# Patient Record
Sex: FEMALE | Race: WHITE | HISPANIC OR LATINO | Employment: OTHER | URBAN - METROPOLITAN AREA
[De-identification: names, ages, dates, MRNs, and addresses within clinical notes are randomized per-mention and may not be internally consistent; named-entity substitution may affect disease eponyms.]

---

## 2017-01-01 ENCOUNTER — HOME CARE VISIT (OUTPATIENT)
Dept: HOME HEALTH SERVICES | Facility: HOME HEALTHCARE | Age: 47
End: 2017-01-01
Payer: MEDICARE

## 2017-01-01 ENCOUNTER — RESOLUTE PROFESSIONAL BILLING HOSPITAL PROF FEE (OUTPATIENT)
Dept: HOSPITALIST | Facility: MEDICAL CENTER | Age: 47
End: 2017-01-01
Payer: MEDICARE

## 2017-01-01 ENCOUNTER — HOSPITAL ENCOUNTER (INPATIENT)
Facility: MEDICAL CENTER | Age: 47
LOS: 6 days | DRG: 974 | End: 2017-05-14
Attending: EMERGENCY MEDICINE | Admitting: HOSPITALIST
Payer: MEDICARE

## 2017-01-01 ENCOUNTER — PATIENT OUTREACH (OUTPATIENT)
Dept: HEALTH INFORMATION MANAGEMENT | Facility: OTHER | Age: 47
End: 2017-01-01

## 2017-01-01 ENCOUNTER — RESOLUTE PROFESSIONAL BILLING HOSPITAL PROF FEE (OUTPATIENT)
Dept: HOSPITALIST | Facility: MEDICAL CENTER | Age: 47
End: 2017-01-01
Payer: MEDICAID

## 2017-01-01 ENCOUNTER — APPOINTMENT (OUTPATIENT)
Dept: RADIOLOGY | Facility: MEDICAL CENTER | Age: 47
DRG: 974 | End: 2017-01-01
Attending: INTERNAL MEDICINE
Payer: MEDICARE

## 2017-01-01 ENCOUNTER — HOSPITAL ENCOUNTER (INPATIENT)
Facility: MEDICAL CENTER | Age: 47
LOS: 8 days | DRG: 977 | End: 2017-12-19
Attending: EMERGENCY MEDICINE | Admitting: INTERNAL MEDICINE
Payer: MEDICARE

## 2017-01-01 ENCOUNTER — HOME HEALTH ADMISSION (OUTPATIENT)
Dept: HOME HEALTH SERVICES | Facility: HOME HEALTHCARE | Age: 47
End: 2017-01-01
Payer: MEDICARE

## 2017-01-01 ENCOUNTER — APPOINTMENT (OUTPATIENT)
Dept: RADIOLOGY | Facility: MEDICAL CENTER | Age: 47
DRG: 917 | End: 2017-01-01
Attending: INTERNAL MEDICINE
Payer: MEDICARE

## 2017-01-01 ENCOUNTER — APPOINTMENT (OUTPATIENT)
Dept: RADIOLOGY | Facility: MEDICAL CENTER | Age: 47
DRG: 314 | End: 2017-01-01
Attending: EMERGENCY MEDICINE
Payer: MEDICARE

## 2017-01-01 ENCOUNTER — TELEPHONE (OUTPATIENT)
Dept: NEPHROLOGY | Facility: MEDICAL CENTER | Age: 47
End: 2017-01-01

## 2017-01-01 ENCOUNTER — APPOINTMENT (OUTPATIENT)
Dept: RADIOLOGY | Facility: MEDICAL CENTER | Age: 47
End: 2017-01-01
Attending: INTERNAL MEDICINE
Payer: MEDICAID

## 2017-01-01 ENCOUNTER — HOSPITAL ENCOUNTER (OUTPATIENT)
Facility: MEDICAL CENTER | Age: 47
End: 2017-05-06
Attending: EMERGENCY MEDICINE | Admitting: INTERNAL MEDICINE
Payer: MEDICARE

## 2017-01-01 ENCOUNTER — HOSPITAL ENCOUNTER (INPATIENT)
Facility: MEDICAL CENTER | Age: 47
LOS: 4 days | DRG: 974 | End: 2017-12-01
Attending: EMERGENCY MEDICINE | Admitting: INTERNAL MEDICINE
Payer: MEDICARE

## 2017-01-01 ENCOUNTER — HOME CARE VISIT (OUTPATIENT)
Dept: HOME HEALTH SERVICES | Facility: HOME HEALTHCARE | Age: 47
End: 2017-01-01

## 2017-01-01 ENCOUNTER — HOSPITAL ENCOUNTER (OUTPATIENT)
Facility: MEDICAL CENTER | Age: 47
End: 2017-01-24
Attending: INTERNAL MEDICINE
Payer: MEDICARE

## 2017-01-01 ENCOUNTER — APPOINTMENT (OUTPATIENT)
Dept: RADIOLOGY | Facility: MEDICAL CENTER | Age: 47
DRG: 917 | End: 2017-01-01
Attending: EMERGENCY MEDICINE
Payer: MEDICARE

## 2017-01-01 ENCOUNTER — HOSPITAL ENCOUNTER (OUTPATIENT)
Dept: LAB | Facility: MEDICAL CENTER | Age: 47
End: 2017-09-25
Attending: NURSE PRACTITIONER
Payer: MEDICAID

## 2017-01-01 ENCOUNTER — HOME HEALTH ADMISSION (OUTPATIENT)
Dept: HOME HEALTH SERVICES | Facility: HOME HEALTHCARE | Age: 47
End: 2017-01-01
Payer: MEDICAID

## 2017-01-01 ENCOUNTER — APPOINTMENT (OUTPATIENT)
Dept: RADIOLOGY | Facility: MEDICAL CENTER | Age: 47
DRG: 977 | End: 2017-01-01
Attending: EMERGENCY MEDICINE
Payer: MEDICARE

## 2017-01-01 ENCOUNTER — HOSPITAL ENCOUNTER (OUTPATIENT)
Facility: MEDICAL CENTER | Age: 47
End: 2017-09-26
Attending: INTERNAL MEDICINE | Admitting: INTERNAL MEDICINE
Payer: MEDICAID

## 2017-01-01 ENCOUNTER — APPOINTMENT (OUTPATIENT)
Dept: RADIOLOGY | Facility: MEDICAL CENTER | Age: 47
DRG: 974 | End: 2017-01-01
Attending: HOSPITALIST
Payer: MEDICARE

## 2017-01-01 ENCOUNTER — HOSPITAL ENCOUNTER (OUTPATIENT)
Facility: MEDICAL CENTER | Age: 47
End: 2017-04-30
Attending: EMERGENCY MEDICINE | Admitting: INTERNAL MEDICINE
Payer: MEDICARE

## 2017-01-01 ENCOUNTER — HOSPITAL ENCOUNTER (INPATIENT)
Facility: MEDICAL CENTER | Age: 47
LOS: 4 days | DRG: 917 | End: 2017-06-07
Attending: EMERGENCY MEDICINE | Admitting: INTERNAL MEDICINE
Payer: MEDICARE

## 2017-01-01 ENCOUNTER — HOSPITAL ENCOUNTER (INPATIENT)
Facility: MEDICAL CENTER | Age: 47
LOS: 1 days | DRG: 974 | End: 2017-06-11
Attending: EMERGENCY MEDICINE | Admitting: HOSPITALIST
Payer: MEDICARE

## 2017-01-01 ENCOUNTER — APPOINTMENT (OUTPATIENT)
Dept: RADIOLOGY | Facility: MEDICAL CENTER | Age: 47
End: 2017-01-01
Attending: EMERGENCY MEDICINE
Payer: MEDICARE

## 2017-01-01 ENCOUNTER — HOSPITAL ENCOUNTER (INPATIENT)
Facility: MEDICAL CENTER | Age: 47
LOS: 7 days | DRG: 974 | End: 2017-06-27
Attending: EMERGENCY MEDICINE | Admitting: HOSPITALIST
Payer: MEDICARE

## 2017-01-01 ENCOUNTER — APPOINTMENT (OUTPATIENT)
Dept: RADIOLOGY | Facility: MEDICAL CENTER | Age: 47
DRG: 974 | End: 2017-01-01
Attending: EMERGENCY MEDICINE
Payer: MEDICARE

## 2017-01-01 ENCOUNTER — APPOINTMENT (OUTPATIENT)
Dept: RADIOLOGY | Facility: MEDICAL CENTER | Age: 47
DRG: 314 | End: 2017-01-01
Attending: INTERNAL MEDICINE
Payer: MEDICARE

## 2017-01-01 ENCOUNTER — HOSPITAL ENCOUNTER (INPATIENT)
Facility: MEDICAL CENTER | Age: 47
LOS: 8 days | DRG: 314 | End: 2017-08-07
Attending: EMERGENCY MEDICINE | Admitting: HOSPITALIST
Payer: MEDICARE

## 2017-01-01 VITALS
BODY MASS INDEX: 19.38 KG/M2 | TEMPERATURE: 99.1 F | WEIGHT: 120 LBS | SYSTOLIC BLOOD PRESSURE: 130 MMHG | HEART RATE: 72 BPM | RESPIRATION RATE: 18 BRPM | DIASTOLIC BLOOD PRESSURE: 70 MMHG

## 2017-01-01 VITALS
SYSTOLIC BLOOD PRESSURE: 132 MMHG | DIASTOLIC BLOOD PRESSURE: 68 MMHG | TEMPERATURE: 99.3 F | RESPIRATION RATE: 16 BRPM | HEART RATE: 84 BPM

## 2017-01-01 VITALS
RESPIRATION RATE: 20 BRPM | TEMPERATURE: 97.2 F | SYSTOLIC BLOOD PRESSURE: 152 MMHG | DIASTOLIC BLOOD PRESSURE: 86 MMHG | HEART RATE: 97 BPM

## 2017-01-01 VITALS
WEIGHT: 120.15 LBS | HEIGHT: 66 IN | DIASTOLIC BLOOD PRESSURE: 72 MMHG | RESPIRATION RATE: 22 BRPM | TEMPERATURE: 97.8 F | BODY MASS INDEX: 19.31 KG/M2 | SYSTOLIC BLOOD PRESSURE: 140 MMHG | HEART RATE: 78 BPM | OXYGEN SATURATION: 100 %

## 2017-01-01 VITALS
BODY MASS INDEX: 17.67 KG/M2 | SYSTOLIC BLOOD PRESSURE: 118 MMHG | DIASTOLIC BLOOD PRESSURE: 68 MMHG | RESPIRATION RATE: 16 BRPM | WEIGHT: 109.4 LBS | HEART RATE: 94 BPM | TEMPERATURE: 99.4 F

## 2017-01-01 VITALS
BODY MASS INDEX: 17.5 KG/M2 | WEIGHT: 108.91 LBS | RESPIRATION RATE: 18 BRPM | SYSTOLIC BLOOD PRESSURE: 178 MMHG | OXYGEN SATURATION: 98 % | TEMPERATURE: 96.7 F | HEART RATE: 87 BPM | DIASTOLIC BLOOD PRESSURE: 88 MMHG | HEIGHT: 66 IN

## 2017-01-01 VITALS
DIASTOLIC BLOOD PRESSURE: 58 MMHG | RESPIRATION RATE: 18 BRPM | HEART RATE: 88 BPM | TEMPERATURE: 99.3 F | SYSTOLIC BLOOD PRESSURE: 124 MMHG

## 2017-01-01 VITALS
TEMPERATURE: 98.5 F | RESPIRATION RATE: 18 BRPM | DIASTOLIC BLOOD PRESSURE: 70 MMHG | SYSTOLIC BLOOD PRESSURE: 150 MMHG | HEART RATE: 87 BPM

## 2017-01-01 VITALS
SYSTOLIC BLOOD PRESSURE: 126 MMHG | HEART RATE: 82 BPM | RESPIRATION RATE: 16 BRPM | TEMPERATURE: 98.7 F | DIASTOLIC BLOOD PRESSURE: 74 MMHG

## 2017-01-01 VITALS
TEMPERATURE: 97.7 F | SYSTOLIC BLOOD PRESSURE: 140 MMHG | HEART RATE: 85 BPM | RESPIRATION RATE: 16 BRPM | DIASTOLIC BLOOD PRESSURE: 78 MMHG

## 2017-01-01 VITALS
RESPIRATION RATE: 19 BRPM | TEMPERATURE: 98 F | HEIGHT: 66 IN | DIASTOLIC BLOOD PRESSURE: 74 MMHG | SYSTOLIC BLOOD PRESSURE: 128 MMHG

## 2017-01-01 VITALS
TEMPERATURE: 208.2 F | SYSTOLIC BLOOD PRESSURE: 130 MMHG | HEART RATE: 87 BPM | HEART RATE: 87 BPM | RESPIRATION RATE: 16 BRPM | SYSTOLIC BLOOD PRESSURE: 130 MMHG | DIASTOLIC BLOOD PRESSURE: 68 MMHG | DIASTOLIC BLOOD PRESSURE: 68 MMHG | TEMPERATURE: 208.2 F | RESPIRATION RATE: 16 BRPM

## 2017-01-01 VITALS
HEART RATE: 94 BPM | TEMPERATURE: 99.6 F | DIASTOLIC BLOOD PRESSURE: 76 MMHG | SYSTOLIC BLOOD PRESSURE: 142 MMHG | RESPIRATION RATE: 18 BRPM

## 2017-01-01 VITALS
DIASTOLIC BLOOD PRESSURE: 71 MMHG | WEIGHT: 109 LBS | RESPIRATION RATE: 20 BRPM | SYSTOLIC BLOOD PRESSURE: 121 MMHG | OXYGEN SATURATION: 94 % | TEMPERATURE: 97.2 F | HEART RATE: 90 BPM | HEIGHT: 66 IN | BODY MASS INDEX: 17.52 KG/M2

## 2017-01-01 VITALS
RESPIRATION RATE: 18 BRPM | HEART RATE: 88 BPM | TEMPERATURE: 98.6 F | DIASTOLIC BLOOD PRESSURE: 72 MMHG | SYSTOLIC BLOOD PRESSURE: 118 MMHG

## 2017-01-01 VITALS
HEART RATE: 90 BPM | DIASTOLIC BLOOD PRESSURE: 74 MMHG | TEMPERATURE: 98.7 F | RESPIRATION RATE: 18 BRPM | SYSTOLIC BLOOD PRESSURE: 132 MMHG

## 2017-01-01 VITALS
HEART RATE: 99 BPM | SYSTOLIC BLOOD PRESSURE: 140 MMHG | HEART RATE: 99 BPM | RESPIRATION RATE: 18 BRPM | SYSTOLIC BLOOD PRESSURE: 132 MMHG | DIASTOLIC BLOOD PRESSURE: 68 MMHG | RESPIRATION RATE: 18 BRPM | DIASTOLIC BLOOD PRESSURE: 80 MMHG | TEMPERATURE: 99.4 F | TEMPERATURE: 99.2 F

## 2017-01-01 VITALS
TEMPERATURE: 98.5 F | HEART RATE: 81 BPM | HEIGHT: 66 IN | DIASTOLIC BLOOD PRESSURE: 72 MMHG | RESPIRATION RATE: 16 BRPM | SYSTOLIC BLOOD PRESSURE: 132 MMHG

## 2017-01-01 VITALS
OXYGEN SATURATION: 100 % | WEIGHT: 106.92 LBS | RESPIRATION RATE: 18 BRPM | SYSTOLIC BLOOD PRESSURE: 118 MMHG | DIASTOLIC BLOOD PRESSURE: 70 MMHG | HEIGHT: 66 IN | TEMPERATURE: 98.1 F | HEART RATE: 82 BPM | BODY MASS INDEX: 17.18 KG/M2

## 2017-01-01 VITALS
TEMPERATURE: 99 F | HEIGHT: 66 IN | RESPIRATION RATE: 16 BRPM | SYSTOLIC BLOOD PRESSURE: 153 MMHG | BODY MASS INDEX: 17.68 KG/M2 | DIASTOLIC BLOOD PRESSURE: 96 MMHG | WEIGHT: 110 LBS

## 2017-01-01 VITALS
RESPIRATION RATE: 16 BRPM | HEART RATE: 79 BPM | SYSTOLIC BLOOD PRESSURE: 120 MMHG | TEMPERATURE: 99.2 F | DIASTOLIC BLOOD PRESSURE: 78 MMHG

## 2017-01-01 VITALS
TEMPERATURE: 99.2 F | HEART RATE: 98 BPM | RESPIRATION RATE: 18 BRPM | SYSTOLIC BLOOD PRESSURE: 126 MMHG | DIASTOLIC BLOOD PRESSURE: 78 MMHG

## 2017-01-01 VITALS
HEART RATE: 99 BPM | DIASTOLIC BLOOD PRESSURE: 74 MMHG | RESPIRATION RATE: 18 BRPM | SYSTOLIC BLOOD PRESSURE: 130 MMHG | TEMPERATURE: 99.2 F

## 2017-01-01 VITALS
TEMPERATURE: 99.1 F | DIASTOLIC BLOOD PRESSURE: 62 MMHG | SYSTOLIC BLOOD PRESSURE: 128 MMHG | RESPIRATION RATE: 16 BRPM | HEART RATE: 80 BPM

## 2017-01-01 VITALS
OXYGEN SATURATION: 97 % | DIASTOLIC BLOOD PRESSURE: 78 MMHG | HEART RATE: 85 BPM | RESPIRATION RATE: 17 BRPM | SYSTOLIC BLOOD PRESSURE: 126 MMHG | HEIGHT: 66 IN | TEMPERATURE: 97.1 F | BODY MASS INDEX: 17.54 KG/M2 | WEIGHT: 109.13 LBS

## 2017-01-01 VITALS
SYSTOLIC BLOOD PRESSURE: 152 MMHG | HEART RATE: 97 BPM | DIASTOLIC BLOOD PRESSURE: 86 MMHG | RESPIRATION RATE: 20 BRPM | TEMPERATURE: 97.2 F

## 2017-01-01 VITALS
HEART RATE: 88 BPM | DIASTOLIC BLOOD PRESSURE: 72 MMHG | TEMPERATURE: 98.2 F | SYSTOLIC BLOOD PRESSURE: 138 MMHG | RESPIRATION RATE: 16 BRPM | SYSTOLIC BLOOD PRESSURE: 136 MMHG | DIASTOLIC BLOOD PRESSURE: 70 MMHG | TEMPERATURE: 99.6 F | HEART RATE: 80 BPM | RESPIRATION RATE: 18 BRPM

## 2017-01-01 VITALS
OXYGEN SATURATION: 98 % | HEART RATE: 104 BPM | BODY MASS INDEX: 18.64 KG/M2 | HEIGHT: 66 IN | RESPIRATION RATE: 19 BRPM | WEIGHT: 115.96 LBS | TEMPERATURE: 98.6 F | SYSTOLIC BLOOD PRESSURE: 174 MMHG | DIASTOLIC BLOOD PRESSURE: 99 MMHG

## 2017-01-01 VITALS
SYSTOLIC BLOOD PRESSURE: 114 MMHG | RESPIRATION RATE: 16 BRPM | HEART RATE: 89 BPM | TEMPERATURE: 98.8 F | DIASTOLIC BLOOD PRESSURE: 62 MMHG

## 2017-01-01 VITALS
SYSTOLIC BLOOD PRESSURE: 126 MMHG | RESPIRATION RATE: 16 BRPM | DIASTOLIC BLOOD PRESSURE: 74 MMHG | HEART RATE: 82 BPM | TEMPERATURE: 98.7 F

## 2017-01-01 VITALS
HEIGHT: 65 IN | BODY MASS INDEX: 19.32 KG/M2 | RESPIRATION RATE: 16 BRPM | OXYGEN SATURATION: 99 % | WEIGHT: 115.96 LBS | DIASTOLIC BLOOD PRESSURE: 86 MMHG | HEART RATE: 83 BPM | SYSTOLIC BLOOD PRESSURE: 161 MMHG | TEMPERATURE: 98 F

## 2017-01-01 VITALS
RESPIRATION RATE: 16 BRPM | DIASTOLIC BLOOD PRESSURE: 72 MMHG | BODY MASS INDEX: 16.21 KG/M2 | HEART RATE: 88 BPM | WEIGHT: 100.4 LBS | SYSTOLIC BLOOD PRESSURE: 132 MMHG | TEMPERATURE: 97.8 F

## 2017-01-01 VITALS
SYSTOLIC BLOOD PRESSURE: 132 MMHG | BODY MASS INDEX: 17.12 KG/M2 | HEART RATE: 98 BPM | DIASTOLIC BLOOD PRESSURE: 72 MMHG | TEMPERATURE: 99.3 F | WEIGHT: 106 LBS | RESPIRATION RATE: 18 BRPM

## 2017-01-01 VITALS
SYSTOLIC BLOOD PRESSURE: 132 MMHG | DIASTOLIC BLOOD PRESSURE: 62 MMHG | TEMPERATURE: 98.6 F | HEART RATE: 91 BPM | RESPIRATION RATE: 18 BRPM

## 2017-01-01 VITALS
HEIGHT: 66 IN | OXYGEN SATURATION: 100 % | BODY MASS INDEX: 19.27 KG/M2 | WEIGHT: 119.93 LBS | HEART RATE: 94 BPM | TEMPERATURE: 98.2 F | RESPIRATION RATE: 18 BRPM | DIASTOLIC BLOOD PRESSURE: 74 MMHG | SYSTOLIC BLOOD PRESSURE: 156 MMHG

## 2017-01-01 VITALS
SYSTOLIC BLOOD PRESSURE: 116 MMHG | RESPIRATION RATE: 16 BRPM | TEMPERATURE: 97.9 F | DIASTOLIC BLOOD PRESSURE: 70 MMHG | HEART RATE: 64 BPM

## 2017-01-01 VITALS
HEART RATE: 93 BPM | TEMPERATURE: 98.8 F | SYSTOLIC BLOOD PRESSURE: 136 MMHG | DIASTOLIC BLOOD PRESSURE: 70 MMHG | RESPIRATION RATE: 20 BRPM

## 2017-01-01 VITALS
RESPIRATION RATE: 18 BRPM | DIASTOLIC BLOOD PRESSURE: 60 MMHG | SYSTOLIC BLOOD PRESSURE: 140 MMHG | TEMPERATURE: 98.5 F | HEART RATE: 92 BPM

## 2017-01-01 VITALS
RESPIRATION RATE: 16 BRPM | SYSTOLIC BLOOD PRESSURE: 138 MMHG | DIASTOLIC BLOOD PRESSURE: 60 MMHG | HEART RATE: 105 BPM | TEMPERATURE: 100.3 F

## 2017-01-01 VITALS
RESPIRATION RATE: 16 BRPM | DIASTOLIC BLOOD PRESSURE: 68 MMHG | SYSTOLIC BLOOD PRESSURE: 122 MMHG | TEMPERATURE: 99.1 F | HEART RATE: 87 BPM

## 2017-01-01 VITALS
HEART RATE: 91 BPM | WEIGHT: 119.05 LBS | SYSTOLIC BLOOD PRESSURE: 140 MMHG | BODY MASS INDEX: 19.22 KG/M2 | TEMPERATURE: 98.6 F | RESPIRATION RATE: 16 BRPM | DIASTOLIC BLOOD PRESSURE: 80 MMHG

## 2017-01-01 VITALS
HEART RATE: 95 BPM | WEIGHT: 106.04 LBS | BODY MASS INDEX: 17.04 KG/M2 | DIASTOLIC BLOOD PRESSURE: 62 MMHG | TEMPERATURE: 97.5 F | OXYGEN SATURATION: 100 % | SYSTOLIC BLOOD PRESSURE: 130 MMHG | HEIGHT: 66 IN | RESPIRATION RATE: 18 BRPM

## 2017-01-01 VITALS
SYSTOLIC BLOOD PRESSURE: 130 MMHG | TEMPERATURE: 99.2 F | HEART RATE: 99 BPM | RESPIRATION RATE: 18 BRPM | DIASTOLIC BLOOD PRESSURE: 74 MMHG

## 2017-01-01 VITALS
DIASTOLIC BLOOD PRESSURE: 60 MMHG | SYSTOLIC BLOOD PRESSURE: 100 MMHG | TEMPERATURE: 98.3 F | RESPIRATION RATE: 18 BRPM | HEART RATE: 97 BPM

## 2017-01-01 VITALS
RESPIRATION RATE: 18 BRPM | SYSTOLIC BLOOD PRESSURE: 118 MMHG | DIASTOLIC BLOOD PRESSURE: 72 MMHG | HEART RATE: 88 BPM | TEMPERATURE: 209.5 F

## 2017-01-01 VITALS
TEMPERATURE: 99.7 F | BODY MASS INDEX: 19.22 KG/M2 | RESPIRATION RATE: 18 BRPM | DIASTOLIC BLOOD PRESSURE: 70 MMHG | WEIGHT: 119.05 LBS | SYSTOLIC BLOOD PRESSURE: 150 MMHG | HEART RATE: 101 BPM

## 2017-01-01 VITALS
DIASTOLIC BLOOD PRESSURE: 72 MMHG | HEART RATE: 96 BPM | SYSTOLIC BLOOD PRESSURE: 130 MMHG | RESPIRATION RATE: 16 BRPM | TEMPERATURE: 208.6 F

## 2017-01-01 VITALS
DIASTOLIC BLOOD PRESSURE: 70 MMHG | SYSTOLIC BLOOD PRESSURE: 136 MMHG | TEMPERATURE: 98.2 F | HEART RATE: 80 BPM | RESPIRATION RATE: 16 BRPM

## 2017-01-01 VITALS
DIASTOLIC BLOOD PRESSURE: 84 MMHG | HEART RATE: 96 BPM | RESPIRATION RATE: 24 BRPM | TEMPERATURE: 98.4 F | SYSTOLIC BLOOD PRESSURE: 162 MMHG

## 2017-01-01 VITALS
TEMPERATURE: 97.3 F | HEART RATE: 86 BPM | SYSTOLIC BLOOD PRESSURE: 120 MMHG | RESPIRATION RATE: 18 BRPM | DIASTOLIC BLOOD PRESSURE: 70 MMHG

## 2017-01-01 VITALS
SYSTOLIC BLOOD PRESSURE: 132 MMHG | DIASTOLIC BLOOD PRESSURE: 78 MMHG | TEMPERATURE: 99.1 F | HEART RATE: 91 BPM | RESPIRATION RATE: 18 BRPM

## 2017-01-01 VITALS
HEART RATE: 91 BPM | TEMPERATURE: 99 F | SYSTOLIC BLOOD PRESSURE: 150 MMHG | RESPIRATION RATE: 20 BRPM | DIASTOLIC BLOOD PRESSURE: 82 MMHG

## 2017-01-01 VITALS
RESPIRATION RATE: 17 BRPM | SYSTOLIC BLOOD PRESSURE: 128 MMHG | HEIGHT: 66 IN | DIASTOLIC BLOOD PRESSURE: 64 MMHG | TEMPERATURE: 99.1 F

## 2017-01-01 VITALS — BODY MASS INDEX: 19.29 KG/M2 | HEIGHT: 66 IN | WEIGHT: 120 LBS

## 2017-01-01 VITALS
SYSTOLIC BLOOD PRESSURE: 120 MMHG | RESPIRATION RATE: 16 BRPM | DIASTOLIC BLOOD PRESSURE: 78 MMHG | HEART RATE: 79 BPM | TEMPERATURE: 99.2 F

## 2017-01-01 VITALS
RESPIRATION RATE: 18 BRPM | SYSTOLIC BLOOD PRESSURE: 140 MMHG | DIASTOLIC BLOOD PRESSURE: 80 MMHG | TEMPERATURE: 99.1 F | HEART RATE: 100 BPM

## 2017-01-01 VITALS
SYSTOLIC BLOOD PRESSURE: 140 MMHG | DIASTOLIC BLOOD PRESSURE: 76 MMHG | TEMPERATURE: 97.7 F | HEART RATE: 96 BPM | RESPIRATION RATE: 16 BRPM

## 2017-01-01 VITALS
SYSTOLIC BLOOD PRESSURE: 130 MMHG | DIASTOLIC BLOOD PRESSURE: 78 MMHG | HEART RATE: 82 BPM | RESPIRATION RATE: 18 BRPM | TEMPERATURE: 99.1 F

## 2017-01-01 VITALS
DIASTOLIC BLOOD PRESSURE: 72 MMHG | TEMPERATURE: 98.1 F | SYSTOLIC BLOOD PRESSURE: 120 MMHG | HEART RATE: 90 BPM | RESPIRATION RATE: 16 BRPM

## 2017-01-01 VITALS
DIASTOLIC BLOOD PRESSURE: 78 MMHG | SYSTOLIC BLOOD PRESSURE: 145 MMHG | RESPIRATION RATE: 18 BRPM | TEMPERATURE: 98.6 F | HEART RATE: 79 BPM

## 2017-01-01 DIAGNOSIS — R53.1 WEAKNESS: ICD-10-CM

## 2017-01-01 DIAGNOSIS — E11.9 DIABETES MELLITUS WITHOUT COMPLICATION (HCC): ICD-10-CM

## 2017-01-01 DIAGNOSIS — I10 HYPERTENSION, UNSPECIFIED TYPE: ICD-10-CM

## 2017-01-01 DIAGNOSIS — N30.90 CYSTITIS: ICD-10-CM

## 2017-01-01 DIAGNOSIS — R62.7 FTT (FAILURE TO THRIVE) IN ADULT: ICD-10-CM

## 2017-01-01 DIAGNOSIS — E86.0 DEHYDRATION: ICD-10-CM

## 2017-01-01 DIAGNOSIS — R50.81 NEUTROPENIC FEVER (HCC): ICD-10-CM

## 2017-01-01 DIAGNOSIS — D50.8 OTHER IRON DEFICIENCY ANEMIA: ICD-10-CM

## 2017-01-01 DIAGNOSIS — E43 SEVERE PROTEIN-CALORIE MALNUTRITION (HCC): ICD-10-CM

## 2017-01-01 DIAGNOSIS — N18.6 ESRD (END STAGE RENAL DISEASE) (HCC): ICD-10-CM

## 2017-01-01 DIAGNOSIS — A41.9 SEPSIS, DUE TO UNSPECIFIED ORGANISM: ICD-10-CM

## 2017-01-01 DIAGNOSIS — N18.6 ESRD ON DIALYSIS (HCC): ICD-10-CM

## 2017-01-01 DIAGNOSIS — E88.A HIV WASTING SYNDROME (HCC): ICD-10-CM

## 2017-01-01 DIAGNOSIS — B20 AIDS (ACQUIRED IMMUNE DEFICIENCY SYNDROME) (HCC): ICD-10-CM

## 2017-01-01 DIAGNOSIS — E87.1 HYPONATREMIA: ICD-10-CM

## 2017-01-01 DIAGNOSIS — R56.9 SEIZURE (HCC): ICD-10-CM

## 2017-01-01 DIAGNOSIS — B20 HIV WASTING SYNDROME (HCC): Chronic | ICD-10-CM

## 2017-01-01 DIAGNOSIS — A60.04 HERPES SIMPLEX VULVOVAGINITIS: Chronic | ICD-10-CM

## 2017-01-01 DIAGNOSIS — N18.6 END STAGE RENAL DISEASE (HCC): ICD-10-CM

## 2017-01-01 DIAGNOSIS — E46 MALNUTRITION (HCC): ICD-10-CM

## 2017-01-01 DIAGNOSIS — B20 HIV WASTING SYNDROME (HCC): ICD-10-CM

## 2017-01-01 DIAGNOSIS — M54.9 PAIN, UPPER BACK: ICD-10-CM

## 2017-01-01 DIAGNOSIS — J18.9 COMMUNITY ACQUIRED PNEUMONIA, UNSPECIFIED LATERALITY: ICD-10-CM

## 2017-01-01 DIAGNOSIS — J18.9 HCAP (HEALTHCARE-ASSOCIATED PNEUMONIA): ICD-10-CM

## 2017-01-01 DIAGNOSIS — R11.2 NAUSEA AND VOMITING, INTRACTABILITY OF VOMITING NOT SPECIFIED, UNSPECIFIED VOMITING TYPE: ICD-10-CM

## 2017-01-01 DIAGNOSIS — D64.9 ANEMIA, UNSPECIFIED TYPE: ICD-10-CM

## 2017-01-01 DIAGNOSIS — I10 HTN (HYPERTENSION), BENIGN: ICD-10-CM

## 2017-01-01 DIAGNOSIS — R11.2 INTRACTABLE VOMITING WITH NAUSEA, UNSPECIFIED VOMITING TYPE: ICD-10-CM

## 2017-01-01 DIAGNOSIS — B37.0 THRUSH: ICD-10-CM

## 2017-01-01 DIAGNOSIS — B20 HIV (HUMAN IMMUNODEFICIENCY VIRUS INFECTION) (HCC): ICD-10-CM

## 2017-01-01 DIAGNOSIS — D70.9 NEUTROPENIC FEVER (HCC): ICD-10-CM

## 2017-01-01 DIAGNOSIS — N18.6 ESRD NEEDING DIALYSIS (HCC): ICD-10-CM

## 2017-01-01 DIAGNOSIS — D70.9 NEUTROPENIA, UNSPECIFIED TYPE (HCC): ICD-10-CM

## 2017-01-01 DIAGNOSIS — Z99.2 ESRD ON DIALYSIS (HCC): ICD-10-CM

## 2017-01-01 DIAGNOSIS — N18.6 ESRD (END STAGE RENAL DISEASE) (HCC): Chronic | ICD-10-CM

## 2017-01-01 DIAGNOSIS — R10.84 GENERALIZED ABDOMINAL PAIN: ICD-10-CM

## 2017-01-01 DIAGNOSIS — E11.29 TYPE 2 DIABETES MELLITUS WITH OTHER KIDNEY COMPLICATION: ICD-10-CM

## 2017-01-01 DIAGNOSIS — L02.419 ABSCESS OF THIGH: ICD-10-CM

## 2017-01-01 DIAGNOSIS — R13.10 ODYNOPHAGIA: ICD-10-CM

## 2017-01-01 DIAGNOSIS — Z99.2 ESRD NEEDING DIALYSIS (HCC): ICD-10-CM

## 2017-01-01 DIAGNOSIS — E88.A HIV WASTING SYNDROME (HCC): Chronic | ICD-10-CM

## 2017-01-01 DIAGNOSIS — B37.81 ESOPHAGEAL CANDIDIASIS (HCC): ICD-10-CM

## 2017-01-01 DIAGNOSIS — R56.9 NEW ONSET SEIZURE (HCC): ICD-10-CM

## 2017-01-01 LAB
ABO GROUP BLD: NORMAL
ALBUMIN SERPL BCP-MCNC: 1.4 G/DL (ref 3.2–4.9)
ALBUMIN SERPL BCP-MCNC: 1.5 G/DL (ref 3.2–4.9)
ALBUMIN SERPL BCP-MCNC: 1.6 G/DL (ref 3.2–4.9)
ALBUMIN SERPL BCP-MCNC: 1.8 G/DL (ref 3.2–4.9)
ALBUMIN SERPL BCP-MCNC: 1.9 G/DL (ref 3.2–4.9)
ALBUMIN SERPL BCP-MCNC: 2 G/DL (ref 3.2–4.9)
ALBUMIN SERPL BCP-MCNC: 2.1 G/DL (ref 3.2–4.9)
ALBUMIN SERPL BCP-MCNC: 2.1 G/DL (ref 3.2–4.9)
ALBUMIN SERPL BCP-MCNC: 2.2 G/DL (ref 3.2–4.9)
ALBUMIN SERPL BCP-MCNC: 2.6 G/DL (ref 3.2–4.9)
ALBUMIN SERPL BCP-MCNC: 2.8 G/DL (ref 3.2–4.9)
ALBUMIN/GLOB SERPL: 0.3 G/DL
ALBUMIN/GLOB SERPL: 0.4 G/DL
ALBUMIN/GLOB SERPL: 0.5 G/DL
ALBUMIN/GLOB SERPL: 0.6 G/DL
ALBUMIN/GLOB SERPL: 0.7 G/DL
ALBUMIN/GLOB SERPL: 0.7 G/DL
ALP SERPL-CCNC: 101 U/L (ref 30–99)
ALP SERPL-CCNC: 104 U/L (ref 30–99)
ALP SERPL-CCNC: 104 U/L (ref 30–99)
ALP SERPL-CCNC: 116 U/L (ref 30–99)
ALP SERPL-CCNC: 126 U/L (ref 30–99)
ALP SERPL-CCNC: 140 U/L (ref 30–99)
ALP SERPL-CCNC: 140 U/L (ref 30–99)
ALP SERPL-CCNC: 169 U/L (ref 30–99)
ALP SERPL-CCNC: 184 U/L (ref 30–99)
ALP SERPL-CCNC: 213 U/L (ref 30–99)
ALP SERPL-CCNC: 216 U/L (ref 30–99)
ALP SERPL-CCNC: 223 U/L (ref 30–99)
ALP SERPL-CCNC: 231 U/L (ref 30–99)
ALP SERPL-CCNC: 248 U/L (ref 30–99)
ALP SERPL-CCNC: 285 U/L (ref 30–99)
ALP SERPL-CCNC: 79 U/L (ref 30–99)
ALP SERPL-CCNC: 93 U/L (ref 30–99)
ALP SERPL-CCNC: 97 U/L (ref 30–99)
ALT SERPL-CCNC: 10 U/L (ref 2–50)
ALT SERPL-CCNC: 11 U/L (ref 2–50)
ALT SERPL-CCNC: 11 U/L (ref 2–50)
ALT SERPL-CCNC: 12 U/L (ref 2–50)
ALT SERPL-CCNC: 12 U/L (ref 2–50)
ALT SERPL-CCNC: 13 U/L (ref 2–50)
ALT SERPL-CCNC: 13 U/L (ref 2–50)
ALT SERPL-CCNC: 14 U/L (ref 2–50)
ALT SERPL-CCNC: 17 U/L (ref 2–50)
ALT SERPL-CCNC: 31 U/L (ref 2–50)
ALT SERPL-CCNC: 40 U/L (ref 2–50)
ALT SERPL-CCNC: 5 U/L (ref 2–50)
ALT SERPL-CCNC: 6 U/L (ref 2–50)
ALT SERPL-CCNC: 8 U/L (ref 2–50)
ALT SERPL-CCNC: 8 U/L (ref 2–50)
ALT SERPL-CCNC: 9 U/L (ref 2–50)
ALT SERPL-CCNC: 9 U/L (ref 2–50)
ALT SERPL-CCNC: <5 U/L (ref 2–50)
AMMONIA PLAS-SCNC: 26 UMOL/L (ref 11–45)
AMORPH CRY #/AREA URNS HPF: PRESENT /HPF
ANION GAP SERPL CALC-SCNC: 10 MMOL/L (ref 0–11.9)
ANION GAP SERPL CALC-SCNC: 10 MMOL/L (ref 0–11.9)
ANION GAP SERPL CALC-SCNC: 11 MMOL/L (ref 0–11.9)
ANION GAP SERPL CALC-SCNC: 12 MMOL/L (ref 0–11.9)
ANION GAP SERPL CALC-SCNC: 3 MMOL/L (ref 0–11.9)
ANION GAP SERPL CALC-SCNC: 3 MMOL/L (ref 0–11.9)
ANION GAP SERPL CALC-SCNC: 4 MMOL/L (ref 0–11.9)
ANION GAP SERPL CALC-SCNC: 5 MMOL/L (ref 0–11.9)
ANION GAP SERPL CALC-SCNC: 6 MMOL/L (ref 0–11.9)
ANION GAP SERPL CALC-SCNC: 7 MMOL/L (ref 0–11.9)
ANION GAP SERPL CALC-SCNC: 8 MMOL/L (ref 0–11.9)
ANION GAP SERPL CALC-SCNC: 9 MMOL/L (ref 0–11.9)
ANISOCYTOSIS BLD QL SMEAR: ABNORMAL
ANNOTATION COMMENT IMP: ABNORMAL
APPEARANCE UR: ABNORMAL
APPEARANCE UR: ABNORMAL
APPEARANCE UR: CLEAR
APPEARANCE UR: CLEAR
APTT PPP: 32 SEC (ref 24.7–36)
APTT PPP: 32.6 SEC (ref 24.7–36)
APTT PPP: 43.4 SEC (ref 24.7–36)
AST SERPL-CCNC: 11 U/L (ref 12–45)
AST SERPL-CCNC: 15 U/L (ref 12–45)
AST SERPL-CCNC: 15 U/L (ref 12–45)
AST SERPL-CCNC: 16 U/L (ref 12–45)
AST SERPL-CCNC: 17 U/L (ref 12–45)
AST SERPL-CCNC: 18 U/L (ref 12–45)
AST SERPL-CCNC: 18 U/L (ref 12–45)
AST SERPL-CCNC: 19 U/L (ref 12–45)
AST SERPL-CCNC: 20 U/L (ref 12–45)
AST SERPL-CCNC: 20 U/L (ref 12–45)
AST SERPL-CCNC: 21 U/L (ref 12–45)
AST SERPL-CCNC: 22 U/L (ref 12–45)
AST SERPL-CCNC: 24 U/L (ref 12–45)
AST SERPL-CCNC: 25 U/L (ref 12–45)
AST SERPL-CCNC: 33 U/L (ref 12–45)
AST SERPL-CCNC: 41 U/L (ref 12–45)
AST SERPL-CCNC: 43 U/L (ref 12–45)
AST SERPL-CCNC: 66 U/L (ref 12–45)
BACTERIA #/AREA URNS HPF: ABNORMAL /HPF
BACTERIA #/AREA URNS HPF: NEGATIVE /HPF
BACTERIA BLD CULT: ABNORMAL
BACTERIA BLD CULT: NORMAL
BACTERIA UR CULT: ABNORMAL
BACTERIA UR CULT: NORMAL
BACTERIA UR CULT: NORMAL
BARCODED ABORH UBTYP: 5100
BARCODED ABORH UBTYP: 5100
BARCODED PRD CODE UBPRD: NORMAL
BARCODED PRD CODE UBPRD: NORMAL
BARCODED UNIT NUM UBUNT: NORMAL
BARCODED UNIT NUM UBUNT: NORMAL
BASO STIPL BLD QL SMEAR: NORMAL
BASOPHILS # BLD AUTO: 0 % (ref 0–1.8)
BASOPHILS # BLD AUTO: 0.2 % (ref 0–1.8)
BASOPHILS # BLD AUTO: 0.3 % (ref 0–1.8)
BASOPHILS # BLD AUTO: 0.3 % (ref 0–1.8)
BASOPHILS # BLD AUTO: 0.4 % (ref 0–1.8)
BASOPHILS # BLD AUTO: 0.5 % (ref 0–1.8)
BASOPHILS # BLD AUTO: 0.8 % (ref 0–1.8)
BASOPHILS # BLD AUTO: 0.9 % (ref 0–1.8)
BASOPHILS # BLD AUTO: 1.3 % (ref 0–1.8)
BASOPHILS # BLD AUTO: 1.7 % (ref 0–1.8)
BASOPHILS # BLD AUTO: 1.8 % (ref 0–1.8)
BASOPHILS # BLD AUTO: 11.7 % (ref 0–1.8)
BASOPHILS # BLD AUTO: 13 % (ref 0–1.8)
BASOPHILS # BLD AUTO: 2.7 % (ref 0–1.8)
BASOPHILS # BLD AUTO: 3 % (ref 0–1.8)
BASOPHILS # BLD AUTO: 3.5 % (ref 0–1.8)
BASOPHILS # BLD AUTO: 4.4 % (ref 0–1.8)
BASOPHILS # BLD AUTO: 4.9 % (ref 0–1.8)
BASOPHILS # BLD AUTO: 5.3 % (ref 0–1.8)
BASOPHILS # BLD AUTO: 5.4 % (ref 0–1.8)
BASOPHILS # BLD AUTO: 6.1 % (ref 0–1.8)
BASOPHILS # BLD AUTO: 8.7 % (ref 0–1.8)
BASOPHILS # BLD: 0 K/UL (ref 0–0.12)
BASOPHILS # BLD: 0.01 K/UL (ref 0–0.12)
BASOPHILS # BLD: 0.02 K/UL (ref 0–0.12)
BASOPHILS # BLD: 0.03 K/UL (ref 0–0.12)
BASOPHILS # BLD: 0.04 K/UL (ref 0–0.12)
BASOPHILS # BLD: 0.04 K/UL (ref 0–0.12)
BASOPHILS # BLD: 0.05 K/UL (ref 0–0.12)
BASOPHILS # BLD: 0.07 K/UL (ref 0–0.12)
BASOPHILS # BLD: 0.08 K/UL (ref 0–0.12)
BASOPHILS # BLD: 0.09 K/UL (ref 0–0.12)
BASOPHILS # BLD: 0.09 K/UL (ref 0–0.12)
BASOPHILS # BLD: 0.11 K/UL (ref 0–0.12)
BASOPHILS # BLD: 0.12 K/UL (ref 0–0.12)
BASOPHILS # BLD: 0.12 K/UL (ref 0–0.12)
BASOPHILS # BLD: 0.16 K/UL (ref 0–0.12)
BASOPHILS # BLD: 0.18 K/UL (ref 0–0.12)
BASOPHILS # BLD: 0.21 K/UL (ref 0–0.12)
BILIRUB SERPL-MCNC: 0.3 MG/DL (ref 0.1–1.5)
BILIRUB SERPL-MCNC: 0.4 MG/DL (ref 0.1–1.5)
BILIRUB SERPL-MCNC: 0.5 MG/DL (ref 0.1–1.5)
BILIRUB SERPL-MCNC: 0.6 MG/DL (ref 0.1–1.5)
BILIRUB SERPL-MCNC: 0.6 MG/DL (ref 0.1–1.5)
BILIRUB SERPL-MCNC: 0.7 MG/DL (ref 0.1–1.5)
BILIRUB SERPL-MCNC: 0.8 MG/DL (ref 0.1–1.5)
BILIRUB UR QL STRIP.AUTO: NEGATIVE
BLD GP AB SCN SERPL QL: NORMAL
BNP SERPL-MCNC: 536 PG/ML (ref 0–100)
BNP SERPL-MCNC: 899 PG/ML (ref 0–100)
BUN SERPL-MCNC: 10 MG/DL (ref 8–22)
BUN SERPL-MCNC: 11 MG/DL (ref 8–22)
BUN SERPL-MCNC: 13 MG/DL (ref 8–22)
BUN SERPL-MCNC: 14 MG/DL (ref 8–22)
BUN SERPL-MCNC: 14 MG/DL (ref 8–22)
BUN SERPL-MCNC: 15 MG/DL (ref 8–22)
BUN SERPL-MCNC: 16 MG/DL (ref 8–22)
BUN SERPL-MCNC: 16 MG/DL (ref 8–22)
BUN SERPL-MCNC: 17 MG/DL (ref 8–22)
BUN SERPL-MCNC: 18 MG/DL (ref 8–22)
BUN SERPL-MCNC: 19 MG/DL (ref 8–22)
BUN SERPL-MCNC: 20 MG/DL (ref 8–22)
BUN SERPL-MCNC: 23 MG/DL (ref 8–22)
BUN SERPL-MCNC: 24 MG/DL (ref 8–22)
BUN SERPL-MCNC: 25 MG/DL (ref 8–22)
BUN SERPL-MCNC: 26 MG/DL (ref 8–22)
BUN SERPL-MCNC: 28 MG/DL (ref 8–22)
BUN SERPL-MCNC: 28 MG/DL (ref 8–22)
BUN SERPL-MCNC: 29 MG/DL (ref 8–22)
BUN SERPL-MCNC: 31 MG/DL (ref 8–22)
BUN SERPL-MCNC: 32 MG/DL (ref 8–22)
BUN SERPL-MCNC: 34 MG/DL (ref 8–22)
BUN SERPL-MCNC: 36 MG/DL (ref 8–22)
BUN SERPL-MCNC: 37 MG/DL (ref 8–22)
BUN SERPL-MCNC: 38 MG/DL (ref 8–22)
BUN SERPL-MCNC: 38 MG/DL (ref 8–22)
BUN SERPL-MCNC: 39 MG/DL (ref 8–22)
BUN SERPL-MCNC: 40 MG/DL (ref 8–22)
BUN SERPL-MCNC: 40 MG/DL (ref 8–22)
BUN SERPL-MCNC: 44 MG/DL (ref 8–22)
BUN SERPL-MCNC: 45 MG/DL (ref 8–22)
BUN SERPL-MCNC: 45 MG/DL (ref 8–22)
BUN SERPL-MCNC: 46 MG/DL (ref 8–22)
BUN SERPL-MCNC: 53 MG/DL (ref 8–22)
C DIFF DNA SPEC QL NAA+PROBE: NEGATIVE
C DIFF TOX A+B STL QL IA: NEGATIVE
C DIFF TOX GENS STL QL NAA+PROBE: NEGATIVE
CALCIUM SERPL-MCNC: 10 MG/DL (ref 8.5–10.5)
CALCIUM SERPL-MCNC: 10.1 MG/DL (ref 8.5–10.5)
CALCIUM SERPL-MCNC: 10.3 MG/DL (ref 8.5–10.5)
CALCIUM SERPL-MCNC: 10.3 MG/DL (ref 8.5–10.5)
CALCIUM SERPL-MCNC: 6 MG/DL (ref 8.5–10.5)
CALCIUM SERPL-MCNC: 7.1 MG/DL (ref 8.5–10.5)
CALCIUM SERPL-MCNC: 7.2 MG/DL (ref 8.5–10.5)
CALCIUM SERPL-MCNC: 7.3 MG/DL (ref 8.4–10.2)
CALCIUM SERPL-MCNC: 7.3 MG/DL (ref 8.5–10.5)
CALCIUM SERPL-MCNC: 7.4 MG/DL (ref 8.4–10.2)
CALCIUM SERPL-MCNC: 7.4 MG/DL (ref 8.5–10.5)
CALCIUM SERPL-MCNC: 7.5 MG/DL (ref 8.4–10.2)
CALCIUM SERPL-MCNC: 7.5 MG/DL (ref 8.4–10.2)
CALCIUM SERPL-MCNC: 7.5 MG/DL (ref 8.5–10.5)
CALCIUM SERPL-MCNC: 7.6 MG/DL (ref 8.4–10.2)
CALCIUM SERPL-MCNC: 7.6 MG/DL (ref 8.5–10.5)
CALCIUM SERPL-MCNC: 7.7 MG/DL (ref 8.4–10.2)
CALCIUM SERPL-MCNC: 7.7 MG/DL (ref 8.5–10.5)
CALCIUM SERPL-MCNC: 7.7 MG/DL (ref 8.5–10.5)
CALCIUM SERPL-MCNC: 7.8 MG/DL (ref 8.4–10.2)
CALCIUM SERPL-MCNC: 7.8 MG/DL (ref 8.4–10.2)
CALCIUM SERPL-MCNC: 7.8 MG/DL (ref 8.5–10.5)
CALCIUM SERPL-MCNC: 7.9 MG/DL (ref 8.4–10.2)
CALCIUM SERPL-MCNC: 8 MG/DL (ref 8.4–10.2)
CALCIUM SERPL-MCNC: 8 MG/DL (ref 8.5–10.5)
CALCIUM SERPL-MCNC: 8.2 MG/DL (ref 8.5–10.5)
CALCIUM SERPL-MCNC: 8.6 MG/DL (ref 8.5–10.5)
CALCIUM SERPL-MCNC: 9.3 MG/DL (ref 8.5–10.5)
CALCIUM SERPL-MCNC: 9.4 MG/DL (ref 8.5–10.5)
CALCIUM SERPL-MCNC: 9.5 MG/DL (ref 8.5–10.5)
CALCIUM SERPL-MCNC: 9.7 MG/DL (ref 8.5–10.5)
CALCIUM SERPL-MCNC: 9.8 MG/DL (ref 8.5–10.5)
CD3 CELLS # BLD: 104 CELLS/UL (ref 570–2400)
CD3 CELLS # BLD: 212 CELLS/UL (ref 570–2400)
CD3 CELLS # BLD: 71 CELLS/UL (ref 570–2400)
CD3+CD4+ CELLS # BLD: 23 CELLS/UL (ref 430–1800)
CD3+CD4+ CELLS # BLD: 6 CELLS/UL (ref 430–1800)
CD3+CD4+ CELLS # BLD: 63 CELLS/UL (ref 430–1800)
CD3+CD4+ CELLS/CD3+CD8+ CLL BLD: 0.09 RATIO (ref 0.8–3.9)
CD3+CD4+ CELLS/CD3+CD8+ CLL BLD: 0.29 RATIO (ref 0.8–3.9)
CD3+CD4+ CELLS/CD3+CD8+ CLL BLD: 0.42 RATIO (ref 0.8–3.9)
CD3+CD8+ CELLS # BLD: 149 CELLS/UL (ref 210–1200)
CD3+CD8+ CELLS # BLD: 66 CELLS/UL (ref 210–1200)
CD3+CD8+ CELLS # BLD: 80 CELLS/UL (ref 210–1200)
CHLORIDE SERPL-SCNC: 100 MMOL/L (ref 96–112)
CHLORIDE SERPL-SCNC: 101 MMOL/L (ref 96–112)
CHLORIDE SERPL-SCNC: 102 MMOL/L (ref 96–112)
CHLORIDE SERPL-SCNC: 102 MMOL/L (ref 96–112)
CHLORIDE SERPL-SCNC: 110 MMOL/L (ref 96–112)
CHLORIDE SERPL-SCNC: 90 MMOL/L (ref 96–112)
CHLORIDE SERPL-SCNC: 91 MMOL/L (ref 96–112)
CHLORIDE SERPL-SCNC: 92 MMOL/L (ref 96–112)
CHLORIDE SERPL-SCNC: 93 MMOL/L (ref 96–112)
CHLORIDE SERPL-SCNC: 93 MMOL/L (ref 96–112)
CHLORIDE SERPL-SCNC: 94 MMOL/L (ref 96–112)
CHLORIDE SERPL-SCNC: 95 MMOL/L (ref 96–112)
CHLORIDE SERPL-SCNC: 97 MMOL/L (ref 96–112)
CHLORIDE SERPL-SCNC: 98 MMOL/L (ref 96–112)
CHLORIDE SERPL-SCNC: 99 MMOL/L (ref 96–112)
CK SERPL-CCNC: 25 U/L (ref 0–154)
CMV IGG SERPL IA-ACNC: 5.9 U/ML
CMV IGM SERPL IA-ACNC: <8 AU/ML
CO2 SERPL-SCNC: 21 MMOL/L (ref 20–33)
CO2 SERPL-SCNC: 22 MMOL/L (ref 20–33)
CO2 SERPL-SCNC: 23 MMOL/L (ref 20–33)
CO2 SERPL-SCNC: 24 MMOL/L (ref 20–33)
CO2 SERPL-SCNC: 25 MMOL/L (ref 20–33)
CO2 SERPL-SCNC: 26 MMOL/L (ref 20–33)
CO2 SERPL-SCNC: 27 MMOL/L (ref 20–33)
CO2 SERPL-SCNC: 28 MMOL/L (ref 20–33)
CO2 SERPL-SCNC: 29 MMOL/L (ref 20–33)
CO2 SERPL-SCNC: 30 MMOL/L (ref 20–33)
CO2 SERPL-SCNC: 31 MMOL/L (ref 20–33)
COLOR UR: YELLOW
COMMENT 1642: NORMAL
COMMENT 1642: NORMAL
COMPONENT R 8504R: NORMAL
COMPONENT R 8504R: NORMAL
CREAT SERPL-MCNC: 1.5 MG/DL (ref 0.5–1.4)
CREAT SERPL-MCNC: 1.66 MG/DL (ref 0.5–1.4)
CREAT SERPL-MCNC: 1.7 MG/DL (ref 0.5–1.4)
CREAT SERPL-MCNC: 1.74 MG/DL (ref 0.5–1.4)
CREAT SERPL-MCNC: 1.75 MG/DL (ref 0.5–1.4)
CREAT SERPL-MCNC: 1.83 MG/DL (ref 0.5–1.4)
CREAT SERPL-MCNC: 1.85 MG/DL (ref 0.5–1.4)
CREAT SERPL-MCNC: 1.89 MG/DL (ref 0.5–1.4)
CREAT SERPL-MCNC: 1.91 MG/DL (ref 0.5–1.4)
CREAT SERPL-MCNC: 1.95 MG/DL (ref 0.5–1.4)
CREAT SERPL-MCNC: 1.99 MG/DL (ref 0.5–1.4)
CREAT SERPL-MCNC: 2.02 MG/DL (ref 0.5–1.4)
CREAT SERPL-MCNC: 2.04 MG/DL (ref 0.5–1.4)
CREAT SERPL-MCNC: 2.07 MG/DL (ref 0.5–1.4)
CREAT SERPL-MCNC: 2.15 MG/DL (ref 0.5–1.4)
CREAT SERPL-MCNC: 2.18 MG/DL (ref 0.5–1.4)
CREAT SERPL-MCNC: 2.22 MG/DL (ref 0.5–1.4)
CREAT SERPL-MCNC: 2.32 MG/DL (ref 0.5–1.4)
CREAT SERPL-MCNC: 2.4 MG/DL (ref 0.5–1.4)
CREAT SERPL-MCNC: 2.4 MG/DL (ref 0.5–1.4)
CREAT SERPL-MCNC: 2.43 MG/DL (ref 0.5–1.4)
CREAT SERPL-MCNC: 2.46 MG/DL (ref 0.5–1.4)
CREAT SERPL-MCNC: 2.46 MG/DL (ref 0.5–1.4)
CREAT SERPL-MCNC: 2.47 MG/DL (ref 0.5–1.4)
CREAT SERPL-MCNC: 2.47 MG/DL (ref 0.5–1.4)
CREAT SERPL-MCNC: 2.53 MG/DL (ref 0.5–1.4)
CREAT SERPL-MCNC: 2.55 MG/DL (ref 0.5–1.4)
CREAT SERPL-MCNC: 2.56 MG/DL (ref 0.5–1.4)
CREAT SERPL-MCNC: 2.59 MG/DL (ref 0.5–1.4)
CREAT SERPL-MCNC: 2.65 MG/DL (ref 0.5–1.4)
CREAT SERPL-MCNC: 2.65 MG/DL (ref 0.5–1.4)
CREAT SERPL-MCNC: 2.72 MG/DL (ref 0.5–1.4)
CREAT SERPL-MCNC: 2.77 MG/DL (ref 0.5–1.4)
CREAT SERPL-MCNC: 2.78 MG/DL (ref 0.5–1.4)
CREAT SERPL-MCNC: 2.79 MG/DL (ref 0.5–1.4)
CREAT SERPL-MCNC: 2.83 MG/DL (ref 0.5–1.4)
CREAT SERPL-MCNC: 2.85 MG/DL (ref 0.5–1.4)
CREAT SERPL-MCNC: 2.93 MG/DL (ref 0.5–1.4)
CREAT SERPL-MCNC: 3.19 MG/DL (ref 0.5–1.4)
CREAT SERPL-MCNC: 3.31 MG/DL (ref 0.5–1.4)
CREAT SERPL-MCNC: 3.62 MG/DL (ref 0.5–1.4)
CREAT SERPL-MCNC: 3.66 MG/DL (ref 0.5–1.4)
CREAT SERPL-MCNC: 3.71 MG/DL (ref 0.5–1.4)
CRP SERPL HS-MCNC: 2.09 MG/DL (ref 0–0.75)
CRP SERPL HS-MCNC: 2.35 MG/DL (ref 0–0.75)
CRP SERPL HS-MCNC: 33.6 MG/L (ref 0–7.5)
CULTURE IF INDICATED INDCX: YES UA CULTURE
CULTURE IF INDICATED INDCX: YES UA CULTURE
DACRYOCYTES BLD QL SMEAR: NORMAL
EKG IMPRESSION: NORMAL
EOSINOPHIL # BLD AUTO: 0 K/UL (ref 0–0.51)
EOSINOPHIL # BLD AUTO: 0.01 K/UL (ref 0–0.51)
EOSINOPHIL # BLD AUTO: 0.02 K/UL (ref 0–0.51)
EOSINOPHIL # BLD AUTO: 0.02 K/UL (ref 0–0.51)
EOSINOPHIL # BLD AUTO: 0.04 K/UL (ref 0–0.51)
EOSINOPHIL # BLD AUTO: 0.05 K/UL (ref 0–0.51)
EOSINOPHIL # BLD AUTO: 0.05 K/UL (ref 0–0.51)
EOSINOPHIL # BLD AUTO: 0.06 K/UL (ref 0–0.51)
EOSINOPHIL # BLD AUTO: 0.06 K/UL (ref 0–0.51)
EOSINOPHIL # BLD AUTO: 0.07 K/UL (ref 0–0.51)
EOSINOPHIL # BLD AUTO: 0.07 K/UL (ref 0–0.51)
EOSINOPHIL # BLD AUTO: 0.09 K/UL (ref 0–0.51)
EOSINOPHIL # BLD AUTO: 0.11 K/UL (ref 0–0.51)
EOSINOPHIL # BLD AUTO: 0.11 K/UL (ref 0–0.51)
EOSINOPHIL # BLD AUTO: 0.12 K/UL (ref 0–0.51)
EOSINOPHIL # BLD AUTO: 0.12 K/UL (ref 0–0.51)
EOSINOPHIL # BLD AUTO: 0.13 K/UL (ref 0–0.51)
EOSINOPHIL # BLD AUTO: 0.14 K/UL (ref 0–0.51)
EOSINOPHIL # BLD AUTO: 0.14 K/UL (ref 0–0.51)
EOSINOPHIL # BLD AUTO: 0.15 K/UL (ref 0–0.51)
EOSINOPHIL # BLD AUTO: 0.16 K/UL (ref 0–0.51)
EOSINOPHIL # BLD AUTO: 0.17 K/UL (ref 0–0.51)
EOSINOPHIL # BLD AUTO: 0.22 K/UL (ref 0–0.51)
EOSINOPHIL # BLD AUTO: 0.26 K/UL (ref 0–0.51)
EOSINOPHIL # BLD AUTO: 0.28 K/UL (ref 0–0.51)
EOSINOPHIL NFR BLD: 0 % (ref 0–6.9)
EOSINOPHIL NFR BLD: 0.2 % (ref 0–6.9)
EOSINOPHIL NFR BLD: 0.3 % (ref 0–6.9)
EOSINOPHIL NFR BLD: 0.3 % (ref 0–6.9)
EOSINOPHIL NFR BLD: 0.4 % (ref 0–6.9)
EOSINOPHIL NFR BLD: 0.4 % (ref 0–6.9)
EOSINOPHIL NFR BLD: 0.9 % (ref 0–6.9)
EOSINOPHIL NFR BLD: 1.6 % (ref 0–6.9)
EOSINOPHIL NFR BLD: 1.6 % (ref 0–6.9)
EOSINOPHIL NFR BLD: 1.7 % (ref 0–6.9)
EOSINOPHIL NFR BLD: 1.8 % (ref 0–6.9)
EOSINOPHIL NFR BLD: 1.8 % (ref 0–6.9)
EOSINOPHIL NFR BLD: 1.9 % (ref 0–6.9)
EOSINOPHIL NFR BLD: 14.3 % (ref 0–6.9)
EOSINOPHIL NFR BLD: 2.4 % (ref 0–6.9)
EOSINOPHIL NFR BLD: 2.6 % (ref 0–6.9)
EOSINOPHIL NFR BLD: 2.7 % (ref 0–6.9)
EOSINOPHIL NFR BLD: 2.9 % (ref 0–6.9)
EOSINOPHIL NFR BLD: 3.2 % (ref 0–6.9)
EOSINOPHIL NFR BLD: 4.4 % (ref 0–6.9)
EOSINOPHIL NFR BLD: 4.6 % (ref 0–6.9)
EOSINOPHIL NFR BLD: 5 % (ref 0–6.9)
EOSINOPHIL NFR BLD: 5 % (ref 0–6.9)
EOSINOPHIL NFR BLD: 5.2 % (ref 0–6.9)
EOSINOPHIL NFR BLD: 5.3 % (ref 0–6.9)
EOSINOPHIL NFR BLD: 5.4 % (ref 0–6.9)
EOSINOPHIL NFR BLD: 6 % (ref 0–6.9)
EOSINOPHIL NFR BLD: 6.1 % (ref 0–6.9)
EOSINOPHIL NFR BLD: 7 % (ref 0–6.9)
EOSINOPHIL NFR BLD: 7.2 % (ref 0–6.9)
EOSINOPHIL NFR BLD: 8.9 % (ref 0–6.9)
EPI CELLS #/AREA URNS HPF: ABNORMAL /HPF
ERYTHROCYTE [DISTWIDTH] IN BLOOD BY AUTOMATED COUNT: 45.4 FL (ref 35.9–50)
ERYTHROCYTE [DISTWIDTH] IN BLOOD BY AUTOMATED COUNT: 46.9 FL (ref 35.9–50)
ERYTHROCYTE [DISTWIDTH] IN BLOOD BY AUTOMATED COUNT: 47 FL (ref 35.9–50)
ERYTHROCYTE [DISTWIDTH] IN BLOOD BY AUTOMATED COUNT: 47.6 FL (ref 35.9–50)
ERYTHROCYTE [DISTWIDTH] IN BLOOD BY AUTOMATED COUNT: 47.6 FL (ref 35.9–50)
ERYTHROCYTE [DISTWIDTH] IN BLOOD BY AUTOMATED COUNT: 47.7 FL (ref 35.9–50)
ERYTHROCYTE [DISTWIDTH] IN BLOOD BY AUTOMATED COUNT: 47.8 FL (ref 35.9–50)
ERYTHROCYTE [DISTWIDTH] IN BLOOD BY AUTOMATED COUNT: 48.2 FL (ref 35.9–50)
ERYTHROCYTE [DISTWIDTH] IN BLOOD BY AUTOMATED COUNT: 48.2 FL (ref 35.9–50)
ERYTHROCYTE [DISTWIDTH] IN BLOOD BY AUTOMATED COUNT: 48.3 FL (ref 35.9–50)
ERYTHROCYTE [DISTWIDTH] IN BLOOD BY AUTOMATED COUNT: 48.5 FL (ref 35.9–50)
ERYTHROCYTE [DISTWIDTH] IN BLOOD BY AUTOMATED COUNT: 48.8 FL (ref 35.9–50)
ERYTHROCYTE [DISTWIDTH] IN BLOOD BY AUTOMATED COUNT: 48.9 FL (ref 35.9–50)
ERYTHROCYTE [DISTWIDTH] IN BLOOD BY AUTOMATED COUNT: 49 FL (ref 35.9–50)
ERYTHROCYTE [DISTWIDTH] IN BLOOD BY AUTOMATED COUNT: 49.2 FL (ref 35.9–50)
ERYTHROCYTE [DISTWIDTH] IN BLOOD BY AUTOMATED COUNT: 49.2 FL (ref 35.9–50)
ERYTHROCYTE [DISTWIDTH] IN BLOOD BY AUTOMATED COUNT: 49.4 FL (ref 35.9–50)
ERYTHROCYTE [DISTWIDTH] IN BLOOD BY AUTOMATED COUNT: 49.7 FL (ref 35.9–50)
ERYTHROCYTE [DISTWIDTH] IN BLOOD BY AUTOMATED COUNT: 49.9 FL (ref 35.9–50)
ERYTHROCYTE [DISTWIDTH] IN BLOOD BY AUTOMATED COUNT: 50.4 FL (ref 35.9–50)
ERYTHROCYTE [DISTWIDTH] IN BLOOD BY AUTOMATED COUNT: 52 FL (ref 35.9–50)
ERYTHROCYTE [DISTWIDTH] IN BLOOD BY AUTOMATED COUNT: 52.4 FL (ref 35.9–50)
ERYTHROCYTE [DISTWIDTH] IN BLOOD BY AUTOMATED COUNT: 53.1 FL (ref 35.9–50)
ERYTHROCYTE [DISTWIDTH] IN BLOOD BY AUTOMATED COUNT: 53.4 FL (ref 35.9–50)
ERYTHROCYTE [DISTWIDTH] IN BLOOD BY AUTOMATED COUNT: 53.5 FL (ref 35.9–50)
ERYTHROCYTE [DISTWIDTH] IN BLOOD BY AUTOMATED COUNT: 55 FL (ref 35.9–50)
ERYTHROCYTE [DISTWIDTH] IN BLOOD BY AUTOMATED COUNT: 55.6 FL (ref 35.9–50)
ERYTHROCYTE [DISTWIDTH] IN BLOOD BY AUTOMATED COUNT: 55.8 FL (ref 35.9–50)
ERYTHROCYTE [DISTWIDTH] IN BLOOD BY AUTOMATED COUNT: 56.4 FL (ref 35.9–50)
ERYTHROCYTE [DISTWIDTH] IN BLOOD BY AUTOMATED COUNT: 56.6 FL (ref 35.9–50)
ERYTHROCYTE [DISTWIDTH] IN BLOOD BY AUTOMATED COUNT: 56.6 FL (ref 35.9–50)
ERYTHROCYTE [DISTWIDTH] IN BLOOD BY AUTOMATED COUNT: 56.8 FL (ref 35.9–50)
ERYTHROCYTE [DISTWIDTH] IN BLOOD BY AUTOMATED COUNT: 56.8 FL (ref 35.9–50)
ERYTHROCYTE [DISTWIDTH] IN BLOOD BY AUTOMATED COUNT: 57.5 FL (ref 35.9–50)
ERYTHROCYTE [DISTWIDTH] IN BLOOD BY AUTOMATED COUNT: 57.7 FL (ref 35.9–50)
ERYTHROCYTE [DISTWIDTH] IN BLOOD BY AUTOMATED COUNT: 58.1 FL (ref 35.9–50)
ERYTHROCYTE [DISTWIDTH] IN BLOOD BY AUTOMATED COUNT: 58.3 FL (ref 35.9–50)
ERYTHROCYTE [DISTWIDTH] IN BLOOD BY AUTOMATED COUNT: 59.9 FL (ref 35.9–50)
ERYTHROCYTE [DISTWIDTH] IN BLOOD BY AUTOMATED COUNT: 63 FL (ref 35.9–50)
ERYTHROCYTE [DISTWIDTH] IN BLOOD BY AUTOMATED COUNT: 64.9 FL (ref 35.9–50)
ERYTHROCYTE [DISTWIDTH] IN BLOOD BY AUTOMATED COUNT: 65.3 FL (ref 35.9–50)
ERYTHROCYTE [DISTWIDTH] IN BLOOD BY AUTOMATED COUNT: 66.7 FL (ref 35.9–50)
ERYTHROCYTE [DISTWIDTH] IN BLOOD BY AUTOMATED COUNT: 67.2 FL (ref 35.9–50)
ERYTHROCYTE [SEDIMENTATION RATE] IN BLOOD BY WESTERGREN METHOD: 57 MM/HOUR (ref 0–20)
EST. AVERAGE GLUCOSE BLD GHB EST-MCNC: 97 MG/DL
ETHANOL BLD-MCNC: 0 G/DL
FLUAV H1 2009 PAND RNA SPEC QL NAA+PROBE: NOT DETECTED
FLUAV H1 RNA SPEC QL NAA+PROBE: NOT DETECTED
FLUAV H3 RNA SPEC QL NAA+PROBE: NOT DETECTED
FLUAV RNA SPEC QL NAA+PROBE: NEGATIVE
FLUAV RNA SPEC QL NAA+PROBE: NEGATIVE
FLUAV RNA SPEC QL NAA+PROBE: NOT DETECTED
FLUBV RNA SPEC QL NAA+PROBE: NEGATIVE
FLUBV RNA SPEC QL NAA+PROBE: NEGATIVE
FLUBV RNA SPEC QL NAA+PROBE: NOT DETECTED
FOLATE SERPL-MCNC: 4.5 NG/ML
FOLATE SERPL-MCNC: 6.7 NG/ML
FUNGUS BLD CULT: ABNORMAL
FUNGUS BLD CULT: ABNORMAL
GFR SERPL CREATININE-BSD FRML MDRD: 13 ML/MIN/1.73 M 2
GFR SERPL CREATININE-BSD FRML MDRD: 15 ML/MIN/1.73 M 2
GFR SERPL CREATININE-BSD FRML MDRD: 16 ML/MIN/1.73 M 2
GFR SERPL CREATININE-BSD FRML MDRD: 17 ML/MIN/1.73 M 2
GFR SERPL CREATININE-BSD FRML MDRD: 18 ML/MIN/1.73 M 2
GFR SERPL CREATININE-BSD FRML MDRD: 19 ML/MIN/1.73 M 2
GFR SERPL CREATININE-BSD FRML MDRD: 20 ML/MIN/1.73 M 2
GFR SERPL CREATININE-BSD FRML MDRD: 21 ML/MIN/1.73 M 2
GFR SERPL CREATININE-BSD FRML MDRD: 22 ML/MIN/1.73 M 2
GFR SERPL CREATININE-BSD FRML MDRD: 24 ML/MIN/1.73 M 2
GFR SERPL CREATININE-BSD FRML MDRD: 26 ML/MIN/1.73 M 2
GFR SERPL CREATININE-BSD FRML MDRD: 27 ML/MIN/1.73 M 2
GFR SERPL CREATININE-BSD FRML MDRD: 27 ML/MIN/1.73 M 2
GFR SERPL CREATININE-BSD FRML MDRD: 28 ML/MIN/1.73 M 2
GFR SERPL CREATININE-BSD FRML MDRD: 28 ML/MIN/1.73 M 2
GFR SERPL CREATININE-BSD FRML MDRD: 29 ML/MIN/1.73 M 2
GFR SERPL CREATININE-BSD FRML MDRD: 29 ML/MIN/1.73 M 2
GFR SERPL CREATININE-BSD FRML MDRD: 31 ML/MIN/1.73 M 2
GFR SERPL CREATININE-BSD FRML MDRD: 31 ML/MIN/1.73 M 2
GFR SERPL CREATININE-BSD FRML MDRD: 32 ML/MIN/1.73 M 2
GFR SERPL CREATININE-BSD FRML MDRD: 33 ML/MIN/1.73 M 2
GFR SERPL CREATININE-BSD FRML MDRD: 37 ML/MIN/1.73 M 2
GIANT PLATELETS BLD QL SMEAR: NORMAL
GLOBULIN SER CALC-MCNC: 2.7 G/DL (ref 1.9–3.5)
GLOBULIN SER CALC-MCNC: 3.2 G/DL (ref 1.9–3.5)
GLOBULIN SER CALC-MCNC: 3.4 G/DL (ref 1.9–3.5)
GLOBULIN SER CALC-MCNC: 3.5 G/DL (ref 1.9–3.5)
GLOBULIN SER CALC-MCNC: 3.6 G/DL (ref 1.9–3.5)
GLOBULIN SER CALC-MCNC: 3.6 G/DL (ref 1.9–3.5)
GLOBULIN SER CALC-MCNC: 3.8 G/DL (ref 1.9–3.5)
GLOBULIN SER CALC-MCNC: 3.9 G/DL (ref 1.9–3.5)
GLOBULIN SER CALC-MCNC: 4 G/DL (ref 1.9–3.5)
GLOBULIN SER CALC-MCNC: 4 G/DL (ref 1.9–3.5)
GLOBULIN SER CALC-MCNC: 4.1 G/DL (ref 1.9–3.5)
GLOBULIN SER CALC-MCNC: 4.3 G/DL (ref 1.9–3.5)
GLOBULIN SER CALC-MCNC: 4.3 G/DL (ref 1.9–3.5)
GLOBULIN SER CALC-MCNC: 4.7 G/DL (ref 1.9–3.5)
GLOBULIN SER CALC-MCNC: 4.9 G/DL (ref 1.9–3.5)
GLUCOSE BLD-MCNC: 100 MG/DL (ref 65–99)
GLUCOSE BLD-MCNC: 102 MG/DL (ref 65–99)
GLUCOSE BLD-MCNC: 103 MG/DL (ref 65–99)
GLUCOSE BLD-MCNC: 103 MG/DL (ref 65–99)
GLUCOSE BLD-MCNC: 104 MG/DL (ref 65–99)
GLUCOSE BLD-MCNC: 104 MG/DL (ref 65–99)
GLUCOSE BLD-MCNC: 108 MG/DL (ref 65–99)
GLUCOSE BLD-MCNC: 109 MG/DL (ref 65–99)
GLUCOSE BLD-MCNC: 112 MG/DL (ref 65–99)
GLUCOSE BLD-MCNC: 119 MG/DL (ref 65–99)
GLUCOSE BLD-MCNC: 122 MG/DL (ref 65–99)
GLUCOSE BLD-MCNC: 126 MG/DL (ref 65–99)
GLUCOSE BLD-MCNC: 132 MG/DL (ref 65–99)
GLUCOSE BLD-MCNC: 146 MG/DL (ref 65–99)
GLUCOSE BLD-MCNC: 148 MG/DL (ref 65–99)
GLUCOSE BLD-MCNC: 148 MG/DL (ref 65–99)
GLUCOSE BLD-MCNC: 149 MG/DL (ref 65–99)
GLUCOSE BLD-MCNC: 57 MG/DL (ref 65–99)
GLUCOSE BLD-MCNC: 60 MG/DL (ref 65–99)
GLUCOSE BLD-MCNC: 64 MG/DL (ref 65–99)
GLUCOSE BLD-MCNC: 67 MG/DL (ref 65–99)
GLUCOSE BLD-MCNC: 68 MG/DL (ref 65–99)
GLUCOSE BLD-MCNC: 68 MG/DL (ref 65–99)
GLUCOSE BLD-MCNC: 71 MG/DL (ref 65–99)
GLUCOSE BLD-MCNC: 72 MG/DL (ref 65–99)
GLUCOSE BLD-MCNC: 72 MG/DL (ref 65–99)
GLUCOSE BLD-MCNC: 74 MG/DL (ref 65–99)
GLUCOSE BLD-MCNC: 74 MG/DL (ref 65–99)
GLUCOSE BLD-MCNC: 75 MG/DL (ref 65–99)
GLUCOSE BLD-MCNC: 75 MG/DL (ref 65–99)
GLUCOSE BLD-MCNC: 78 MG/DL (ref 65–99)
GLUCOSE BLD-MCNC: 79 MG/DL (ref 65–99)
GLUCOSE BLD-MCNC: 83 MG/DL (ref 65–99)
GLUCOSE BLD-MCNC: 86 MG/DL (ref 65–99)
GLUCOSE BLD-MCNC: 86 MG/DL (ref 65–99)
GLUCOSE BLD-MCNC: 87 MG/DL (ref 65–99)
GLUCOSE BLD-MCNC: 88 MG/DL (ref 65–99)
GLUCOSE BLD-MCNC: 88 MG/DL (ref 65–99)
GLUCOSE BLD-MCNC: 90 MG/DL (ref 65–99)
GLUCOSE BLD-MCNC: 91 MG/DL (ref 65–99)
GLUCOSE BLD-MCNC: 93 MG/DL (ref 65–99)
GLUCOSE BLD-MCNC: 95 MG/DL (ref 65–99)
GLUCOSE BLD-MCNC: 95 MG/DL (ref 65–99)
GLUCOSE SERPL-MCNC: 100 MG/DL (ref 65–99)
GLUCOSE SERPL-MCNC: 103 MG/DL (ref 65–99)
GLUCOSE SERPL-MCNC: 105 MG/DL (ref 65–99)
GLUCOSE SERPL-MCNC: 120 MG/DL (ref 65–99)
GLUCOSE SERPL-MCNC: 126 MG/DL (ref 65–99)
GLUCOSE SERPL-MCNC: 47 MG/DL (ref 65–99)
GLUCOSE SERPL-MCNC: 48 MG/DL (ref 65–99)
GLUCOSE SERPL-MCNC: 51 MG/DL (ref 65–99)
GLUCOSE SERPL-MCNC: 51 MG/DL (ref 65–99)
GLUCOSE SERPL-MCNC: 55 MG/DL (ref 65–99)
GLUCOSE SERPL-MCNC: 56 MG/DL (ref 65–99)
GLUCOSE SERPL-MCNC: 59 MG/DL (ref 65–99)
GLUCOSE SERPL-MCNC: 59 MG/DL (ref 65–99)
GLUCOSE SERPL-MCNC: 60 MG/DL (ref 65–99)
GLUCOSE SERPL-MCNC: 61 MG/DL (ref 65–99)
GLUCOSE SERPL-MCNC: 61 MG/DL (ref 65–99)
GLUCOSE SERPL-MCNC: 62 MG/DL (ref 65–99)
GLUCOSE SERPL-MCNC: 62 MG/DL (ref 65–99)
GLUCOSE SERPL-MCNC: 64 MG/DL (ref 65–99)
GLUCOSE SERPL-MCNC: 65 MG/DL (ref 65–99)
GLUCOSE SERPL-MCNC: 65 MG/DL (ref 65–99)
GLUCOSE SERPL-MCNC: 66 MG/DL (ref 65–99)
GLUCOSE SERPL-MCNC: 69 MG/DL (ref 65–99)
GLUCOSE SERPL-MCNC: 69 MG/DL (ref 65–99)
GLUCOSE SERPL-MCNC: 72 MG/DL (ref 65–99)
GLUCOSE SERPL-MCNC: 73 MG/DL (ref 65–99)
GLUCOSE SERPL-MCNC: 74 MG/DL (ref 65–99)
GLUCOSE SERPL-MCNC: 74 MG/DL (ref 65–99)
GLUCOSE SERPL-MCNC: 75 MG/DL (ref 65–99)
GLUCOSE SERPL-MCNC: 77 MG/DL (ref 65–99)
GLUCOSE SERPL-MCNC: 78 MG/DL (ref 65–99)
GLUCOSE SERPL-MCNC: 80 MG/DL (ref 65–99)
GLUCOSE SERPL-MCNC: 82 MG/DL (ref 65–99)
GLUCOSE SERPL-MCNC: 84 MG/DL (ref 65–99)
GLUCOSE SERPL-MCNC: 87 MG/DL (ref 65–99)
GLUCOSE SERPL-MCNC: 87 MG/DL (ref 65–99)
GLUCOSE SERPL-MCNC: 96 MG/DL (ref 65–99)
GLUCOSE SERPL-MCNC: 99 MG/DL (ref 65–99)
GLUCOSE UR STRIP.AUTO-MCNC: 100 MG/DL
GLUCOSE UR STRIP.AUTO-MCNC: NEGATIVE MG/DL
HADV DNA SPEC QL NAA+PROBE: NOT DETECTED
HADV DNA SPEC QL NAA+PROBE: NOT DETECTED
HAV IGM SERPL QL IA: NEGATIVE
HBA1C MFR BLD: 5 % (ref 0–5.6)
HBV CORE IGM SER QL: NEGATIVE
HBV SURFACE AB SERPL IA-ACNC: <3.1 MIU/ML (ref 0–10)
HBV SURFACE AG SER QL: NEGATIVE
HCT VFR BLD AUTO: 21.2 % (ref 37–47)
HCT VFR BLD AUTO: 23 % (ref 37–47)
HCT VFR BLD AUTO: 23 % (ref 37–47)
HCT VFR BLD AUTO: 23.2 % (ref 37–47)
HCT VFR BLD AUTO: 23.4 % (ref 37–47)
HCT VFR BLD AUTO: 23.5 % (ref 37–47)
HCT VFR BLD AUTO: 23.9 % (ref 37–47)
HCT VFR BLD AUTO: 24 % (ref 37–47)
HCT VFR BLD AUTO: 24 % (ref 37–47)
HCT VFR BLD AUTO: 24.1 % (ref 37–47)
HCT VFR BLD AUTO: 24.3 % (ref 37–47)
HCT VFR BLD AUTO: 24.4 % (ref 37–47)
HCT VFR BLD AUTO: 25 % (ref 37–47)
HCT VFR BLD AUTO: 25.2 % (ref 37–47)
HCT VFR BLD AUTO: 25.4 % (ref 37–47)
HCT VFR BLD AUTO: 25.4 % (ref 37–47)
HCT VFR BLD AUTO: 25.6 % (ref 37–47)
HCT VFR BLD AUTO: 26.2 % (ref 37–47)
HCT VFR BLD AUTO: 26.3 % (ref 37–47)
HCT VFR BLD AUTO: 26.5 % (ref 37–47)
HCT VFR BLD AUTO: 26.8 % (ref 37–47)
HCT VFR BLD AUTO: 26.8 % (ref 37–47)
HCT VFR BLD AUTO: 26.9 % (ref 37–47)
HCT VFR BLD AUTO: 27 % (ref 37–47)
HCT VFR BLD AUTO: 27.2 % (ref 37–47)
HCT VFR BLD AUTO: 27.2 % (ref 37–47)
HCT VFR BLD AUTO: 27.5 % (ref 37–47)
HCT VFR BLD AUTO: 27.7 % (ref 37–47)
HCT VFR BLD AUTO: 27.7 % (ref 37–47)
HCT VFR BLD AUTO: 28.3 % (ref 37–47)
HCT VFR BLD AUTO: 28.4 % (ref 37–47)
HCT VFR BLD AUTO: 28.6 % (ref 37–47)
HCT VFR BLD AUTO: 28.7 % (ref 37–47)
HCT VFR BLD AUTO: 29 % (ref 37–47)
HCT VFR BLD AUTO: 29.1 % (ref 37–47)
HCT VFR BLD AUTO: 29.1 % (ref 37–47)
HCT VFR BLD AUTO: 29.7 % (ref 37–47)
HCT VFR BLD AUTO: 29.7 % (ref 37–47)
HCT VFR BLD AUTO: 29.8 % (ref 37–47)
HCT VFR BLD AUTO: 30.3 % (ref 37–47)
HCT VFR BLD AUTO: 30.3 % (ref 37–47)
HCT VFR BLD AUTO: 31.8 % (ref 37–47)
HCT VFR BLD AUTO: 33.5 % (ref 37–47)
HCV AB SER QL: NEGATIVE
HGB BLD-MCNC: 10.1 G/DL (ref 12–16)
HGB BLD-MCNC: 10.4 G/DL (ref 12–16)
HGB BLD-MCNC: 6.7 G/DL (ref 12–16)
HGB BLD-MCNC: 6.7 G/DL (ref 12–16)
HGB BLD-MCNC: 7 G/DL (ref 12–16)
HGB BLD-MCNC: 7.1 G/DL (ref 12–16)
HGB BLD-MCNC: 7.2 G/DL (ref 12–16)
HGB BLD-MCNC: 7.3 G/DL (ref 12–16)
HGB BLD-MCNC: 7.4 G/DL (ref 12–16)
HGB BLD-MCNC: 7.4 G/DL (ref 12–16)
HGB BLD-MCNC: 7.5 G/DL (ref 12–16)
HGB BLD-MCNC: 7.5 G/DL (ref 12–16)
HGB BLD-MCNC: 7.7 G/DL (ref 12–16)
HGB BLD-MCNC: 7.9 G/DL (ref 12–16)
HGB BLD-MCNC: 7.9 G/DL (ref 12–16)
HGB BLD-MCNC: 8 G/DL (ref 12–16)
HGB BLD-MCNC: 8.1 G/DL (ref 12–16)
HGB BLD-MCNC: 8.3 G/DL (ref 12–16)
HGB BLD-MCNC: 8.4 G/DL (ref 12–16)
HGB BLD-MCNC: 8.5 G/DL (ref 12–16)
HGB BLD-MCNC: 8.5 G/DL (ref 12–16)
HGB BLD-MCNC: 8.6 G/DL (ref 12–16)
HGB BLD-MCNC: 8.7 G/DL (ref 12–16)
HGB BLD-MCNC: 8.7 G/DL (ref 12–16)
HGB BLD-MCNC: 8.8 G/DL (ref 12–16)
HGB BLD-MCNC: 8.9 G/DL (ref 12–16)
HGB BLD-MCNC: 8.9 G/DL (ref 12–16)
HGB BLD-MCNC: 9 G/DL (ref 12–16)
HGB BLD-MCNC: 9.1 G/DL (ref 12–16)
HGB BLD-MCNC: 9.2 G/DL (ref 12–16)
HGB BLD-MCNC: 9.3 G/DL (ref 12–16)
HGB BLD-MCNC: 9.4 G/DL (ref 12–16)
HGB BLD-MCNC: 9.5 G/DL (ref 12–16)
HGB RETIC QN AUTO: 27.6 PG/CELL (ref 29–35)
HIV 1 & 2 AB SER-IMP: ABNORMAL
HIV 1 & 2 AB SER-IMP: ABNORMAL
HIV 1 & 2 AB SERPL IA.RAPID: ABNORMAL
HIV 1 & 2 AB SERPL IA.RAPID: ABNORMAL
HIV 1+2 AB+HIV1 P24 AG SERPL QL IA: REACTIVE
HIV 1+2 AB+HIV1 P24 AG SERPL QL IA: REACTIVE
HIV 2 AB SERPL QL IA: NEGATIVE
HIV 2 AB SERPL QL IA: NEGATIVE
HIV1 AB SERPL QL IA: POSITIVE
HIV1 AB SERPL QL IA: POSITIVE
HLA-B*57:01 QL: NEGATIVE
HMPV RNA SPEC QL NAA+PROBE: NOT DETECTED
HPIV1 RNA SPEC QL NAA+PROBE: NOT DETECTED
HPIV2 RNA SPEC QL NAA+PROBE: NOT DETECTED
HPIV3 RNA SPEC QL NAA+PROBE: NOT DETECTED
HYALINE CASTS #/AREA URNS LPF: ABNORMAL /LPF
HYALINE CASTS #/AREA URNS LPF: ABNORMAL /LPF
HYPOCHROMIA BLD QL SMEAR: ABNORMAL
IMM GRANULOCYTES # BLD AUTO: 0.02 K/UL (ref 0–0.11)
IMM GRANULOCYTES # BLD AUTO: 0.03 K/UL (ref 0–0.11)
IMM GRANULOCYTES # BLD AUTO: 0.04 K/UL (ref 0–0.11)
IMM GRANULOCYTES # BLD AUTO: 0.04 K/UL (ref 0–0.11)
IMM GRANULOCYTES # BLD AUTO: 0.1 K/UL (ref 0–0.11)
IMM GRANULOCYTES # BLD AUTO: 0.14 K/UL (ref 0–0.11)
IMM GRANULOCYTES # BLD AUTO: 0.43 K/UL (ref 0–0.11)
IMM GRANULOCYTES NFR BLD AUTO: 0.4 % (ref 0–0.9)
IMM GRANULOCYTES NFR BLD AUTO: 0.5 % (ref 0–0.9)
IMM GRANULOCYTES NFR BLD AUTO: 0.5 % (ref 0–0.9)
IMM GRANULOCYTES NFR BLD AUTO: 0.8 % (ref 0–0.9)
IMM GRANULOCYTES NFR BLD AUTO: 0.9 % (ref 0–0.9)
IMM GRANULOCYTES NFR BLD AUTO: 1 % (ref 0–0.9)
IMM GRANULOCYTES NFR BLD AUTO: 1.1 % (ref 0–0.9)
IMM GRANULOCYTES NFR BLD AUTO: 1.1 % (ref 0–0.9)
IMM GRANULOCYTES NFR BLD AUTO: 1.2 % (ref 0–0.9)
IMM GRANULOCYTES NFR BLD AUTO: 2.2 % (ref 0–0.9)
IMM GRANULOCYTES NFR BLD AUTO: 4.6 % (ref 0–0.9)
IMM GRANULOCYTES NFR BLD AUTO: 8.3 % (ref 0–0.9)
IMM RETICS NFR: 5.5 % (ref 9.3–17.4)
INR PPP: 0.95 (ref 0.87–1.13)
INR PPP: 0.97 (ref 0.87–1.13)
INR PPP: 1.06 (ref 0.87–1.13)
INR PPP: 1.18 (ref 0.87–1.13)
IRON SATN MFR SERPL: 25 % (ref 15–55)
IRON SATN MFR SERPL: 6 % (ref 15–55)
IRON SERPL-MCNC: 11 UG/DL (ref 40–170)
IRON SERPL-MCNC: 56 UG/DL (ref 40–170)
KETONES UR STRIP.AUTO-MCNC: NEGATIVE MG/DL
LACTATE BLD-SCNC: 0.82 MMOL/L (ref 0.5–2)
LACTATE BLD-SCNC: 1.02 MMOL/L (ref 0.5–2)
LACTATE BLD-SCNC: 1.3 MMOL/L (ref 0.5–2)
LACTATE BLD-SCNC: 1.3 MMOL/L (ref 0.5–2)
LACTATE BLD-SCNC: 1.33 MMOL/L (ref 0.5–2)
LACTATE BLD-SCNC: 1.6 MMOL/L (ref 0.5–2)
LACTATE BLD-SCNC: 1.89 MMOL/L (ref 0.5–2)
LACTATE BLD-SCNC: 2 MMOL/L (ref 0.5–2)
LACTATE BLD-SCNC: 2.09 MMOL/L (ref 0.5–2)
LACTATE BLD-SCNC: 2.37 MMOL/L (ref 0.5–2)
LEUKOCYTE ESTERASE UR QL STRIP.AUTO: ABNORMAL
LEUKOCYTE ESTERASE UR QL STRIP.AUTO: NEGATIVE
LG PLATELETS BLD QL SMEAR: NORMAL
LIPASE SERPL-CCNC: 18 U/L (ref 7–58)
LV EJECT FRACT  99904: 30
LV EJECT FRACT MOD 2C 99903: 13.97
LV EJECT FRACT MOD 4C 99902: 35.47
LV EJECT FRACT MOD BP 99901: 25.85
LYMPHOCYTES # BLD AUTO: 0 K/UL (ref 1–4.8)
LYMPHOCYTES # BLD AUTO: 0.01 K/UL (ref 1–4.8)
LYMPHOCYTES # BLD AUTO: 0.05 K/UL (ref 1–4.8)
LYMPHOCYTES # BLD AUTO: 0.08 K/UL (ref 1–4.8)
LYMPHOCYTES # BLD AUTO: 0.11 K/UL (ref 1–4.8)
LYMPHOCYTES # BLD AUTO: 0.16 K/UL (ref 1–4.8)
LYMPHOCYTES # BLD AUTO: 0.16 K/UL (ref 1–4.8)
LYMPHOCYTES # BLD AUTO: 0.17 K/UL (ref 1–4.8)
LYMPHOCYTES # BLD AUTO: 0.17 K/UL (ref 1–4.8)
LYMPHOCYTES # BLD AUTO: 0.18 K/UL (ref 1–4.8)
LYMPHOCYTES # BLD AUTO: 0.18 K/UL (ref 1–4.8)
LYMPHOCYTES # BLD AUTO: 0.19 K/UL (ref 1–4.8)
LYMPHOCYTES # BLD AUTO: 0.2 K/UL (ref 1–4.8)
LYMPHOCYTES # BLD AUTO: 0.2 K/UL (ref 1–4.8)
LYMPHOCYTES # BLD AUTO: 0.21 K/UL (ref 1–4.8)
LYMPHOCYTES # BLD AUTO: 0.22 K/UL (ref 1–4.8)
LYMPHOCYTES # BLD AUTO: 0.22 K/UL (ref 1–4.8)
LYMPHOCYTES # BLD AUTO: 0.23 K/UL (ref 1–4.8)
LYMPHOCYTES # BLD AUTO: 0.24 K/UL (ref 1–4.8)
LYMPHOCYTES # BLD AUTO: 0.24 K/UL (ref 1–4.8)
LYMPHOCYTES # BLD AUTO: 0.25 K/UL (ref 1–4.8)
LYMPHOCYTES # BLD AUTO: 0.26 K/UL (ref 1–4.8)
LYMPHOCYTES # BLD AUTO: 0.26 K/UL (ref 1–4.8)
LYMPHOCYTES # BLD AUTO: 0.29 K/UL (ref 1–4.8)
LYMPHOCYTES # BLD AUTO: 0.3 K/UL (ref 1–4.8)
LYMPHOCYTES # BLD AUTO: 0.31 K/UL (ref 1–4.8)
LYMPHOCYTES # BLD AUTO: 0.39 K/UL (ref 1–4.8)
LYMPHOCYTES # BLD AUTO: 0.4 K/UL (ref 1–4.8)
LYMPHOCYTES # BLD AUTO: 0.4 K/UL (ref 1–4.8)
LYMPHOCYTES # BLD AUTO: 0.41 K/UL (ref 1–4.8)
LYMPHOCYTES # BLD AUTO: 0.41 K/UL (ref 1–4.8)
LYMPHOCYTES # BLD AUTO: 0.44 K/UL (ref 1–4.8)
LYMPHOCYTES # BLD AUTO: 0.49 K/UL (ref 1–4.8)
LYMPHOCYTES # BLD AUTO: 0.54 K/UL (ref 1–4.8)
LYMPHOCYTES # BLD AUTO: 0.55 K/UL (ref 1–4.8)
LYMPHOCYTES # BLD AUTO: 0.59 K/UL (ref 1–4.8)
LYMPHOCYTES # BLD AUTO: 0.68 K/UL (ref 1–4.8)
LYMPHOCYTES # BLD AUTO: 0.69 K/UL (ref 1–4.8)
LYMPHOCYTES NFR BLD: 0 % (ref 22–41)
LYMPHOCYTES NFR BLD: 0.9 % (ref 22–41)
LYMPHOCYTES NFR BLD: 10.9 % (ref 22–41)
LYMPHOCYTES NFR BLD: 11 % (ref 22–41)
LYMPHOCYTES NFR BLD: 11.7 % (ref 22–41)
LYMPHOCYTES NFR BLD: 12.5 % (ref 22–41)
LYMPHOCYTES NFR BLD: 13.5 % (ref 22–41)
LYMPHOCYTES NFR BLD: 15.6 % (ref 22–41)
LYMPHOCYTES NFR BLD: 16.1 % (ref 22–41)
LYMPHOCYTES NFR BLD: 16.5 % (ref 22–41)
LYMPHOCYTES NFR BLD: 17.3 % (ref 22–41)
LYMPHOCYTES NFR BLD: 17.4 % (ref 22–41)
LYMPHOCYTES NFR BLD: 17.9 % (ref 22–41)
LYMPHOCYTES NFR BLD: 22.7 % (ref 22–41)
LYMPHOCYTES NFR BLD: 24.6 % (ref 22–41)
LYMPHOCYTES NFR BLD: 27.2 % (ref 22–41)
LYMPHOCYTES NFR BLD: 27.3 % (ref 22–41)
LYMPHOCYTES NFR BLD: 3.5 % (ref 22–41)
LYMPHOCYTES NFR BLD: 30.6 % (ref 22–41)
LYMPHOCYTES NFR BLD: 4.2 % (ref 22–41)
LYMPHOCYTES NFR BLD: 4.4 % (ref 22–41)
LYMPHOCYTES NFR BLD: 5.4 % (ref 22–41)
LYMPHOCYTES NFR BLD: 5.5 % (ref 22–41)
LYMPHOCYTES NFR BLD: 6 % (ref 22–41)
LYMPHOCYTES NFR BLD: 6 % (ref 22–41)
LYMPHOCYTES NFR BLD: 6.1 % (ref 22–41)
LYMPHOCYTES NFR BLD: 6.1 % (ref 22–41)
LYMPHOCYTES NFR BLD: 7 % (ref 22–41)
LYMPHOCYTES NFR BLD: 7 % (ref 22–41)
LYMPHOCYTES NFR BLD: 7.4 % (ref 22–41)
LYMPHOCYTES NFR BLD: 7.9 % (ref 22–41)
LYMPHOCYTES NFR BLD: 8.4 % (ref 22–41)
LYMPHOCYTES NFR BLD: 8.4 % (ref 22–41)
LYMPHOCYTES NFR BLD: 8.9 % (ref 22–41)
LYMPHOCYTES NFR BLD: 9 % (ref 22–41)
LYMPHOCYTES NFR BLD: 9.4 % (ref 22–41)
M TB TUBERC IFN-G BLD QL: NEGATIVE
M TB TUBERC IFN-G/MITOGEN IGNF BLD: 0.08
M TB TUBERC IGNF/MITOGEN IGNF CONTROL: 1.31 [IU]/ML
MACROCYTES BLD QL SMEAR: ABNORMAL
MAGNESIUM SERPL-MCNC: 1.9 MG/DL (ref 1.5–2.5)
MAGNESIUM SERPL-MCNC: 2 MG/DL (ref 1.5–2.5)
MANUAL DIFF BLD: ABNORMAL
MANUAL DIFF BLD: NORMAL
MCH RBC QN AUTO: 26.6 PG (ref 27–33)
MCH RBC QN AUTO: 26.7 PG (ref 27–33)
MCH RBC QN AUTO: 27 PG (ref 27–33)
MCH RBC QN AUTO: 27 PG (ref 27–33)
MCH RBC QN AUTO: 27.2 PG (ref 27–33)
MCH RBC QN AUTO: 27.3 PG (ref 27–33)
MCH RBC QN AUTO: 27.5 PG (ref 27–33)
MCH RBC QN AUTO: 27.7 PG (ref 27–33)
MCH RBC QN AUTO: 27.8 PG (ref 27–33)
MCH RBC QN AUTO: 27.9 PG (ref 27–33)
MCH RBC QN AUTO: 28 PG (ref 27–33)
MCH RBC QN AUTO: 28 PG (ref 27–33)
MCH RBC QN AUTO: 28.4 PG (ref 27–33)
MCH RBC QN AUTO: 28.7 PG (ref 27–33)
MCH RBC QN AUTO: 28.8 PG (ref 27–33)
MCH RBC QN AUTO: 29 PG (ref 27–33)
MCH RBC QN AUTO: 29.1 PG (ref 27–33)
MCH RBC QN AUTO: 29.3 PG (ref 27–33)
MCH RBC QN AUTO: 29.4 PG (ref 27–33)
MCH RBC QN AUTO: 29.5 PG (ref 27–33)
MCH RBC QN AUTO: 29.6 PG (ref 27–33)
MCH RBC QN AUTO: 29.6 PG (ref 27–33)
MCH RBC QN AUTO: 29.8 PG (ref 27–33)
MCH RBC QN AUTO: 29.9 PG (ref 27–33)
MCH RBC QN AUTO: 30 PG (ref 27–33)
MCH RBC QN AUTO: 30.2 PG (ref 27–33)
MCH RBC QN AUTO: 30.2 PG (ref 27–33)
MCH RBC QN AUTO: 30.3 PG (ref 27–33)
MCH RBC QN AUTO: 30.3 PG (ref 27–33)
MCH RBC QN AUTO: 30.4 PG (ref 27–33)
MCH RBC QN AUTO: 30.6 PG (ref 27–33)
MCHC RBC AUTO-ENTMCNC: 27.6 G/DL (ref 33.6–35)
MCHC RBC AUTO-ENTMCNC: 29.5 G/DL (ref 33.6–35)
MCHC RBC AUTO-ENTMCNC: 29.6 G/DL (ref 33.6–35)
MCHC RBC AUTO-ENTMCNC: 30.3 G/DL (ref 33.6–35)
MCHC RBC AUTO-ENTMCNC: 30.3 G/DL (ref 33.6–35)
MCHC RBC AUTO-ENTMCNC: 30.4 G/DL (ref 33.6–35)
MCHC RBC AUTO-ENTMCNC: 30.4 G/DL (ref 33.6–35)
MCHC RBC AUTO-ENTMCNC: 30.5 G/DL (ref 33.6–35)
MCHC RBC AUTO-ENTMCNC: 30.6 G/DL (ref 33.6–35)
MCHC RBC AUTO-ENTMCNC: 30.8 G/DL (ref 33.6–35)
MCHC RBC AUTO-ENTMCNC: 30.9 G/DL (ref 33.6–35)
MCHC RBC AUTO-ENTMCNC: 30.9 G/DL (ref 33.6–35)
MCHC RBC AUTO-ENTMCNC: 31 G/DL (ref 33.6–35)
MCHC RBC AUTO-ENTMCNC: 31.2 G/DL (ref 33.6–35)
MCHC RBC AUTO-ENTMCNC: 31.3 G/DL (ref 33.6–35)
MCHC RBC AUTO-ENTMCNC: 31.3 G/DL (ref 33.6–35)
MCHC RBC AUTO-ENTMCNC: 31.4 G/DL (ref 33.6–35)
MCHC RBC AUTO-ENTMCNC: 31.5 G/DL (ref 33.6–35)
MCHC RBC AUTO-ENTMCNC: 31.6 G/DL (ref 33.6–35)
MCHC RBC AUTO-ENTMCNC: 31.7 G/DL (ref 33.6–35)
MCHC RBC AUTO-ENTMCNC: 31.8 G/DL (ref 33.6–35)
MCHC RBC AUTO-ENTMCNC: 31.9 G/DL (ref 33.6–35)
MCHC RBC AUTO-ENTMCNC: 32 G/DL (ref 33.6–35)
MCHC RBC AUTO-ENTMCNC: 32.1 G/DL (ref 33.6–35)
MCHC RBC AUTO-ENTMCNC: 32.1 G/DL (ref 33.6–35)
MCHC RBC AUTO-ENTMCNC: 32.2 G/DL (ref 33.6–35)
MCHC RBC AUTO-ENTMCNC: 32.6 G/DL (ref 33.6–35)
MCV RBC AUTO: 83.9 FL (ref 81.4–97.8)
MCV RBC AUTO: 85.3 FL (ref 81.4–97.8)
MCV RBC AUTO: 86.9 FL (ref 81.4–97.8)
MCV RBC AUTO: 87.1 FL (ref 81.4–97.8)
MCV RBC AUTO: 87.1 FL (ref 81.4–97.8)
MCV RBC AUTO: 87.3 FL (ref 81.4–97.8)
MCV RBC AUTO: 87.8 FL (ref 81.4–97.8)
MCV RBC AUTO: 88.4 FL (ref 81.4–97.8)
MCV RBC AUTO: 88.6 FL (ref 81.4–97.8)
MCV RBC AUTO: 88.6 FL (ref 81.4–97.8)
MCV RBC AUTO: 89.9 FL (ref 81.4–97.8)
MCV RBC AUTO: 89.9 FL (ref 81.4–97.8)
MCV RBC AUTO: 90.2 FL (ref 81.4–97.8)
MCV RBC AUTO: 90.4 FL (ref 81.4–97.8)
MCV RBC AUTO: 90.7 FL (ref 81.4–97.8)
MCV RBC AUTO: 91.5 FL (ref 81.4–97.8)
MCV RBC AUTO: 91.8 FL (ref 81.4–97.8)
MCV RBC AUTO: 91.9 FL (ref 81.4–97.8)
MCV RBC AUTO: 91.9 FL (ref 81.4–97.8)
MCV RBC AUTO: 92 FL (ref 81.4–97.8)
MCV RBC AUTO: 92 FL (ref 81.4–97.8)
MCV RBC AUTO: 92.1 FL (ref 81.4–97.8)
MCV RBC AUTO: 92.5 FL (ref 81.4–97.8)
MCV RBC AUTO: 92.8 FL (ref 81.4–97.8)
MCV RBC AUTO: 92.8 FL (ref 81.4–97.8)
MCV RBC AUTO: 93 FL (ref 81.4–97.8)
MCV RBC AUTO: 93.1 FL (ref 81.4–97.8)
MCV RBC AUTO: 93.3 FL (ref 81.4–97.8)
MCV RBC AUTO: 93.3 FL (ref 81.4–97.8)
MCV RBC AUTO: 93.8 FL (ref 81.4–97.8)
MCV RBC AUTO: 94.1 FL (ref 81.4–97.8)
MCV RBC AUTO: 94.3 FL (ref 81.4–97.8)
MCV RBC AUTO: 94.6 FL (ref 81.4–97.8)
MCV RBC AUTO: 95 FL (ref 81.4–97.8)
MCV RBC AUTO: 95.1 FL (ref 81.4–97.8)
MCV RBC AUTO: 95.1 FL (ref 81.4–97.8)
MCV RBC AUTO: 95.2 FL (ref 81.4–97.8)
MCV RBC AUTO: 95.4 FL (ref 81.4–97.8)
MCV RBC AUTO: 96.2 FL (ref 81.4–97.8)
MCV RBC AUTO: 97 FL (ref 81.4–97.8)
MCV RBC AUTO: 97.1 FL (ref 81.4–97.8)
MCV RBC AUTO: 97.9 FL (ref 81.4–97.8)
MCV RBC AUTO: 98 FL (ref 81.4–97.8)
METAMYELOCYTES NFR BLD MANUAL: 0.9 %
METAMYELOCYTES NFR BLD MANUAL: 1.3 %
METAMYELOCYTES NFR BLD MANUAL: 1.5 %
METAMYELOCYTES NFR BLD MANUAL: 1.6 %
METAMYELOCYTES NFR BLD MANUAL: 1.7 %
METAMYELOCYTES NFR BLD MANUAL: 1.7 %
METAMYELOCYTES NFR BLD MANUAL: 1.8 %
METAMYELOCYTES NFR BLD MANUAL: 1.8 %
METAMYELOCYTES NFR BLD MANUAL: 2.6 %
METAMYELOCYTES NFR BLD MANUAL: 2.6 %
METAMYELOCYTES NFR BLD MANUAL: 2.8 %
MICRO URNS: ABNORMAL
MICROCYTES BLD QL SMEAR: ABNORMAL
MITOGEN IGNF BCKGRD COR BLD-ACNC: 0.95 [IU]/ML
MONOCYTES # BLD AUTO: 0 K/UL (ref 0–0.85)
MONOCYTES # BLD AUTO: 0 K/UL (ref 0–0.85)
MONOCYTES # BLD AUTO: 0.03 K/UL (ref 0–0.85)
MONOCYTES # BLD AUTO: 0.04 K/UL (ref 0–0.85)
MONOCYTES # BLD AUTO: 0.05 K/UL (ref 0–0.85)
MONOCYTES # BLD AUTO: 0.05 K/UL (ref 0–0.85)
MONOCYTES # BLD AUTO: 0.06 K/UL (ref 0–0.85)
MONOCYTES # BLD AUTO: 0.09 K/UL (ref 0–0.85)
MONOCYTES # BLD AUTO: 0.1 K/UL (ref 0–0.85)
MONOCYTES # BLD AUTO: 0.1 K/UL (ref 0–0.85)
MONOCYTES # BLD AUTO: 0.12 K/UL (ref 0–0.85)
MONOCYTES # BLD AUTO: 0.14 K/UL (ref 0–0.85)
MONOCYTES # BLD AUTO: 0.15 K/UL (ref 0–0.85)
MONOCYTES # BLD AUTO: 0.19 K/UL (ref 0–0.85)
MONOCYTES # BLD AUTO: 0.2 K/UL (ref 0–0.85)
MONOCYTES # BLD AUTO: 0.22 K/UL (ref 0–0.85)
MONOCYTES # BLD AUTO: 0.23 K/UL (ref 0–0.85)
MONOCYTES # BLD AUTO: 0.25 K/UL (ref 0–0.85)
MONOCYTES # BLD AUTO: 0.25 K/UL (ref 0–0.85)
MONOCYTES # BLD AUTO: 0.26 K/UL (ref 0–0.85)
MONOCYTES # BLD AUTO: 0.27 K/UL (ref 0–0.85)
MONOCYTES # BLD AUTO: 0.29 K/UL (ref 0–0.85)
MONOCYTES # BLD AUTO: 0.3 K/UL (ref 0–0.85)
MONOCYTES # BLD AUTO: 0.31 K/UL (ref 0–0.85)
MONOCYTES # BLD AUTO: 0.32 K/UL (ref 0–0.85)
MONOCYTES # BLD AUTO: 0.34 K/UL (ref 0–0.85)
MONOCYTES # BLD AUTO: 0.36 K/UL (ref 0–0.85)
MONOCYTES # BLD AUTO: 0.39 K/UL (ref 0–0.85)
MONOCYTES # BLD AUTO: 0.4 K/UL (ref 0–0.85)
MONOCYTES # BLD AUTO: 0.55 K/UL (ref 0–0.85)
MONOCYTES # BLD AUTO: 0.57 K/UL (ref 0–0.85)
MONOCYTES NFR BLD AUTO: 0 % (ref 0–13.4)
MONOCYTES NFR BLD AUTO: 0 % (ref 0–13.4)
MONOCYTES NFR BLD AUTO: 10 % (ref 0–13.4)
MONOCYTES NFR BLD AUTO: 10.5 % (ref 0–13.4)
MONOCYTES NFR BLD AUTO: 10.6 % (ref 0–13.4)
MONOCYTES NFR BLD AUTO: 11 % (ref 0–13.4)
MONOCYTES NFR BLD AUTO: 11.1 % (ref 0–13.4)
MONOCYTES NFR BLD AUTO: 11.2 % (ref 0–13.4)
MONOCYTES NFR BLD AUTO: 13.1 % (ref 0–13.4)
MONOCYTES NFR BLD AUTO: 13.8 % (ref 0–13.4)
MONOCYTES NFR BLD AUTO: 13.9 % (ref 0–13.4)
MONOCYTES NFR BLD AUTO: 18.8 % (ref 0–13.4)
MONOCYTES NFR BLD AUTO: 2 % (ref 0–13.4)
MONOCYTES NFR BLD AUTO: 2 % (ref 0–13.4)
MONOCYTES NFR BLD AUTO: 2.6 % (ref 0–13.4)
MONOCYTES NFR BLD AUTO: 3.5 % (ref 0–13.4)
MONOCYTES NFR BLD AUTO: 3.6 % (ref 0–13.4)
MONOCYTES NFR BLD AUTO: 4 % (ref 0–13.4)
MONOCYTES NFR BLD AUTO: 4.4 % (ref 0–13.4)
MONOCYTES NFR BLD AUTO: 4.8 % (ref 0–13.4)
MONOCYTES NFR BLD AUTO: 4.9 % (ref 0–13.4)
MONOCYTES NFR BLD AUTO: 5 % (ref 0–13.4)
MONOCYTES NFR BLD AUTO: 5 % (ref 0–13.4)
MONOCYTES NFR BLD AUTO: 5.7 % (ref 0–13.4)
MONOCYTES NFR BLD AUTO: 5.8 % (ref 0–13.4)
MONOCYTES NFR BLD AUTO: 6.3 % (ref 0–13.4)
MONOCYTES NFR BLD AUTO: 6.3 % (ref 0–13.4)
MONOCYTES NFR BLD AUTO: 6.5 % (ref 0–13.4)
MONOCYTES NFR BLD AUTO: 7.9 % (ref 0–13.4)
MONOCYTES NFR BLD AUTO: 8 % (ref 0–13.4)
MONOCYTES NFR BLD AUTO: 8.6 % (ref 0–13.4)
MONOCYTES NFR BLD AUTO: 8.7 % (ref 0–13.4)
MONOCYTES NFR BLD AUTO: 8.8 % (ref 0–13.4)
MONOCYTES NFR BLD AUTO: 8.9 % (ref 0–13.4)
MONOCYTES NFR BLD AUTO: 9 % (ref 0–13.4)
MONOCYTES NFR BLD AUTO: 9.1 % (ref 0–13.4)
MONOCYTES NFR BLD AUTO: 9.6 % (ref 0–13.4)
MONOCYTES NFR BLD AUTO: 9.6 % (ref 0–13.4)
MONOCYTES NFR BLD AUTO: 9.8 % (ref 0–13.4)
MONOCYTES NFR BLD AUTO: 9.8 % (ref 0–13.4)
MORPHOLOGY BLD-IMP: NORMAL
MYELOCYTES NFR BLD MANUAL: 0.9 %
MYELOCYTES NFR BLD MANUAL: 1.3 %
MYELOCYTES NFR BLD MANUAL: 1.8 %
MYELOCYTES NFR BLD MANUAL: 2.6 %
MYELOCYTES NFR BLD MANUAL: 4.3 %
NEUTROPHILS # BLD AUTO: 0.39 K/UL (ref 2–7.15)
NEUTROPHILS # BLD AUTO: 0.45 K/UL (ref 2–7.15)
NEUTROPHILS # BLD AUTO: 0.49 K/UL (ref 2–7.15)
NEUTROPHILS # BLD AUTO: 0.62 K/UL (ref 2–7.15)
NEUTROPHILS # BLD AUTO: 0.79 K/UL (ref 2–7.15)
NEUTROPHILS # BLD AUTO: 0.82 K/UL (ref 2–7.15)
NEUTROPHILS # BLD AUTO: 0.83 K/UL (ref 2–7.15)
NEUTROPHILS # BLD AUTO: 1.33 K/UL (ref 2–7.15)
NEUTROPHILS # BLD AUTO: 1.35 K/UL (ref 2–7.15)
NEUTROPHILS # BLD AUTO: 1.36 K/UL (ref 2–7.15)
NEUTROPHILS # BLD AUTO: 1.39 K/UL (ref 2–7.15)
NEUTROPHILS # BLD AUTO: 1.43 K/UL (ref 2–7.15)
NEUTROPHILS # BLD AUTO: 1.45 K/UL (ref 2–7.15)
NEUTROPHILS # BLD AUTO: 1.48 K/UL (ref 2–7.15)
NEUTROPHILS # BLD AUTO: 1.53 K/UL (ref 2–7.15)
NEUTROPHILS # BLD AUTO: 1.75 K/UL (ref 2–7.15)
NEUTROPHILS # BLD AUTO: 1.82 K/UL (ref 2–7.15)
NEUTROPHILS # BLD AUTO: 1.87 K/UL (ref 2–7.15)
NEUTROPHILS # BLD AUTO: 1.95 K/UL (ref 2–7.15)
NEUTROPHILS # BLD AUTO: 1.99 K/UL (ref 2–7.15)
NEUTROPHILS # BLD AUTO: 2.03 K/UL (ref 2–7.15)
NEUTROPHILS # BLD AUTO: 2.06 K/UL (ref 2–7.15)
NEUTROPHILS # BLD AUTO: 2.15 K/UL (ref 2–7.15)
NEUTROPHILS # BLD AUTO: 2.28 K/UL (ref 2–7.15)
NEUTROPHILS # BLD AUTO: 2.37 K/UL (ref 2–7.15)
NEUTROPHILS # BLD AUTO: 3.04 K/UL (ref 2–7.15)
NEUTROPHILS # BLD AUTO: 3.04 K/UL (ref 2–7.15)
NEUTROPHILS # BLD AUTO: 3.1 K/UL (ref 2–7.15)
NEUTROPHILS # BLD AUTO: 3.17 K/UL (ref 2–7.15)
NEUTROPHILS # BLD AUTO: 3.26 K/UL (ref 2–7.15)
NEUTROPHILS # BLD AUTO: 3.36 K/UL (ref 2–7.15)
NEUTROPHILS # BLD AUTO: 3.41 K/UL (ref 2–7.15)
NEUTROPHILS # BLD AUTO: 3.57 K/UL (ref 2–7.15)
NEUTROPHILS # BLD AUTO: 3.59 K/UL (ref 2–7.15)
NEUTROPHILS # BLD AUTO: 3.98 K/UL (ref 2–7.15)
NEUTROPHILS # BLD AUTO: 4.09 K/UL (ref 2–7.15)
NEUTROPHILS # BLD AUTO: 4.26 K/UL (ref 2–7.15)
NEUTROPHILS # BLD AUTO: 4.45 K/UL (ref 2–7.15)
NEUTROPHILS # BLD AUTO: 4.65 K/UL (ref 2–7.15)
NEUTROPHILS # BLD AUTO: 5.06 K/UL (ref 2–7.15)
NEUTROPHILS NFR BLD: 43.7 % (ref 44–72)
NEUTROPHILS NFR BLD: 46.3 % (ref 44–72)
NEUTROPHILS NFR BLD: 47 % (ref 44–72)
NEUTROPHILS NFR BLD: 47.8 % (ref 44–72)
NEUTROPHILS NFR BLD: 49.1 % (ref 44–72)
NEUTROPHILS NFR BLD: 50 % (ref 44–72)
NEUTROPHILS NFR BLD: 51.7 % (ref 44–72)
NEUTROPHILS NFR BLD: 51.9 % (ref 44–72)
NEUTROPHILS NFR BLD: 53.5 % (ref 44–72)
NEUTROPHILS NFR BLD: 55 % (ref 44–72)
NEUTROPHILS NFR BLD: 56.1 % (ref 44–72)
NEUTROPHILS NFR BLD: 56.5 % (ref 44–72)
NEUTROPHILS NFR BLD: 58.4 % (ref 44–72)
NEUTROPHILS NFR BLD: 59.8 % (ref 44–72)
NEUTROPHILS NFR BLD: 62.9 % (ref 44–72)
NEUTROPHILS NFR BLD: 65.4 % (ref 44–72)
NEUTROPHILS NFR BLD: 66 % (ref 44–72)
NEUTROPHILS NFR BLD: 67.8 % (ref 44–72)
NEUTROPHILS NFR BLD: 68 % (ref 44–72)
NEUTROPHILS NFR BLD: 68.4 % (ref 44–72)
NEUTROPHILS NFR BLD: 73.3 % (ref 44–72)
NEUTROPHILS NFR BLD: 77.8 % (ref 44–72)
NEUTROPHILS NFR BLD: 78 % (ref 44–72)
NEUTROPHILS NFR BLD: 80.4 % (ref 44–72)
NEUTROPHILS NFR BLD: 81.1 % (ref 44–72)
NEUTROPHILS NFR BLD: 82.1 % (ref 44–72)
NEUTROPHILS NFR BLD: 83.2 % (ref 44–72)
NEUTROPHILS NFR BLD: 83.2 % (ref 44–72)
NEUTROPHILS NFR BLD: 83.6 % (ref 44–72)
NEUTROPHILS NFR BLD: 85.3 % (ref 44–72)
NEUTROPHILS NFR BLD: 86.1 % (ref 44–72)
NEUTROPHILS NFR BLD: 86.2 % (ref 44–72)
NEUTROPHILS NFR BLD: 86.7 % (ref 44–72)
NEUTROPHILS NFR BLD: 87 % (ref 44–72)
NEUTROPHILS NFR BLD: 87.3 % (ref 44–72)
NEUTROPHILS NFR BLD: 87.7 % (ref 44–72)
NEUTROPHILS NFR BLD: 88 % (ref 44–72)
NEUTROPHILS NFR BLD: 88 % (ref 44–72)
NEUTROPHILS NFR BLD: 88.5 % (ref 44–72)
NEUTROPHILS NFR BLD: 93.6 % (ref 44–72)
NEUTS BAND NFR BLD MANUAL: 1 % (ref 0–10)
NEUTS BAND NFR BLD MANUAL: 12 % (ref 0–10)
NEUTS BAND NFR BLD MANUAL: 13.9 % (ref 0–10)
NEUTS BAND NFR BLD MANUAL: 14.4 % (ref 0–10)
NEUTS BAND NFR BLD MANUAL: 15.2 % (ref 0–10)
NEUTS BAND NFR BLD MANUAL: 16.7 % (ref 0–10)
NEUTS BAND NFR BLD MANUAL: 19.3 % (ref 0–10)
NEUTS BAND NFR BLD MANUAL: 2 % (ref 0–10)
NEUTS BAND NFR BLD MANUAL: 2.7 % (ref 0–10)
NEUTS BAND NFR BLD MANUAL: 2.8 % (ref 0–10)
NEUTS BAND NFR BLD MANUAL: 3 % (ref 0–10)
NEUTS BAND NFR BLD MANUAL: 4.4 % (ref 0–10)
NEUTS BAND NFR BLD MANUAL: 4.5 % (ref 0–10)
NEUTS BAND NFR BLD MANUAL: 4.6 % (ref 0–10)
NEUTS BAND NFR BLD MANUAL: 5 % (ref 0–10)
NEUTS BAND NFR BLD MANUAL: 5.2 % (ref 0–10)
NEUTS BAND NFR BLD MANUAL: 6.5 % (ref 0–10)
NEUTS BAND NFR BLD MANUAL: 6.5 % (ref 0–10)
NEUTS BAND NFR BLD MANUAL: 7.1 % (ref 0–10)
NEUTS BAND NFR BLD MANUAL: 8.6 % (ref 0–10)
NEUTS BAND NFR BLD MANUAL: 8.7 % (ref 0–10)
NITRITE UR QL STRIP.AUTO: NEGATIVE
NRBC # BLD AUTO: 0 K/UL
NRBC BLD AUTO-RTO: 0 /100 WBC
NRBC BLD-RTO: 0 /100 WBC
OVALOCYTES BLD QL SMEAR: NORMAL
PATHOLOGY CONSULT NOTE: NORMAL
PH UR STRIP.AUTO: 6.5 [PH]
PH UR STRIP.AUTO: 8 [PH]
PH UR STRIP.AUTO: >=9 [PH]
PH UR STRIP.AUTO: >=9 [PH]
PHOSPHATE SERPL-MCNC: 2.9 MG/DL (ref 2.5–4.5)
PHOSPHATE SERPL-MCNC: 3.8 MG/DL (ref 2.5–4.5)
PHOSPHATE SERPL-MCNC: 5 MG/DL (ref 2.5–4.5)
PLATELET # BLD AUTO: 101 K/UL (ref 164–446)
PLATELET # BLD AUTO: 107 K/UL (ref 164–446)
PLATELET # BLD AUTO: 107 K/UL (ref 164–446)
PLATELET # BLD AUTO: 108 K/UL (ref 164–446)
PLATELET # BLD AUTO: 108 K/UL (ref 164–446)
PLATELET # BLD AUTO: 111 K/UL (ref 164–446)
PLATELET # BLD AUTO: 112 K/UL (ref 164–446)
PLATELET # BLD AUTO: 112 K/UL (ref 164–446)
PLATELET # BLD AUTO: 115 K/UL (ref 164–446)
PLATELET # BLD AUTO: 118 K/UL (ref 164–446)
PLATELET # BLD AUTO: 119 K/UL (ref 164–446)
PLATELET # BLD AUTO: 120 K/UL (ref 164–446)
PLATELET # BLD AUTO: 124 K/UL (ref 164–446)
PLATELET # BLD AUTO: 125 K/UL (ref 164–446)
PLATELET # BLD AUTO: 126 K/UL (ref 164–446)
PLATELET # BLD AUTO: 129 K/UL (ref 164–446)
PLATELET # BLD AUTO: 132 K/UL (ref 164–446)
PLATELET # BLD AUTO: 134 K/UL (ref 164–446)
PLATELET # BLD AUTO: 137 K/UL (ref 164–446)
PLATELET # BLD AUTO: 138 K/UL (ref 164–446)
PLATELET # BLD AUTO: 144 K/UL (ref 164–446)
PLATELET # BLD AUTO: 144 K/UL (ref 164–446)
PLATELET # BLD AUTO: 145 K/UL (ref 164–446)
PLATELET # BLD AUTO: 152 K/UL (ref 164–446)
PLATELET # BLD AUTO: 154 K/UL (ref 164–446)
PLATELET # BLD AUTO: 154 K/UL (ref 164–446)
PLATELET # BLD AUTO: 156 K/UL (ref 164–446)
PLATELET # BLD AUTO: 163 K/UL (ref 164–446)
PLATELET # BLD AUTO: 179 K/UL (ref 164–446)
PLATELET # BLD AUTO: 200 K/UL (ref 164–446)
PLATELET # BLD AUTO: 205 K/UL (ref 164–446)
PLATELET # BLD AUTO: 211 K/UL (ref 164–446)
PLATELET # BLD AUTO: 240 K/UL (ref 164–446)
PLATELET # BLD AUTO: 68 K/UL (ref 164–446)
PLATELET # BLD AUTO: 68 K/UL (ref 164–446)
PLATELET # BLD AUTO: 70 K/UL (ref 164–446)
PLATELET # BLD AUTO: 71 K/UL (ref 164–446)
PLATELET # BLD AUTO: 75 K/UL (ref 164–446)
PLATELET # BLD AUTO: 78 K/UL (ref 164–446)
PLATELET # BLD AUTO: 79 K/UL (ref 164–446)
PLATELET # BLD AUTO: 82 K/UL (ref 164–446)
PLATELET # BLD AUTO: 88 K/UL (ref 164–446)
PLATELET BLD QL SMEAR: NORMAL
PMV BLD AUTO: 10 FL (ref 9–12.9)
PMV BLD AUTO: 10.1 FL (ref 9–12.9)
PMV BLD AUTO: 10.2 FL (ref 9–12.9)
PMV BLD AUTO: 10.4 FL (ref 9–12.9)
PMV BLD AUTO: 10.4 FL (ref 9–12.9)
PMV BLD AUTO: 10.5 FL (ref 9–12.9)
PMV BLD AUTO: 10.6 FL (ref 9–12.9)
PMV BLD AUTO: 10.8 FL (ref 9–12.9)
PMV BLD AUTO: 10.9 FL (ref 9–12.9)
PMV BLD AUTO: 11 FL (ref 9–12.9)
PMV BLD AUTO: 11 FL (ref 9–12.9)
PMV BLD AUTO: 11.2 FL (ref 9–12.9)
PMV BLD AUTO: 11.4 FL (ref 9–12.9)
PMV BLD AUTO: 11.8 FL (ref 9–12.9)
PMV BLD AUTO: 8.7 FL (ref 9–12.9)
PMV BLD AUTO: 8.7 FL (ref 9–12.9)
PMV BLD AUTO: 8.8 FL (ref 9–12.9)
PMV BLD AUTO: 8.9 FL (ref 9–12.9)
PMV BLD AUTO: 9.1 FL (ref 9–12.9)
PMV BLD AUTO: 9.2 FL (ref 9–12.9)
PMV BLD AUTO: 9.3 FL (ref 9–12.9)
PMV BLD AUTO: 9.4 FL (ref 9–12.9)
PMV BLD AUTO: 9.5 FL (ref 9–12.9)
PMV BLD AUTO: 9.6 FL (ref 9–12.9)
PMV BLD AUTO: 9.6 FL (ref 9–12.9)
PMV BLD AUTO: 9.7 FL (ref 9–12.9)
PMV BLD AUTO: 9.7 FL (ref 9–12.9)
PMV BLD AUTO: 9.8 FL (ref 9–12.9)
PMV BLD AUTO: 9.8 FL (ref 9–12.9)
PMV BLD AUTO: 9.9 FL (ref 9–12.9)
PMV BLD AUTO: 9.9 FL (ref 9–12.9)
POIKILOCYTOSIS BLD QL SMEAR: NORMAL
POLYCHROMASIA BLD QL SMEAR: NORMAL
POTASSIUM SERPL-SCNC: 3.3 MMOL/L (ref 3.6–5.5)
POTASSIUM SERPL-SCNC: 3.4 MMOL/L (ref 3.6–5.5)
POTASSIUM SERPL-SCNC: 3.4 MMOL/L (ref 3.6–5.5)
POTASSIUM SERPL-SCNC: 3.5 MMOL/L (ref 3.6–5.5)
POTASSIUM SERPL-SCNC: 3.6 MMOL/L (ref 3.6–5.5)
POTASSIUM SERPL-SCNC: 3.7 MMOL/L (ref 3.6–5.5)
POTASSIUM SERPL-SCNC: 3.8 MMOL/L (ref 3.6–5.5)
POTASSIUM SERPL-SCNC: 3.9 MMOL/L (ref 3.6–5.5)
POTASSIUM SERPL-SCNC: 4 MMOL/L (ref 3.6–5.5)
POTASSIUM SERPL-SCNC: 4.1 MMOL/L (ref 3.6–5.5)
POTASSIUM SERPL-SCNC: 4.2 MMOL/L (ref 3.6–5.5)
POTASSIUM SERPL-SCNC: 4.3 MMOL/L (ref 3.6–5.5)
POTASSIUM SERPL-SCNC: 4.3 MMOL/L (ref 3.6–5.5)
POTASSIUM SERPL-SCNC: 4.4 MMOL/L (ref 3.6–5.5)
POTASSIUM SERPL-SCNC: 4.5 MMOL/L (ref 3.6–5.5)
POTASSIUM SERPL-SCNC: 4.7 MMOL/L (ref 3.6–5.5)
POTASSIUM SERPL-SCNC: 4.8 MMOL/L (ref 3.6–5.5)
PROCALCITONIN SERPL-MCNC: 0.28 NG/ML
PROCALCITONIN SERPL-MCNC: 0.28 NG/ML
PROCALCITONIN SERPL-MCNC: 0.31 NG/ML
PROCALCITONIN SERPL-MCNC: 1.35 NG/ML
PROCALCITONIN SERPL-MCNC: 1.38 NG/ML
PRODUCT TYPE UPROD: NORMAL
PRODUCT TYPE UPROD: NORMAL
PROT SERPL-MCNC: 4.2 G/DL (ref 6–8.2)
PROT SERPL-MCNC: 5 G/DL (ref 6–8.2)
PROT SERPL-MCNC: 5.2 G/DL (ref 6–8.2)
PROT SERPL-MCNC: 5.2 G/DL (ref 6–8.2)
PROT SERPL-MCNC: 5.3 G/DL (ref 6–8.2)
PROT SERPL-MCNC: 5.4 G/DL (ref 6–8.2)
PROT SERPL-MCNC: 5.4 G/DL (ref 6–8.2)
PROT SERPL-MCNC: 5.6 G/DL (ref 6–8.2)
PROT SERPL-MCNC: 5.7 G/DL (ref 6–8.2)
PROT SERPL-MCNC: 6 G/DL (ref 6–8.2)
PROT SERPL-MCNC: 6 G/DL (ref 6–8.2)
PROT SERPL-MCNC: 6.1 G/DL (ref 6–8.2)
PROT SERPL-MCNC: 6.2 G/DL (ref 6–8.2)
PROT SERPL-MCNC: 6.3 G/DL (ref 6–8.2)
PROT SERPL-MCNC: 6.4 G/DL (ref 6–8.2)
PROT SERPL-MCNC: 6.5 G/DL (ref 6–8.2)
PROT SERPL-MCNC: 6.8 G/DL (ref 6–8.2)
PROT SERPL-MCNC: 6.9 G/DL (ref 6–8.2)
PROT UR QL STRIP: 100 MG/DL
PROT UR QL STRIP: 100 MG/DL
PROT UR QL STRIP: 200 MG/DL
PROT UR QL STRIP: 300 MG/DL
PROTHROMBIN TIME: 12.5 SEC (ref 12–14.6)
PROTHROMBIN TIME: 12.6 SEC (ref 12–14.6)
PROTHROMBIN TIME: 14.1 SEC (ref 12–14.6)
PROTHROMBIN TIME: 15.4 SEC (ref 12–14.6)
RBC # BLD AUTO: 2.44 M/UL (ref 4.2–5.4)
RBC # BLD AUTO: 2.44 M/UL (ref 4.2–5.4)
RBC # BLD AUTO: 2.48 M/UL (ref 4.2–5.4)
RBC # BLD AUTO: 2.52 M/UL (ref 4.2–5.4)
RBC # BLD AUTO: 2.54 M/UL (ref 4.2–5.4)
RBC # BLD AUTO: 2.55 M/UL (ref 4.2–5.4)
RBC # BLD AUTO: 2.6 M/UL (ref 4.2–5.4)
RBC # BLD AUTO: 2.61 M/UL (ref 4.2–5.4)
RBC # BLD AUTO: 2.62 M/UL (ref 4.2–5.4)
RBC # BLD AUTO: 2.64 M/UL (ref 4.2–5.4)
RBC # BLD AUTO: 2.67 M/UL (ref 4.2–5.4)
RBC # BLD AUTO: 2.68 M/UL (ref 4.2–5.4)
RBC # BLD AUTO: 2.7 M/UL (ref 4.2–5.4)
RBC # BLD AUTO: 2.7 M/UL (ref 4.2–5.4)
RBC # BLD AUTO: 2.72 M/UL (ref 4.2–5.4)
RBC # BLD AUTO: 2.76 M/UL (ref 4.2–5.4)
RBC # BLD AUTO: 2.79 M/UL (ref 4.2–5.4)
RBC # BLD AUTO: 2.8 M/UL (ref 4.2–5.4)
RBC # BLD AUTO: 2.81 M/UL (ref 4.2–5.4)
RBC # BLD AUTO: 2.85 M/UL (ref 4.2–5.4)
RBC # BLD AUTO: 2.86 M/UL (ref 4.2–5.4)
RBC # BLD AUTO: 2.88 M/UL (ref 4.2–5.4)
RBC # BLD AUTO: 2.89 M/UL (ref 4.2–5.4)
RBC # BLD AUTO: 2.89 M/UL (ref 4.2–5.4)
RBC # BLD AUTO: 2.9 M/UL (ref 4.2–5.4)
RBC # BLD AUTO: 2.91 M/UL (ref 4.2–5.4)
RBC # BLD AUTO: 2.93 M/UL (ref 4.2–5.4)
RBC # BLD AUTO: 2.97 M/UL (ref 4.2–5.4)
RBC # BLD AUTO: 2.99 M/UL (ref 4.2–5.4)
RBC # BLD AUTO: 3.01 M/UL (ref 4.2–5.4)
RBC # BLD AUTO: 3.04 M/UL (ref 4.2–5.4)
RBC # BLD AUTO: 3.04 M/UL (ref 4.2–5.4)
RBC # BLD AUTO: 3.12 M/UL (ref 4.2–5.4)
RBC # BLD AUTO: 3.15 M/UL (ref 4.2–5.4)
RBC # BLD AUTO: 3.2 M/UL (ref 4.2–5.4)
RBC # BLD AUTO: 3.21 M/UL (ref 4.2–5.4)
RBC # BLD AUTO: 3.21 M/UL (ref 4.2–5.4)
RBC # BLD AUTO: 3.22 M/UL (ref 4.2–5.4)
RBC # BLD AUTO: 3.23 M/UL (ref 4.2–5.4)
RBC # BLD AUTO: 3.24 M/UL (ref 4.2–5.4)
RBC # BLD AUTO: 3.29 M/UL (ref 4.2–5.4)
RBC # BLD AUTO: 3.33 M/UL (ref 4.2–5.4)
RBC # BLD AUTO: 3.41 M/UL (ref 4.2–5.4)
RBC # BLD AUTO: 3.62 M/UL (ref 4.2–5.4)
RBC # BLD AUTO: 3.66 M/UL (ref 4.2–5.4)
RBC # URNS HPF: ABNORMAL /HPF
RBC BLD AUTO: PRESENT
RBC UR QL AUTO: ABNORMAL
RBC UR QL AUTO: ABNORMAL
RBC UR QL AUTO: NEGATIVE
RBC UR QL AUTO: NEGATIVE
RETICS # AUTO: 0.03 M/UL (ref 0.04–0.06)
RETICS/RBC NFR: 1.1 % (ref 0.8–2.1)
RH BLD: NORMAL
RHINOVIRUS RNA SPEC QL NAA+PROBE: NOT DETECTED
RSV A RNA SPEC QL NAA+PROBE: NOT DETECTED
RSV B RNA SPEC QL NAA+PROBE: NOT DETECTED
SCHISTOCYTES BLD QL SMEAR: NORMAL
SCHISTOCYTES BLD QL SMEAR: NORMAL
SIGNIFICANT IND 70042: ABNORMAL
SIGNIFICANT IND 70042: NORMAL
SITE SITE: ABNORMAL
SITE SITE: NORMAL
SMUDGE CELLS BLD QL SMEAR: NORMAL
SODIUM SERPL-SCNC: 124 MMOL/L (ref 135–145)
SODIUM SERPL-SCNC: 126 MMOL/L (ref 135–145)
SODIUM SERPL-SCNC: 127 MMOL/L (ref 135–145)
SODIUM SERPL-SCNC: 128 MMOL/L (ref 135–145)
SODIUM SERPL-SCNC: 129 MMOL/L (ref 135–145)
SODIUM SERPL-SCNC: 130 MMOL/L (ref 135–145)
SODIUM SERPL-SCNC: 131 MMOL/L (ref 135–145)
SODIUM SERPL-SCNC: 132 MMOL/L (ref 135–145)
SODIUM SERPL-SCNC: 133 MMOL/L (ref 135–145)
SODIUM SERPL-SCNC: 134 MMOL/L (ref 135–145)
SODIUM SERPL-SCNC: 134 MMOL/L (ref 135–145)
SODIUM SERPL-SCNC: 135 MMOL/L (ref 135–145)
SODIUM SERPL-SCNC: 135 MMOL/L (ref 135–145)
SODIUM SERPL-SCNC: 136 MMOL/L (ref 135–145)
SODIUM SERPL-SCNC: 137 MMOL/L (ref 135–145)
SOURCE SOURCE: ABNORMAL
SOURCE SOURCE: NORMAL
SP GR UR STRIP.AUTO: 1.01
SP GR UR STRIP.AUTO: 1.02
SPECIMEN DRAWN FROM PATIENT: ABNORMAL
SPECIMEN DRAWN FROM PATIENT: ABNORMAL
SPECIMEN DRAWN FROM PATIENT: NORMAL
SPECIMEN SOURCE: NORMAL
SPHEROCYTES BLD QL SMEAR: NORMAL
T GONDII IGG SER-ACNC: <3 IU/ML
TARGETS BLD QL SMEAR: NORMAL
TIBC SERPL-MCNC: 174 UG/DL (ref 250–450)
TIBC SERPL-MCNC: 224 UG/DL (ref 250–450)
TOXIC GRANULES BLD QL SMEAR: SLIGHT
TREPONEMA PALLIDUM IGG+IGM AB [PRESENCE] IN SERUM OR PLASMA BY IMMUNOASSAY: NON REACTIVE
TROPONIN I SERPL-MCNC: <0.02 NG/ML (ref 0–0.04)
TROPONIN I SERPL-MCNC: <0.02 NG/ML (ref 0–0.04)
TSH SERPL DL<=0.005 MIU/L-ACNC: 0.93 UIU/ML (ref 0.3–3.7)
UNIDENT CRYS URNS QL MICRO: ABNORMAL /HPF
UNIDENT CRYS URNS QL MICRO: ABNORMAL /HPF
UNIT STATUS USTAT: NORMAL
UNIT STATUS USTAT: NORMAL
UROBILINOGEN UR STRIP.AUTO-MCNC: 0.2 MG/DL
VANCOMYCIN TIMED 2584: 13.2 UG/ML
VANCOMYCIN TIMED 2584: 19.5 UG/ML
VANCOMYCIN TROUGH SERPL-MCNC: 11.1 UG/ML (ref 10–20)
VIT B12 SERPL-MCNC: 1244 PG/ML (ref 211–911)
VIT B12 SERPL-MCNC: 1287 PG/ML (ref 211–911)
WBC # BLD AUTO: 0.7 K/UL (ref 4.8–10.8)
WBC # BLD AUTO: 0.7 K/UL (ref 4.8–10.8)
WBC # BLD AUTO: 0.8 K/UL (ref 4.8–10.8)
WBC # BLD AUTO: 1 K/UL (ref 4.8–10.8)
WBC # BLD AUTO: 1 K/UL (ref 4.8–10.8)
WBC # BLD AUTO: 1.1 K/UL (ref 4.8–10.8)
WBC # BLD AUTO: 1.4 K/UL (ref 4.8–10.8)
WBC # BLD AUTO: 1.6 K/UL (ref 4.8–10.8)
WBC # BLD AUTO: 1.6 K/UL (ref 4.8–10.8)
WBC # BLD AUTO: 2.2 K/UL (ref 4.8–10.8)
WBC # BLD AUTO: 2.3 K/UL (ref 4.8–10.8)
WBC # BLD AUTO: 2.4 K/UL (ref 4.8–10.8)
WBC # BLD AUTO: 2.5 K/UL (ref 4.8–10.8)
WBC # BLD AUTO: 2.6 K/UL (ref 4.8–10.8)
WBC # BLD AUTO: 2.7 K/UL (ref 4.8–10.8)
WBC # BLD AUTO: 2.8 K/UL (ref 4.8–10.8)
WBC # BLD AUTO: 2.9 K/UL (ref 4.8–10.8)
WBC # BLD AUTO: 3 K/UL (ref 4.8–10.8)
WBC # BLD AUTO: 3.1 K/UL (ref 4.8–10.8)
WBC # BLD AUTO: 3.1 K/UL (ref 4.8–10.8)
WBC # BLD AUTO: 3.6 K/UL (ref 4.8–10.8)
WBC # BLD AUTO: 3.7 K/UL (ref 4.8–10.8)
WBC # BLD AUTO: 3.8 K/UL (ref 4.8–10.8)
WBC # BLD AUTO: 4.1 K/UL (ref 4.8–10.8)
WBC # BLD AUTO: 4.3 K/UL (ref 4.8–10.8)
WBC # BLD AUTO: 4.6 K/UL (ref 4.8–10.8)
WBC # BLD AUTO: 4.6 K/UL (ref 4.8–10.8)
WBC # BLD AUTO: 5 K/UL (ref 4.8–10.8)
WBC # BLD AUTO: 5 K/UL (ref 4.8–10.8)
WBC # BLD AUTO: 5.2 K/UL (ref 4.8–10.8)
WBC # BLD AUTO: 5.3 K/UL (ref 4.8–10.8)
WBC # BLD AUTO: 6.1 K/UL (ref 4.8–10.8)
WBC #/AREA URNS HPF: ABNORMAL /HPF
YEAST #/AREA URNS HPF: ABNORMAL /HPF

## 2017-01-01 PROCEDURE — 700111 HCHG RX REV CODE 636 W/ 250 OVERRIDE (IP): Performed by: FAMILY MEDICINE

## 2017-01-01 PROCEDURE — G0378 HOSPITAL OBSERVATION PER HR: HCPCS

## 2017-01-01 PROCEDURE — A9270 NON-COVERED ITEM OR SERVICE: HCPCS | Performed by: HOSPITALIST

## 2017-01-01 PROCEDURE — 665998 HH PPS REVENUE CREDIT

## 2017-01-01 PROCEDURE — 700102 HCHG RX REV CODE 250 W/ 637 OVERRIDE(OP): Performed by: INTERNAL MEDICINE

## 2017-01-01 PROCEDURE — 97161 PT EVAL LOW COMPLEX 20 MIN: CPT

## 2017-01-01 PROCEDURE — 80048 BASIC METABOLIC PNL TOTAL CA: CPT

## 2017-01-01 PROCEDURE — 85610 PROTHROMBIN TIME: CPT

## 2017-01-01 PROCEDURE — 700111 HCHG RX REV CODE 636 W/ 250 OVERRIDE (IP): Performed by: HOSPITALIST

## 2017-01-01 PROCEDURE — 700111 HCHG RX REV CODE 636 W/ 250 OVERRIDE (IP): Performed by: INTERNAL MEDICINE

## 2017-01-01 PROCEDURE — 90935 HEMODIALYSIS ONE EVALUATION: CPT

## 2017-01-01 PROCEDURE — 80053 COMPREHEN METABOLIC PANEL: CPT

## 2017-01-01 PROCEDURE — 82962 GLUCOSE BLOOD TEST: CPT

## 2017-01-01 PROCEDURE — 71020 DX-CHEST-2 VIEWS: CPT

## 2017-01-01 PROCEDURE — 700102 HCHG RX REV CODE 250 W/ 637 OVERRIDE(OP): Performed by: HOSPITALIST

## 2017-01-01 PROCEDURE — 82962 GLUCOSE BLOOD TEST: CPT | Mod: 91

## 2017-01-01 PROCEDURE — 665999 HH PPS REVENUE DEBIT

## 2017-01-01 PROCEDURE — G0158 HHC OT ASSISTANT EA 15: HCPCS

## 2017-01-01 PROCEDURE — 770001 HCHG ROOM/CARE - MED/SURG/GYN PRIV*

## 2017-01-01 PROCEDURE — 87493 C DIFF AMPLIFIED PROBE: CPT

## 2017-01-01 PROCEDURE — 36415 COLL VENOUS BLD VENIPUNCTURE: CPT

## 2017-01-01 PROCEDURE — A9270 NON-COVERED ITEM OR SERVICE: HCPCS | Performed by: INTERNAL MEDICINE

## 2017-01-01 PROCEDURE — 700102 HCHG RX REV CODE 250 W/ 637 OVERRIDE(OP): Performed by: NURSE PRACTITIONER

## 2017-01-01 PROCEDURE — 96365 THER/PROPH/DIAG IV INF INIT: CPT

## 2017-01-01 PROCEDURE — 94669 MECHANICAL CHEST WALL OSCILL: CPT

## 2017-01-01 PROCEDURE — G0162 HHC RN E&M PLAN SVS, 15 MIN: HCPCS

## 2017-01-01 PROCEDURE — 700105 HCHG RX REV CODE 258: Performed by: INTERNAL MEDICINE

## 2017-01-01 PROCEDURE — A9270 NON-COVERED ITEM OR SERVICE: HCPCS | Performed by: NURSE PRACTITIONER

## 2017-01-01 PROCEDURE — 87340 HEPATITIS B SURFACE AG IA: CPT

## 2017-01-01 PROCEDURE — 97162 PT EVAL MOD COMPLEX 30 MIN: CPT

## 2017-01-01 PROCEDURE — 86645 CMV ANTIBODY IGM: CPT

## 2017-01-01 PROCEDURE — 86644 CMV ANTIBODY: CPT

## 2017-01-01 PROCEDURE — 99225 PR SUBSEQUENT OBSERVATION CARE,LEVEL II: CPT | Performed by: INTERNAL MEDICINE

## 2017-01-01 PROCEDURE — 99233 SBSQ HOSP IP/OBS HIGH 50: CPT | Performed by: INTERNAL MEDICINE

## 2017-01-01 PROCEDURE — 700105 HCHG RX REV CODE 258: Performed by: HOSPITALIST

## 2017-01-01 PROCEDURE — 700111 HCHG RX REV CODE 636 W/ 250 OVERRIDE (IP)

## 2017-01-01 PROCEDURE — 81381 HLA I TYPING 1 ALLELE HR: CPT

## 2017-01-01 PROCEDURE — 96375 TX/PRO/DX INJ NEW DRUG ADDON: CPT

## 2017-01-01 PROCEDURE — 160048 HCHG OR STATISTICAL LEVEL 1-5: Performed by: INTERNAL MEDICINE

## 2017-01-01 PROCEDURE — G0151 HHCP-SERV OF PT,EA 15 MIN: HCPCS

## 2017-01-01 PROCEDURE — 85027 COMPLETE CBC AUTOMATED: CPT

## 2017-01-01 PROCEDURE — 99232 SBSQ HOSP IP/OBS MODERATE 35: CPT | Performed by: INTERNAL MEDICINE

## 2017-01-01 PROCEDURE — G0152 HHCP-SERV OF OT,EA 15 MIN: HCPCS

## 2017-01-01 PROCEDURE — 86706 HEP B SURFACE ANTIBODY: CPT

## 2017-01-01 PROCEDURE — 99285 EMERGENCY DEPT VISIT HI MDM: CPT

## 2017-01-01 PROCEDURE — 99220 PR INITIAL OBSERVATION CARE,LEVL III: CPT | Performed by: INTERNAL MEDICINE

## 2017-01-01 PROCEDURE — 87086 URINE CULTURE/COLONY COUNT: CPT

## 2017-01-01 PROCEDURE — 99232 SBSQ HOSP IP/OBS MODERATE 35: CPT | Performed by: FAMILY MEDICINE

## 2017-01-01 PROCEDURE — 770009 HCHG ROOM/CARE - ONCOLOGY SEMI PRI*

## 2017-01-01 PROCEDURE — 83605 ASSAY OF LACTIC ACID: CPT | Mod: 91

## 2017-01-01 PROCEDURE — G0299 HHS/HOSPICE OF RN EA 15 MIN: HCPCS

## 2017-01-01 PROCEDURE — G0153 HHCP-SVS OF S/L PATH,EA 15MN: HCPCS

## 2017-01-01 PROCEDURE — 86901 BLOOD TYPING SEROLOGIC RH(D): CPT

## 2017-01-01 PROCEDURE — 99223 1ST HOSP IP/OBS HIGH 75: CPT | Mod: AI | Performed by: HOSPITALIST

## 2017-01-01 PROCEDURE — 87040 BLOOD CULTURE FOR BACTERIA: CPT

## 2017-01-01 PROCEDURE — 71010 DX-CHEST-PORTABLE (1 VIEW): CPT

## 2017-01-01 PROCEDURE — 87502 INFLUENZA DNA AMP PROBE: CPT

## 2017-01-01 PROCEDURE — 85007 BL SMEAR W/DIFF WBC COUNT: CPT

## 2017-01-01 PROCEDURE — 83540 ASSAY OF IRON: CPT

## 2017-01-01 PROCEDURE — 96376 TX/PRO/DX INJ SAME DRUG ADON: CPT | Mod: XU

## 2017-01-01 PROCEDURE — 71250 CT THORAX DX C-: CPT

## 2017-01-01 PROCEDURE — 700101 HCHG RX REV CODE 250: Performed by: INTERNAL MEDICINE

## 2017-01-01 PROCEDURE — 80202 ASSAY OF VANCOMYCIN: CPT

## 2017-01-01 PROCEDURE — 94760 N-INVAS EAR/PLS OXIMETRY 1: CPT

## 2017-01-01 PROCEDURE — A9270 NON-COVERED ITEM OR SERVICE: HCPCS | Performed by: EMERGENCY MEDICINE

## 2017-01-01 PROCEDURE — 770020 HCHG ROOM/CARE - TELE (206)

## 2017-01-01 PROCEDURE — 86140 C-REACTIVE PROTEIN: CPT

## 2017-01-01 PROCEDURE — 0DB58ZX EXCISION OF ESOPHAGUS, VIA NATURAL OR ARTIFICIAL OPENING ENDOSCOPIC, DIAGNOSTIC: ICD-10-PCS | Performed by: INTERNAL MEDICINE

## 2017-01-01 PROCEDURE — 700101 HCHG RX REV CODE 250: Performed by: HOSPITALIST

## 2017-01-01 PROCEDURE — 81001 URINALYSIS AUTO W/SCOPE: CPT

## 2017-01-01 PROCEDURE — 700105 HCHG RX REV CODE 258: Performed by: FAMILY MEDICINE

## 2017-01-01 PROCEDURE — A6213 FOAM DRG >16<=48 SQ IN W/BDR: HCPCS | Performed by: INTERNAL MEDICINE

## 2017-01-01 PROCEDURE — 70450 CT HEAD/BRAIN W/O DYE: CPT

## 2017-01-01 PROCEDURE — 85025 COMPLETE CBC W/AUTO DIFF WBC: CPT

## 2017-01-01 PROCEDURE — 99233 SBSQ HOSP IP/OBS HIGH 50: CPT | Performed by: FAMILY MEDICINE

## 2017-01-01 PROCEDURE — 72148 MRI LUMBAR SPINE W/O DYE: CPT

## 2017-01-01 PROCEDURE — G0155 HHCP-SVS OF CSW,EA 15 MIN: HCPCS

## 2017-01-01 PROCEDURE — P9016 RBC LEUKOCYTES REDUCED: HCPCS

## 2017-01-01 PROCEDURE — 160035 HCHG PACU - 1ST 60 MINS PHASE I: Performed by: INTERNAL MEDICINE

## 2017-01-01 PROCEDURE — 83880 ASSAY OF NATRIURETIC PEPTIDE: CPT

## 2017-01-01 PROCEDURE — 700101 HCHG RX REV CODE 250: Performed by: NURSE PRACTITIONER

## 2017-01-01 PROCEDURE — 85027 COMPLETE CBC AUTOMATED: CPT | Mod: 91

## 2017-01-01 PROCEDURE — 87389 HIV-1 AG W/HIV-1&-2 AB AG IA: CPT

## 2017-01-01 PROCEDURE — 84145 PROCALCITONIN (PCT): CPT

## 2017-01-01 PROCEDURE — 99226 PR SUBSEQUENT OBSERVATION CARE,LEVEL III: CPT | Performed by: HOSPITALIST

## 2017-01-01 PROCEDURE — 700111 HCHG RX REV CODE 636 W/ 250 OVERRIDE (IP): Performed by: EMERGENCY MEDICINE

## 2017-01-01 PROCEDURE — 97166 OT EVAL MOD COMPLEX 45 MIN: CPT

## 2017-01-01 PROCEDURE — 700117 HCHG RX CONTRAST REV CODE 255: Performed by: FAMILY MEDICINE

## 2017-01-01 PROCEDURE — 302112 WASHCLOTH,PERINEAL CARE: Performed by: INTERNAL MEDICINE

## 2017-01-01 PROCEDURE — 83735 ASSAY OF MAGNESIUM: CPT

## 2017-01-01 PROCEDURE — 665001 SOC-HOME HEALTH

## 2017-01-01 PROCEDURE — 770021 HCHG ROOM/CARE - ISO PRIVATE

## 2017-01-01 PROCEDURE — 96366 THER/PROPH/DIAG IV INF ADDON: CPT

## 2017-01-01 PROCEDURE — G8978 MOBILITY CURRENT STATUS: HCPCS | Mod: CM

## 2017-01-01 PROCEDURE — 500066 HCHG BITE BLOCK, ECT: Performed by: INTERNAL MEDICINE

## 2017-01-01 PROCEDURE — 99217 PR OBSERVATION CARE DISCHARGE: CPT | Performed by: HOSPITALIST

## 2017-01-01 PROCEDURE — 94668 MNPJ CHEST WALL SBSQ: CPT

## 2017-01-01 PROCEDURE — 80307 DRUG TEST PRSMV CHEM ANLYZR: CPT

## 2017-01-01 PROCEDURE — 160036 HCHG PACU - EA ADDL 30 MINS PHASE I: Performed by: INTERNAL MEDICINE

## 2017-01-01 PROCEDURE — 84100 ASSAY OF PHOSPHORUS: CPT

## 2017-01-01 PROCEDURE — G8990 OTHER PT/OT CURRENT STATUS: HCPCS | Mod: CH

## 2017-01-01 PROCEDURE — 83550 IRON BINDING TEST: CPT

## 2017-01-01 PROCEDURE — 86360 T CELL ABSOLUTE COUNT/RATIO: CPT

## 2017-01-01 PROCEDURE — 87077 CULTURE AEROBIC IDENTIFY: CPT | Mod: 91

## 2017-01-01 PROCEDURE — 99223 1ST HOSP IP/OBS HIGH 75: CPT | Performed by: INTERNAL MEDICINE

## 2017-01-01 PROCEDURE — 99239 HOSP IP/OBS DSCHRG MGMT >30: CPT | Performed by: INTERNAL MEDICINE

## 2017-01-01 PROCEDURE — 72146 MRI CHEST SPINE W/O DYE: CPT

## 2017-01-01 PROCEDURE — 96376 TX/PRO/DX INJ SAME DRUG ADON: CPT

## 2017-01-01 PROCEDURE — 36430 TRANSFUSION BLD/BLD COMPNT: CPT

## 2017-01-01 PROCEDURE — G0496 LPN CARE TRAIN/EDU IN HH: HCPCS

## 2017-01-01 PROCEDURE — 97535 SELF CARE MNGMENT TRAINING: CPT

## 2017-01-01 PROCEDURE — 85007 BL SMEAR W/DIFF WBC COUNT: CPT | Mod: 91

## 2017-01-01 PROCEDURE — G8979 MOBILITY GOAL STATUS: HCPCS | Mod: CK

## 2017-01-01 PROCEDURE — 770006 HCHG ROOM/CARE - MED/SURG/GYN SEMI*

## 2017-01-01 PROCEDURE — 99238 HOSP IP/OBS DSCHRG MGMT 30/<: CPT | Performed by: FAMILY MEDICINE

## 2017-01-01 PROCEDURE — 700102 HCHG RX REV CODE 250 W/ 637 OVERRIDE(OP): Performed by: FAMILY MEDICINE

## 2017-01-01 PROCEDURE — 302112 WASHCLOTH,PERINEAL CARE: Performed by: FAMILY MEDICINE

## 2017-01-01 PROCEDURE — 86359 T CELLS TOTAL COUNT: CPT

## 2017-01-01 PROCEDURE — 0DB68ZX EXCISION OF STOMACH, VIA NATURAL OR ARTIFICIAL OPENING ENDOSCOPIC, DIAGNOSTIC: ICD-10-PCS | Performed by: INTERNAL MEDICINE

## 2017-01-01 PROCEDURE — 83605 ASSAY OF LACTIC ACID: CPT

## 2017-01-01 PROCEDURE — 665997 HH PPS REVENUE ADJ

## 2017-01-01 PROCEDURE — 80074 ACUTE HEPATITIS PANEL: CPT

## 2017-01-01 PROCEDURE — 96361 HYDRATE IV INFUSION ADD-ON: CPT

## 2017-01-01 PROCEDURE — 86702 HIV-2 ANTIBODY: CPT

## 2017-01-01 PROCEDURE — 87324 CLOSTRIDIUM AG IA: CPT

## 2017-01-01 PROCEDURE — 99225 PR SUBSEQUENT OBSERVATION CARE,LEVEL II: CPT | Performed by: HOSPITALIST

## 2017-01-01 PROCEDURE — 700102 HCHG RX REV CODE 250 W/ 637 OVERRIDE(OP): Performed by: EMERGENCY MEDICINE

## 2017-01-01 PROCEDURE — 86480 TB TEST CELL IMMUN MEASURE: CPT

## 2017-01-01 PROCEDURE — 85730 THROMBOPLASTIN TIME PARTIAL: CPT

## 2017-01-01 PROCEDURE — 84295 ASSAY OF SERUM SODIUM: CPT

## 2017-01-01 PROCEDURE — 82607 VITAMIN B-12: CPT

## 2017-01-01 PROCEDURE — G8988 SELF CARE GOAL STATUS: HCPCS | Mod: CK

## 2017-01-01 PROCEDURE — 5A1D60Z PERFORMANCE OF URINARY FILTRATION, MULTIPLE: ICD-10-PCS | Performed by: INTERNAL MEDICINE

## 2017-01-01 PROCEDURE — 87103 BLOOD FUNGUS CULTURE: CPT

## 2017-01-01 PROCEDURE — 36589 REMOVAL TUNNELED CV CATH: CPT

## 2017-01-01 PROCEDURE — 99223 1ST HOSP IP/OBS HIGH 75: CPT | Mod: AI | Performed by: INTERNAL MEDICINE

## 2017-01-01 PROCEDURE — 74176 CT ABD & PELVIS W/O CONTRAST: CPT

## 2017-01-01 PROCEDURE — 82550 ASSAY OF CK (CPK): CPT

## 2017-01-01 PROCEDURE — 700105 HCHG RX REV CODE 258

## 2017-01-01 PROCEDURE — 71010 DX-CHEST-LIMITED (1 VIEW): CPT

## 2017-01-01 PROCEDURE — 86900 BLOOD TYPING SEROLOGIC ABO: CPT

## 2017-01-01 PROCEDURE — 71260 CT THORAX DX C+: CPT

## 2017-01-01 PROCEDURE — 82140 ASSAY OF AMMONIA: CPT

## 2017-01-01 PROCEDURE — 99239 HOSP IP/OBS DSCHRG MGMT >30: CPT | Performed by: HOSPITALIST

## 2017-01-01 PROCEDURE — 87077 CULTURE AEROBIC IDENTIFY: CPT

## 2017-01-01 PROCEDURE — 93005 ELECTROCARDIOGRAM TRACING: CPT | Performed by: EMERGENCY MEDICINE

## 2017-01-01 PROCEDURE — 0DB98ZX EXCISION OF DUODENUM, VIA NATURAL OR ARTIFICIAL OPENING ENDOSCOPIC, DIAGNOSTIC: ICD-10-PCS | Performed by: INTERNAL MEDICINE

## 2017-01-01 PROCEDURE — 84484 ASSAY OF TROPONIN QUANT: CPT

## 2017-01-01 PROCEDURE — 96374 THER/PROPH/DIAG INJ IV PUSH: CPT

## 2017-01-01 PROCEDURE — 88305 TISSUE EXAM BY PATHOLOGIST: CPT

## 2017-01-01 PROCEDURE — 99233 SBSQ HOSP IP/OBS HIGH 50: CPT | Performed by: HOSPITALIST

## 2017-01-01 PROCEDURE — 87040 BLOOD CULTURE FOR BACTERIA: CPT | Mod: 91

## 2017-01-01 PROCEDURE — 84443 ASSAY THYROID STIM HORMONE: CPT

## 2017-01-01 PROCEDURE — G0494 LPN CARE EA 15MIN HH/HOSPICE: HCPCS

## 2017-01-01 PROCEDURE — 86701 HIV-1ANTIBODY: CPT

## 2017-01-01 PROCEDURE — 96372 THER/PROPH/DIAG INJ SC/IM: CPT

## 2017-01-01 PROCEDURE — 302255 BARRIER CREAM MOISTURE BAZA PROTECT: Performed by: INTERNAL MEDICINE

## 2017-01-01 PROCEDURE — 96374 THER/PROPH/DIAG INJ IV PUSH: CPT | Mod: XU

## 2017-01-01 PROCEDURE — 97163 PT EVAL HIGH COMPLEX 45 MIN: CPT

## 2017-01-01 PROCEDURE — 93005 ELECTROCARDIOGRAM TRACING: CPT

## 2017-01-01 PROCEDURE — G0495 RN CARE TRAIN/EDU IN HH: HCPCS

## 2017-01-01 PROCEDURE — 5A1D00Z PERFORMANCE OF URINARY FILTRATION, SINGLE: ICD-10-PCS | Performed by: INTERNAL MEDICINE

## 2017-01-01 PROCEDURE — 87186 SC STD MICRODIL/AGAR DIL: CPT

## 2017-01-01 PROCEDURE — A9270 NON-COVERED ITEM OR SERVICE: HCPCS | Performed by: FAMILY MEDICINE

## 2017-01-01 PROCEDURE — 94667 MNPJ CHEST WALL 1ST: CPT

## 2017-01-01 PROCEDURE — 700105 HCHG RX REV CODE 258: Performed by: EMERGENCY MEDICINE

## 2017-01-01 PROCEDURE — 86850 RBC ANTIBODY SCREEN: CPT

## 2017-01-01 PROCEDURE — 84132 ASSAY OF SERUM POTASSIUM: CPT

## 2017-01-01 PROCEDURE — 160002 HCHG RECOVERY MINUTES (STAT): Performed by: INTERNAL MEDICINE

## 2017-01-01 PROCEDURE — G8987 SELF CARE CURRENT STATUS: HCPCS | Mod: CL

## 2017-01-01 PROCEDURE — 70491 CT SOFT TISSUE NECK W/DYE: CPT

## 2017-01-01 PROCEDURE — 304222 HCHG STAT ISOLATION DAILY CHARGE

## 2017-01-01 PROCEDURE — 86780 TREPONEMA PALLIDUM: CPT

## 2017-01-01 PROCEDURE — 304562 HCHG STAT O2 MASK/CANNULA

## 2017-01-01 PROCEDURE — 96367 TX/PROPH/DG ADDL SEQ IV INF: CPT

## 2017-01-01 PROCEDURE — 82746 ASSAY OF FOLIC ACID SERUM: CPT

## 2017-01-01 PROCEDURE — G0156 HHCP-SVS OF AIDE,EA 15 MIN: HCPCS

## 2017-01-01 PROCEDURE — 302146: Performed by: INTERNAL MEDICINE

## 2017-01-01 PROCEDURE — 160009 HCHG ANES TIME/MIN: Performed by: INTERNAL MEDICINE

## 2017-01-01 PROCEDURE — G8991 OTHER PT/OT GOAL STATUS: HCPCS | Mod: CH

## 2017-01-01 PROCEDURE — 88312 SPECIAL STAINS GROUP 1: CPT | Mod: 59

## 2017-01-01 PROCEDURE — 502240 HCHG MISC OR SUPPLY RC 0272: Performed by: INTERNAL MEDICINE

## 2017-01-01 PROCEDURE — 83690 ASSAY OF LIPASE: CPT

## 2017-01-01 PROCEDURE — 700117 HCHG RX CONTRAST REV CODE 255: Performed by: INTERNAL MEDICINE

## 2017-01-01 PROCEDURE — 70551 MRI BRAIN STEM W/O DYE: CPT

## 2017-01-01 PROCEDURE — 95951 EEG: CPT | Mod: 52

## 2017-01-01 PROCEDURE — 86141 C-REACTIVE PROTEIN HS: CPT

## 2017-01-01 PROCEDURE — 302255 BARRIER CREAM MOISTURE BAZA PROTECT: Performed by: HOSPITALIST

## 2017-01-01 PROCEDURE — G8979 MOBILITY GOAL STATUS: HCPCS | Mod: CJ

## 2017-01-01 PROCEDURE — 302106 OSTOMY POWDER: Performed by: HOSPITALIST

## 2017-01-01 PROCEDURE — 5A1D70Z PERFORMANCE OF URINARY FILTRATION, INTERMITTENT, LESS THAN 6 HOURS PER DAY: ICD-10-PCS | Performed by: INTERNAL MEDICINE

## 2017-01-01 PROCEDURE — 86777 TOXOPLASMA ANTIBODY: CPT

## 2017-01-01 PROCEDURE — 86803 HEPATITIS C AB TEST: CPT

## 2017-01-01 PROCEDURE — 85046 RETICYTE/HGB CONCENTRATE: CPT

## 2017-01-01 PROCEDURE — 86923 COMPATIBILITY TEST ELECTRIC: CPT

## 2017-01-01 PROCEDURE — 302092 REMEDY SKIN REPAIR CREAM: Performed by: INTERNAL MEDICINE

## 2017-01-01 PROCEDURE — 93306 TTE W/DOPPLER COMPLETE: CPT | Mod: 26 | Performed by: INTERNAL MEDICINE

## 2017-01-01 PROCEDURE — 83036 HEMOGLOBIN GLYCOSYLATED A1C: CPT

## 2017-01-01 PROCEDURE — 87633 RESP VIRUS 12-25 TARGETS: CPT

## 2017-01-01 PROCEDURE — 51701 INSERT BLADDER CATHETER: CPT

## 2017-01-01 PROCEDURE — 30233N1 TRANSFUSION OF NONAUTOLOGOUS RED BLOOD CELLS INTO PERIPHERAL VEIN, PERCUTANEOUS APPROACH: ICD-10-PCS | Performed by: HOSPITALIST

## 2017-01-01 PROCEDURE — G8978 MOBILITY CURRENT STATUS: HCPCS | Mod: CL

## 2017-01-01 PROCEDURE — 87186 SC STD MICRODIL/AGAR DIL: CPT | Mod: 91

## 2017-01-01 PROCEDURE — 160203 HCHG ENDO MINUTES - 1ST 30 MINS LEVEL 4: Performed by: INTERNAL MEDICINE

## 2017-01-01 PROCEDURE — 93306 TTE W/DOPPLER COMPLETE: CPT

## 2017-01-01 PROCEDURE — 85652 RBC SED RATE AUTOMATED: CPT

## 2017-01-01 RX ORDER — POTASSIUM CHLORIDE 20 MEQ/1
20 TABLET, EXTENDED RELEASE ORAL ONCE
Status: DISCONTINUED | OUTPATIENT
Start: 2017-01-01 | End: 2017-01-01

## 2017-01-01 RX ORDER — ALPRAZOLAM 0.25 MG/1
0.25 TABLET ORAL 3 TIMES DAILY PRN
Status: DISCONTINUED | OUTPATIENT
Start: 2017-01-01 | End: 2017-01-01 | Stop reason: HOSPADM

## 2017-01-01 RX ORDER — METHOCARBAMOL 750 MG/1
750 TABLET, FILM COATED ORAL EVERY 6 HOURS PRN
Status: DISCONTINUED | OUTPATIENT
Start: 2017-01-01 | End: 2017-01-01 | Stop reason: HOSPADM

## 2017-01-01 RX ORDER — HEPARIN SODIUM 1000 [USP'U]/ML
3700 INJECTION, SOLUTION INTRAVENOUS; SUBCUTANEOUS
Status: COMPLETED | OUTPATIENT
Start: 2017-01-01 | End: 2017-01-01

## 2017-01-01 RX ORDER — FLUCONAZOLE 200 MG/1
200 TABLET ORAL DAILY
COMMUNITY
Start: 2017-01-01 | End: 2017-01-01

## 2017-01-01 RX ORDER — SULFAMETHOXAZOLE AND TRIMETHOPRIM 80; 16 MG/ML; MG/ML
INJECTION INTRAVENOUS
Status: DISCONTINUED
Start: 2017-01-01 | End: 2017-01-01

## 2017-01-01 RX ORDER — HEPARIN SODIUM 5000 [USP'U]/ML
5000 INJECTION, SOLUTION INTRAVENOUS; SUBCUTANEOUS EVERY 8 HOURS
Status: DISCONTINUED | OUTPATIENT
Start: 2017-01-01 | End: 2017-01-01 | Stop reason: HOSPADM

## 2017-01-01 RX ORDER — SULFAMETHOXAZOLE AND TRIMETHOPRIM 800; 160 MG/1; MG/1
0.5 TABLET ORAL 2 TIMES DAILY
Status: ON HOLD | COMMUNITY
Start: 2017-01-01 | End: 2017-01-01

## 2017-01-01 RX ORDER — LIDOCAINE HYDROCHLORIDE 10 MG/ML
1 INJECTION, SOLUTION INFILTRATION; PERINEURAL PRN
Status: DISCONTINUED | OUTPATIENT
Start: 2017-01-01 | End: 2017-01-01 | Stop reason: HOSPADM

## 2017-01-01 RX ORDER — POLYETHYLENE GLYCOL 3350 17 G/17G
1 POWDER, FOR SOLUTION ORAL DAILY
Status: DISCONTINUED | OUTPATIENT
Start: 2017-01-01 | End: 2017-01-01 | Stop reason: HOSPADM

## 2017-01-01 RX ORDER — ACETAMINOPHEN 325 MG/1
325 TABLET ORAL EVERY 8 HOURS PRN
COMMUNITY
End: 2017-01-01

## 2017-01-01 RX ORDER — HEPARIN SODIUM 1000 [USP'U]/ML
3700 INJECTION, SOLUTION INTRAVENOUS; SUBCUTANEOUS ONCE
Status: COMPLETED | OUTPATIENT
Start: 2017-01-01 | End: 2017-01-01

## 2017-01-01 RX ORDER — HEPARIN SODIUM 1000 [USP'U]/ML
INJECTION, SOLUTION INTRAVENOUS; SUBCUTANEOUS
Status: COMPLETED
Start: 2017-01-01 | End: 2017-01-01

## 2017-01-01 RX ORDER — DIPHENOXYLATE HYDROCHLORIDE AND ATROPINE SULFATE 2.5; .025 MG/1; MG/1
1 TABLET ORAL 4 TIMES DAILY PRN
Status: DISCONTINUED | OUTPATIENT
Start: 2017-01-01 | End: 2017-01-01 | Stop reason: HOSPADM

## 2017-01-01 RX ORDER — FLUOXETINE 10 MG/1
10 CAPSULE ORAL DAILY
Status: DISCONTINUED | OUTPATIENT
Start: 2017-01-01 | End: 2017-01-01 | Stop reason: HOSPADM

## 2017-01-01 RX ORDER — ONDANSETRON 2 MG/ML
4 INJECTION INTRAMUSCULAR; INTRAVENOUS EVERY 4 HOURS PRN
Status: DISCONTINUED | OUTPATIENT
Start: 2017-01-01 | End: 2017-01-01 | Stop reason: HOSPADM

## 2017-01-01 RX ORDER — PROMETHAZINE HYDROCHLORIDE 25 MG/1
12.5-25 TABLET ORAL EVERY 4 HOURS PRN
Status: DISCONTINUED | OUTPATIENT
Start: 2017-01-01 | End: 2017-01-01 | Stop reason: HOSPADM

## 2017-01-01 RX ORDER — LISINOPRIL 20 MG/1
20 TABLET ORAL DAILY
Status: DISCONTINUED | OUTPATIENT
Start: 2017-01-01 | End: 2017-01-01

## 2017-01-01 RX ORDER — POLYETHYLENE GLYCOL 3350 17 G/17G
17 POWDER, FOR SOLUTION ORAL
Start: 2017-01-01

## 2017-01-01 RX ORDER — LISINOPRIL 20 MG/1
20 TABLET ORAL DAILY
Status: DISCONTINUED | OUTPATIENT
Start: 2017-01-01 | End: 2017-01-01 | Stop reason: HOSPADM

## 2017-01-01 RX ORDER — NYSTATIN 100000 [USP'U]/G
POWDER TOPICAL 2 TIMES DAILY
Status: DISCONTINUED | OUTPATIENT
Start: 2017-01-01 | End: 2017-01-01 | Stop reason: HOSPADM

## 2017-01-01 RX ORDER — SODIUM CHLORIDE 9 MG/ML
INJECTION, SOLUTION INTRAVENOUS CONTINUOUS
Status: DISCONTINUED | OUTPATIENT
Start: 2017-01-01 | End: 2017-01-01

## 2017-01-01 RX ORDER — SODIUM CHLORIDE 9 MG/ML
INJECTION, SOLUTION INTRAVENOUS CONTINUOUS
Status: DISCONTINUED | OUTPATIENT
Start: 2017-01-01 | End: 2017-01-01 | Stop reason: HOSPADM

## 2017-01-01 RX ORDER — DEXTROSE MONOHYDRATE 25 G/50ML
25 INJECTION, SOLUTION INTRAVENOUS
Status: DISCONTINUED | OUTPATIENT
Start: 2017-01-01 | End: 2017-01-01 | Stop reason: HOSPADM

## 2017-01-01 RX ORDER — AZITHROMYCIN 250 MG/1
TABLET, FILM COATED ORAL
Qty: 10 TAB | Refills: 0 | Status: SHIPPED | OUTPATIENT
Start: 2017-01-01 | End: 2017-01-01

## 2017-01-01 RX ORDER — SEVELAMER HYDROCHLORIDE 800 MG/1
1600 TABLET, FILM COATED ORAL 3 TIMES DAILY
Status: DISCONTINUED | OUTPATIENT
Start: 2017-01-01 | End: 2017-01-01 | Stop reason: HOSPADM

## 2017-01-01 RX ORDER — HYDROXYZINE HYDROCHLORIDE 25 MG/1
25 TABLET, FILM COATED ORAL EVERY 8 HOURS PRN
Status: DISCONTINUED | OUTPATIENT
Start: 2017-01-01 | End: 2017-01-01 | Stop reason: HOSPADM

## 2017-01-01 RX ORDER — ACYCLOVIR 800 MG/1
400 TABLET ORAL DAILY
Status: DISCONTINUED | OUTPATIENT
Start: 2017-01-01 | End: 2017-01-01 | Stop reason: HOSPADM

## 2017-01-01 RX ORDER — HEPARIN SODIUM 1000 [USP'U]/ML
3700 INJECTION, SOLUTION INTRAVENOUS; SUBCUTANEOUS
Status: DISCONTINUED | OUTPATIENT
Start: 2017-01-01 | End: 2017-01-01 | Stop reason: HOSPADM

## 2017-01-01 RX ORDER — TRAZODONE HYDROCHLORIDE 50 MG/1
50 TABLET ORAL NIGHTLY PRN
Status: DISCONTINUED | OUTPATIENT
Start: 2017-01-01 | End: 2017-01-01 | Stop reason: HOSPADM

## 2017-01-01 RX ORDER — PREDNISOLONE ACETATE 10 MG/ML
1 SUSPENSION/ DROPS OPHTHALMIC 4 TIMES DAILY
COMMUNITY
End: 2017-01-01

## 2017-01-01 RX ORDER — PROMETHAZINE HYDROCHLORIDE 25 MG/1
12.5-25 SUPPOSITORY RECTAL EVERY 4 HOURS PRN
Status: DISCONTINUED | OUTPATIENT
Start: 2017-01-01 | End: 2017-01-01 | Stop reason: HOSPADM

## 2017-01-01 RX ORDER — ETHAMBUTOL HYDROCHLORIDE 400 MG/1
800 TABLET, FILM COATED ORAL DAILY
Status: DISCONTINUED | OUTPATIENT
Start: 2017-01-01 | End: 2017-01-01 | Stop reason: HOSPADM

## 2017-01-01 RX ORDER — AZITHROMYCIN 250 MG/1
500 TABLET, FILM COATED ORAL DAILY
Status: DISCONTINUED | OUTPATIENT
Start: 2017-01-01 | End: 2017-01-01

## 2017-01-01 RX ORDER — SODIUM CHLORIDE 9 MG/ML
1000 INJECTION, SOLUTION INTRAVENOUS
Status: DISCONTINUED | OUTPATIENT
Start: 2017-01-01 | End: 2017-01-01 | Stop reason: HOSPADM

## 2017-01-01 RX ORDER — ETHAMBUTOL HYDROCHLORIDE 400 MG/1
800 TABLET, FILM COATED ORAL
Status: DISCONTINUED | OUTPATIENT
Start: 2017-01-01 | End: 2017-01-01 | Stop reason: HOSPADM

## 2017-01-01 RX ORDER — HEPARIN SODIUM 1000 [USP'U]/ML
2000 INJECTION, SOLUTION INTRAVENOUS; SUBCUTANEOUS
Status: DISCONTINUED | OUTPATIENT
Start: 2017-01-01 | End: 2017-01-01 | Stop reason: HOSPADM

## 2017-01-01 RX ORDER — VALACYCLOVIR HYDROCHLORIDE 500 MG/1
500 TABLET, FILM COATED ORAL DAILY
Status: DISCONTINUED | OUTPATIENT
Start: 2017-01-01 | End: 2017-01-01 | Stop reason: HOSPADM

## 2017-01-01 RX ORDER — MOXIFLOXACIN 5 MG/ML
SOLUTION/ DROPS OPHTHALMIC
Status: COMPLETED
Start: 2017-01-01 | End: 2017-01-01

## 2017-01-01 RX ORDER — ALBUMIN (HUMAN) 12.5 G/50ML
12.5 SOLUTION INTRAVENOUS
Status: DISCONTINUED | OUTPATIENT
Start: 2017-01-01 | End: 2017-01-01 | Stop reason: HOSPADM

## 2017-01-01 RX ORDER — HYDROCODONE BITARTRATE AND ACETAMINOPHEN 5; 325 MG/1; MG/1
1-2 TABLET ORAL EVERY 4 HOURS PRN
COMMUNITY
End: 2017-01-01

## 2017-01-01 RX ORDER — HEPARIN SODIUM 1000 [USP'U]/ML
1500 INJECTION, SOLUTION INTRAVENOUS; SUBCUTANEOUS PRN
Status: DISCONTINUED | OUTPATIENT
Start: 2017-01-01 | End: 2017-01-01 | Stop reason: HOSPADM

## 2017-01-01 RX ORDER — AMLODIPINE BESYLATE 5 MG/1
5 TABLET ORAL EVERY EVENING
Status: DISCONTINUED | OUTPATIENT
Start: 2017-01-01 | End: 2017-01-01 | Stop reason: HOSPADM

## 2017-01-01 RX ORDER — ONDANSETRON 4 MG/1
4 TABLET, ORALLY DISINTEGRATING ORAL EVERY 4 HOURS PRN
Status: DISCONTINUED | OUTPATIENT
Start: 2017-01-01 | End: 2017-01-01 | Stop reason: HOSPADM

## 2017-01-01 RX ORDER — FLUOXETINE 10 MG/1
10 CAPSULE ORAL DAILY
COMMUNITY

## 2017-01-01 RX ORDER — FLUCONAZOLE 2 MG/ML
INJECTION, SOLUTION INTRAVENOUS
Status: COMPLETED
Start: 2017-01-01 | End: 2017-01-01

## 2017-01-01 RX ORDER — HYDROXYZINE HYDROCHLORIDE 25 MG/1
25 TABLET, FILM COATED ORAL 4 TIMES DAILY
Status: DISCONTINUED | OUTPATIENT
Start: 2017-01-01 | End: 2017-01-01 | Stop reason: HOSPADM

## 2017-01-01 RX ORDER — AMLODIPINE BESYLATE 5 MG/1
10 TABLET ORAL DAILY
Status: ON HOLD | COMMUNITY
End: 2017-01-01

## 2017-01-01 RX ORDER — HEPARIN SODIUM 5000 [USP'U]/ML
5000 INJECTION, SOLUTION INTRAVENOUS; SUBCUTANEOUS EVERY 8 HOURS
Status: DISCONTINUED | OUTPATIENT
Start: 2017-01-01 | End: 2017-01-01

## 2017-01-01 RX ORDER — FLUCONAZOLE 2 MG/ML
200 INJECTION, SOLUTION INTRAVENOUS EVERY 24 HOURS
Status: DISCONTINUED | OUTPATIENT
Start: 2017-01-01 | End: 2017-01-01

## 2017-01-01 RX ORDER — ACETAMINOPHEN 325 MG/1
650 TABLET ORAL EVERY 4 HOURS PRN
COMMUNITY
End: 2018-01-01

## 2017-01-01 RX ORDER — AMOXICILLIN 250 MG
2 CAPSULE ORAL 2 TIMES DAILY
Status: DISCONTINUED | OUTPATIENT
Start: 2017-01-01 | End: 2017-01-01 | Stop reason: HOSPADM

## 2017-01-01 RX ORDER — SULFAMETHOXAZOLE AND TRIMETHOPRIM 800; 160 MG/1; MG/1
1 TABLET ORAL
Status: DISCONTINUED | OUTPATIENT
Start: 2017-01-01 | End: 2017-01-01

## 2017-01-01 RX ORDER — AZITHROMYCIN 250 MG/1
500 TABLET, FILM COATED ORAL DAILY
Status: DISCONTINUED | OUTPATIENT
Start: 2017-01-01 | End: 2017-01-01 | Stop reason: HOSPADM

## 2017-01-01 RX ORDER — AZITHROMYCIN 250 MG/1
TABLET, FILM COATED ORAL
Status: COMPLETED
Start: 2017-01-01 | End: 2017-01-01

## 2017-01-01 RX ORDER — PREDNISOLONE ACETATE 10 MG/ML
1 SUSPENSION/ DROPS OPHTHALMIC 4 TIMES DAILY
Status: DISCONTINUED | OUTPATIENT
Start: 2017-01-01 | End: 2017-01-01 | Stop reason: HOSPADM

## 2017-01-01 RX ORDER — SULFAMETHOXAZOLE AND TRIMETHOPRIM 400; 80 MG/1; MG/1
1 TABLET ORAL DAILY
Status: ON HOLD | COMMUNITY
Start: 2017-01-01 | End: 2017-01-01

## 2017-01-01 RX ORDER — METOCLOPRAMIDE 10 MG/1
10 TABLET ORAL
Status: DISCONTINUED | OUTPATIENT
Start: 2017-01-01 | End: 2017-01-01 | Stop reason: HOSPADM

## 2017-01-01 RX ORDER — HYDROCODONE BITARTRATE AND ACETAMINOPHEN 5; 325 MG/1; MG/1
2 TABLET ORAL EVERY 4 HOURS PRN
COMMUNITY
End: 2018-01-01

## 2017-01-01 RX ORDER — SODIUM CHLORIDE 9 MG/ML
500 INJECTION, SOLUTION INTRAVENOUS
Status: DISCONTINUED | OUTPATIENT
Start: 2017-01-01 | End: 2017-01-01 | Stop reason: HOSPADM

## 2017-01-01 RX ORDER — CARVEDILOL 6.25 MG/1
6.25 TABLET ORAL 2 TIMES DAILY WITH MEALS
Status: DISCONTINUED | OUTPATIENT
Start: 2017-01-01 | End: 2017-01-01 | Stop reason: HOSPADM

## 2017-01-01 RX ORDER — POLYVINYL ALCOHOL 14 MG/ML
1 SOLUTION/ DROPS OPHTHALMIC PRN
Status: DISCONTINUED | OUTPATIENT
Start: 2017-01-01 | End: 2017-01-01 | Stop reason: HOSPADM

## 2017-01-01 RX ORDER — AMOXICILLIN 250 MG
2 CAPSULE ORAL 2 TIMES DAILY
Status: DISCONTINUED | OUTPATIENT
Start: 2017-01-01 | End: 2017-01-01

## 2017-01-01 RX ORDER — AMLODIPINE BESYLATE 10 MG/1
10 TABLET ORAL DAILY
Status: DISCONTINUED | OUTPATIENT
Start: 2017-01-01 | End: 2017-01-01 | Stop reason: HOSPADM

## 2017-01-01 RX ORDER — ACETAMINOPHEN 325 MG/1
650 TABLET ORAL ONCE
Status: COMPLETED | OUTPATIENT
Start: 2017-01-01 | End: 2017-01-01

## 2017-01-01 RX ORDER — DIPHENHYDRAMINE HCL 25 MG
25 TABLET ORAL ONCE
Status: COMPLETED | OUTPATIENT
Start: 2017-01-01 | End: 2017-01-01

## 2017-01-01 RX ORDER — HYDRALAZINE HYDROCHLORIDE 25 MG/1
50 TABLET, FILM COATED ORAL EVERY 8 HOURS PRN
Status: DISCONTINUED | OUTPATIENT
Start: 2017-01-01 | End: 2017-01-01 | Stop reason: HOSPADM

## 2017-01-01 RX ORDER — MORPHINE SULFATE 4 MG/ML
2-5 INJECTION, SOLUTION INTRAMUSCULAR; INTRAVENOUS
Status: DISCONTINUED | OUTPATIENT
Start: 2017-01-01 | End: 2017-01-01 | Stop reason: HOSPADM

## 2017-01-01 RX ORDER — FLUCONAZOLE 200 MG/1
200 TABLET ORAL DAILY
Qty: 10 TAB | Refills: 0 | Status: SHIPPED | OUTPATIENT
Start: 2017-01-01 | End: 2017-01-01

## 2017-01-01 RX ORDER — HYDROCODONE BITARTRATE AND ACETAMINOPHEN 5; 325 MG/1; MG/1
1-2 TABLET ORAL EVERY 4 HOURS PRN
Qty: 20 TAB | Refills: 0
Start: 2017-01-01 | End: 2017-01-01

## 2017-01-01 RX ORDER — MORPHINE SULFATE 4 MG/ML
2.5 INJECTION, SOLUTION INTRAMUSCULAR; INTRAVENOUS ONCE
Status: COMPLETED | OUTPATIENT
Start: 2017-01-01 | End: 2017-01-01

## 2017-01-01 RX ORDER — LORAZEPAM 2 MG/ML
4 INJECTION INTRAMUSCULAR
Status: DISCONTINUED | OUTPATIENT
Start: 2017-01-01 | End: 2017-01-01 | Stop reason: HOSPADM

## 2017-01-01 RX ORDER — FLUCONAZOLE 100 MG/1
200 TABLET ORAL DAILY
Status: DISCONTINUED | OUTPATIENT
Start: 2017-01-01 | End: 2017-01-01

## 2017-01-01 RX ORDER — HEPARIN SODIUM 1000 [USP'U]/ML
2000 INJECTION, SOLUTION INTRAVENOUS; SUBCUTANEOUS ONCE
Status: COMPLETED | OUTPATIENT
Start: 2017-01-01 | End: 2017-01-01

## 2017-01-01 RX ORDER — BISACODYL 10 MG
10 SUPPOSITORY, RECTAL RECTAL
Status: DISCONTINUED | OUTPATIENT
Start: 2017-01-01 | End: 2017-01-01 | Stop reason: HOSPADM

## 2017-01-01 RX ORDER — SULFAMETHOXAZOLE AND TRIMETHOPRIM 800; 160 MG/1; MG/1
0.5 TABLET ORAL EVERY 12 HOURS
Qty: 28 TAB | Refills: 0 | Status: SHIPPED | OUTPATIENT
Start: 2017-01-01 | End: 2017-01-01

## 2017-01-01 RX ORDER — TRAZODONE HYDROCHLORIDE 100 MG/1
50 TABLET ORAL NIGHTLY PRN
Status: DISCONTINUED | OUTPATIENT
Start: 2017-01-01 | End: 2017-01-01 | Stop reason: HOSPADM

## 2017-01-01 RX ORDER — POLYETHYLENE GLYCOL 3350 17 G/17G
1 POWDER, FOR SOLUTION ORAL
Status: DISCONTINUED | OUTPATIENT
Start: 2017-01-01 | End: 2017-01-01

## 2017-01-01 RX ORDER — DEXTROSE MONOHYDRATE 25 G/50ML
25 INJECTION, SOLUTION INTRAVENOUS
Status: DISCONTINUED | OUTPATIENT
Start: 2017-01-01 | End: 2017-01-01

## 2017-01-01 RX ORDER — SULFAMETHOXAZOLE AND TRIMETHOPRIM 400; 80 MG/1; MG/1
1 TABLET ORAL 2 TIMES DAILY
Status: DISCONTINUED | OUTPATIENT
Start: 2017-01-01 | End: 2017-01-01

## 2017-01-01 RX ORDER — ETHAMBUTOL HYDROCHLORIDE 400 MG/1
800 TABLET, FILM COATED ORAL DAILY
Status: DISCONTINUED | OUTPATIENT
Start: 2017-01-01 | End: 2017-01-01

## 2017-01-01 RX ORDER — HYDRALAZINE HYDROCHLORIDE 20 MG/ML
10 INJECTION INTRAMUSCULAR; INTRAVENOUS EVERY 6 HOURS PRN
Status: DISCONTINUED | OUTPATIENT
Start: 2017-01-01 | End: 2017-01-01 | Stop reason: HOSPADM

## 2017-01-01 RX ORDER — LISINOPRIL 20 MG/1
20 TABLET ORAL DAILY
Qty: 30 TAB | Refills: 1 | Status: SHIPPED | OUTPATIENT
Start: 2017-01-01 | End: 2017-01-01

## 2017-01-01 RX ORDER — AZITHROMYCIN 250 MG/1
250 TABLET, FILM COATED ORAL DAILY
COMMUNITY
Start: 2017-01-01 | End: 2017-01-01

## 2017-01-01 RX ORDER — SODIUM BICARBONATE 650 MG/1
650 TABLET ORAL 3 TIMES DAILY
Status: DISCONTINUED | OUTPATIENT
Start: 2017-01-01 | End: 2017-01-01 | Stop reason: HOSPADM

## 2017-01-01 RX ORDER — FLUCONAZOLE 100 MG/1
100 TABLET ORAL DAILY
Status: DISCONTINUED | OUTPATIENT
Start: 2017-01-01 | End: 2017-01-01

## 2017-01-01 RX ORDER — VALACYCLOVIR HYDROCHLORIDE 500 MG/1
500 TABLET, FILM COATED ORAL 2 TIMES DAILY
Qty: 20 TAB | Refills: 0 | Status: SHIPPED | OUTPATIENT
Start: 2017-01-01 | End: 2017-01-01

## 2017-01-01 RX ORDER — ACYCLOVIR 800 MG/1
800 TABLET ORAL DAILY
COMMUNITY
Start: 2017-01-01 | End: 2017-01-01

## 2017-01-01 RX ORDER — AZITHROMYCIN 250 MG/1
1200 TABLET, FILM COATED ORAL
Status: DISCONTINUED | OUTPATIENT
Start: 2017-01-01 | End: 2017-01-01 | Stop reason: HOSPADM

## 2017-01-01 RX ORDER — LISINOPRIL 20 MG/1
40 TABLET ORAL
Status: DISCONTINUED | OUTPATIENT
Start: 2017-01-01 | End: 2017-01-01 | Stop reason: HOSPADM

## 2017-01-01 RX ORDER — LABETALOL HYDROCHLORIDE 5 MG/ML
10 INJECTION, SOLUTION INTRAVENOUS EVERY 4 HOURS PRN
Status: DISCONTINUED | OUTPATIENT
Start: 2017-01-01 | End: 2017-01-01 | Stop reason: HOSPADM

## 2017-01-01 RX ORDER — AMLODIPINE BESYLATE 5 MG/1
5 TABLET ORAL
Status: DISCONTINUED | OUTPATIENT
Start: 2017-01-01 | End: 2017-01-01 | Stop reason: HOSPADM

## 2017-01-01 RX ORDER — MOXIFLOXACIN 5 MG/ML
1 SOLUTION/ DROPS OPHTHALMIC 4 TIMES DAILY
Status: DISCONTINUED | OUTPATIENT
Start: 2017-01-01 | End: 2017-01-01 | Stop reason: HOSPADM

## 2017-01-01 RX ORDER — METHOCARBAMOL 500 MG/1
750 TABLET, FILM COATED ORAL EVERY 6 HOURS PRN
Status: DISCONTINUED | OUTPATIENT
Start: 2017-01-01 | End: 2017-01-01 | Stop reason: HOSPADM

## 2017-01-01 RX ORDER — ONDANSETRON 4 MG/1
4 TABLET, ORALLY DISINTEGRATING ORAL EVERY 4 HOURS PRN
Qty: 20 TAB | Refills: 0 | Status: SHIPPED | OUTPATIENT
Start: 2017-01-01 | End: 2017-01-01

## 2017-01-01 RX ORDER — ATOVAQUONE 750 MG/5ML
750 SUSPENSION ORAL 2 TIMES DAILY WITH MEALS
Status: DISCONTINUED | OUTPATIENT
Start: 2017-01-01 | End: 2017-01-01

## 2017-01-01 RX ORDER — MICONAZOLE NITRATE 20 MG/G
CREAM TOPICAL PRN
Status: DISCONTINUED | OUTPATIENT
Start: 2017-01-01 | End: 2017-01-01 | Stop reason: HOSPADM

## 2017-01-01 RX ORDER — TRAMADOL HYDROCHLORIDE 50 MG/1
100 TABLET ORAL EVERY 6 HOURS PRN
Status: DISCONTINUED | OUTPATIENT
Start: 2017-01-01 | End: 2017-01-01 | Stop reason: HOSPADM

## 2017-01-01 RX ORDER — LISINOPRIL 20 MG/1
20 TABLET ORAL
Status: DISCONTINUED | OUTPATIENT
Start: 2017-01-01 | End: 2017-01-01 | Stop reason: HOSPADM

## 2017-01-01 RX ORDER — AZITHROMYCIN 250 MG/1
250-500 TABLET, FILM COATED ORAL DAILY
Status: ON HOLD | COMMUNITY
End: 2017-01-01

## 2017-01-01 RX ORDER — HEPARIN SODIUM 1000 [USP'U]/ML
INJECTION, SOLUTION INTRAVENOUS; SUBCUTANEOUS
Status: ACTIVE
Start: 2017-01-01 | End: 2017-01-01

## 2017-01-01 RX ORDER — SODIUM CHLORIDE 9 MG/ML
1000 INJECTION, SOLUTION INTRAVENOUS CONTINUOUS
Status: DISCONTINUED | OUTPATIENT
Start: 2017-01-01 | End: 2017-01-01

## 2017-01-01 RX ORDER — AZITHROMYCIN 250 MG/1
250 TABLET, FILM COATED ORAL DAILY
Status: DISCONTINUED | OUTPATIENT
Start: 2017-01-01 | End: 2017-01-01

## 2017-01-01 RX ORDER — ACYCLOVIR 800 MG/1
800 TABLET ORAL DAILY
Status: DISCONTINUED | OUTPATIENT
Start: 2017-01-01 | End: 2017-01-01 | Stop reason: HOSPADM

## 2017-01-01 RX ORDER — ACYCLOVIR 200 MG/1
800 CAPSULE ORAL DAILY
Status: DISCONTINUED | OUTPATIENT
Start: 2017-01-01 | End: 2017-01-01 | Stop reason: HOSPADM

## 2017-01-01 RX ORDER — ETHAMBUTOL HYDROCHLORIDE 400 MG/1
800 TABLET, FILM COATED ORAL
Status: DISCONTINUED | OUTPATIENT
Start: 2017-01-01 | End: 2017-01-01

## 2017-01-01 RX ORDER — ONDANSETRON 2 MG/ML
4 INJECTION INTRAMUSCULAR; INTRAVENOUS ONCE
Status: COMPLETED | OUTPATIENT
Start: 2017-01-01 | End: 2017-01-01

## 2017-01-01 RX ORDER — VALACYCLOVIR HYDROCHLORIDE 500 MG/1
500 TABLET, FILM COATED ORAL 2 TIMES DAILY
COMMUNITY
Start: 2017-01-01 | End: 2017-01-01

## 2017-01-01 RX ORDER — AMLODIPINE BESYLATE 5 MG/1
10 TABLET ORAL DAILY
Qty: 30 TAB
Start: 2017-01-01 | End: 2017-01-01

## 2017-01-01 RX ORDER — SULFAMETHOXAZOLE AND TRIMETHOPRIM 400; 80 MG/1; MG/1
1 TABLET ORAL DAILY
Status: DISCONTINUED | OUTPATIENT
Start: 2017-01-01 | End: 2017-01-01 | Stop reason: HOSPADM

## 2017-01-01 RX ORDER — HEPARIN SODIUM 1000 [USP'U]/ML
2000 INJECTION, SOLUTION INTRAVENOUS; SUBCUTANEOUS ONCE
Status: DISCONTINUED | OUTPATIENT
Start: 2017-01-01 | End: 2017-01-01

## 2017-01-01 RX ORDER — HYDROCODONE BITARTRATE AND ACETAMINOPHEN 10; 325 MG/1; MG/1
1 TABLET ORAL EVERY 6 HOURS PRN
Status: DISCONTINUED | OUTPATIENT
Start: 2017-01-01 | End: 2017-01-01 | Stop reason: HOSPADM

## 2017-01-01 RX ORDER — SODIUM CHLORIDE 9 MG/ML
30 INJECTION, SOLUTION INTRAVENOUS
Status: DISCONTINUED | OUTPATIENT
Start: 2017-01-01 | End: 2017-01-01 | Stop reason: HOSPADM

## 2017-01-01 RX ORDER — HEPARIN SODIUM 5000 [USP'U]/ML
5000 INJECTION, SOLUTION INTRAVENOUS; SUBCUTANEOUS EVERY 12 HOURS
Refills: 0
Start: 2017-01-01 | End: 2017-01-01

## 2017-01-01 RX ORDER — SULFAMETHOXAZOLE AND TRIMETHOPRIM 800; 160 MG/1; MG/1
TABLET ORAL
Status: COMPLETED
Start: 2017-01-01 | End: 2017-01-01

## 2017-01-01 RX ORDER — LISINOPRIL 20 MG/1
20 TABLET ORAL DAILY
Status: ON HOLD | COMMUNITY
End: 2017-01-01

## 2017-01-01 RX ORDER — HEPARIN SODIUM 1000 [USP'U]/ML
2000 INJECTION, SOLUTION INTRAVENOUS; SUBCUTANEOUS
Status: COMPLETED | OUTPATIENT
Start: 2017-01-01 | End: 2017-01-01

## 2017-01-01 RX ORDER — HYDROCODONE BITARTRATE AND ACETAMINOPHEN 5; 325 MG/1; MG/1
2 TABLET ORAL EVERY 4 HOURS PRN
Status: DISCONTINUED | OUTPATIENT
Start: 2017-01-01 | End: 2017-01-01 | Stop reason: HOSPADM

## 2017-01-01 RX ORDER — FLUCONAZOLE 100 MG/1
200 TABLET ORAL DAILY
Status: DISCONTINUED | OUTPATIENT
Start: 2017-01-01 | End: 2017-01-01 | Stop reason: HOSPADM

## 2017-01-01 RX ORDER — HYDROXYZINE HYDROCHLORIDE 25 MG/1
25 TABLET, FILM COATED ORAL 4 TIMES DAILY
COMMUNITY
End: 2017-01-01

## 2017-01-01 RX ORDER — HEPARIN SODIUM 1000 [USP'U]/ML
2500 INJECTION, SOLUTION INTRAVENOUS; SUBCUTANEOUS
Status: DISCONTINUED | OUTPATIENT
Start: 2017-01-01 | End: 2017-01-01 | Stop reason: HOSPADM

## 2017-01-01 RX ORDER — ONDANSETRON 4 MG/1
4 TABLET, ORALLY DISINTEGRATING ORAL EVERY 4 HOURS PRN
Status: DISCONTINUED | OUTPATIENT
Start: 2017-01-01 | End: 2017-01-01

## 2017-01-01 RX ORDER — DIPHENHYDRAMINE HCL 25 MG
25 TABLET ORAL PRN
Status: DISCONTINUED | OUTPATIENT
Start: 2017-01-01 | End: 2017-01-01 | Stop reason: HOSPADM

## 2017-01-01 RX ORDER — HYDROCODONE BITARTRATE AND ACETAMINOPHEN 5; 325 MG/1; MG/1
1-2 TABLET ORAL EVERY 4 HOURS PRN
Status: DISCONTINUED | OUTPATIENT
Start: 2017-01-01 | End: 2017-01-01 | Stop reason: HOSPADM

## 2017-01-01 RX ORDER — SULFAMETHOXAZOLE AND TRIMETHOPRIM 800; 160 MG/1; MG/1
0.5 TABLET ORAL 2 TIMES DAILY
Status: DISCONTINUED | OUTPATIENT
Start: 2017-01-01 | End: 2017-01-01

## 2017-01-01 RX ORDER — HEPARIN SODIUM 1000 [USP'U]/ML
1500 INJECTION, SOLUTION INTRAVENOUS; SUBCUTANEOUS
Status: DISCONTINUED | OUTPATIENT
Start: 2017-01-01 | End: 2017-01-01 | Stop reason: HOSPADM

## 2017-01-01 RX ORDER — NEOMYCIN SULFATE, POLYMYXIN B SULFATE, AND DEXAMETHASONE 3.5; 10000; 1 MG/G; [USP'U]/G; MG/G
OINTMENT OPHTHALMIC
Status: DISCONTINUED | OUTPATIENT
Start: 2017-01-01 | End: 2017-01-01 | Stop reason: HOSPADM

## 2017-01-01 RX ORDER — HEPARIN SODIUM 1000 [USP'U]/ML
1500 INJECTION, SOLUTION INTRAVENOUS; SUBCUTANEOUS
Status: DISCONTINUED | OUTPATIENT
Start: 2017-01-01 | End: 2017-01-01

## 2017-01-01 RX ORDER — MORPHINE SULFATE 10 MG/5ML
2.5 SOLUTION ORAL ONCE
Status: DISCONTINUED | OUTPATIENT
Start: 2017-01-01 | End: 2017-01-01

## 2017-01-01 RX ORDER — DIPHENOXYLATE HYDROCHLORIDE AND ATROPINE SULFATE 2.5; .025 MG/1; MG/1
1 TABLET ORAL 4 TIMES DAILY PRN
COMMUNITY
End: 2017-01-01

## 2017-01-01 RX ORDER — SODIUM BICARBONATE 650 MG/1
650 TABLET ORAL 3 TIMES DAILY
Status: DISCONTINUED | OUTPATIENT
Start: 2017-01-01 | End: 2017-01-01

## 2017-01-01 RX ORDER — ACYCLOVIR 400 MG/1
800 TABLET ORAL DAILY
COMMUNITY
End: 2017-01-01

## 2017-01-01 RX ORDER — BISACODYL 10 MG
10 SUPPOSITORY, RECTAL RECTAL
Status: DISCONTINUED | OUTPATIENT
Start: 2017-01-01 | End: 2017-01-01

## 2017-01-01 RX ORDER — AMPICILLIN AND SULBACTAM 2; 1 G/1; G/1
3 INJECTION, POWDER, FOR SOLUTION INTRAMUSCULAR; INTRAVENOUS ONCE
Status: COMPLETED | OUTPATIENT
Start: 2017-01-01 | End: 2017-01-01

## 2017-01-01 RX ORDER — FLUCONAZOLE 200 MG/1
100 TABLET ORAL DAILY
Status: DISCONTINUED | OUTPATIENT
Start: 2017-01-01 | End: 2017-01-01 | Stop reason: HOSPADM

## 2017-01-01 RX ORDER — ACETAMINOPHEN 325 MG/1
650 TABLET ORAL EVERY 6 HOURS PRN
Status: DISCONTINUED | OUTPATIENT
Start: 2017-01-01 | End: 2017-01-01 | Stop reason: HOSPADM

## 2017-01-01 RX ORDER — PROMETHAZINE HYDROCHLORIDE 25 MG/1
25 TABLET ORAL EVERY 4 HOURS PRN
Status: DISCONTINUED | OUTPATIENT
Start: 2017-01-01 | End: 2017-01-01

## 2017-01-01 RX ORDER — FLUCONAZOLE 100 MG/1
100 TABLET ORAL DAILY
Qty: 10 TAB | Refills: 0 | Status: ON HOLD | OUTPATIENT
Start: 2017-01-01 | End: 2017-01-01

## 2017-01-01 RX ORDER — HEPARIN SODIUM 1000 [USP'U]/ML
2000 INJECTION, SOLUTION INTRAVENOUS; SUBCUTANEOUS PRN
Status: DISCONTINUED | OUTPATIENT
Start: 2017-01-01 | End: 2017-01-01

## 2017-01-01 RX ORDER — HEPARIN SODIUM 1000 [USP'U]/ML
3700 INJECTION, SOLUTION INTRAVENOUS; SUBCUTANEOUS PRN
Status: DISCONTINUED | OUTPATIENT
Start: 2017-01-01 | End: 2017-01-01 | Stop reason: HOSPADM

## 2017-01-01 RX ORDER — SULFAMETHOXAZOLE AND TRIMETHOPRIM 400; 80 MG/1; MG/1
1 TABLET ORAL DAILY
Status: DISCONTINUED | OUTPATIENT
Start: 2017-01-01 | End: 2017-01-01

## 2017-01-01 RX ORDER — BISACODYL 10 MG
10 SUPPOSITORY, RECTAL RECTAL
Refills: 0
Start: 2017-01-01 | End: 2018-01-01

## 2017-01-01 RX ORDER — POLYETHYLENE GLYCOL 3350 17 G/17G
1 POWDER, FOR SOLUTION ORAL
Status: DISCONTINUED | OUTPATIENT
Start: 2017-01-01 | End: 2017-01-01 | Stop reason: HOSPADM

## 2017-01-01 RX ORDER — OXYCODONE HYDROCHLORIDE 10 MG/1
10 TABLET ORAL EVERY 4 HOURS PRN
Status: DISCONTINUED | OUTPATIENT
Start: 2017-01-01 | End: 2017-01-01 | Stop reason: HOSPADM

## 2017-01-01 RX ORDER — SODIUM CHLORIDE 9 MG/ML
250 INJECTION, SOLUTION INTRAVENOUS
Status: DISCONTINUED | OUTPATIENT
Start: 2017-01-01 | End: 2017-01-01 | Stop reason: HOSPADM

## 2017-01-01 RX ORDER — SULFAMETHOXAZOLE AND TRIMETHOPRIM 400; 80 MG/1; MG/1
1 TABLET ORAL DAILY
Qty: 30 TAB | Refills: 0 | Status: SHIPPED | OUTPATIENT
Start: 2017-01-01 | End: 2017-01-01

## 2017-01-01 RX ORDER — SULFAMETHOXAZOLE AND TRIMETHOPRIM 800; 160 MG/1; MG/1
0.5 TABLET ORAL DAILY
Status: DISCONTINUED | OUTPATIENT
Start: 2017-01-01 | End: 2017-01-01 | Stop reason: HOSPADM

## 2017-01-01 RX ORDER — OXYCODONE HYDROCHLORIDE 5 MG/1
5-10 TABLET ORAL EVERY 4 HOURS PRN
Status: DISCONTINUED | OUTPATIENT
Start: 2017-01-01 | End: 2017-01-01

## 2017-01-01 RX ORDER — SODIUM CHLORIDE 9 MG/ML
500 INJECTION, SOLUTION INTRAVENOUS ONCE
Status: COMPLETED | OUTPATIENT
Start: 2017-01-01 | End: 2017-01-01

## 2017-01-01 RX ORDER — VALACYCLOVIR HYDROCHLORIDE 1 G/1
500 TABLET, FILM COATED ORAL 2 TIMES DAILY
Status: DISCONTINUED | OUTPATIENT
Start: 2017-01-01 | End: 2017-01-01 | Stop reason: HOSPADM

## 2017-01-01 RX ORDER — ENALAPRILAT 1.25 MG/ML
1.25 INJECTION INTRAVENOUS EVERY 6 HOURS PRN
Status: DISCONTINUED | OUTPATIENT
Start: 2017-01-01 | End: 2017-01-01 | Stop reason: HOSPADM

## 2017-01-01 RX ORDER — ACYCLOVIR 400 MG/1
400 TABLET ORAL DAILY
COMMUNITY

## 2017-01-01 RX ORDER — FLUCONAZOLE 2 MG/ML
200 INJECTION, SOLUTION INTRAVENOUS EVERY 24 HOURS
Status: DISCONTINUED | OUTPATIENT
Start: 2017-01-01 | End: 2017-01-01 | Stop reason: HOSPADM

## 2017-01-01 RX ORDER — SULFAMETHOXAZOLE AND TRIMETHOPRIM 400; 80 MG/1; MG/1
1 TABLET ORAL EVERY 12 HOURS
Status: DISCONTINUED | OUTPATIENT
Start: 2017-01-01 | End: 2017-01-01

## 2017-01-01 RX ORDER — OXYCODONE HYDROCHLORIDE 5 MG/1
5 TABLET ORAL EVERY 4 HOURS PRN
Status: DISCONTINUED | OUTPATIENT
Start: 2017-01-01 | End: 2017-01-01 | Stop reason: HOSPADM

## 2017-01-01 RX ORDER — HEPARIN SODIUM 5000 [USP'U]/ML
5000 INJECTION, SOLUTION INTRAVENOUS; SUBCUTANEOUS EVERY 8 HOURS
Status: ON HOLD | COMMUNITY
Start: 2017-01-01 | End: 2017-01-01

## 2017-01-01 RX ORDER — TRAMADOL HYDROCHLORIDE 50 MG/1
50 TABLET ORAL EVERY 6 HOURS PRN
Status: DISCONTINUED | OUTPATIENT
Start: 2017-01-01 | End: 2017-01-01 | Stop reason: HOSPADM

## 2017-01-01 RX ORDER — AMOXICILLIN 250 MG
2 CAPSULE ORAL 2 TIMES DAILY
Qty: 30 TAB | Refills: 0 | Status: SHIPPED | OUTPATIENT
Start: 2017-01-01

## 2017-01-01 RX ORDER — SODIUM CHLORIDE 9 MG/ML
500 INJECTION, SOLUTION INTRAVENOUS ONCE
Status: ACTIVE | OUTPATIENT
Start: 2017-01-01 | End: 2017-01-01

## 2017-01-01 RX ORDER — DIPHENHYDRAMINE HYDROCHLORIDE 50 MG/ML
25 INJECTION INTRAMUSCULAR; INTRAVENOUS ONCE
Status: COMPLETED | OUTPATIENT
Start: 2017-01-01 | End: 2017-01-01

## 2017-01-01 RX ORDER — AMLODIPINE BESYLATE 10 MG/1
10 TABLET ORAL DAILY
Qty: 30 TAB
Start: 2017-01-01

## 2017-01-01 RX ORDER — HYDROXYZINE 50 MG/1
25 TABLET, FILM COATED ORAL 4 TIMES DAILY
Status: DISCONTINUED | OUTPATIENT
Start: 2017-01-01 | End: 2017-01-01 | Stop reason: HOSPADM

## 2017-01-01 RX ORDER — ONDANSETRON 2 MG/ML
4 INJECTION INTRAMUSCULAR; INTRAVENOUS EVERY 4 HOURS PRN
Status: DISCONTINUED | OUTPATIENT
Start: 2017-01-01 | End: 2017-01-01

## 2017-01-01 RX ORDER — CLONIDINE HYDROCHLORIDE 0.1 MG/1
0.1 TABLET ORAL EVERY 6 HOURS PRN
Status: DISCONTINUED | OUTPATIENT
Start: 2017-01-01 | End: 2017-01-01 | Stop reason: HOSPADM

## 2017-01-01 RX ORDER — ACETAMINOPHEN 325 MG/1
650 TABLET ORAL PRN
Status: DISCONTINUED | OUTPATIENT
Start: 2017-01-01 | End: 2017-01-01 | Stop reason: HOSPADM

## 2017-01-01 RX ORDER — FLUCONAZOLE 100 MG/1
100 TABLET ORAL DAILY
Status: DISCONTINUED | OUTPATIENT
Start: 2017-01-01 | End: 2017-01-01 | Stop reason: HOSPADM

## 2017-01-01 RX ORDER — HEPARIN SODIUM 5000 [USP'U]/ML
5000 INJECTION, SOLUTION INTRAVENOUS; SUBCUTANEOUS EVERY 12 HOURS
Status: DISCONTINUED | OUTPATIENT
Start: 2017-01-01 | End: 2017-01-01 | Stop reason: HOSPADM

## 2017-01-01 RX ORDER — SULFAMETHOXAZOLE AND TRIMETHOPRIM 800; 160 MG/1; MG/1
0.5 TABLET ORAL EVERY 12 HOURS
Status: DISCONTINUED | OUTPATIENT
Start: 2017-01-01 | End: 2017-01-01

## 2017-01-01 RX ORDER — KETOROLAC TROMETHAMINE 30 MG/ML
15 INJECTION, SOLUTION INTRAMUSCULAR; INTRAVENOUS ONCE
Status: DISCONTINUED | OUTPATIENT
Start: 2017-01-01 | End: 2017-01-01

## 2017-01-01 RX ORDER — SEVELAMER CARBONATE 800 MG/1
1600 TABLET, FILM COATED ORAL 3 TIMES DAILY
COMMUNITY

## 2017-01-01 RX ORDER — MORPHINE SULFATE 4 MG/ML
2 INJECTION, SOLUTION INTRAMUSCULAR; INTRAVENOUS EVERY 4 HOURS PRN
Status: DISCONTINUED | OUTPATIENT
Start: 2017-01-01 | End: 2017-01-01 | Stop reason: HOSPADM

## 2017-01-01 RX ORDER — LORAZEPAM 2 MG/ML
2 INJECTION INTRAMUSCULAR
Status: DISCONTINUED | OUTPATIENT
Start: 2017-01-01 | End: 2017-01-01 | Stop reason: HOSPADM

## 2017-01-01 RX ORDER — ONDANSETRON 2 MG/ML
INJECTION INTRAMUSCULAR; INTRAVENOUS
Status: COMPLETED
Start: 2017-01-01 | End: 2017-01-01

## 2017-01-01 RX ORDER — CEFTRIAXONE 1 G/1
1 INJECTION, POWDER, FOR SOLUTION INTRAMUSCULAR; INTRAVENOUS ONCE
Status: COMPLETED | OUTPATIENT
Start: 2017-01-01 | End: 2017-01-01

## 2017-01-01 RX ORDER — SULFAMETHOXAZOLE AND TRIMETHOPRIM 800; 160 MG/1; MG/1
0.5 TABLET ORAL EVERY 12 HOURS
Status: DISCONTINUED | OUTPATIENT
Start: 2017-01-01 | End: 2017-01-01 | Stop reason: HOSPADM

## 2017-01-01 RX ORDER — ETHAMBUTOL HYDROCHLORIDE 400 MG/1
800 TABLET, FILM COATED ORAL DAILY
Status: ON HOLD | COMMUNITY
Start: 2017-01-01 | End: 2017-01-01

## 2017-01-01 RX ADMIN — NYSTATIN 500000 UNITS: 500000 SUSPENSION ORAL at 17:51

## 2017-01-01 RX ADMIN — CEFEPIME 2 G: 2 INJECTION, POWDER, FOR SOLUTION INTRAMUSCULAR; INTRAVENOUS at 21:18

## 2017-01-01 RX ADMIN — ALPRAZOLAM 0.25 MG: 0.25 TABLET ORAL at 21:15

## 2017-01-01 RX ADMIN — FLUOXETINE 10 MG: 10 CAPSULE ORAL at 10:24

## 2017-01-01 RX ADMIN — NYSTATIN 500000 UNITS: 500000 SUSPENSION ORAL at 05:04

## 2017-01-01 RX ADMIN — HEPARIN SODIUM 5000 UNITS: 5000 INJECTION, SOLUTION INTRAVENOUS; SUBCUTANEOUS at 06:18

## 2017-01-01 RX ADMIN — FLUOXETINE 10 MG: 10 CAPSULE ORAL at 14:04

## 2017-01-01 RX ADMIN — PREDNISOLONE ACETATE 1 DROP: 10 SUSPENSION/ DROPS OPHTHALMIC at 17:16

## 2017-01-01 RX ADMIN — FLUCONAZOLE 200 MG: 2 INJECTION, SOLUTION INTRAVENOUS at 09:00

## 2017-01-01 RX ADMIN — PREDNISOLONE ACETATE 1 DROP: 10 SUSPENSION/ DROPS OPHTHALMIC at 04:19

## 2017-01-01 RX ADMIN — DOLUTEGRAVIR SODIUM 50 MG: 50 TABLET, FILM COATED ORAL at 07:40

## 2017-01-01 RX ADMIN — AZITHROMYCIN 500 MG: 250 TABLET, FILM COATED ORAL at 21:43

## 2017-01-01 RX ADMIN — HEPARIN SODIUM 3700 UNITS: 1000 INJECTION, SOLUTION INTRAVENOUS; SUBCUTANEOUS at 08:08

## 2017-01-01 RX ADMIN — METOCLOPRAMIDE 10 MG: 10 TABLET ORAL at 16:16

## 2017-01-01 RX ADMIN — NYSTATIN 500000 UNITS: 500000 SUSPENSION ORAL at 06:02

## 2017-01-01 RX ADMIN — HEPARIN SODIUM 5000 UNITS: 5000 INJECTION, SOLUTION INTRAVENOUS; SUBCUTANEOUS at 14:16

## 2017-01-01 RX ADMIN — HEPARIN SODIUM 3700 UNITS: 1000 INJECTION, SOLUTION INTRAVENOUS; SUBCUTANEOUS at 13:20

## 2017-01-01 RX ADMIN — PREDNISOLONE ACETATE 1 DROP: 10 SUSPENSION/ DROPS OPHTHALMIC at 08:35

## 2017-01-01 RX ADMIN — HYDROCODONE BITARTRATE AND ACETAMINOPHEN 2 TABLET: 5; 325 TABLET ORAL at 10:36

## 2017-01-01 RX ADMIN — HYDROXYZINE HYDROCHLORIDE 25 MG: 50 TABLET, FILM COATED ORAL at 13:50

## 2017-01-01 RX ADMIN — HYDROCODONE BITARTRATE AND ACETAMINOPHEN 2 TABLET: 5; 325 TABLET ORAL at 20:35

## 2017-01-01 RX ADMIN — TRAMADOL HYDROCHLORIDE 50 MG: 50 TABLET, COATED ORAL at 06:03

## 2017-01-01 RX ADMIN — MOXIFLOXACIN HYDROCHLORIDE 1 DROP: 5 SOLUTION/ DROPS OPHTHALMIC at 05:21

## 2017-01-01 RX ADMIN — HYDROXYZINE HYDROCHLORIDE 25 MG: 25 TABLET, FILM COATED ORAL at 14:53

## 2017-01-01 RX ADMIN — ETHAMBUTOL HYDROCHLORIDE 800 MG: 400 TABLET, FILM COATED ORAL at 08:38

## 2017-01-01 RX ADMIN — HYDROXYZINE HYDROCHLORIDE 25 MG: 25 TABLET, FILM COATED ORAL at 17:46

## 2017-01-01 RX ADMIN — HEPARIN SODIUM 5000 UNITS: 5000 INJECTION, SOLUTION INTRAVENOUS; SUBCUTANEOUS at 13:40

## 2017-01-01 RX ADMIN — ACYCLOVIR 800 MG: 800 TABLET ORAL at 07:59

## 2017-01-01 RX ADMIN — HEPARIN SODIUM 3700 UNITS: 1000 INJECTION, SOLUTION INTRAVENOUS; SUBCUTANEOUS at 11:20

## 2017-01-01 RX ADMIN — NYSTATIN 500000 UNITS: 500000 SUSPENSION ORAL at 23:15

## 2017-01-01 RX ADMIN — HYDROCODONE BITARTRATE AND ACETAMINOPHEN 2 TABLET: 5; 325 TABLET ORAL at 12:35

## 2017-01-01 RX ADMIN — EPOETIN ALFA 3000 UNITS: 3000 SOLUTION INTRAVENOUS; SUBCUTANEOUS at 09:45

## 2017-01-01 RX ADMIN — FLUOXETINE 10 MG: 10 CAPSULE ORAL at 09:14

## 2017-01-01 RX ADMIN — PREDNISOLONE ACETATE 1 DROP: 10 SUSPENSION/ DROPS OPHTHALMIC at 23:00

## 2017-01-01 RX ADMIN — METOCLOPRAMIDE 10 MG: 10 TABLET ORAL at 18:03

## 2017-01-01 RX ADMIN — FLUOXETINE 10 MG: 10 CAPSULE ORAL at 08:22

## 2017-01-01 RX ADMIN — MOXIFLOXACIN HYDROCHLORIDE 1 DROP: 5 SOLUTION/ DROPS OPHTHALMIC at 21:18

## 2017-01-01 RX ADMIN — MOXIFLOXACIN HYDROCHLORIDE 1 DROP: 5 SOLUTION/ DROPS OPHTHALMIC at 14:17

## 2017-01-01 RX ADMIN — ONDANSETRON 4 MG: 4 TABLET, ORALLY DISINTEGRATING ORAL at 02:23

## 2017-01-01 RX ADMIN — PREDNISOLONE ACETATE 1 DROP: 10 SUSPENSION/ DROPS OPHTHALMIC at 11:47

## 2017-01-01 RX ADMIN — NYSTATIN 500000 UNITS: 500000 SUSPENSION ORAL at 11:47

## 2017-01-01 RX ADMIN — NYSTATIN 500000 UNITS: 500000 SUSPENSION ORAL at 04:50

## 2017-01-01 RX ADMIN — LISINOPRIL 20 MG: 20 TABLET ORAL at 08:07

## 2017-01-01 RX ADMIN — HYDROXYZINE HYDROCHLORIDE 25 MG: 25 TABLET, FILM COATED ORAL at 17:07

## 2017-01-01 RX ADMIN — SODIUM BICARBONATE 650 MG: 650 TABLET ORAL at 23:05

## 2017-01-01 RX ADMIN — PIPERACILLIN AND TAZOBACTAM 2.25 G: 2; .25 INJECTION, POWDER, FOR SOLUTION INTRAVENOUS at 06:07

## 2017-01-01 RX ADMIN — ETHAMBUTOL HYDROCHLORIDE 800 MG: 400 TABLET, FILM COATED ORAL at 08:18

## 2017-01-01 RX ADMIN — CEFTRIAXONE 2 G: 2 INJECTION, POWDER, FOR SOLUTION INTRAMUSCULAR; INTRAVENOUS at 12:35

## 2017-01-01 RX ADMIN — HEPARIN SODIUM 1500 UNITS: 1000 INJECTION, SOLUTION INTRAVENOUS; SUBCUTANEOUS at 09:46

## 2017-01-01 RX ADMIN — HYDROXYZINE HYDROCHLORIDE 25 MG: 25 TABLET, FILM COATED ORAL at 08:34

## 2017-01-01 RX ADMIN — AMLODIPINE BESYLATE 10 MG: 10 TABLET ORAL at 09:00

## 2017-01-01 RX ADMIN — HYDROXYZINE HYDROCHLORIDE 25 MG: 50 TABLET, FILM COATED ORAL at 17:45

## 2017-01-01 RX ADMIN — HYDROXYZINE HYDROCHLORIDE 25 MG: 25 TABLET, FILM COATED ORAL at 21:50

## 2017-01-01 RX ADMIN — SODIUM BICARBONATE 650 MG TABLET 650 MG: at 09:57

## 2017-01-01 RX ADMIN — HYDROXYZINE HYDROCHLORIDE 25 MG: 50 TABLET, FILM COATED ORAL at 12:43

## 2017-01-01 RX ADMIN — HYDROMORPHONE HYDROCHLORIDE 0.5 MG: 1 INJECTION, SOLUTION INTRAMUSCULAR; INTRAVENOUS; SUBCUTANEOUS at 12:34

## 2017-01-01 RX ADMIN — FLUOXETINE 10 MG: 10 CAPSULE ORAL at 08:49

## 2017-01-01 RX ADMIN — SODIUM BICARBONATE 650 MG: 650 TABLET ORAL at 22:05

## 2017-01-01 RX ADMIN — HYDROXYZINE HYDROCHLORIDE 25 MG: 25 TABLET, FILM COATED ORAL at 16:39

## 2017-01-01 RX ADMIN — HEPARIN SODIUM 2500 UNITS: 1000 INJECTION, SOLUTION INTRAVENOUS; SUBCUTANEOUS at 17:10

## 2017-01-01 RX ADMIN — HYDROXYZINE HYDROCHLORIDE 25 MG: 25 TABLET, FILM COATED ORAL at 17:15

## 2017-01-01 RX ADMIN — EPOETIN ALFA 3000 UNITS: 3000 SOLUTION INTRAVENOUS; SUBCUTANEOUS at 10:00

## 2017-01-01 RX ADMIN — AZITHROMYCIN 250 MG: 250 TABLET, FILM COATED ORAL at 08:38

## 2017-01-01 RX ADMIN — HYDROXYZINE HYDROCHLORIDE 25 MG: 25 TABLET, FILM COATED ORAL at 21:25

## 2017-01-01 RX ADMIN — MOXIFLOXACIN HYDROCHLORIDE 1 DROP: 5 SOLUTION/ DROPS OPHTHALMIC at 05:07

## 2017-01-01 RX ADMIN — HEPARIN SODIUM 5000 UNITS: 5000 INJECTION, SOLUTION INTRAVENOUS; SUBCUTANEOUS at 20:28

## 2017-01-01 RX ADMIN — HEPARIN SODIUM 5000 UNITS: 5000 INJECTION, SOLUTION INTRAVENOUS; SUBCUTANEOUS at 13:56

## 2017-01-01 RX ADMIN — HYDROCODONE BITARTRATE AND ACETAMINOPHEN 2 TABLET: 5; 325 TABLET ORAL at 20:15

## 2017-01-01 RX ADMIN — PREDNISOLONE ACETATE 1 DROP: 10 SUSPENSION/ DROPS OPHTHALMIC at 17:07

## 2017-01-01 RX ADMIN — MOXIFLOXACIN HYDROCHLORIDE 1 DROP: 5 SOLUTION/ DROPS OPHTHALMIC at 14:53

## 2017-01-01 RX ADMIN — HEPARIN SODIUM 3700 UNITS: 1000 INJECTION, SOLUTION INTRAVENOUS; SUBCUTANEOUS at 13:35

## 2017-01-01 RX ADMIN — DIPHENHYDRAMINE HCL 25 MG: 25 TABLET ORAL at 17:12

## 2017-01-01 RX ADMIN — FLUCONAZOLE 100 MG: 100 TABLET ORAL at 20:18

## 2017-01-01 RX ADMIN — HYDROXYZINE HYDROCHLORIDE 25 MG: 25 TABLET, FILM COATED ORAL at 08:35

## 2017-01-01 RX ADMIN — NYSTATIN 500000 UNITS: 500000 SUSPENSION ORAL at 17:16

## 2017-01-01 RX ADMIN — METOCLOPRAMIDE 10 MG: 10 TABLET ORAL at 17:23

## 2017-01-01 RX ADMIN — CARVEDILOL 6.25 MG: 6.25 TABLET, FILM COATED ORAL at 18:27

## 2017-01-01 RX ADMIN — FLUOXETINE 10 MG: 10 CAPSULE ORAL at 08:35

## 2017-01-01 RX ADMIN — FLUCONAZOLE 100 MG: 100 TABLET ORAL at 12:44

## 2017-01-01 RX ADMIN — SODIUM BICARBONATE 650 MG: 650 TABLET ORAL at 16:43

## 2017-01-01 RX ADMIN — RENAGEL 1600 MG: 800 TABLET ORAL at 17:27

## 2017-01-01 RX ADMIN — DOLUTEGRAVIR SODIUM 50 MG: 50 TABLET, FILM COATED ORAL at 13:28

## 2017-01-01 RX ADMIN — NYSTATIN 500000 UNITS: 500000 SUSPENSION ORAL at 21:47

## 2017-01-01 RX ADMIN — ONDANSETRON 4 MG: 2 INJECTION INTRAMUSCULAR; INTRAVENOUS at 17:34

## 2017-01-01 RX ADMIN — HYDROCODONE BITARTRATE AND ACETAMINOPHEN 2 TABLET: 5; 325 TABLET ORAL at 20:23

## 2017-01-01 RX ADMIN — HYDROCODONE BITARTRATE AND ACETAMINOPHEN 2 TABLET: 5; 325 TABLET ORAL at 06:05

## 2017-01-01 RX ADMIN — LIDOCAINE HYDROCHLORIDE 1 APPLICATION: 20 JELLY TOPICAL at 12:18

## 2017-01-01 RX ADMIN — SODIUM BICARBONATE 650 MG: 650 TABLET ORAL at 20:17

## 2017-01-01 RX ADMIN — METOCLOPRAMIDE 10 MG: 10 TABLET ORAL at 20:23

## 2017-01-01 RX ADMIN — MORPHINE SULFATE 4 MG: 4 INJECTION INTRAVENOUS at 06:11

## 2017-01-01 RX ADMIN — ACETAMINOPHEN 650 MG: 325 TABLET, FILM COATED ORAL at 11:45

## 2017-01-01 RX ADMIN — VALACYCLOVIR 500 MG: 500 TABLET, FILM COATED ORAL at 08:49

## 2017-01-01 RX ADMIN — MOXIFLOXACIN HYDROCHLORIDE 1 DROP: 5 SOLUTION/ DROPS OPHTHALMIC at 21:49

## 2017-01-01 RX ADMIN — AMPICILLIN SODIUM 1 G: 1 INJECTION, POWDER, FOR SOLUTION INTRAMUSCULAR; INTRAVENOUS at 07:59

## 2017-01-01 RX ADMIN — AZITHROMYCIN 500 MG: 250 TABLET, FILM COATED ORAL at 21:17

## 2017-01-01 RX ADMIN — HYDROXYZINE HYDROCHLORIDE 25 MG: 25 TABLET, FILM COATED ORAL at 08:04

## 2017-01-01 RX ADMIN — HEPARIN SODIUM 3700 UNITS: 1000 INJECTION, SOLUTION INTRAVENOUS; SUBCUTANEOUS at 10:35

## 2017-01-01 RX ADMIN — IOHEXOL 80 ML: 350 INJECTION, SOLUTION INTRAVENOUS at 16:20

## 2017-01-01 RX ADMIN — ACYCLOVIR 400 MG: 800 TABLET ORAL at 08:38

## 2017-01-01 RX ADMIN — SODIUM BICARBONATE 650 MG: 650 TABLET ORAL at 20:07

## 2017-01-01 RX ADMIN — AMLODIPINE BESYLATE 5 MG: 5 TABLET ORAL at 15:28

## 2017-01-01 RX ADMIN — SODIUM BICARBONATE 650 MG: 650 TABLET ORAL at 21:46

## 2017-01-01 RX ADMIN — ACYCLOVIR 800 MG: 200 CAPSULE ORAL at 09:56

## 2017-01-01 RX ADMIN — PREDNISOLONE ACETATE 1 DROP: 10 SUSPENSION/ DROPS OPHTHALMIC at 22:00

## 2017-01-01 RX ADMIN — MEROPENEM 500 MG: 500 INJECTION, POWDER, FOR SOLUTION INTRAVENOUS at 16:00

## 2017-01-01 RX ADMIN — NYSTATIN 500000 UNITS: 500000 SUSPENSION ORAL at 20:53

## 2017-01-01 RX ADMIN — NYSTATIN 500000 UNITS: 500000 SUSPENSION ORAL at 08:33

## 2017-01-01 RX ADMIN — DIPHENOXYLATE HYDROCHLORIDE AND ATROPINE SULFATE 1 TABLET: 2.5; .025 TABLET ORAL at 21:56

## 2017-01-01 RX ADMIN — HYDROXYZINE HYDROCHLORIDE 25 MG: 50 TABLET, FILM COATED ORAL at 16:56

## 2017-01-01 RX ADMIN — MOXIFLOXACIN HYDROCHLORIDE 1 DROP: 5 SOLUTION/ DROPS OPHTHALMIC at 08:37

## 2017-01-01 RX ADMIN — FLUCONAZOLE 100 MG: 100 TABLET ORAL at 09:48

## 2017-01-01 RX ADMIN — SODIUM BICARBONATE 650 MG: 650 TABLET ORAL at 20:28

## 2017-01-01 RX ADMIN — DIPHENOXYLATE HYDROCHLORIDE AND ATROPINE SULFATE 1 TABLET: 2.5; .025 TABLET ORAL at 10:12

## 2017-01-01 RX ADMIN — ACYCLOVIR 400 MG: 800 TABLET ORAL at 12:48

## 2017-01-01 RX ADMIN — HYDROXYZINE HYDROCHLORIDE 25 MG: 25 TABLET, FILM COATED ORAL at 12:08

## 2017-01-01 RX ADMIN — PREDNISOLONE ACETATE 1 DROP: 10 SUSPENSION/ DROPS OPHTHALMIC at 17:30

## 2017-01-01 RX ADMIN — HEPARIN SODIUM 3700 UNITS: 1000 INJECTION, SOLUTION INTRAVENOUS; SUBCUTANEOUS at 11:25

## 2017-01-01 RX ADMIN — MOXIFLOXACIN HYDROCHLORIDE 1 DROP: 5 SOLUTION/ DROPS OPHTHALMIC at 16:56

## 2017-01-01 RX ADMIN — AMLODIPINE BESYLATE 5 MG: 5 TABLET ORAL at 21:05

## 2017-01-01 RX ADMIN — HYDROXYZINE HYDROCHLORIDE 25 MG: 25 TABLET, FILM COATED ORAL at 09:20

## 2017-01-01 RX ADMIN — ONDANSETRON 4 MG: 2 INJECTION INTRAMUSCULAR; INTRAVENOUS at 17:38

## 2017-01-01 RX ADMIN — NYSTATIN 500000 UNITS: 100000 SUSPENSION ORAL at 20:39

## 2017-01-01 RX ADMIN — EPOETIN ALFA 3000 UNITS: 3000 SOLUTION INTRAVENOUS; SUBCUTANEOUS at 09:57

## 2017-01-01 RX ADMIN — HYDROCODONE BITARTRATE AND ACETAMINOPHEN 2 TABLET: 5; 325 TABLET ORAL at 16:12

## 2017-01-01 RX ADMIN — RENAGEL 1600 MG: 800 TABLET ORAL at 08:20

## 2017-01-01 RX ADMIN — HEPARIN SODIUM 5000 UNITS: 5000 INJECTION, SOLUTION INTRAVENOUS; SUBCUTANEOUS at 22:05

## 2017-01-01 RX ADMIN — DEXTROSE MONOHYDRATE 25 ML: 25 INJECTION, SOLUTION INTRAVENOUS at 16:00

## 2017-01-01 RX ADMIN — NYSTATIN 500000 UNITS: 500000 SUSPENSION ORAL at 13:45

## 2017-01-01 RX ADMIN — PREDNISOLONE ACETATE 1 DROP: 10 SUSPENSION/ DROPS OPHTHALMIC at 06:07

## 2017-01-01 RX ADMIN — MOXIFLOXACIN HYDROCHLORIDE 1 DROP: 5 SOLUTION/ DROPS OPHTHALMIC at 04:51

## 2017-01-01 RX ADMIN — HYDROXYZINE HYDROCHLORIDE 25 MG: 25 TABLET, FILM COATED ORAL at 18:03

## 2017-01-01 RX ADMIN — SODIUM BICARBONATE 650 MG TABLET 650 MG: at 15:11

## 2017-01-01 RX ADMIN — PREDNISOLONE ACETATE 1 DROP: 10 SUSPENSION/ DROPS OPHTHALMIC at 21:45

## 2017-01-01 RX ADMIN — ACYCLOVIR 400 MG: 800 TABLET ORAL at 08:24

## 2017-01-01 RX ADMIN — HEPARIN SODIUM 5000 UNITS: 5000 INJECTION, SOLUTION INTRAVENOUS; SUBCUTANEOUS at 05:56

## 2017-01-01 RX ADMIN — HEPARIN SODIUM 5000 UNITS: 5000 INJECTION, SOLUTION INTRAVENOUS; SUBCUTANEOUS at 20:27

## 2017-01-01 RX ADMIN — ETHAMBUTOL HYDROCHLORIDE 800 MG: 400 TABLET, FILM COATED ORAL at 08:30

## 2017-01-01 RX ADMIN — MOXIFLOXACIN HYDROCHLORIDE 1 DROP: 5 SOLUTION/ DROPS OPHTHALMIC at 11:13

## 2017-01-01 RX ADMIN — MOXIFLOXACIN HYDROCHLORIDE 1 DROP: 5 SOLUTION/ DROPS OPHTHALMIC at 12:26

## 2017-01-01 RX ADMIN — SENNOSIDES AND DOCUSATE SODIUM 2 TABLET: 8.6; 5 TABLET ORAL at 13:00

## 2017-01-01 RX ADMIN — HYDRALAZINE HYDROCHLORIDE 10 MG: 20 INJECTION INTRAMUSCULAR; INTRAVENOUS at 20:29

## 2017-01-01 RX ADMIN — HEPARIN SODIUM 5000 UNITS: 5000 INJECTION, SOLUTION INTRAVENOUS; SUBCUTANEOUS at 23:15

## 2017-01-01 RX ADMIN — MORPHINE SULFATE 2 MG: 4 INJECTION INTRAVENOUS at 01:57

## 2017-01-01 RX ADMIN — SODIUM BICARBONATE 650 MG: 650 TABLET ORAL at 09:48

## 2017-01-01 RX ADMIN — NYSTATIN 500000 UNITS: 500000 SUSPENSION ORAL at 11:53

## 2017-01-01 RX ADMIN — AZITHROMYCIN 1250 MG: 250 TABLET, FILM COATED ORAL at 03:07

## 2017-01-01 RX ADMIN — HEPARIN SODIUM 1500 UNITS: 1000 INJECTION, SOLUTION INTRAVENOUS; SUBCUTANEOUS at 06:41

## 2017-01-01 RX ADMIN — SODIUM BICARBONATE 650 MG: 650 TABLET ORAL at 13:28

## 2017-01-01 RX ADMIN — HYDRALAZINE HYDROCHLORIDE 10 MG: 20 INJECTION INTRAMUSCULAR; INTRAVENOUS at 07:13

## 2017-01-01 RX ADMIN — SODIUM BICARBONATE 650 MG: 650 TABLET ORAL at 22:11

## 2017-01-01 RX ADMIN — MOXIFLOXACIN HYDROCHLORIDE 1 DROP: 5 SOLUTION/ DROPS OPHTHALMIC at 17:16

## 2017-01-01 RX ADMIN — DOLUTEGRAVIR SODIUM 50 MG: 50 TABLET, FILM COATED ORAL at 08:24

## 2017-01-01 RX ADMIN — MORPHINE SULFATE 2.5 MG: 4 INJECTION INTRAVENOUS at 13:40

## 2017-01-01 RX ADMIN — RENAGEL 1600 MG: 800 TABLET ORAL at 20:15

## 2017-01-01 RX ADMIN — ALPRAZOLAM 0.25 MG: 0.25 TABLET ORAL at 21:56

## 2017-01-01 RX ADMIN — MOXIFLOXACIN HYDROCHLORIDE 1 DROP: 5 SOLUTION/ DROPS OPHTHALMIC at 09:00

## 2017-01-01 RX ADMIN — RENAGEL 1600 MG: 800 TABLET ORAL at 08:37

## 2017-01-01 RX ADMIN — MOXIFLOXACIN HYDROCHLORIDE 1 DROP: 5 SOLUTION/ DROPS OPHTHALMIC at 00:00

## 2017-01-01 RX ADMIN — DIPHENOXYLATE HYDROCHLORIDE AND ATROPINE SULFATE 1 TABLET: 2.5; .025 TABLET ORAL at 20:53

## 2017-01-01 RX ADMIN — HEPARIN SODIUM 1500 UNITS: 1000 INJECTION, SOLUTION INTRAVENOUS; SUBCUTANEOUS at 07:48

## 2017-01-01 RX ADMIN — LABETALOL HYDROCHLORIDE 10 MG: 5 INJECTION, SOLUTION INTRAVENOUS at 05:05

## 2017-01-01 RX ADMIN — SODIUM BICARBONATE 650 MG TABLET 650 MG: at 23:14

## 2017-01-01 RX ADMIN — ACYCLOVIR 800 MG: 200 CAPSULE ORAL at 09:20

## 2017-01-01 RX ADMIN — SODIUM BICARBONATE 650 MG TABLET 650 MG: at 09:07

## 2017-01-01 RX ADMIN — VANCOMYCIN HYDROCHLORIDE 1000 MG: 100 INJECTION, POWDER, LYOPHILIZED, FOR SOLUTION INTRAVENOUS at 21:48

## 2017-01-01 RX ADMIN — HYDROCODONE BITARTRATE AND ACETAMINOPHEN 2 TABLET: 5; 325 TABLET ORAL at 08:06

## 2017-01-01 RX ADMIN — NYSTATIN 500000 UNITS: 500000 SUSPENSION ORAL at 12:03

## 2017-01-01 RX ADMIN — MOXIFLOXACIN HYDROCHLORIDE 1 DROP: 5 SOLUTION/ DROPS OPHTHALMIC at 22:21

## 2017-01-01 RX ADMIN — ETHAMBUTOL HYDROCHLORIDE 800 MG: 400 TABLET, FILM COATED ORAL at 13:28

## 2017-01-01 RX ADMIN — HEPARIN SODIUM 5000 UNITS: 5000 INJECTION, SOLUTION INTRAVENOUS; SUBCUTANEOUS at 06:08

## 2017-01-01 RX ADMIN — HYDROXYZINE HYDROCHLORIDE 25 MG: 50 TABLET, FILM COATED ORAL at 13:37

## 2017-01-01 RX ADMIN — AMLODIPINE BESYLATE 10 MG: 10 TABLET ORAL at 08:07

## 2017-01-01 RX ADMIN — RENAGEL 1600 MG: 800 TABLET ORAL at 20:56

## 2017-01-01 RX ADMIN — HYDROCODONE BITARTRATE AND ACETAMINOPHEN 2 TABLET: 5; 325 TABLET ORAL at 17:27

## 2017-01-01 RX ADMIN — LISINOPRIL 20 MG: 20 TABLET ORAL at 08:03

## 2017-01-01 RX ADMIN — HYDROXYZINE HYDROCHLORIDE 25 MG: 50 TABLET, FILM COATED ORAL at 17:30

## 2017-01-01 RX ADMIN — HYDROXYZINE HYDROCHLORIDE 25 MG: 50 TABLET, FILM COATED ORAL at 23:04

## 2017-01-01 RX ADMIN — LISINOPRIL 20 MG: 20 TABLET ORAL at 09:59

## 2017-01-01 RX ADMIN — ACYCLOVIR 400 MG: 800 TABLET ORAL at 07:24

## 2017-01-01 RX ADMIN — AZITHROMYCIN 500 MG: 250 TABLET, FILM COATED ORAL at 08:49

## 2017-01-01 RX ADMIN — SODIUM BICARBONATE 650 MG TABLET 650 MG: at 17:18

## 2017-01-01 RX ADMIN — PREDNISOLONE ACETATE 1 DROP: 10 SUSPENSION/ DROPS OPHTHALMIC at 13:45

## 2017-01-01 RX ADMIN — LISINOPRIL 20 MG: 20 TABLET ORAL at 14:04

## 2017-01-01 RX ADMIN — ONDANSETRON 4 MG: 2 INJECTION INTRAMUSCULAR; INTRAVENOUS at 18:20

## 2017-01-01 RX ADMIN — POLYETHYLENE GLYCOL 3350 1 PACKET: 17 POWDER, FOR SOLUTION ORAL at 07:48

## 2017-01-01 RX ADMIN — VANCOMYCIN HYDROCHLORIDE 1000 MG: 10 INJECTION, POWDER, LYOPHILIZED, FOR SOLUTION INTRAVENOUS at 16:00

## 2017-01-01 RX ADMIN — CEFTRIAXONE 1 G: 1 INJECTION, SOLUTION INTRAVENOUS at 09:57

## 2017-01-01 RX ADMIN — HEPARIN SODIUM 5000 UNITS: 5000 INJECTION, SOLUTION INTRAVENOUS; SUBCUTANEOUS at 05:53

## 2017-01-01 RX ADMIN — HYDRALAZINE HYDROCHLORIDE 10 MG: 20 INJECTION INTRAMUSCULAR; INTRAVENOUS at 15:13

## 2017-01-01 RX ADMIN — ETHAMBUTOL HYDROCHLORIDE 800 MG: 400 TABLET, FILM COATED ORAL at 10:24

## 2017-01-01 RX ADMIN — FLUOXETINE 10 MG: 10 CAPSULE ORAL at 08:39

## 2017-01-01 RX ADMIN — ONDANSETRON 4 MG: 2 INJECTION INTRAMUSCULAR; INTRAVENOUS at 21:40

## 2017-01-01 RX ADMIN — PREDNISOLONE ACETATE 1 DROP: 10 SUSPENSION/ DROPS OPHTHALMIC at 14:05

## 2017-01-01 RX ADMIN — HYDROXYZINE HYDROCHLORIDE 25 MG: 25 TABLET, FILM COATED ORAL at 17:36

## 2017-01-01 RX ADMIN — HEPARIN SODIUM 5000 UNITS: 5000 INJECTION, SOLUTION INTRAVENOUS; SUBCUTANEOUS at 13:43

## 2017-01-01 RX ADMIN — HEPARIN SODIUM 2000 UNITS: 1000 INJECTION, SOLUTION INTRAVENOUS; SUBCUTANEOUS at 08:55

## 2017-01-01 RX ADMIN — MOXIFLOXACIN HYDROCHLORIDE 1 DROP: 5 SOLUTION/ DROPS OPHTHALMIC at 12:19

## 2017-01-01 RX ADMIN — HEPARIN SODIUM 3700 UNITS: 1000 INJECTION, SOLUTION INTRAVENOUS; SUBCUTANEOUS at 14:09

## 2017-01-01 RX ADMIN — HEPARIN SODIUM 5000 UNITS: 5000 INJECTION, SOLUTION INTRAVENOUS; SUBCUTANEOUS at 20:35

## 2017-01-01 RX ADMIN — MOXIFLOXACIN HYDROCHLORIDE 1 DROP: 5 SOLUTION/ DROPS OPHTHALMIC at 05:33

## 2017-01-01 RX ADMIN — HEPARIN SODIUM 5000 UNITS: 5000 INJECTION, SOLUTION INTRAVENOUS; SUBCUTANEOUS at 05:51

## 2017-01-01 RX ADMIN — HEPARIN SODIUM 3700 UNITS: 1000 INJECTION, SOLUTION INTRAVENOUS; SUBCUTANEOUS at 12:01

## 2017-01-01 RX ADMIN — STANDARDIZED SENNA CONCENTRATE AND DOCUSATE SODIUM 2 TABLET: 8.6; 5 TABLET, FILM COATED ORAL at 20:33

## 2017-01-01 RX ADMIN — NYSTATIN 500000 UNITS: 500000 SUSPENSION ORAL at 08:08

## 2017-01-01 RX ADMIN — LISINOPRIL 20 MG: 20 TABLET ORAL at 10:36

## 2017-01-01 RX ADMIN — METOCLOPRAMIDE 10 MG: 10 TABLET ORAL at 20:29

## 2017-01-01 RX ADMIN — METOCLOPRAMIDE 10 MG: 10 TABLET ORAL at 11:35

## 2017-01-01 RX ADMIN — FLUCONAZOLE 200 MG: 2 INJECTION, SOLUTION INTRAVENOUS at 12:08

## 2017-01-01 RX ADMIN — SODIUM BICARBONATE 650 MG TABLET 650 MG: at 13:01

## 2017-01-01 RX ADMIN — PIPERACILLIN AND TAZOBACTAM 2.25 G: 2; .25 INJECTION, POWDER, FOR SOLUTION INTRAVENOUS at 21:46

## 2017-01-01 RX ADMIN — ACYCLOVIR 800 MG: 800 TABLET ORAL at 13:28

## 2017-01-01 RX ADMIN — SODIUM BICARBONATE 650 MG TABLET 650 MG: at 14:00

## 2017-01-01 RX ADMIN — MOXIFLOXACIN HYDROCHLORIDE 1 DROP: 5 SOLUTION/ DROPS OPHTHALMIC at 13:38

## 2017-01-01 RX ADMIN — FLUOXETINE 10 MG: 10 CAPSULE ORAL at 11:14

## 2017-01-01 RX ADMIN — HEPARIN SODIUM 5000 UNITS: 5000 INJECTION, SOLUTION INTRAVENOUS; SUBCUTANEOUS at 21:20

## 2017-01-01 RX ADMIN — AZITHROMYCIN 500 MG: 250 TABLET, FILM COATED ORAL at 13:27

## 2017-01-01 RX ADMIN — RENAGEL 1600 MG: 800 TABLET ORAL at 21:20

## 2017-01-01 RX ADMIN — HYDROXYZINE HYDROCHLORIDE 25 MG: 25 TABLET, FILM COATED ORAL at 21:15

## 2017-01-01 RX ADMIN — RENAGEL 1600 MG: 800 TABLET ORAL at 08:07

## 2017-01-01 RX ADMIN — NYSTATIN 500000 UNITS: 500000 SUSPENSION ORAL at 18:07

## 2017-01-01 RX ADMIN — SODIUM BICARBONATE 650 MG TABLET 650 MG: at 21:47

## 2017-01-01 RX ADMIN — HYDROXYZINE HYDROCHLORIDE 25 MG: 25 TABLET, FILM COATED ORAL at 09:28

## 2017-01-01 RX ADMIN — HYDROCODONE BITARTRATE AND ACETAMINOPHEN 2 TABLET: 5; 325 TABLET ORAL at 23:20

## 2017-01-01 RX ADMIN — DOLUTEGRAVIR SODIUM 50 MG: 50 TABLET, FILM COATED ORAL at 10:00

## 2017-01-01 RX ADMIN — AZITHROMYCIN 500 MG: 250 TABLET, FILM COATED ORAL at 23:06

## 2017-01-01 RX ADMIN — HYDROCODONE BITARTRATE AND ACETAMINOPHEN 2 TABLET: 5; 325 TABLET ORAL at 23:07

## 2017-01-01 RX ADMIN — ACYCLOVIR 800 MG: 200 CAPSULE ORAL at 08:34

## 2017-01-01 RX ADMIN — HYDROXYZINE HYDROCHLORIDE 25 MG: 25 TABLET, FILM COATED ORAL at 09:56

## 2017-01-01 RX ADMIN — LABETALOL HYDROCHLORIDE 10 MG: 5 INJECTION, SOLUTION INTRAVENOUS at 03:14

## 2017-01-01 RX ADMIN — SODIUM BICARBONATE 650 MG TABLET 650 MG: at 08:34

## 2017-01-01 RX ADMIN — HYDROCODONE BITARTRATE AND ACETAMINOPHEN 2 TABLET: 5; 325 TABLET ORAL at 06:36

## 2017-01-01 RX ADMIN — MOXIFLOXACIN HYDROCHLORIDE 1 DROP: 5 SOLUTION/ DROPS OPHTHALMIC at 17:00

## 2017-01-01 RX ADMIN — AMPICILLIN SODIUM 1 G: 1 INJECTION, POWDER, FOR SOLUTION INTRAMUSCULAR; INTRAVENOUS at 15:00

## 2017-01-01 RX ADMIN — AMPICILLIN AND SULBACTAM 3 G: 2; 1 INJECTION, POWDER, FOR SOLUTION INTRAVENOUS at 20:30

## 2017-01-01 RX ADMIN — MICONAZOLE NITRATE: 20 CREAM TOPICAL at 10:00

## 2017-01-01 RX ADMIN — FLUOXETINE 10 MG: 10 CAPSULE ORAL at 11:46

## 2017-01-01 RX ADMIN — NYSTATIN 500000 UNITS: 100000 SUSPENSION ORAL at 16:16

## 2017-01-01 RX ADMIN — LISINOPRIL 20 MG: 20 TABLET ORAL at 15:18

## 2017-01-01 RX ADMIN — ETHAMBUTOL HYDROCHLORIDE 800 MG: 400 TABLET, FILM COATED ORAL at 08:49

## 2017-01-01 RX ADMIN — HYDROCODONE BITARTRATE AND ACETAMINOPHEN 2 TABLET: 5; 325 TABLET ORAL at 21:00

## 2017-01-01 RX ADMIN — IRON DEXTRAN 25 MG: 50 INJECTION, SOLUTION INTRAVENOUS at 17:28

## 2017-01-01 RX ADMIN — HYDROXYZINE HYDROCHLORIDE 25 MG: 25 TABLET, FILM COATED ORAL at 08:32

## 2017-01-01 RX ADMIN — RENAGEL 1600 MG: 800 TABLET ORAL at 07:40

## 2017-01-01 RX ADMIN — METOCLOPRAMIDE 10 MG: 10 TABLET ORAL at 21:25

## 2017-01-01 RX ADMIN — FLUOXETINE 10 MG: 10 CAPSULE ORAL at 17:40

## 2017-01-01 RX ADMIN — ALPRAZOLAM 0.25 MG: 0.25 TABLET ORAL at 10:08

## 2017-01-01 RX ADMIN — HEPARIN SODIUM 5000 UNITS: 5000 INJECTION, SOLUTION INTRAVENOUS; SUBCUTANEOUS at 22:42

## 2017-01-01 RX ADMIN — NYSTATIN 500000 UNITS: 500000 SUSPENSION ORAL at 08:34

## 2017-01-01 RX ADMIN — ACYCLOVIR 800 MG: 200 CAPSULE ORAL at 08:30

## 2017-01-01 RX ADMIN — HEPARIN SODIUM 5000 UNITS: 5000 INJECTION, SOLUTION INTRAVENOUS; SUBCUTANEOUS at 20:48

## 2017-01-01 RX ADMIN — SODIUM BICARBONATE 650 MG: 650 TABLET ORAL at 14:04

## 2017-01-01 RX ADMIN — MOXIFLOXACIN HYDROCHLORIDE 1 DROP: 5 SOLUTION/ DROPS OPHTHALMIC at 11:14

## 2017-01-01 RX ADMIN — MORPHINE SULFATE 2 MG: 4 INJECTION INTRAVENOUS at 01:01

## 2017-01-01 RX ADMIN — SODIUM BICARBONATE TAB 650 MG 650 MG: 650 TAB at 22:21

## 2017-01-01 RX ADMIN — HEPARIN SODIUM 5000 UNITS: 5000 INJECTION, SOLUTION INTRAVENOUS; SUBCUTANEOUS at 12:55

## 2017-01-01 RX ADMIN — HYDROXYZINE HYDROCHLORIDE 25 MG: 25 TABLET, FILM COATED ORAL at 21:47

## 2017-01-01 RX ADMIN — HEPARIN SODIUM 3700 UNITS: 1000 INJECTION, SOLUTION INTRAVENOUS; SUBCUTANEOUS at 12:56

## 2017-01-01 RX ADMIN — HEPARIN SODIUM 2000 UNITS: 1000 INJECTION, SOLUTION INTRAVENOUS; SUBCUTANEOUS at 09:35

## 2017-01-01 RX ADMIN — RENAGEL 1600 MG: 800 TABLET ORAL at 07:25

## 2017-01-01 RX ADMIN — HYDROXYZINE HYDROCHLORIDE 25 MG: 50 TABLET, FILM COATED ORAL at 08:47

## 2017-01-01 RX ADMIN — LISINOPRIL 20 MG: 20 TABLET ORAL at 08:32

## 2017-01-01 RX ADMIN — HYDROXYZINE HYDROCHLORIDE 25 MG: 25 TABLET, FILM COATED ORAL at 09:33

## 2017-01-01 RX ADMIN — DOLUTEGRAVIR SODIUM 50 MG: 50 TABLET, FILM COATED ORAL at 10:41

## 2017-01-01 RX ADMIN — METOCLOPRAMIDE 10 MG: 10 TABLET ORAL at 21:51

## 2017-01-01 RX ADMIN — STANDARDIZED SENNA CONCENTRATE AND DOCUSATE SODIUM 2 TABLET: 8.6; 5 TABLET, FILM COATED ORAL at 07:39

## 2017-01-01 RX ADMIN — SODIUM BICARBONATE 650 MG TABLET 650 MG: at 21:25

## 2017-01-01 RX ADMIN — SODIUM BICARBONATE 650 MG TABLET 650 MG: at 08:03

## 2017-01-01 RX ADMIN — METOCLOPRAMIDE 10 MG: 10 TABLET ORAL at 05:53

## 2017-01-01 RX ADMIN — OXYCODONE HYDROCHLORIDE 5 MG: 5 TABLET ORAL at 06:07

## 2017-01-01 RX ADMIN — NYSTATIN 500000 UNITS: 500000 SUSPENSION ORAL at 21:43

## 2017-01-01 RX ADMIN — HYDROCODONE BITARTRATE AND ACETAMINOPHEN 2 TABLET: 5; 325 TABLET ORAL at 16:48

## 2017-01-01 RX ADMIN — ACETAMINOPHEN 650 MG: 325 TABLET, FILM COATED ORAL at 17:43

## 2017-01-01 RX ADMIN — NYSTATIN 1500000 UNITS: 100000 POWDER TOPICAL at 08:59

## 2017-01-01 RX ADMIN — RENAGEL 1600 MG: 800 TABLET ORAL at 08:58

## 2017-01-01 RX ADMIN — AMLODIPINE BESYLATE 10 MG: 10 TABLET ORAL at 10:37

## 2017-01-01 RX ADMIN — LISINOPRIL 20 MG: 20 TABLET ORAL at 12:59

## 2017-01-01 RX ADMIN — FLUCONAZOLE 100 MG: 100 TABLET ORAL at 07:59

## 2017-01-01 RX ADMIN — HYDROCODONE BITARTRATE AND ACETAMINOPHEN 2 TABLET: 5; 325 TABLET ORAL at 19:58

## 2017-01-01 RX ADMIN — MOXIFLOXACIN HYDROCHLORIDE 1 DROP: 5 SOLUTION/ DROPS OPHTHALMIC at 22:09

## 2017-01-01 RX ADMIN — MOXIFLOXACIN HYDROCHLORIDE 1 DROP: 5 SOLUTION/ DROPS OPHTHALMIC at 17:30

## 2017-01-01 RX ADMIN — SODIUM BICARBONATE 650 MG: 650 TABLET ORAL at 14:44

## 2017-01-01 RX ADMIN — MOXIFLOXACIN HYDROCHLORIDE 1 DROP: 5 SOLUTION/ DROPS OPHTHALMIC at 08:35

## 2017-01-01 RX ADMIN — HYDROXYZINE HYDROCHLORIDE 25 MG: 50 TABLET, FILM COATED ORAL at 21:17

## 2017-01-01 RX ADMIN — MOXIFLOXACIN HYDROCHLORIDE 1 DROP: 5 SOLUTION/ DROPS OPHTHALMIC at 13:45

## 2017-01-01 RX ADMIN — LABETALOL HYDROCHLORIDE 10 MG: 5 INJECTION, SOLUTION INTRAVENOUS at 06:00

## 2017-01-01 RX ADMIN — MOXIFLOXACIN HYDROCHLORIDE 1 DROP: 5 SOLUTION/ DROPS OPHTHALMIC at 21:47

## 2017-01-01 RX ADMIN — FLUOXETINE 10 MG: 10 CAPSULE ORAL at 11:16

## 2017-01-01 RX ADMIN — CARVEDILOL 6.25 MG: 6.25 TABLET, FILM COATED ORAL at 08:35

## 2017-01-01 RX ADMIN — HYDROXYZINE HYDROCHLORIDE 25 MG: 25 TABLET, FILM COATED ORAL at 21:07

## 2017-01-01 RX ADMIN — HEPARIN SODIUM 5000 UNITS: 5000 INJECTION, SOLUTION INTRAVENOUS; SUBCUTANEOUS at 14:13

## 2017-01-01 RX ADMIN — MOXIFLOXACIN HYDROCHLORIDE 1 DROP: 5 SOLUTION/ DROPS OPHTHALMIC at 05:52

## 2017-01-01 RX ADMIN — HYDROXYZINE HYDROCHLORIDE 25 MG: 25 TABLET, FILM COATED ORAL at 17:10

## 2017-01-01 RX ADMIN — FLUOXETINE 10 MG: 10 CAPSULE ORAL at 10:00

## 2017-01-01 RX ADMIN — NYSTATIN 500000 UNITS: 500000 SUSPENSION ORAL at 12:54

## 2017-01-01 RX ADMIN — PREDNISOLONE ACETATE 1 DROP: 10 SUSPENSION/ DROPS OPHTHALMIC at 05:51

## 2017-01-01 RX ADMIN — FLUOXETINE 10 MG: 10 CAPSULE ORAL at 13:01

## 2017-01-01 RX ADMIN — VALACYCLOVIR 500 MG: 500 TABLET, FILM COATED ORAL at 08:18

## 2017-01-01 RX ADMIN — MOXIFLOXACIN HYDROCHLORIDE 1 DROP: 5 SOLUTION/ DROPS OPHTHALMIC at 11:47

## 2017-01-01 RX ADMIN — MORPHINE SULFATE 5 MG: 4 INJECTION INTRAVENOUS at 20:44

## 2017-01-01 RX ADMIN — HYDROCODONE BITARTRATE AND ACETAMINOPHEN 2 TABLET: 5; 325 TABLET ORAL at 23:45

## 2017-01-01 RX ADMIN — HEPARIN SODIUM 2000 UNITS: 1000 INJECTION, SOLUTION INTRAVENOUS; SUBCUTANEOUS at 08:05

## 2017-01-01 RX ADMIN — PREDNISOLONE ACETATE 1 DROP: 10 SUSPENSION/ DROPS OPHTHALMIC at 23:15

## 2017-01-01 RX ADMIN — HEPARIN SODIUM 3700 UNITS: 1000 INJECTION, SOLUTION INTRAVENOUS; SUBCUTANEOUS at 12:25

## 2017-01-01 RX ADMIN — MOXIFLOXACIN HYDROCHLORIDE 1 DROP: 5 SOLUTION/ DROPS OPHTHALMIC at 17:31

## 2017-01-01 RX ADMIN — SODIUM BICARBONATE 650 MG TABLET 650 MG: at 08:32

## 2017-01-01 RX ADMIN — HYDROXYZINE HYDROCHLORIDE 25 MG: 50 TABLET, FILM COATED ORAL at 21:43

## 2017-01-01 RX ADMIN — PREDNISOLONE ACETATE 1 DROP: 10 SUSPENSION/ DROPS OPHTHALMIC at 13:36

## 2017-01-01 RX ADMIN — PREDNISOLONE ACETATE 1 DROP: 10 SUSPENSION/ DROPS OPHTHALMIC at 05:07

## 2017-01-01 RX ADMIN — HEPARIN SODIUM 2000 UNITS: 1000 INJECTION, SOLUTION INTRAVENOUS; SUBCUTANEOUS at 13:18

## 2017-01-01 RX ADMIN — LISINOPRIL 20 MG: 20 TABLET ORAL at 17:23

## 2017-01-01 RX ADMIN — HYDROCODONE BITARTRATE AND ACETAMINOPHEN 2 TABLET: 5; 325 TABLET ORAL at 20:57

## 2017-01-01 RX ADMIN — SODIUM BICARBONATE 650 MG TABLET 650 MG: at 08:35

## 2017-01-01 RX ADMIN — ONDANSETRON 4 MG: 2 INJECTION INTRAMUSCULAR; INTRAVENOUS at 11:50

## 2017-01-01 RX ADMIN — NYSTATIN 500000 UNITS: 500000 SUSPENSION ORAL at 12:47

## 2017-01-01 RX ADMIN — ACYCLOVIR 800 MG: 800 TABLET ORAL at 08:50

## 2017-01-01 RX ADMIN — NYSTATIN 1500000 UNITS: 100000 POWDER TOPICAL at 21:33

## 2017-01-01 RX ADMIN — HYDROXYZINE HYDROCHLORIDE 25 MG: 50 TABLET, FILM COATED ORAL at 08:39

## 2017-01-01 RX ADMIN — PREDNISOLONE ACETATE 1 DROP: 10 SUSPENSION/ DROPS OPHTHALMIC at 17:46

## 2017-01-01 RX ADMIN — CARVEDILOL 6.25 MG: 6.25 TABLET, FILM COATED ORAL at 07:58

## 2017-01-01 RX ADMIN — AZITHROMYCIN 500 MG: 250 TABLET, FILM COATED ORAL at 09:48

## 2017-01-01 RX ADMIN — VALACYCLOVIR 500 MG: 500 TABLET, FILM COATED ORAL at 08:40

## 2017-01-01 RX ADMIN — SULFAMETHOXAZOLE AND TRIMETHOPRIM 1 TABLET: 400; 80 TABLET ORAL at 08:51

## 2017-01-01 RX ADMIN — STANDARDIZED SENNA CONCENTRATE AND DOCUSATE SODIUM 2 TABLET: 8.6; 5 TABLET, FILM COATED ORAL at 20:38

## 2017-01-01 RX ADMIN — HYDRALAZINE HYDROCHLORIDE 10 MG: 20 INJECTION INTRAMUSCULAR; INTRAVENOUS at 17:26

## 2017-01-01 RX ADMIN — SODIUM BICARBONATE 650 MG TABLET 650 MG: at 09:33

## 2017-01-01 RX ADMIN — HYDROXYZINE HYDROCHLORIDE 25 MG: 50 TABLET, FILM COATED ORAL at 13:07

## 2017-01-01 RX ADMIN — MOXIFLOXACIN HYDROCHLORIDE 1 DROP: 5 SOLUTION/ DROPS OPHTHALMIC at 22:00

## 2017-01-01 RX ADMIN — SODIUM BICARBONATE 650 MG TABLET 650 MG: at 09:29

## 2017-01-01 RX ADMIN — HYDROCODONE BITARTRATE AND ACETAMINOPHEN 2 TABLET: 5; 325 TABLET ORAL at 05:58

## 2017-01-01 RX ADMIN — LISINOPRIL 20 MG: 20 TABLET ORAL at 07:39

## 2017-01-01 RX ADMIN — LISINOPRIL 20 MG: 20 TABLET ORAL at 12:48

## 2017-01-01 RX ADMIN — SODIUM BICARBONATE 650 MG: 650 TABLET ORAL at 21:43

## 2017-01-01 RX ADMIN — HEPARIN SODIUM 5000 UNITS: 5000 INJECTION, SOLUTION INTRAVENOUS; SUBCUTANEOUS at 15:00

## 2017-01-01 RX ADMIN — CEFTRIAXONE 2 G: 2 INJECTION, POWDER, FOR SOLUTION INTRAMUSCULAR; INTRAVENOUS at 08:34

## 2017-01-01 RX ADMIN — ACYCLOVIR 400 MG: 800 TABLET ORAL at 10:36

## 2017-01-01 RX ADMIN — HYDROXYZINE HYDROCHLORIDE 25 MG: 25 TABLET, FILM COATED ORAL at 18:27

## 2017-01-01 RX ADMIN — ACYCLOVIR 800 MG: 800 TABLET ORAL at 08:29

## 2017-01-01 RX ADMIN — SODIUM BICARBONATE 650 MG: 650 TABLET ORAL at 14:42

## 2017-01-01 RX ADMIN — HYDROXYZINE HYDROCHLORIDE 25 MG: 25 TABLET, FILM COATED ORAL at 21:56

## 2017-01-01 RX ADMIN — AMLODIPINE BESYLATE 10 MG: 10 TABLET ORAL at 12:48

## 2017-01-01 RX ADMIN — HYDROXYZINE HYDROCHLORIDE 25 MG: 25 TABLET, FILM COATED ORAL at 18:14

## 2017-01-01 RX ADMIN — NYSTATIN 500000 UNITS: 500000 SUSPENSION ORAL at 11:14

## 2017-01-01 RX ADMIN — METOCLOPRAMIDE 10 MG: 10 TABLET ORAL at 11:44

## 2017-01-01 RX ADMIN — FLUCONAZOLE 200 MG: 100 TABLET ORAL at 21:45

## 2017-01-01 RX ADMIN — HYDRALAZINE HYDROCHLORIDE 10 MG: 20 INJECTION INTRAMUSCULAR; INTRAVENOUS at 03:57

## 2017-01-01 RX ADMIN — ACETAMINOPHEN 650 MG: 325 TABLET, FILM COATED ORAL at 09:59

## 2017-01-01 RX ADMIN — METOCLOPRAMIDE 10 MG: 10 TABLET ORAL at 20:39

## 2017-01-01 RX ADMIN — AMLODIPINE BESYLATE 5 MG: 5 TABLET ORAL at 08:30

## 2017-01-01 RX ADMIN — FAMOTIDINE 20 MG: 10 INJECTION, SOLUTION INTRAVENOUS at 17:14

## 2017-01-01 RX ADMIN — MOXIFLOXACIN HYDROCHLORIDE 1 DROP: 5 SOLUTION/ DROPS OPHTHALMIC at 17:07

## 2017-01-01 RX ADMIN — VALACYCLOVIR 500 MG: 500 TABLET, FILM COATED ORAL at 08:50

## 2017-01-01 RX ADMIN — FLUOXETINE 10 MG: 10 CAPSULE ORAL at 10:37

## 2017-01-01 RX ADMIN — NYSTATIN 500000 UNITS: 500000 SUSPENSION ORAL at 14:00

## 2017-01-01 RX ADMIN — IOHEXOL 50 ML: 350 INJECTION, SOLUTION INTRAVENOUS at 19:45

## 2017-01-01 RX ADMIN — NYSTATIN 500000 UNITS: 500000 SUSPENSION ORAL at 05:51

## 2017-01-01 RX ADMIN — HYDROXYZINE HYDROCHLORIDE 25 MG: 25 TABLET, FILM COATED ORAL at 12:58

## 2017-01-01 RX ADMIN — CARVEDILOL 6.25 MG: 6.25 TABLET, FILM COATED ORAL at 17:26

## 2017-01-01 RX ADMIN — HEPARIN SODIUM 5000 UNITS: 5000 INJECTION, SOLUTION INTRAVENOUS; SUBCUTANEOUS at 05:37

## 2017-01-01 RX ADMIN — HEPARIN SODIUM 3700 UNITS: 1000 INJECTION, SOLUTION INTRAVENOUS; SUBCUTANEOUS at 20:04

## 2017-01-01 RX ADMIN — RENAGEL 1600 MG: 800 TABLET ORAL at 15:49

## 2017-01-01 RX ADMIN — ACETAMINOPHEN 650 MG: 325 TABLET, FILM COATED ORAL at 08:50

## 2017-01-01 RX ADMIN — MOXIFLOXACIN HYDROCHLORIDE 1 DROP: 5 SOLUTION/ DROPS OPHTHALMIC at 04:19

## 2017-01-01 RX ADMIN — SODIUM BICARBONATE 650 MG TABLET 650 MG: at 20:09

## 2017-01-01 RX ADMIN — HEPARIN SODIUM 3700 UNITS: 1000 INJECTION, SOLUTION INTRAVENOUS; SUBCUTANEOUS at 13:30

## 2017-01-01 RX ADMIN — METOCLOPRAMIDE 10 MG: 10 TABLET ORAL at 06:03

## 2017-01-01 RX ADMIN — METOCLOPRAMIDE 10 MG: 10 TABLET ORAL at 06:29

## 2017-01-01 RX ADMIN — LISINOPRIL 20 MG: 20 TABLET ORAL at 12:42

## 2017-01-01 RX ADMIN — ACETAMINOPHEN 650 MG: 325 TABLET, FILM COATED ORAL at 20:18

## 2017-01-01 RX ADMIN — ACYCLOVIR 800 MG: 200 CAPSULE ORAL at 08:02

## 2017-01-01 RX ADMIN — PREDNISOLONE ACETATE 1 DROP: 10 SUSPENSION/ DROPS OPHTHALMIC at 16:56

## 2017-01-01 RX ADMIN — FLUCONAZOLE 100 MG: 200 TABLET ORAL at 09:07

## 2017-01-01 RX ADMIN — NYSTATIN 500000 UNITS: 500000 SUSPENSION ORAL at 17:30

## 2017-01-01 RX ADMIN — HEPARIN SODIUM 5000 UNITS: 5000 INJECTION, SOLUTION INTRAVENOUS; SUBCUTANEOUS at 16:16

## 2017-01-01 RX ADMIN — SENNOSIDES AND DOCUSATE SODIUM 2 TABLET: 8.6; 5 TABLET ORAL at 07:49

## 2017-01-01 RX ADMIN — SODIUM BICARBONATE 650 MG TABLET 650 MG: at 21:07

## 2017-01-01 RX ADMIN — EPOETIN ALFA 3000 UNITS: 3000 SOLUTION INTRAVENOUS; SUBCUTANEOUS at 10:45

## 2017-01-01 RX ADMIN — DOLUTEGRAVIR SODIUM 50 MG: 50 TABLET, FILM COATED ORAL at 08:50

## 2017-01-01 RX ADMIN — HYDROMORPHONE HYDROCHLORIDE 0.5 MG: 1 INJECTION, SOLUTION INTRAMUSCULAR; INTRAVENOUS; SUBCUTANEOUS at 12:13

## 2017-01-01 RX ADMIN — MOXIFLOXACIN HYDROCHLORIDE 1 DROP: 5 SOLUTION/ DROPS OPHTHALMIC at 05:05

## 2017-01-01 RX ADMIN — SODIUM BICARBONATE 650 MG: 650 TABLET ORAL at 08:00

## 2017-01-01 RX ADMIN — HYDROXYZINE HYDROCHLORIDE 25 MG: 50 TABLET, FILM COATED ORAL at 08:19

## 2017-01-01 RX ADMIN — NYSTATIN 500000 UNITS: 500000 SUSPENSION ORAL at 13:48

## 2017-01-01 RX ADMIN — ALPRAZOLAM 0.25 MG: 0.25 TABLET ORAL at 21:48

## 2017-01-01 RX ADMIN — HYDROCODONE BITARTRATE AND ACETAMINOPHEN 2 TABLET: 5; 325 TABLET ORAL at 07:25

## 2017-01-01 RX ADMIN — DOLUTEGRAVIR SODIUM 50 MG: 50 TABLET, FILM COATED ORAL at 13:40

## 2017-01-01 RX ADMIN — SODIUM CHLORIDE: 9 INJECTION, SOLUTION INTRAVENOUS at 04:46

## 2017-01-01 RX ADMIN — MOXIFLOXACIN HYDROCHLORIDE 1 DROP: 5 SOLUTION/ DROPS OPHTHALMIC at 17:46

## 2017-01-01 RX ADMIN — METOCLOPRAMIDE 10 MG: 10 TABLET ORAL at 06:23

## 2017-01-01 RX ADMIN — HEPARIN SODIUM 5000 UNITS: 5000 INJECTION, SOLUTION INTRAVENOUS; SUBCUTANEOUS at 08:23

## 2017-01-01 RX ADMIN — VALACYCLOVIR 500 MG: 1 TABLET, FILM COATED ORAL at 12:57

## 2017-01-01 RX ADMIN — TRAMADOL HYDROCHLORIDE 100 MG: 50 TABLET, COATED ORAL at 21:11

## 2017-01-01 RX ADMIN — RENAGEL 1600 MG: 800 TABLET ORAL at 14:29

## 2017-01-01 RX ADMIN — MOXIFLOXACIN HYDROCHLORIDE 1 DROP: 5 SOLUTION/ DROPS OPHTHALMIC at 08:06

## 2017-01-01 RX ADMIN — HYDROCODONE BITARTRATE AND ACETAMINOPHEN 2 TABLET: 5; 325 TABLET ORAL at 16:35

## 2017-01-01 RX ADMIN — METOCLOPRAMIDE 10 MG: 10 TABLET ORAL at 06:34

## 2017-01-01 RX ADMIN — PREDNISOLONE ACETATE 1 DROP: 10 SUSPENSION/ DROPS OPHTHALMIC at 11:15

## 2017-01-01 RX ADMIN — PREDNISOLONE ACETATE 1 DROP: 10 SUSPENSION/ DROPS OPHTHALMIC at 00:00

## 2017-01-01 RX ADMIN — SODIUM CHLORIDE 1000 ML: 9 INJECTION, SOLUTION INTRAVENOUS at 14:58

## 2017-01-01 RX ADMIN — ACYCLOVIR 800 MG: 800 TABLET ORAL at 10:24

## 2017-01-01 RX ADMIN — AMLODIPINE BESYLATE 10 MG: 10 TABLET ORAL at 07:39

## 2017-01-01 RX ADMIN — SENNOSIDES AND DOCUSATE SODIUM 2 TABLET: 8.6; 5 TABLET ORAL at 09:28

## 2017-01-01 RX ADMIN — NYSTATIN 500000 UNITS: 500000 SUSPENSION ORAL at 23:08

## 2017-01-01 RX ADMIN — PROMETHAZINE HYDROCHLORIDE 12.5 MG: 12.5 SUPPOSITORY RECTAL at 06:24

## 2017-01-01 RX ADMIN — HYDROCODONE BITARTRATE AND ACETAMINOPHEN 2 TABLET: 5; 325 TABLET ORAL at 03:45

## 2017-01-01 RX ADMIN — MOXIFLOXACIN HYDROCHLORIDE 1 DROP: 5 SOLUTION/ DROPS OPHTHALMIC at 18:07

## 2017-01-01 RX ADMIN — ONDANSETRON 4 MG: 2 INJECTION INTRAMUSCULAR; INTRAVENOUS at 08:30

## 2017-01-01 RX ADMIN — ACYCLOVIR 800 MG: 200 CAPSULE ORAL at 09:59

## 2017-01-01 RX ADMIN — HEPARIN SODIUM 1500 UNITS: 1000 INJECTION, SOLUTION INTRAVENOUS; SUBCUTANEOUS at 14:40

## 2017-01-01 RX ADMIN — LISINOPRIL 20 MG: 20 TABLET ORAL at 08:37

## 2017-01-01 RX ADMIN — ALPRAZOLAM 0.25 MG: 0.25 TABLET ORAL at 20:53

## 2017-01-01 RX ADMIN — HYDROXYZINE HYDROCHLORIDE 25 MG: 50 TABLET, FILM COATED ORAL at 18:07

## 2017-01-01 RX ADMIN — AMLODIPINE BESYLATE 10 MG: 10 TABLET ORAL at 08:58

## 2017-01-01 RX ADMIN — FLUOXETINE 10 MG: 10 CAPSULE ORAL at 17:45

## 2017-01-01 RX ADMIN — ACETAMINOPHEN 650 MG: 325 TABLET, FILM COATED ORAL at 23:15

## 2017-01-01 RX ADMIN — MOXIFLOXACIN HYDROCHLORIDE 1 DROP: 5 SOLUTION/ DROPS OPHTHALMIC at 22:14

## 2017-01-01 RX ADMIN — ACETAMINOPHEN 650 MG: 325 TABLET, FILM COATED ORAL at 19:47

## 2017-01-01 RX ADMIN — SULFAMETHOXAZOLE AND TRIMETHOPRIM 0.5 TABLET: 800; 160 TABLET ORAL at 21:46

## 2017-01-01 RX ADMIN — SODIUM BICARBONATE 650 MG: 650 TABLET ORAL at 15:38

## 2017-01-01 RX ADMIN — TRAMADOL HYDROCHLORIDE 50 MG: 50 TABLET, COATED ORAL at 20:07

## 2017-01-01 RX ADMIN — FLUCONAZOLE 200 MG: 2 INJECTION, SOLUTION INTRAVENOUS at 09:33

## 2017-01-01 RX ADMIN — STANDARDIZED SENNA CONCENTRATE AND DOCUSATE SODIUM 2 TABLET: 8.6; 5 TABLET, FILM COATED ORAL at 10:41

## 2017-01-01 RX ADMIN — LISINOPRIL 20 MG: 20 TABLET ORAL at 11:14

## 2017-01-01 RX ADMIN — METOCLOPRAMIDE 10 MG: 10 TABLET ORAL at 18:27

## 2017-01-01 RX ADMIN — VANCOMYCIN HYDROCHLORIDE 1300 MG: 10 INJECTION, POWDER, LYOPHILIZED, FOR SOLUTION INTRAVENOUS at 12:43

## 2017-01-01 RX ADMIN — SODIUM BICARBONATE 650 MG: 650 TABLET ORAL at 13:45

## 2017-01-01 RX ADMIN — AMLODIPINE BESYLATE 5 MG: 5 TABLET ORAL at 20:23

## 2017-01-01 RX ADMIN — FLUOXETINE 10 MG: 10 CAPSULE ORAL at 08:37

## 2017-01-01 RX ADMIN — MOXIFLOXACIN HYDROCHLORIDE 1 DROP: 5 SOLUTION/ DROPS OPHTHALMIC at 23:00

## 2017-01-01 RX ADMIN — DOLUTEGRAVIR SODIUM 50 MG: 50 TABLET, FILM COATED ORAL at 07:24

## 2017-01-01 RX ADMIN — SODIUM BICARBONATE 650 MG TABLET 650 MG: at 13:52

## 2017-01-01 RX ADMIN — SODIUM BICARBONATE 650 MG TABLET 650 MG: at 16:11

## 2017-01-01 RX ADMIN — SODIUM BICARBONATE 650 MG TABLET 650 MG: at 20:39

## 2017-01-01 RX ADMIN — ACYCLOVIR 400 MG: 800 TABLET ORAL at 07:39

## 2017-01-01 RX ADMIN — ERYTHROPOIETIN 2500 UNITS: 10000 INJECTION, SOLUTION INTRAVENOUS; SUBCUTANEOUS at 08:51

## 2017-01-01 RX ADMIN — FLUCONAZOLE 200 MG: 2 INJECTION INTRAVENOUS at 19:43

## 2017-01-01 RX ADMIN — HYDROCODONE BITARTRATE AND ACETAMINOPHEN 1 TABLET: 10; 325 TABLET ORAL at 10:43

## 2017-01-01 RX ADMIN — RENAGEL 1600 MG: 800 TABLET ORAL at 15:16

## 2017-01-01 RX ADMIN — NYSTATIN 500000 UNITS: 500000 SUSPENSION ORAL at 16:39

## 2017-01-01 RX ADMIN — SODIUM BICARBONATE 650 MG: 650 TABLET ORAL at 08:50

## 2017-01-01 RX ADMIN — SODIUM BICARBONATE 650 MG: 650 TABLET ORAL at 15:01

## 2017-01-01 RX ADMIN — PREDNISOLONE ACETATE 1 DROP: 10 SUSPENSION/ DROPS OPHTHALMIC at 18:12

## 2017-01-01 RX ADMIN — NYSTATIN 500000 UNITS: 100000 SUSPENSION ORAL at 07:48

## 2017-01-01 RX ADMIN — AZITHROMYCIN 500 MG: 250 TABLET, FILM COATED ORAL at 22:10

## 2017-01-01 RX ADMIN — HYDROXYZINE HYDROCHLORIDE 25 MG: 25 TABLET ORAL at 13:17

## 2017-01-01 RX ADMIN — METHOCARBAMOL 750 MG: 500 TABLET ORAL at 14:37

## 2017-01-01 RX ADMIN — METOCLOPRAMIDE 10 MG: 10 TABLET ORAL at 06:06

## 2017-01-01 RX ADMIN — PIPERACILLIN AND TAZOBACTAM 4.5 G: 4; .5 INJECTION, POWDER, LYOPHILIZED, FOR SOLUTION INTRAVENOUS; PARENTERAL at 11:13

## 2017-01-01 RX ADMIN — ATOVAQUONE 750 MG: 750 SUSPENSION ORAL at 06:04

## 2017-01-01 RX ADMIN — SODIUM CHLORIDE: 9 INJECTION, SOLUTION INTRAVENOUS at 12:01

## 2017-01-01 RX ADMIN — HYDROXYZINE HYDROCHLORIDE 25 MG: 25 TABLET, FILM COATED ORAL at 09:07

## 2017-01-01 RX ADMIN — FLUOXETINE 10 MG: 10 CAPSULE ORAL at 12:45

## 2017-01-01 RX ADMIN — FLUOXETINE 10 MG: 10 CAPSULE ORAL at 09:48

## 2017-01-01 RX ADMIN — LISINOPRIL 20 MG: 20 TABLET ORAL at 10:41

## 2017-01-01 RX ADMIN — AMLODIPINE BESYLATE 5 MG: 5 TABLET ORAL at 20:39

## 2017-01-01 RX ADMIN — METOCLOPRAMIDE 10 MG: 10 TABLET ORAL at 13:00

## 2017-01-01 RX ADMIN — METOCLOPRAMIDE 10 MG: 10 TABLET ORAL at 11:16

## 2017-01-01 RX ADMIN — FLUOXETINE 10 MG: 10 CAPSULE ORAL at 09:20

## 2017-01-01 RX ADMIN — HYDROCODONE BITARTRATE AND ACETAMINOPHEN 1 TABLET: 10; 325 TABLET ORAL at 22:05

## 2017-01-01 RX ADMIN — MICONAZOLE NITRATE: 20 CREAM TOPICAL at 14:39

## 2017-01-01 RX ADMIN — SODIUM BICARBONATE 650 MG TABLET 650 MG: at 14:37

## 2017-01-01 RX ADMIN — FLUCONAZOLE 200 MG: 100 TABLET ORAL at 17:15

## 2017-01-01 RX ADMIN — EPOETIN ALFA 3000 UNITS: 3000 SOLUTION INTRAVENOUS; SUBCUTANEOUS at 08:02

## 2017-01-01 RX ADMIN — SENNOSIDES AND DOCUSATE SODIUM 2 TABLET: 8.6; 5 TABLET ORAL at 20:39

## 2017-01-01 RX ADMIN — SULFAMETHOXAZOLE AND TRIMETHOPRIM 1 TABLET: 800; 160 TABLET ORAL at 08:50

## 2017-01-01 RX ADMIN — NYSTATIN 500000 UNITS: 500000 SUSPENSION ORAL at 11:11

## 2017-01-01 RX ADMIN — DOLUTEGRAVIR SODIUM 50 MG: 50 TABLET, FILM COATED ORAL at 12:48

## 2017-01-01 RX ADMIN — PREDNISOLONE ACETATE 1 DROP: 10 SUSPENSION/ DROPS OPHTHALMIC at 22:14

## 2017-01-01 RX ADMIN — SODIUM BICARBONATE 650 MG: 650 TABLET ORAL at 08:52

## 2017-01-01 RX ADMIN — LISINOPRIL 20 MG: 20 TABLET ORAL at 11:53

## 2017-01-01 RX ADMIN — DIPHENOXYLATE HYDROCHLORIDE AND ATROPINE SULFATE 1 TABLET: 2.5; .025 TABLET ORAL at 21:14

## 2017-01-01 RX ADMIN — VANCOMYCIN HYDROCHLORIDE 1200 MG: 10 INJECTION, POWDER, LYOPHILIZED, FOR SOLUTION INTRAVENOUS at 17:13

## 2017-01-01 RX ADMIN — DOLUTEGRAVIR SODIUM 50 MG: 50 TABLET, FILM COATED ORAL at 08:38

## 2017-01-01 RX ADMIN — PREDNISOLONE ACETATE 1 DROP: 10 SUSPENSION/ DROPS OPHTHALMIC at 12:21

## 2017-01-01 RX ADMIN — NYSTATIN 500000 UNITS: 500000 SUSPENSION ORAL at 23:38

## 2017-01-01 RX ADMIN — HEPARIN SODIUM 5000 UNITS: 5000 INJECTION, SOLUTION INTRAVENOUS; SUBCUTANEOUS at 20:17

## 2017-01-01 RX ADMIN — MOXIFLOXACIN HYDROCHLORIDE 1 DROP: 5 SOLUTION/ DROPS OPHTHALMIC at 13:00

## 2017-01-01 RX ADMIN — NYSTATIN 500000 UNITS: 500000 SUSPENSION ORAL at 17:36

## 2017-01-01 RX ADMIN — FLUCONAZOLE 200 MG: 2 INJECTION, SOLUTION INTRAVENOUS at 08:35

## 2017-01-01 RX ADMIN — SODIUM CHLORIDE: 9 INJECTION, SOLUTION INTRAVENOUS at 06:21

## 2017-01-01 RX ADMIN — METOCLOPRAMIDE 10 MG: 10 TABLET ORAL at 14:14

## 2017-01-01 RX ADMIN — HYDROXYZINE HYDROCHLORIDE 25 MG: 25 TABLET, FILM COATED ORAL at 18:01

## 2017-01-01 RX ADMIN — NYSTATIN 500000 UNITS: 500000 SUSPENSION ORAL at 10:00

## 2017-01-01 RX ADMIN — MOXIFLOXACIN HYDROCHLORIDE 1 DROP: 5 SOLUTION/ DROPS OPHTHALMIC at 21:07

## 2017-01-01 RX ADMIN — ETHAMBUTOL HYDROCHLORIDE 800 MG: 400 TABLET, FILM COATED ORAL at 13:01

## 2017-01-01 RX ADMIN — HEPARIN SODIUM 5000 UNITS: 5000 INJECTION, SOLUTION INTRAVENOUS; SUBCUTANEOUS at 13:29

## 2017-01-01 RX ADMIN — SODIUM BICARBONATE 650 MG TABLET 650 MG: at 21:15

## 2017-01-01 RX ADMIN — NYSTATIN 500000 UNITS: 100000 SUSPENSION ORAL at 08:59

## 2017-01-01 RX ADMIN — CARVEDILOL 6.25 MG: 6.25 TABLET, FILM COATED ORAL at 08:34

## 2017-01-01 RX ADMIN — SODIUM CHLORIDE: 9 INJECTION, SOLUTION INTRAVENOUS at 21:46

## 2017-01-01 RX ADMIN — ACETAMINOPHEN 650 MG: 325 TABLET, FILM COATED ORAL at 10:03

## 2017-01-01 RX ADMIN — SODIUM BICARBONATE 650 MG: 650 TABLET ORAL at 08:23

## 2017-01-01 RX ADMIN — FLUCONAZOLE 100 MG: 100 TABLET ORAL at 08:18

## 2017-01-01 RX ADMIN — RENAGEL 1600 MG: 800 TABLET ORAL at 19:54

## 2017-01-01 RX ADMIN — NYSTATIN 500000 UNITS: 500000 SUSPENSION ORAL at 21:17

## 2017-01-01 RX ADMIN — MOXIFLOXACIN HYDROCHLORIDE 1 DROP: 5 SOLUTION/ DROPS OPHTHALMIC at 20:53

## 2017-01-01 RX ADMIN — TRAMADOL HYDROCHLORIDE 50 MG: 50 TABLET, COATED ORAL at 12:51

## 2017-01-01 RX ADMIN — STANDARDIZED SENNA CONCENTRATE AND DOCUSATE SODIUM 2 TABLET: 8.6; 5 TABLET, FILM COATED ORAL at 20:56

## 2017-01-01 RX ADMIN — MOXIFLOXACIN HYDROCHLORIDE 1 DROP: 5 SOLUTION/ DROPS OPHTHALMIC at 14:05

## 2017-01-01 RX ADMIN — MOXIFLOXACIN HYDROCHLORIDE 1 DROP: 5 SOLUTION/ DROPS OPHTHALMIC at 06:02

## 2017-01-01 RX ADMIN — HYDRALAZINE HYDROCHLORIDE 50 MG: 25 TABLET ORAL at 20:08

## 2017-01-01 RX ADMIN — HYDROCODONE BITARTRATE AND ACETAMINOPHEN 2 TABLET: 5; 325 TABLET ORAL at 11:52

## 2017-01-01 RX ADMIN — MOXIFLOXACIN HYDROCHLORIDE 1 DROP: 5 SOLUTION/ DROPS OPHTHALMIC at 17:51

## 2017-01-01 RX ADMIN — ACYCLOVIR 800 MG: 200 CAPSULE ORAL at 09:07

## 2017-01-01 RX ADMIN — STANDARDIZED SENNA CONCENTRATE AND DOCUSATE SODIUM 2 TABLET: 8.6; 5 TABLET, FILM COATED ORAL at 12:46

## 2017-01-01 RX ADMIN — HYDROCODONE BITARTRATE AND ACETAMINOPHEN 2 TABLET: 5; 325 TABLET ORAL at 00:39

## 2017-01-01 RX ADMIN — FLUCONAZOLE 200 MG: 100 TABLET ORAL at 18:11

## 2017-01-01 RX ADMIN — FLUOXETINE 10 MG: 10 CAPSULE ORAL at 07:48

## 2017-01-01 RX ADMIN — SENNOSIDES AND DOCUSATE SODIUM 2 TABLET: 8.6; 5 TABLET ORAL at 08:59

## 2017-01-01 RX ADMIN — SODIUM BICARBONATE 650 MG: 650 TABLET ORAL at 08:18

## 2017-01-01 RX ADMIN — NYSTATIN 500000 UNITS: 500000 SUSPENSION ORAL at 21:49

## 2017-01-01 RX ADMIN — NYSTATIN 500000 UNITS: 500000 SUSPENSION ORAL at 22:30

## 2017-01-01 RX ADMIN — ACYCLOVIR 800 MG: 800 TABLET ORAL at 17:39

## 2017-01-01 RX ADMIN — AMPICILLIN SODIUM 1 G: 1 INJECTION, POWDER, FOR SOLUTION INTRAMUSCULAR; INTRAVENOUS at 09:06

## 2017-01-01 RX ADMIN — MORPHINE SULFATE 5 MG: 4 INJECTION INTRAVENOUS at 04:35

## 2017-01-01 RX ADMIN — SENNOSIDES AND DOCUSATE SODIUM 2 TABLET: 8.6; 5 TABLET ORAL at 20:23

## 2017-01-01 RX ADMIN — DOLUTEGRAVIR SODIUM 50 MG: 50 TABLET, FILM COATED ORAL at 10:24

## 2017-01-01 RX ADMIN — FLUCONAZOLE 100 MG: 100 TABLET ORAL at 08:49

## 2017-01-01 RX ADMIN — HYDROXYZINE HYDROCHLORIDE 25 MG: 50 TABLET, FILM COATED ORAL at 20:07

## 2017-01-01 RX ADMIN — SODIUM BICARBONATE 650 MG: 650 TABLET ORAL at 20:33

## 2017-01-01 RX ADMIN — PREDNISOLONE ACETATE 1 DROP: 10 SUSPENSION/ DROPS OPHTHALMIC at 05:05

## 2017-01-01 RX ADMIN — MEROPENEM 500 MG: 500 INJECTION, POWDER, FOR SOLUTION INTRAVENOUS at 17:10

## 2017-01-01 RX ADMIN — OXYCODONE HYDROCHLORIDE 5 MG: 5 TABLET ORAL at 15:16

## 2017-01-01 RX ADMIN — SODIUM BICARBONATE 650 MG TABLET 650 MG: at 14:53

## 2017-01-01 RX ADMIN — HYDROXYZINE HYDROCHLORIDE 25 MG: 50 TABLET, FILM COATED ORAL at 22:11

## 2017-01-01 RX ADMIN — FLUOXETINE 10 MG: 10 CAPSULE ORAL at 12:57

## 2017-01-01 RX ADMIN — ERYTHROPOIETIN 10000 UNITS: 10000 INJECTION, SOLUTION INTRAVENOUS; SUBCUTANEOUS at 11:30

## 2017-01-01 RX ADMIN — HYDROXYZINE HYDROCHLORIDE 25 MG: 50 TABLET, FILM COATED ORAL at 21:12

## 2017-01-01 RX ADMIN — SODIUM BICARBONATE 650 MG TABLET 650 MG: at 09:20

## 2017-01-01 RX ADMIN — PREDNISOLONE ACETATE 1 DROP: 10 SUSPENSION/ DROPS OPHTHALMIC at 22:47

## 2017-01-01 RX ADMIN — MOXIFLOXACIN HYDROCHLORIDE 1 DROP: 5 SOLUTION/ DROPS OPHTHALMIC at 05:51

## 2017-01-01 RX ADMIN — HYDROXYZINE HYDROCHLORIDE 25 MG: 25 TABLET, FILM COATED ORAL at 16:01

## 2017-01-01 RX ADMIN — NYSTATIN 500000 UNITS: 500000 SUSPENSION ORAL at 17:46

## 2017-01-01 RX ADMIN — HYDROXYZINE HYDROCHLORIDE 25 MG: 25 TABLET, FILM COATED ORAL at 10:00

## 2017-01-01 RX ADMIN — LIDOCAINE HYDROCHLORIDE 30 ML: 20 SOLUTION OROPHARYNGEAL at 16:55

## 2017-01-01 RX ADMIN — NYSTATIN 500000 UNITS: 500000 SUSPENSION ORAL at 17:48

## 2017-01-01 RX ADMIN — SODIUM CHLORIDE: 9 INJECTION, SOLUTION INTRAVENOUS at 14:40

## 2017-01-01 RX ADMIN — SULFAMETHOXAZOLE AND TRIMETHOPRIM 1 TABLET: 400; 80 TABLET ORAL at 17:53

## 2017-01-01 RX ADMIN — HEPARIN SODIUM 5000 UNITS: 5000 INJECTION, SOLUTION INTRAVENOUS; SUBCUTANEOUS at 15:55

## 2017-01-01 RX ADMIN — HEPARIN SODIUM 5000 UNITS: 5000 INJECTION, SOLUTION INTRAVENOUS; SUBCUTANEOUS at 20:16

## 2017-01-01 RX ADMIN — NYSTATIN 500000 UNITS: 500000 SUSPENSION ORAL at 05:07

## 2017-01-01 RX ADMIN — SODIUM BICARBONATE 650 MG: 650 TABLET ORAL at 14:38

## 2017-01-01 RX ADMIN — MOXIFLOXACIN HYDROCHLORIDE 1 DROP: 5 SOLUTION/ DROPS OPHTHALMIC at 09:15

## 2017-01-01 RX ADMIN — ONDANSETRON 4 MG: 2 INJECTION INTRAMUSCULAR; INTRAVENOUS at 11:15

## 2017-01-01 RX ADMIN — HYDROCODONE BITARTRATE AND ACETAMINOPHEN 2 TABLET: 5; 325 TABLET ORAL at 10:02

## 2017-01-01 RX ADMIN — HEPARIN SODIUM 5000 UNITS: 5000 INJECTION, SOLUTION INTRAVENOUS; SUBCUTANEOUS at 05:59

## 2017-01-01 RX ADMIN — MOXIFLOXACIN HYDROCHLORIDE 1 DROP: 5 SOLUTION/ DROPS OPHTHALMIC at 23:37

## 2017-01-01 RX ADMIN — HYDROCODONE BITARTRATE AND ACETAMINOPHEN 2 TABLET: 5; 325 TABLET ORAL at 11:38

## 2017-01-01 RX ADMIN — VANCOMYCIN HYDROCHLORIDE 1400 MG: 10 INJECTION, POWDER, LYOPHILIZED, FOR SOLUTION INTRAVENOUS at 06:37

## 2017-01-01 RX ADMIN — PREDNISOLONE ACETATE 1 DROP: 10 SUSPENSION/ DROPS OPHTHALMIC at 13:29

## 2017-01-01 RX ADMIN — METOCLOPRAMIDE 10 MG: 10 TABLET ORAL at 18:14

## 2017-01-01 RX ADMIN — MOXIFLOXACIN HYDROCHLORIDE 1 DROP: 5 SOLUTION/ DROPS OPHTHALMIC at 23:09

## 2017-01-01 RX ADMIN — CARVEDILOL 6.25 MG: 6.25 TABLET, FILM COATED ORAL at 17:41

## 2017-01-01 RX ADMIN — SULFAMETHOXAZOLE AND TRIMETHOPRIM 0.5 TABLET: 800; 160 TABLET ORAL at 12:58

## 2017-01-01 RX ADMIN — NYSTATIN 500000 UNITS: 500000 SUSPENSION ORAL at 21:07

## 2017-01-01 RX ADMIN — FLUOXETINE 10 MG: 10 CAPSULE ORAL at 09:29

## 2017-01-01 RX ADMIN — HEPARIN SODIUM 5000 UNITS: 5000 INJECTION, SOLUTION INTRAVENOUS; SUBCUTANEOUS at 21:46

## 2017-01-01 RX ADMIN — MORPHINE SULFATE 4 MG: 4 INJECTION INTRAVENOUS at 15:44

## 2017-01-01 RX ADMIN — SODIUM BICARBONATE 650 MG: 650 TABLET ORAL at 08:49

## 2017-01-01 RX ADMIN — FLUCONAZOLE 100 MG: 200 TABLET ORAL at 08:04

## 2017-01-01 RX ADMIN — HEPARIN SODIUM 3700 UNITS: 1000 INJECTION, SOLUTION INTRAVENOUS; SUBCUTANEOUS at 12:35

## 2017-01-01 RX ADMIN — HEPARIN SODIUM 5000 UNITS: 5000 INJECTION, SOLUTION INTRAVENOUS; SUBCUTANEOUS at 08:31

## 2017-01-01 RX ADMIN — MOXIFLOXACIN HYDROCHLORIDE 1 DROP: 5 SOLUTION/ DROPS OPHTHALMIC at 11:53

## 2017-01-01 RX ADMIN — PREDNISOLONE ACETATE 1 DROP: 10 SUSPENSION/ DROPS OPHTHALMIC at 06:02

## 2017-01-01 RX ADMIN — VALACYCLOVIR 500 MG: 1 TABLET, FILM COATED ORAL at 21:48

## 2017-01-01 RX ADMIN — FLUCONAZOLE 100 MG: 100 TABLET ORAL at 17:30

## 2017-01-01 RX ADMIN — HEPARIN SODIUM 5000 UNITS: 5000 INJECTION, SOLUTION INTRAVENOUS; SUBCUTANEOUS at 04:35

## 2017-01-01 RX ADMIN — FLUCONAZOLE 100 MG: 100 TABLET ORAL at 08:40

## 2017-01-01 RX ADMIN — FLUOXETINE 10 MG: 10 CAPSULE ORAL at 08:58

## 2017-01-01 RX ADMIN — HYDROCODONE BITARTRATE AND ACETAMINOPHEN 2 TABLET: 5; 325 TABLET ORAL at 00:52

## 2017-01-01 RX ADMIN — MOXIFLOXACIN HYDROCHLORIDE 1 DROP: 5 SOLUTION/ DROPS OPHTHALMIC at 12:56

## 2017-01-01 RX ADMIN — SODIUM BICARBONATE 650 MG: 650 TABLET ORAL at 09:52

## 2017-01-01 RX ADMIN — FLUOXETINE 10 MG: 10 CAPSULE ORAL at 08:04

## 2017-01-01 RX ADMIN — MOXIFLOXACIN HYDROCHLORIDE 1 DROP: 5 SOLUTION/ DROPS OPHTHALMIC at 23:18

## 2017-01-01 RX ADMIN — HEPARIN SODIUM 5000 UNITS: 5000 INJECTION, SOLUTION INTRAVENOUS; SUBCUTANEOUS at 05:19

## 2017-01-01 RX ADMIN — FLUOXETINE 10 MG: 10 CAPSULE ORAL at 07:25

## 2017-01-01 RX ADMIN — IRON DEXTRAN 1375 MG: 50 INJECTION, SOLUTION INTRAVENOUS at 19:22

## 2017-01-01 RX ADMIN — HYDROCODONE BITARTRATE AND ACETAMINOPHEN 2 TABLET: 5; 325 TABLET ORAL at 12:48

## 2017-01-01 RX ADMIN — VALACYCLOVIR 500 MG: 500 TABLET, FILM COATED ORAL at 07:48

## 2017-01-01 RX ADMIN — OXYCODONE HYDROCHLORIDE 5 MG: 5 TABLET ORAL at 13:02

## 2017-01-01 RX ADMIN — MORPHINE SULFATE 2 MG: 4 INJECTION INTRAVENOUS at 19:07

## 2017-01-01 RX ADMIN — ACYCLOVIR 800 MG: 800 TABLET ORAL at 08:23

## 2017-01-01 RX ADMIN — HYDROXYZINE HYDROCHLORIDE 25 MG: 25 TABLET, FILM COATED ORAL at 17:48

## 2017-01-01 RX ADMIN — HYDROXYZINE HYDROCHLORIDE 25 MG: 50 TABLET, FILM COATED ORAL at 17:15

## 2017-01-01 RX ADMIN — POLYETHYLENE GLYCOL 3350 1 PACKET: 17 POWDER, FOR SOLUTION ORAL at 16:16

## 2017-01-01 RX ADMIN — NYSTATIN 500000 UNITS: 500000 SUSPENSION ORAL at 13:52

## 2017-01-01 RX ADMIN — SODIUM BICARBONATE 650 MG TABLET 650 MG: at 21:51

## 2017-01-01 RX ADMIN — HYDROXYZINE HYDROCHLORIDE 25 MG: 50 TABLET, FILM COATED ORAL at 14:38

## 2017-01-01 RX ADMIN — LISINOPRIL 20 MG: 20 TABLET ORAL at 08:04

## 2017-01-01 RX ADMIN — HEPARIN SODIUM 5000 UNITS: 5000 INJECTION, SOLUTION INTRAVENOUS; SUBCUTANEOUS at 06:02

## 2017-01-01 RX ADMIN — SODIUM BICARBONATE 650 MG: 650 TABLET ORAL at 15:14

## 2017-01-01 RX ADMIN — HYDROXYZINE HYDROCHLORIDE 25 MG: 25 TABLET, FILM COATED ORAL at 20:23

## 2017-01-01 RX ADMIN — SODIUM BICARBONATE 650 MG TABLET 650 MG: at 10:00

## 2017-01-01 RX ADMIN — AZITHROMYCIN MONOHYDRATE 500 MG: 500 INJECTION, POWDER, LYOPHILIZED, FOR SOLUTION INTRAVENOUS at 21:16

## 2017-01-01 RX ADMIN — NYSTATIN 500000 UNITS: 500000 SUSPENSION ORAL at 16:55

## 2017-01-01 RX ADMIN — MICONAZOLE NITRATE: 20 CREAM TOPICAL at 07:00

## 2017-01-01 RX ADMIN — MORPHINE SULFATE 2 MG: 4 INJECTION INTRAVENOUS at 17:10

## 2017-01-01 RX ADMIN — DEXTROSE MONOHYDRATE 500 MG: 50 INJECTION, SOLUTION INTRAVENOUS at 21:07

## 2017-01-01 RX ADMIN — SODIUM BICARBONATE 650 MG TABLET 650 MG: at 15:31

## 2017-01-01 RX ADMIN — PREDNISOLONE ACETATE 1 DROP: 10 SUSPENSION/ DROPS OPHTHALMIC at 04:02

## 2017-01-01 RX ADMIN — HEPARIN SODIUM 2000 UNITS: 1000 INJECTION, SOLUTION INTRAVENOUS; SUBCUTANEOUS at 11:20

## 2017-01-01 RX ADMIN — LISINOPRIL 20 MG: 20 TABLET ORAL at 14:38

## 2017-01-01 RX ADMIN — HEPARIN SODIUM 5000 UNITS: 5000 INJECTION, SOLUTION INTRAVENOUS; SUBCUTANEOUS at 21:47

## 2017-01-01 RX ADMIN — NYSTATIN 500000 UNITS: 500000 SUSPENSION ORAL at 09:20

## 2017-01-01 RX ADMIN — METOCLOPRAMIDE 10 MG: 10 TABLET ORAL at 06:13

## 2017-01-01 RX ADMIN — CEFTRIAXONE 1 G: 1 INJECTION, SOLUTION INTRAVENOUS at 11:20

## 2017-01-01 RX ADMIN — CARVEDILOL 6.25 MG: 6.25 TABLET, FILM COATED ORAL at 17:10

## 2017-01-01 RX ADMIN — FLUCONAZOLE 200 MG: 2 INJECTION, SOLUTION INTRAVENOUS at 19:43

## 2017-01-01 RX ADMIN — HEPARIN SODIUM 5000 UNITS: 5000 INJECTION, SOLUTION INTRAVENOUS; SUBCUTANEOUS at 05:31

## 2017-01-01 RX ADMIN — HEPARIN SODIUM 5000 UNITS: 5000 INJECTION, SOLUTION INTRAVENOUS; SUBCUTANEOUS at 05:45

## 2017-01-01 RX ADMIN — SODIUM BICARBONATE 650 MG: 650 TABLET ORAL at 13:41

## 2017-01-01 RX ADMIN — HYDROXYZINE HYDROCHLORIDE 25 MG: 25 TABLET, FILM COATED ORAL at 12:03

## 2017-01-01 RX ADMIN — HYDROCODONE BITARTRATE AND ACETAMINOPHEN 2 TABLET: 5; 325 TABLET ORAL at 02:28

## 2017-01-01 RX ADMIN — LISINOPRIL 20 MG: 20 TABLET ORAL at 08:58

## 2017-01-01 RX ADMIN — HYDROXYZINE HYDROCHLORIDE 25 MG: 50 TABLET, FILM COATED ORAL at 08:50

## 2017-01-01 RX ADMIN — AMLODIPINE BESYLATE 10 MG: 10 TABLET ORAL at 10:41

## 2017-01-01 RX ADMIN — ACETAMINOPHEN 650 MG: 325 TABLET, FILM COATED ORAL at 21:47

## 2017-01-01 RX ADMIN — NYSTATIN 500000 UNITS: 100000 SUSPENSION ORAL at 14:08

## 2017-01-01 RX ADMIN — PREDNISOLONE ACETATE 1 DROP: 10 SUSPENSION/ DROPS OPHTHALMIC at 22:50

## 2017-01-01 RX ADMIN — EPOETIN ALFA 3000 UNITS: 3000 SOLUTION INTRAVENOUS; SUBCUTANEOUS at 16:30

## 2017-01-01 RX ADMIN — HYDROXYZINE HYDROCHLORIDE 25 MG: 25 TABLET, FILM COATED ORAL at 20:53

## 2017-01-01 RX ADMIN — LABETALOL HYDROCHLORIDE 10 MG: 5 INJECTION, SOLUTION INTRAVENOUS at 04:00

## 2017-01-01 RX ADMIN — DOLUTEGRAVIR SODIUM 50 MG: 50 TABLET, FILM COATED ORAL at 07:51

## 2017-01-01 RX ADMIN — HEPARIN SODIUM 1500 UNITS: 1000 INJECTION, SOLUTION INTRAVENOUS; SUBCUTANEOUS at 09:03

## 2017-01-01 RX ADMIN — HYDROCODONE BITARTRATE AND ACETAMINOPHEN 2 TABLET: 5; 325 TABLET ORAL at 01:57

## 2017-01-01 RX ADMIN — HYDROCODONE BITARTRATE AND ACETAMINOPHEN 2 TABLET: 5; 325 TABLET ORAL at 00:18

## 2017-01-01 RX ADMIN — SODIUM BICARBONATE 650 MG: 650 TABLET ORAL at 21:13

## 2017-01-01 RX ADMIN — ACYCLOVIR 800 MG: 800 TABLET ORAL at 11:19

## 2017-01-01 RX ADMIN — ETHAMBUTOL HYDROCHLORIDE 800 MG: 400 TABLET, FILM COATED ORAL at 11:09

## 2017-01-01 RX ADMIN — FLUOXETINE 10 MG: 10 CAPSULE ORAL at 09:07

## 2017-01-01 RX ADMIN — NYSTATIN 500000 UNITS: 500000 SUSPENSION ORAL at 21:16

## 2017-01-01 RX ADMIN — SODIUM BICARBONATE 650 MG TABLET 650 MG: at 21:48

## 2017-01-01 RX ADMIN — DOLUTEGRAVIR SODIUM 50 MG: 50 TABLET, FILM COATED ORAL at 08:37

## 2017-01-01 RX ADMIN — HEPARIN SODIUM 5000 UNITS: 5000 INJECTION, SOLUTION INTRAVENOUS; SUBCUTANEOUS at 15:38

## 2017-01-01 RX ADMIN — HYDROXYZINE HYDROCHLORIDE 25 MG: 25 TABLET, FILM COATED ORAL at 14:00

## 2017-01-01 RX ADMIN — HYDROCODONE BITARTRATE AND ACETAMINOPHEN 2 TABLET: 5; 325 TABLET ORAL at 12:46

## 2017-01-01 RX ADMIN — HEPARIN SODIUM 2000 UNITS: 1000 INJECTION, SOLUTION INTRAVENOUS; SUBCUTANEOUS at 11:42

## 2017-01-01 RX ADMIN — RENAGEL 1600 MG: 800 TABLET ORAL at 20:24

## 2017-01-01 RX ADMIN — CARVEDILOL 6.25 MG: 6.25 TABLET, FILM COATED ORAL at 18:14

## 2017-01-01 RX ADMIN — HEPARIN SODIUM 5000 UNITS: 5000 INJECTION, SOLUTION INTRAVENOUS; SUBCUTANEOUS at 05:57

## 2017-01-01 RX ADMIN — SODIUM BICARBONATE 650 MG: 650 TABLET ORAL at 21:09

## 2017-01-01 RX ADMIN — HYDROXYZINE HYDROCHLORIDE 25 MG: 25 TABLET, FILM COATED ORAL at 16:11

## 2017-01-01 RX ADMIN — ACYCLOVIR 400 MG: 800 TABLET ORAL at 08:58

## 2017-01-01 RX ADMIN — CARVEDILOL 6.25 MG: 6.25 TABLET, FILM COATED ORAL at 09:20

## 2017-01-01 RX ADMIN — POLYETHYLENE GLYCOL 3350 1 PACKET: 17 POWDER, FOR SOLUTION ORAL at 08:59

## 2017-01-01 RX ADMIN — SODIUM BICARBONATE 650 MG: 650 TABLET ORAL at 21:17

## 2017-01-01 RX ADMIN — HYDROCODONE BITARTRATE AND ACETAMINOPHEN 2 TABLET: 5; 325 TABLET ORAL at 21:38

## 2017-01-01 RX ADMIN — HYDROXYZINE HYDROCHLORIDE 25 MG: 25 TABLET, FILM COATED ORAL at 21:46

## 2017-01-01 RX ADMIN — AMLODIPINE BESYLATE 5 MG: 5 TABLET ORAL at 00:29

## 2017-01-01 RX ADMIN — SODIUM CHLORIDE 500 ML: 9 INJECTION, SOLUTION INTRAVENOUS at 11:13

## 2017-01-01 RX ADMIN — HYDROCODONE BITARTRATE AND ACETAMINOPHEN 2 TABLET: 5; 325 TABLET ORAL at 05:05

## 2017-01-01 RX ADMIN — CARVEDILOL 6.25 MG: 6.25 TABLET, FILM COATED ORAL at 09:33

## 2017-01-01 RX ADMIN — ONDANSETRON 4 MG: 2 INJECTION INTRAMUSCULAR; INTRAVENOUS at 18:28

## 2017-01-01 RX ADMIN — VALACYCLOVIR 500 MG: 500 TABLET, FILM COATED ORAL at 08:48

## 2017-01-01 RX ADMIN — FLUOXETINE 10 MG: 10 CAPSULE ORAL at 08:08

## 2017-01-01 RX ADMIN — MOXIFLOXACIN HYDROCHLORIDE 1 DROP: 5 SOLUTION/ DROPS OPHTHALMIC at 21:16

## 2017-01-01 RX ADMIN — LISINOPRIL 20 MG: 20 TABLET ORAL at 12:01

## 2017-01-01 RX ADMIN — DOLUTEGRAVIR SODIUM 50 MG: 50 TABLET, FILM COATED ORAL at 10:37

## 2017-01-01 RX ADMIN — MOXIFLOXACIN HYDROCHLORIDE 1 DROP: 5 SOLUTION/ DROPS OPHTHALMIC at 13:30

## 2017-01-01 RX ADMIN — CARVEDILOL 6.25 MG: 6.25 TABLET, FILM COATED ORAL at 17:23

## 2017-01-01 RX ADMIN — PREDNISOLONE ACETATE 1 DROP: 10 SUSPENSION/ DROPS OPHTHALMIC at 12:26

## 2017-01-01 RX ADMIN — HYDROXYZINE HYDROCHLORIDE 25 MG: 25 TABLET, FILM COATED ORAL at 13:52

## 2017-01-01 RX ADMIN — ALPRAZOLAM 0.25 MG: 0.25 TABLET ORAL at 21:47

## 2017-01-01 RX ADMIN — HEPARIN SODIUM 2000 UNITS: 1000 INJECTION, SOLUTION INTRAVENOUS; SUBCUTANEOUS at 07:25

## 2017-01-01 RX ADMIN — PREDNISOLONE ACETATE 1 DROP: 10 SUSPENSION/ DROPS OPHTHALMIC at 06:08

## 2017-01-01 RX ADMIN — VALACYCLOVIR 500 MG: 500 TABLET, FILM COATED ORAL at 12:44

## 2017-01-01 RX ADMIN — HEPARIN SODIUM 2000 UNITS: 1000 INJECTION, SOLUTION INTRAVENOUS; SUBCUTANEOUS at 11:06

## 2017-01-01 RX ADMIN — NYSTATIN 500000 UNITS: 500000 SUSPENSION ORAL at 20:10

## 2017-01-01 RX ADMIN — HEPARIN SODIUM 2500 UNITS: 1000 INJECTION, SOLUTION INTRAVENOUS; SUBCUTANEOUS at 09:16

## 2017-01-01 RX ADMIN — HYDROXYZINE HYDROCHLORIDE 25 MG: 50 TABLET, FILM COATED ORAL at 14:04

## 2017-01-01 RX ADMIN — HEPARIN SODIUM 5000 UNITS: 5000 INJECTION, SOLUTION INTRAVENOUS; SUBCUTANEOUS at 06:05

## 2017-01-01 RX ADMIN — MOXIFLOXACIN HYDROCHLORIDE 1 DROP: 5 SOLUTION/ DROPS OPHTHALMIC at 21:45

## 2017-01-01 RX ADMIN — TRAZODONE HYDROCHLORIDE 50 MG: 100 TABLET ORAL at 21:15

## 2017-01-01 RX ADMIN — ACETAMINOPHEN 650 MG: 325 TABLET, FILM COATED ORAL at 04:49

## 2017-01-01 RX ADMIN — HYDROXYZINE HYDROCHLORIDE 25 MG: 25 TABLET, FILM COATED ORAL at 12:54

## 2017-01-01 RX ADMIN — METOCLOPRAMIDE 10 MG: 10 TABLET ORAL at 17:42

## 2017-01-01 RX ADMIN — MORPHINE SULFATE 2 MG: 4 INJECTION INTRAVENOUS at 06:28

## 2017-01-01 RX ADMIN — HEPARIN SODIUM 5000 UNITS: 5000 INJECTION, SOLUTION INTRAVENOUS; SUBCUTANEOUS at 04:52

## 2017-01-01 RX ADMIN — FLUCONAZOLE 200 MG: 2 INJECTION, SOLUTION INTRAVENOUS at 23:35

## 2017-01-01 RX ADMIN — FLUOXETINE 10 MG: 10 CAPSULE ORAL at 13:28

## 2017-01-01 RX ADMIN — HYDROCODONE BITARTRATE AND ACETAMINOPHEN 2 TABLET: 5; 325 TABLET ORAL at 14:43

## 2017-01-01 RX ADMIN — SODIUM CHLORIDE: 9 INJECTION, SOLUTION INTRAVENOUS at 13:44

## 2017-01-01 RX ADMIN — HYDROXYZINE HYDROCHLORIDE 25 MG: 25 TABLET, FILM COATED ORAL at 13:48

## 2017-01-01 RX ADMIN — HYDROCODONE BITARTRATE AND ACETAMINOPHEN 2 TABLET: 5; 325 TABLET ORAL at 18:06

## 2017-01-01 RX ADMIN — CARVEDILOL 6.25 MG: 6.25 TABLET, FILM COATED ORAL at 08:58

## 2017-01-01 RX ADMIN — LISINOPRIL 20 MG: 20 TABLET ORAL at 12:47

## 2017-01-01 RX ADMIN — AMLODIPINE BESYLATE 5 MG: 5 TABLET ORAL at 22:21

## 2017-01-01 RX ADMIN — PROCHLORPERAZINE EDISYLATE 10 MG: 5 INJECTION INTRAMUSCULAR; INTRAVENOUS at 07:47

## 2017-01-01 RX ADMIN — HEPARIN SODIUM 3700 UNITS: 1000 INJECTION, SOLUTION INTRAVENOUS; SUBCUTANEOUS at 15:35

## 2017-01-01 RX ADMIN — ERYTHROPOIETIN 10000 UNITS: 10000 INJECTION, SOLUTION INTRAVENOUS; SUBCUTANEOUS at 11:00

## 2017-01-01 RX ADMIN — ACETAMINOPHEN 650 MG: 325 TABLET, FILM COATED ORAL at 06:13

## 2017-01-01 RX ADMIN — FLUOXETINE 10 MG: 10 CAPSULE ORAL at 08:34

## 2017-01-01 RX ADMIN — HEPARIN SODIUM 5000 UNITS: 5000 INJECTION, SOLUTION INTRAVENOUS; SUBCUTANEOUS at 20:38

## 2017-01-01 RX ADMIN — HEPARIN SODIUM 5000 UNITS: 5000 INJECTION, SOLUTION INTRAVENOUS; SUBCUTANEOUS at 14:07

## 2017-01-01 RX ADMIN — MOXIFLOXACIN HYDROCHLORIDE 1 DROP: 5 SOLUTION/ DROPS OPHTHALMIC at 21:06

## 2017-01-01 RX ADMIN — NYSTATIN 500000 UNITS: 500000 SUSPENSION ORAL at 08:04

## 2017-01-01 RX ADMIN — NYSTATIN 500000 UNITS: 100000 SUSPENSION ORAL at 12:13

## 2017-01-01 RX ADMIN — ONDANSETRON 4 MG: 2 INJECTION INTRAMUSCULAR; INTRAVENOUS at 13:40

## 2017-01-01 RX ADMIN — SULFAMETHOXAZOLE AND TRIMETHOPRIM 1 TABLET: 400; 80 TABLET ORAL at 03:10

## 2017-01-01 RX ADMIN — HYDROCODONE BITARTRATE AND ACETAMINOPHEN 2 TABLET: 5; 325 TABLET ORAL at 15:55

## 2017-01-01 RX ADMIN — FLUOXETINE 10 MG: 10 CAPSULE ORAL at 10:41

## 2017-01-01 RX ADMIN — NYSTATIN 500000 UNITS: 500000 SUSPENSION ORAL at 17:45

## 2017-01-01 RX ADMIN — FLUCONAZOLE 100 MG: 200 TABLET ORAL at 08:03

## 2017-01-01 RX ADMIN — ACYCLOVIR 800 MG: 800 TABLET ORAL at 08:39

## 2017-01-01 RX ADMIN — FLUOXETINE 10 MG: 10 CAPSULE ORAL at 12:47

## 2017-01-01 RX ADMIN — NYSTATIN 500000 UNITS: 500000 SUSPENSION ORAL at 16:01

## 2017-01-01 RX ADMIN — LISINOPRIL 20 MG: 20 TABLET ORAL at 09:07

## 2017-01-01 RX ADMIN — HYDROXYZINE HYDROCHLORIDE 25 MG: 25 TABLET, FILM COATED ORAL at 20:10

## 2017-01-01 RX ADMIN — MOXIFLOXACIN HYDROCHLORIDE 1 DROP: 5 SOLUTION/ DROPS OPHTHALMIC at 04:02

## 2017-01-01 RX ADMIN — AZITHROMYCIN MONOHYDRATE: 500 INJECTION, POWDER, LYOPHILIZED, FOR SOLUTION INTRAVENOUS at 21:46

## 2017-01-01 RX ADMIN — MOXIFLOXACIN HYDROCHLORIDE 1 DROP: 5 SOLUTION/ DROPS OPHTHALMIC at 12:48

## 2017-01-01 RX ADMIN — AZITHROMYCIN 500 MG: 250 TABLET, FILM COATED ORAL at 20:42

## 2017-01-01 RX ADMIN — ENALAPRILAT 1.25 MG: 1.25 INJECTION, SOLUTION INTRAVENOUS at 19:28

## 2017-01-01 RX ADMIN — HYDROCODONE BITARTRATE AND ACETAMINOPHEN 1 TABLET: 5; 325 TABLET ORAL at 20:48

## 2017-01-01 RX ADMIN — HYDROCODONE BITARTRATE AND ACETAMINOPHEN 2 TABLET: 5; 325 TABLET ORAL at 03:55

## 2017-01-01 RX ADMIN — FLUOXETINE 10 MG: 10 CAPSULE ORAL at 11:53

## 2017-01-01 RX ADMIN — SODIUM BICARBONATE 650 MG TABLET 650 MG: at 16:01

## 2017-01-01 RX ADMIN — FLUOXETINE 10 MG: 10 CAPSULE ORAL at 08:29

## 2017-01-01 RX ADMIN — RENAGEL 1600 MG: 800 TABLET ORAL at 14:13

## 2017-01-01 RX ADMIN — MORPHINE SULFATE 2 MG: 4 INJECTION INTRAVENOUS at 11:20

## 2017-01-01 RX ADMIN — HYDROCODONE BITARTRATE AND ACETAMINOPHEN 2 TABLET: 5; 325 TABLET ORAL at 14:59

## 2017-01-01 RX ADMIN — PREDNISOLONE ACETATE 1 DROP: 10 SUSPENSION/ DROPS OPHTHALMIC at 17:15

## 2017-01-01 RX ADMIN — DOLUTEGRAVIR SODIUM 50 MG: 50 TABLET, FILM COATED ORAL at 12:45

## 2017-01-01 RX ADMIN — ONDANSETRON 4 MG: 2 INJECTION INTRAMUSCULAR; INTRAVENOUS at 04:54

## 2017-01-01 RX ADMIN — METOCLOPRAMIDE 10 MG: 10 TABLET ORAL at 16:11

## 2017-01-01 RX ADMIN — FLUCONAZOLE 100 MG: 200 TABLET ORAL at 08:31

## 2017-01-01 RX ADMIN — FLUCONAZOLE 100 MG: 100 TABLET ORAL at 07:47

## 2017-01-01 RX ADMIN — RENAGEL 1600 MG: 800 TABLET ORAL at 16:07

## 2017-01-01 RX ADMIN — SENNOSIDES AND DOCUSATE SODIUM 2 TABLET: 8.6; 5 TABLET ORAL at 20:29

## 2017-01-01 RX ADMIN — IOHEXOL 100 ML: 350 INJECTION, SOLUTION INTRAVENOUS at 19:23

## 2017-01-01 RX ADMIN — FLUCONAZOLE 100 MG: 200 TABLET ORAL at 09:56

## 2017-01-01 RX ADMIN — SODIUM BICARBONATE 650 MG TABLET 650 MG: at 14:09

## 2017-01-01 RX ADMIN — HYDROCODONE BITARTRATE AND ACETAMINOPHEN 2 TABLET: 5; 325 TABLET ORAL at 10:41

## 2017-01-01 RX ADMIN — DOLUTEGRAVIR SODIUM 50 MG: 50 TABLET, FILM COATED ORAL at 08:43

## 2017-01-01 RX ADMIN — HEPARIN SODIUM 3700 UNITS: 1000 INJECTION, SOLUTION INTRAVENOUS; SUBCUTANEOUS at 12:10

## 2017-01-01 RX ADMIN — CARVEDILOL 6.25 MG: 6.25 TABLET, FILM COATED ORAL at 07:23

## 2017-01-01 RX ADMIN — AMPICILLIN SODIUM 1 G: 1 INJECTION, POWDER, FOR SOLUTION INTRAMUSCULAR; INTRAVENOUS at 13:01

## 2017-01-01 RX ADMIN — HYDROCODONE BITARTRATE AND ACETAMINOPHEN 1 TABLET: 10; 325 TABLET ORAL at 14:51

## 2017-01-01 RX ADMIN — METOCLOPRAMIDE 10 MG: 10 TABLET ORAL at 07:24

## 2017-01-01 RX ADMIN — SODIUM BICARBONATE 650 MG: 650 TABLET ORAL at 08:40

## 2017-01-01 RX ADMIN — FLUOXETINE 10 MG: 10 CAPSULE ORAL at 08:32

## 2017-01-01 RX ADMIN — HYDROXYZINE HYDROCHLORIDE 25 MG: 25 TABLET, FILM COATED ORAL at 12:46

## 2017-01-01 RX ADMIN — PREDNISOLONE ACETATE 1 DROP: 10 SUSPENSION/ DROPS OPHTHALMIC at 17:31

## 2017-01-01 RX ADMIN — PREDNISOLONE ACETATE 1 DROP: 10 SUSPENSION/ DROPS OPHTHALMIC at 11:53

## 2017-01-01 RX ADMIN — NYSTATIN 500000 UNITS: 500000 SUSPENSION ORAL at 21:56

## 2017-01-01 RX ADMIN — FLUCONAZOLE 200 MG: 2 INJECTION, SOLUTION INTRAVENOUS at 09:20

## 2017-01-01 RX ADMIN — SODIUM BICARBONATE 650 MG TABLET 650 MG: at 20:53

## 2017-01-01 RX ADMIN — PREDNISOLONE ACETATE 1 DROP: 10 SUSPENSION/ DROPS OPHTHALMIC at 18:07

## 2017-01-01 RX ADMIN — PREDNISOLONE ACETATE 1 DROP: 10 SUSPENSION/ DROPS OPHTHALMIC at 05:33

## 2017-01-01 RX ADMIN — NYSTATIN 500000 UNITS: 500000 SUSPENSION ORAL at 09:57

## 2017-01-01 RX ADMIN — FLUCONAZOLE 100 MG: 200 TABLET ORAL at 10:00

## 2017-01-01 RX ADMIN — HYDROCODONE BITARTRATE AND ACETAMINOPHEN 2 TABLET: 5; 325 TABLET ORAL at 06:02

## 2017-01-01 RX ADMIN — SODIUM BICARBONATE 650 MG: 650 TABLET ORAL at 20:38

## 2017-01-01 RX ADMIN — SODIUM CHLORIDE 500 ML: 9 INJECTION, SOLUTION INTRAVENOUS at 21:18

## 2017-01-01 RX ADMIN — DEXTROSE MONOHYDRATE 25 ML: 25 INJECTION, SOLUTION INTRAVENOUS at 06:17

## 2017-01-01 RX ADMIN — ONDANSETRON 4 MG: 2 INJECTION, SOLUTION INTRAMUSCULAR; INTRAVENOUS at 07:56

## 2017-01-01 RX ADMIN — SODIUM BICARBONATE 650 MG: 650 TABLET ORAL at 17:39

## 2017-01-01 RX ADMIN — SODIUM BICARBONATE 650 MG TABLET 650 MG: at 20:29

## 2017-01-01 RX ADMIN — PREDNISOLONE ACETATE 1 DROP: 10 SUSPENSION/ DROPS OPHTHALMIC at 17:51

## 2017-01-01 RX ADMIN — SODIUM CHLORIDE: 9 INJECTION, SOLUTION INTRAVENOUS at 05:37

## 2017-01-01 RX ADMIN — METOCLOPRAMIDE 10 MG: 10 TABLET ORAL at 22:21

## 2017-01-01 RX ADMIN — AZITHROMYCIN 500 MG: 250 TABLET, FILM COATED ORAL at 21:11

## 2017-01-01 RX ADMIN — SODIUM CHLORIDE 1000 ML: 9 INJECTION, SOLUTION INTRAVENOUS at 04:18

## 2017-01-01 RX ADMIN — AMLODIPINE BESYLATE 5 MG: 5 TABLET ORAL at 21:50

## 2017-01-01 RX ADMIN — HEPARIN SODIUM 5000 UNITS: 5000 INJECTION, SOLUTION INTRAVENOUS; SUBCUTANEOUS at 05:33

## 2017-01-01 RX ADMIN — PREDNISOLONE ACETATE 1 DROP: 10 SUSPENSION/ DROPS OPHTHALMIC at 14:14

## 2017-01-01 RX ADMIN — LISINOPRIL 20 MG: 20 TABLET ORAL at 07:59

## 2017-01-01 RX ADMIN — PREDNISOLONE ACETATE 1 DROP: 10 SUSPENSION/ DROPS OPHTHALMIC at 21:18

## 2017-01-01 RX ADMIN — SODIUM BICARBONATE 650 MG TABLET 650 MG: at 20:23

## 2017-01-01 RX ADMIN — FLUCONAZOLE 200 MG: 2 INJECTION, SOLUTION INTRAVENOUS at 09:28

## 2017-01-01 RX ADMIN — HEPARIN SODIUM 5000 UNITS: 5000 INJECTION, SOLUTION INTRAVENOUS; SUBCUTANEOUS at 22:20

## 2017-01-01 RX ADMIN — CARVEDILOL 6.25 MG: 6.25 TABLET, FILM COATED ORAL at 13:01

## 2017-01-01 RX ADMIN — LISINOPRIL 20 MG: 20 TABLET ORAL at 08:29

## 2017-01-01 RX ADMIN — SODIUM BICARBONATE 650 MG TABLET 650 MG: at 12:47

## 2017-01-01 RX ADMIN — ACYCLOVIR 400 MG: 800 TABLET ORAL at 08:08

## 2017-01-01 RX ADMIN — MOXIFLOXACIN HYDROCHLORIDE 1 DROP: 5 SOLUTION/ DROPS OPHTHALMIC at 17:14

## 2017-01-01 RX ADMIN — MORPHINE SULFATE 2 MG: 4 INJECTION INTRAVENOUS at 10:39

## 2017-01-01 RX ADMIN — HYDROCODONE BITARTRATE AND ACETAMINOPHEN 2 TABLET: 5; 325 TABLET ORAL at 02:17

## 2017-01-01 RX ADMIN — PREDNISOLONE ACETATE 1 DROP: 10 SUSPENSION/ DROPS OPHTHALMIC at 12:19

## 2017-01-01 RX ADMIN — AMPICILLIN SODIUM 1 G: 1 INJECTION, POWDER, FOR SOLUTION INTRAMUSCULAR; INTRAVENOUS at 12:01

## 2017-01-01 RX ADMIN — NYSTATIN 500000 UNITS: 500000 SUSPENSION ORAL at 17:07

## 2017-01-01 RX ADMIN — SODIUM CHLORIDE: 9 INJECTION, SOLUTION INTRAVENOUS at 23:13

## 2017-01-01 RX ADMIN — NYSTATIN 500000 UNITS: 500000 SUSPENSION ORAL at 09:06

## 2017-01-01 RX ADMIN — NYSTATIN 500000 UNITS: 500000 SUSPENSION ORAL at 17:15

## 2017-01-01 RX ADMIN — PREDNISOLONE ACETATE 1 DROP: 10 SUSPENSION/ DROPS OPHTHALMIC at 08:50

## 2017-01-01 RX ADMIN — SODIUM BICARBONATE 650 MG: 650 TABLET ORAL at 10:24

## 2017-01-01 RX ADMIN — MOXIFLOXACIN HYDROCHLORIDE 1 DROP: 5 SOLUTION/ DROPS OPHTHALMIC at 16:39

## 2017-01-01 RX ADMIN — CARVEDILOL 6.25 MG: 6.25 TABLET, FILM COATED ORAL at 18:03

## 2017-01-01 RX ADMIN — PREDNISOLONE ACETATE 1 DROP: 10 SUSPENSION/ DROPS OPHTHALMIC at 12:48

## 2017-01-01 RX ADMIN — AZITHROMYCIN 500 MG: 250 TABLET, FILM COATED ORAL at 20:06

## 2017-01-01 RX ADMIN — NYSTATIN 500000 UNITS: 500000 SUSPENSION ORAL at 04:02

## 2017-01-01 RX ADMIN — FLUOXETINE 10 MG: 10 CAPSULE ORAL at 07:40

## 2017-01-01 RX ADMIN — HEPARIN SODIUM 3700 UNITS: 1000 INJECTION, SOLUTION INTRAVENOUS; SUBCUTANEOUS at 15:33

## 2017-01-01 RX ADMIN — SODIUM BICARBONATE 650 MG TABLET 650 MG: at 21:56

## 2017-01-01 RX ADMIN — ACETAMINOPHEN 650 MG: 325 TABLET, FILM COATED ORAL at 17:13

## 2017-01-01 RX ADMIN — AZITHROMYCIN 500 MG: 250 TABLET, FILM COATED ORAL at 10:24

## 2017-01-01 RX ADMIN — MOXIFLOXACIN HYDROCHLORIDE 1 DROP: 5 SOLUTION/ DROPS OPHTHALMIC at 23:22

## 2017-01-01 RX ADMIN — MORPHINE SULFATE 4 MG: 4 INJECTION INTRAVENOUS at 12:18

## 2017-01-01 RX ADMIN — RENAGEL 1600 MG: 800 TABLET ORAL at 09:39

## 2017-01-01 RX ADMIN — SODIUM CHLORIDE: 9 INJECTION, SOLUTION INTRAVENOUS at 01:23

## 2017-01-01 RX ADMIN — PREDNISOLONE ACETATE 1 DROP: 10 SUSPENSION/ DROPS OPHTHALMIC at 21:06

## 2017-01-01 RX ADMIN — FLUOXETINE 10 MG: 10 CAPSULE ORAL at 09:33

## 2017-01-01 RX ADMIN — STANDARDIZED SENNA CONCENTRATE AND DOCUSATE SODIUM 2 TABLET: 8.6; 5 TABLET, FILM COATED ORAL at 19:55

## 2017-01-01 RX ADMIN — FLUOXETINE 10 MG: 10 CAPSULE ORAL at 07:59

## 2017-01-01 RX ADMIN — MOXIFLOXACIN HYDROCHLORIDE 1 DROP: 5 SOLUTION/ DROPS OPHTHALMIC at 08:50

## 2017-01-01 RX ADMIN — CARVEDILOL 6.25 MG: 6.25 TABLET, FILM COATED ORAL at 16:16

## 2017-01-01 RX ADMIN — SODIUM BICARBONATE 650 MG: 650 TABLET ORAL at 20:42

## 2017-01-01 RX ADMIN — SODIUM BICARBONATE 650 MG: 650 TABLET ORAL at 21:00

## 2017-01-01 RX ADMIN — SODIUM BICARBONATE 650 MG: 650 TABLET ORAL at 20:48

## 2017-01-01 RX ADMIN — SODIUM BICARBONATE TAB 650 MG 650 MG: 650 TAB at 17:42

## 2017-01-01 RX ADMIN — SODIUM BICARBONATE 650 MG: 650 TABLET ORAL at 08:29

## 2017-01-01 RX ADMIN — RENAGEL 1600 MG: 800 TABLET ORAL at 10:41

## 2017-01-01 RX ADMIN — DOLUTEGRAVIR SODIUM 50 MG: 50 TABLET, FILM COATED ORAL at 08:58

## 2017-01-01 RX ADMIN — MOXIFLOXACIN HYDROCHLORIDE 1 DROP: 5 SOLUTION/ DROPS OPHTHALMIC at 13:48

## 2017-01-01 RX ADMIN — ONDANSETRON 4 MG: 4 TABLET, ORALLY DISINTEGRATING ORAL at 07:36

## 2017-01-01 RX ADMIN — SODIUM BICARBONATE 650 MG: 650 TABLET ORAL at 07:50

## 2017-01-01 RX ADMIN — PREDNISOLONE ACETATE 1 DROP: 10 SUSPENSION/ DROPS OPHTHALMIC at 05:21

## 2017-01-01 RX ADMIN — NYSTATIN 500000 UNITS: 500000 SUSPENSION ORAL at 12:01

## 2017-01-01 RX ADMIN — SODIUM BICARBONATE 650 MG TABLET 650 MG: at 12:58

## 2017-01-01 RX ADMIN — HYDROXYZINE HYDROCHLORIDE 25 MG: 50 TABLET, FILM COATED ORAL at 20:42

## 2017-01-01 RX ADMIN — SODIUM BICARBONATE TAB 650 MG 650 MG: 650 TAB at 09:14

## 2017-01-01 RX ADMIN — HEPARIN SODIUM 5000 UNITS: 5000 INJECTION, SOLUTION INTRAVENOUS; SUBCUTANEOUS at 21:39

## 2017-01-01 RX ADMIN — HEPARIN SODIUM 5000 UNITS: 5000 INJECTION, SOLUTION INTRAVENOUS; SUBCUTANEOUS at 20:55

## 2017-01-01 RX ADMIN — HYDROCODONE BITARTRATE AND ACETAMINOPHEN 2 TABLET: 5; 325 TABLET ORAL at 04:50

## 2017-01-01 RX ADMIN — FLUOXETINE 10 MG: 10 CAPSULE ORAL at 08:23

## 2017-01-01 RX ADMIN — MOXIFLOXACIN HYDROCHLORIDE 1 DROP: 5 SOLUTION/ DROPS OPHTHALMIC at 08:33

## 2017-01-01 RX ADMIN — FLUOXETINE 10 MG: 10 CAPSULE ORAL at 09:56

## 2017-01-01 RX ADMIN — VALACYCLOVIR 500 MG: 1 TABLET, FILM COATED ORAL at 12:46

## 2017-01-01 RX ADMIN — RENAGEL 1600 MG: 800 TABLET ORAL at 10:36

## 2017-01-01 RX ADMIN — ACYCLOVIR 400 MG: 800 TABLET ORAL at 10:41

## 2017-01-01 RX ADMIN — SODIUM BICARBONATE 650 MG TABLET 650 MG: at 11:15

## 2017-01-01 RX ADMIN — HYDROXYZINE HYDROCHLORIDE 25 MG: 25 TABLET, FILM COATED ORAL at 23:10

## 2017-01-01 RX ADMIN — MOXIFLOXACIN HYDROCHLORIDE 1 DROP: 5 SOLUTION/ DROPS OPHTHALMIC at 18:11

## 2017-01-01 RX ADMIN — HYDROCODONE BITARTRATE AND ACETAMINOPHEN 2 TABLET: 5; 325 TABLET ORAL at 04:32

## 2017-01-01 RX ADMIN — FLUCONAZOLE 100 MG: 200 TABLET ORAL at 08:34

## 2017-01-01 RX ADMIN — DIPHENHYDRAMINE HCL 25 MG: 25 TABLET ORAL at 04:49

## 2017-01-01 RX ADMIN — SODIUM BICARBONATE 650 MG: 650 TABLET ORAL at 17:00

## 2017-01-01 RX ADMIN — CARVEDILOL 6.25 MG: 6.25 TABLET, FILM COATED ORAL at 09:14

## 2017-01-01 RX ADMIN — HYDROXYZINE HYDROCHLORIDE 25 MG: 50 TABLET, FILM COATED ORAL at 17:00

## 2017-01-01 RX ADMIN — CEFTRIAXONE 1 G: 1 INJECTION, POWDER, FOR SOLUTION INTRAMUSCULAR; INTRAVENOUS at 18:30

## 2017-01-01 RX ADMIN — LISINOPRIL 20 MG: 20 TABLET ORAL at 09:48

## 2017-01-01 RX ADMIN — DOLUTEGRAVIR SODIUM 50 MG: 50 TABLET, FILM COATED ORAL at 08:18

## 2017-01-01 RX ADMIN — HYDROXYZINE HYDROCHLORIDE 25 MG: 50 TABLET, FILM COATED ORAL at 08:51

## 2017-01-01 RX ADMIN — HEPARIN SODIUM 5000 UNITS: 5000 INJECTION, SOLUTION INTRAVENOUS; SUBCUTANEOUS at 20:56

## 2017-01-01 RX ADMIN — HEPARIN SODIUM 5000 UNITS: 5000 INJECTION, SOLUTION INTRAVENOUS; SUBCUTANEOUS at 15:16

## 2017-01-01 RX ADMIN — PREDNISOLONE ACETATE 1 DROP: 10 SUSPENSION/ DROPS OPHTHALMIC at 11:12

## 2017-01-01 RX ADMIN — SODIUM BICARBONATE 650 MG: 650 TABLET ORAL at 08:38

## 2017-01-01 RX ADMIN — ONDANSETRON 4 MG: 2 INJECTION, SOLUTION INTRAMUSCULAR; INTRAVENOUS at 00:57

## 2017-01-01 RX ADMIN — PREDNISOLONE ACETATE 1 DROP: 10 SUSPENSION/ DROPS OPHTHALMIC at 23:18

## 2017-01-01 RX ADMIN — STANDARDIZED SENNA CONCENTRATE AND DOCUSATE SODIUM 2 TABLET: 8.6; 5 TABLET, FILM COATED ORAL at 08:37

## 2017-01-01 RX ADMIN — MOXIFLOXACIN HYDROCHLORIDE 1 DROP: 5 SOLUTION/ DROPS OPHTHALMIC at 12:07

## 2017-01-01 RX ADMIN — LISINOPRIL 20 MG: 20 TABLET ORAL at 08:35

## 2017-01-01 RX ADMIN — METOCLOPRAMIDE 10 MG: 10 TABLET ORAL at 17:10

## 2017-01-01 RX ADMIN — STANDARDIZED SENNA CONCENTRATE AND DOCUSATE SODIUM 2 TABLET: 8.6; 5 TABLET, FILM COATED ORAL at 20:57

## 2017-01-01 RX ADMIN — NYSTATIN 500000 UNITS: 500000 SUSPENSION ORAL at 14:53

## 2017-01-01 RX ADMIN — HEPARIN SODIUM 5000 UNITS: 5000 INJECTION, SOLUTION INTRAVENOUS; SUBCUTANEOUS at 14:29

## 2017-01-01 RX ADMIN — METOCLOPRAMIDE 10 MG: 10 TABLET ORAL at 12:13

## 2017-01-01 RX ADMIN — SULFAMETHOXAZOLE AND TRIMETHOPRIM 1 TABLET: 400; 80 TABLET ORAL at 03:17

## 2017-01-01 RX ADMIN — NYSTATIN 500000 UNITS: 100000 SUSPENSION ORAL at 17:23

## 2017-01-01 RX ADMIN — HYDROCODONE BITARTRATE AND ACETAMINOPHEN 2 TABLET: 5; 325 TABLET ORAL at 21:20

## 2017-01-01 RX ADMIN — NYSTATIN 500000 UNITS: 500000 SUSPENSION ORAL at 22:12

## 2017-01-01 RX ADMIN — HYDROXYZINE HYDROCHLORIDE 25 MG: 25 TABLET, FILM COATED ORAL at 12:01

## 2017-01-01 RX ADMIN — PREDNISOLONE ACETATE 1 DROP: 10 SUSPENSION/ DROPS OPHTHALMIC at 05:56

## 2017-01-01 RX ADMIN — AMLODIPINE BESYLATE 10 MG: 10 TABLET ORAL at 08:37

## 2017-01-01 RX ADMIN — MOXIFLOXACIN HYDROCHLORIDE 1 DROP: 5 SOLUTION/ DROPS OPHTHALMIC at 17:37

## 2017-01-01 RX ADMIN — HYDROXYZINE HYDROCHLORIDE 25 MG: 25 TABLET, FILM COATED ORAL at 20:39

## 2017-01-01 RX ADMIN — AZITHROMYCIN 500 MG: 250 TABLET, FILM COATED ORAL at 07:59

## 2017-01-01 RX ADMIN — HYDROCODONE BITARTRATE AND ACETAMINOPHEN 2 TABLET: 5; 325 TABLET ORAL at 11:12

## 2017-01-01 RX ADMIN — MOXIFLOXACIN HYDROCHLORIDE 1 DROP: 5 SOLUTION/ DROPS OPHTHALMIC at 22:47

## 2017-01-01 RX ADMIN — FLUCONAZOLE 100 MG: 200 TABLET ORAL at 09:20

## 2017-01-01 RX ADMIN — ACETAMINOPHEN 650 MG: 325 TABLET, FILM COATED ORAL at 04:52

## 2017-01-01 RX ADMIN — ONDANSETRON 4 MG: 2 INJECTION INTRAMUSCULAR; INTRAVENOUS at 12:33

## 2017-01-01 RX ADMIN — AMLODIPINE BESYLATE 5 MG: 5 TABLET ORAL at 21:39

## 2017-01-01 RX ADMIN — CEFTRIAXONE 1 G: 1 INJECTION, SOLUTION INTRAVENOUS at 08:22

## 2017-01-01 RX ADMIN — ONDANSETRON 4 MG: 2 INJECTION INTRAMUSCULAR; INTRAVENOUS at 04:18

## 2017-01-01 RX ADMIN — MOXIFLOXACIN HYDROCHLORIDE 1 DROP: 5 SOLUTION/ DROPS OPHTHALMIC at 12:21

## 2017-01-01 RX ADMIN — ONDANSETRON 4 MG: 2 INJECTION INTRAMUSCULAR; INTRAVENOUS at 05:04

## 2017-01-01 RX ADMIN — FLUOXETINE 10 MG: 10 CAPSULE ORAL at 13:45

## 2017-01-01 RX ADMIN — SODIUM CHLORIDE: 9 INJECTION, SOLUTION INTRAVENOUS at 23:14

## 2017-01-01 RX ADMIN — HYDROXYZINE HYDROCHLORIDE 25 MG: 25 TABLET, FILM COATED ORAL at 13:00

## 2017-01-01 RX ADMIN — HYDROXYZINE HYDROCHLORIDE 25 MG: 25 TABLET, FILM COATED ORAL at 20:29

## 2017-01-01 RX ADMIN — METOCLOPRAMIDE 10 MG: 10 TABLET ORAL at 21:05

## 2017-01-01 RX ADMIN — HEPARIN SODIUM 5000 UNITS: 5000 INJECTION, SOLUTION INTRAVENOUS; SUBCUTANEOUS at 05:05

## 2017-01-01 RX ADMIN — RENAGEL 1600 MG: 800 TABLET ORAL at 20:27

## 2017-01-01 RX ADMIN — FLUOXETINE 10 MG: 10 CAPSULE ORAL at 16:16

## 2017-01-01 RX ADMIN — MOXIFLOXACIN HYDROCHLORIDE 1 DROP: 5 SOLUTION/ DROPS OPHTHALMIC at 23:16

## 2017-01-01 RX ADMIN — NYSTATIN 500000 UNITS: 100000 SUSPENSION ORAL at 21:40

## 2017-01-01 RX ADMIN — MOXIFLOXACIN HYDROCHLORIDE 1 DROP: 5 SOLUTION/ DROPS OPHTHALMIC at 20:13

## 2017-01-01 RX ADMIN — OXYCODONE HYDROCHLORIDE 10 MG: 10 TABLET ORAL at 21:47

## 2017-01-01 RX ADMIN — AMLODIPINE BESYLATE 5 MG: 5 TABLET ORAL at 20:29

## 2017-01-01 RX ADMIN — HYDROCODONE BITARTRATE AND ACETAMINOPHEN 2 TABLET: 5; 325 TABLET ORAL at 16:26

## 2017-01-01 RX ADMIN — PREDNISOLONE ACETATE 1 DROP: 10 SUSPENSION/ DROPS OPHTHALMIC at 23:38

## 2017-01-01 RX ADMIN — METOCLOPRAMIDE 10 MG: 10 TABLET ORAL at 06:12

## 2017-01-01 RX ADMIN — PREDNISOLONE ACETATE 1 DROP: 10 SUSPENSION/ DROPS OPHTHALMIC at 04:51

## 2017-01-01 RX ADMIN — MORPHINE SULFATE 2 MG: 4 INJECTION INTRAVENOUS at 13:01

## 2017-01-01 RX ADMIN — METOCLOPRAMIDE 10 MG: 10 TABLET ORAL at 21:39

## 2017-01-01 RX ADMIN — MOXIFLOXACIN HYDROCHLORIDE 1 DROP: 5 SOLUTION/ DROPS OPHTHALMIC at 06:08

## 2017-01-01 RX ADMIN — ALPRAZOLAM 0.25 MG: 0.25 TABLET ORAL at 20:09

## 2017-01-01 RX ADMIN — LISINOPRIL 20 MG: 20 TABLET ORAL at 08:23

## 2017-01-01 RX ADMIN — SODIUM BICARBONATE 650 MG: 650 TABLET ORAL at 15:20

## 2017-01-01 RX ADMIN — HEPARIN SODIUM 5000 UNITS: 5000 INJECTION, SOLUTION INTRAVENOUS; SUBCUTANEOUS at 13:18

## 2017-01-01 RX ADMIN — NYSTATIN 500000 UNITS: 500000 SUSPENSION ORAL at 05:21

## 2017-01-01 RX ADMIN — ACETAMINOPHEN 650 MG: 325 TABLET, FILM COATED ORAL at 20:31

## 2017-01-01 RX ADMIN — NYSTATIN 500000 UNITS: 500000 SUSPENSION ORAL at 18:01

## 2017-01-01 SDOH — ECONOMIC STABILITY: HOUSING INSECURITY: UNSAFE COOKING RANGE AREA: 0

## 2017-01-01 SDOH — ECONOMIC STABILITY: HOUSING INSECURITY: UNSAFE APPLIANCES: 0

## 2017-01-01 SDOH — ECONOMIC STABILITY: HOUSING INSECURITY: HOME SAFETY: PT AT MOTHERS HOUSE

## 2017-01-01 SDOH — ECONOMIC STABILITY: HOUSING INSECURITY: HOME SAFETY: STAIRS PRESENT BUT FAMILY ASSIST PATIENT UP STAIRS WITH PATIENT IN WHEELCHAIR.

## 2017-01-01 SDOH — ECONOMIC STABILITY: HOUSING INSECURITY
HOME SAFETY: HOME SAFETY ASSESSMENT COMPLETED AT THE HOME OF PATIENT'S MOTHER. PT AT MOTHER'S DURING THE DAY AND EX HUSBANDS AT NIGHT.

## 2017-01-01 SDOH — ECONOMIC STABILITY: HOUSING INSECURITY: HOME SAFETY: PT NOT AMBULATORY AND STAIRS TO ENTER/EXIT PATIENT'S MOTHER'S HOME

## 2017-01-01 ASSESSMENT — ACTIVITIES OF DAILY LIVING (ADL)
ORAL_CARE_ASSISTANCE: 0
TOILETING: UNABLE TO DETERMINE AT THIS TIME
HOME_HEALTH_OASIS: 01
OASIS_M1830: 03
HOUSEKEEPING_ASSISTANCE: 6
ADLS_COMMENTS: <!--EPICS-->PT HAS FAMILY TO HELP WITH ADL'S AS NEEDED.<!--EPICE-->
IADLS_COMMENTS: <!--EPICS-->PT HAS FAMILY TO HELP WITH IADL'S AS NEEDED.<!--EPICE-->
ORAL_CARE_ASSISTANCE: 0
DRESSING_LB_ASSISTANCE: 6
TELEPHONE_ASSISTANCE: 0
TOILETING_ASSISTANCE: 6
GROOMING_ASSISTANCE: 0
TOILETING_ASSISTANCE: 5
TRANSPORTATION_ASSISTANCE: 6
BATHING_ASSISTANCE: 5
ADLS_COMMENTS: <!--EPICS--><!--EPICE-->
IADLS_COMMENTS: <!--EPICS-->REQUIRES ASSIST WITH IADLS<!--EPICE-->
EATING_ASSISTANCE: 0
ORAL_CARE_ASSISTANCE: 4
HOME_HEALTH_OASIS: 01
HOME_HEALTH_OASIS: 01
TOILETING: DEPENDENT
BATHING_ASSISTANCE: 6
HOME_HEALTH_OASIS: 02
IADLS_COMMENTS: <!--EPICS-->PT HAS FAMILY TO HELP WITH IADL'S AS NEEDED.<!--EPICE-->
TOILETING: REQUIRES ASSIST
GROOMING_ASSISTANCE: 0
MEAL_PREP_ASSISTANCE: 6
SHOPPING_ASSISTANCE: 6
OASIS_M1830: 03
IADLS_COMMENTS: <!--EPICS-->REQUIRES ASSIST WITH ALL IADLS<!--EPICE-->
TRANSPORTATION COMMENTS: FTT
BATHING_ASSISTANCE: 6
HOME_HEALTH_OASIS: 01
TOILETING_ASSISTANCE: 6
LAUNDRY_ASSISTANCE: 6
HOME_HEALTH_OASIS: 02
DRESSING_UB_ASSISTANCE: 5
MEAL_PREP_ASSISTANCE: 6
SHOPPING_ASSISTANCE: 6
GROOMING_ASSISTANCE: 4
EATING_ASSISTANCE: 1
HOUSEKEEPING_ASSISTANCE: 6
TELEPHONE_ASSISTANCE: 4
OASIS_M1830: 03
TRANSPORTATION_ASSISTANCE: 6
HOME_HEALTH_OASIS: 02
HOME_HEALTH_OASIS: 02
DRESSING_LB_ASSISTANCE: 5
TOILETING_ASSISTANCE: 6
EATING_ASSISTANCE: 4
DRESSING_UB_ASSISTANCE: 5
DRESSING_UB_ASSISTANCE: 5
OASIS_M1830: 03
HOME_HEALTH_OASIS: 01
ORAL_CARE_ASSISTANCE: 4
OASIS_M1830: 03
ADLS_COMMENTS: <!--EPICS-->PT HAS FAMILY TO HELP WITH ADL'S AS NEEDED.<!--EPICE-->
GROOMING_ASSISTANCE: 4
LAUNDRY_ASSISTANCE: 6
DRESSING_UB_ASSISTANCE: 6
DRESSING_LB_ASSISTANCE: 6
EATING_ASSISTANCE: 0
DRESSING_LB_ASSISTANCE: 6
BATHING_ASSISTANCE: 6

## 2017-01-01 ASSESSMENT — PAIN SCALES - GENERAL
PAINLEVEL_OUTOF10: 2
PAINLEVEL_OUTOF10: 5
PAINLEVEL_OUTOF10: 0
PAINLEVEL_OUTOF10: 5
PAINLEVEL_OUTOF10: 2
PAINLEVEL_OUTOF10: 7
PAINLEVEL_OUTOF10: 0
PAINLEVEL_OUTOF10: 1
PAINLEVEL_OUTOF10: 0
PAINLEVEL_OUTOF10: 8
PAINLEVEL_OUTOF10: 0
PAINLEVEL_OUTOF10: 8
PAINLEVEL_OUTOF10: 0
PAINLEVEL_OUTOF10: 7
PAINLEVEL_OUTOF10: 7
PAINLEVEL_OUTOF10: 6
PAINLEVEL_OUTOF10: 8
PAINLEVEL_OUTOF10: 5
PAINLEVEL_OUTOF10: 0
PAINLEVEL_OUTOF10: 5
PAINLEVEL_OUTOF10: 8
PAINLEVEL_OUTOF10: 6
PAINLEVEL_OUTOF10: 7
PAINLEVEL_OUTOF10: 7
PAINLEVEL_OUTOF10: 0
PAINLEVEL_OUTOF10: 5
PAINLEVEL_OUTOF10: 6
PAINLEVEL_OUTOF10: 0
PAINLEVEL_OUTOF10: 4
PAINLEVEL_OUTOF10: ASSUMED PAIN PRESENT
PAINLEVEL_OUTOF10: 6
PAINLEVEL_OUTOF10: 2
PAINLEVEL_OUTOF10: 0
PAINLEVEL_OUTOF10: 2
PAINLEVEL_OUTOF10: 0
PAINLEVEL_OUTOF10: 8
PAINLEVEL_OUTOF10: 1
PAINLEVEL_OUTOF10: 4
PAINLEVEL_OUTOF10: 0
PAINLEVEL_OUTOF10: 8
PAINLEVEL_OUTOF10: 3
PAINLEVEL_OUTOF10: 8
PAINLEVEL_OUTOF10: 0
PAINLEVEL_OUTOF10: 8
PAINLEVEL_OUTOF10: 0
PAINLEVEL_OUTOF10: 4
PAINLEVEL_OUTOF10: ASSUMED PAIN PRESENT
PAINLEVEL_OUTOF10: 5
PAINLEVEL_OUTOF10: 4
PAINLEVEL_OUTOF10: 8
PAINLEVEL_OUTOF10: 2
PAINLEVEL_OUTOF10: 3
PAINLEVEL_OUTOF10: ASSUMED PAIN PRESENT
PAINLEVEL_OUTOF10: 3
PAINLEVEL_OUTOF10: 0
PAINLEVEL_OUTOF10: 0
PAINLEVEL_OUTOF10: 8
PAINLEVEL_OUTOF10: 9
PAINLEVEL_OUTOF10: 0
PAINLEVEL_OUTOF10: 8
PAINLEVEL_OUTOF10: 7
PAINLEVEL_OUTOF10: 8
PAINLEVEL_OUTOF10: ASSUMED PAIN PRESENT
PAINLEVEL_OUTOF10: 0
PAINLEVEL_OUTOF10: 8
PAINLEVEL_OUTOF10: 5
PAINLEVEL_OUTOF10: 1
PAINLEVEL_OUTOF10: 7
PAINLEVEL_OUTOF10: 2
PAINLEVEL_OUTOF10: 1
PAINLEVEL_OUTOF10: 6
PAINLEVEL_OUTOF10: 0
PAINLEVEL_OUTOF10: 3
PAINLEVEL_OUTOF10: 6
PAINLEVEL_OUTOF10: 0
PAINLEVEL_OUTOF10: 2
PAINLEVEL_OUTOF10: 9
PAINLEVEL_OUTOF10: 8
PAINLEVEL_OUTOF10: 8
PAINLEVEL_OUTOF10: 3
PAINLEVEL_OUTOF10: 1
PAINLEVEL_OUTOF10: 7
PAINLEVEL_OUTOF10: 0
PAINLEVEL_OUTOF10: 0
PAINLEVEL_OUTOF10: 7
PAINLEVEL_OUTOF10: 0
PAINLEVEL_OUTOF10: 4
PAINLEVEL_OUTOF10: 6
PAINLEVEL_OUTOF10: 0
PAINLEVEL_OUTOF10: 0
PAINLEVEL_OUTOF10: 3
PAINLEVEL_OUTOF10: 7
PAINLEVEL_OUTOF10: 8
PAINLEVEL_OUTOF10: 4
PAINLEVEL_OUTOF10: 2
PAINLEVEL_OUTOF10: 6
PAINLEVEL_OUTOF10: 6
PAINLEVEL_OUTOF10: 8
PAINLEVEL_OUTOF10: 8
PAINLEVEL_OUTOF10: 0
PAINLEVEL_OUTOF10: 0
PAINLEVEL_OUTOF10: 8
PAINLEVEL_OUTOF10: 4
PAINLEVEL_OUTOF10: 0
PAINLEVEL_OUTOF10: 6
PAINLEVEL_OUTOF10: 8
PAINLEVEL_OUTOF10: 6
PAINLEVEL_OUTOF10: 8
PAINLEVEL_OUTOF10: 3
PAINLEVEL_OUTOF10: 5
PAINLEVEL_OUTOF10: 7
PAINLEVEL_OUTOF10: 0
PAINLEVEL_OUTOF10: 6
PAINLEVEL_OUTOF10: 8
PAINLEVEL_OUTOF10: 0
PAINLEVEL_OUTOF10: 2
PAINLEVEL_OUTOF10: 4
PAINLEVEL_OUTOF10: 0
PAINLEVEL_OUTOF10: 8
PAINLEVEL_OUTOF10: 6
PAINLEVEL_OUTOF10: 4
PAINLEVEL_OUTOF10: 0
PAINLEVEL_OUTOF10: 0
PAINLEVEL_OUTOF10: 6
PAINLEVEL_OUTOF10: 5
PAINLEVEL_OUTOF10: 8
PAINLEVEL_OUTOF10: 8
PAINLEVEL_OUTOF10: 5
PAINLEVEL_OUTOF10: 0
PAINLEVEL_OUTOF10: 0
PAINLEVEL_OUTOF10: ASSUMED PAIN PRESENT
PAINLEVEL_OUTOF10: 0
PAINLEVEL_OUTOF10: 8
PAINLEVEL_OUTOF10: 5
PAINLEVEL_OUTOF10: 4
PAINLEVEL_OUTOF10: 3
PAINLEVEL_OUTOF10: 7
PAINLEVEL_OUTOF10: 4
PAINLEVEL_OUTOF10: 0
PAINLEVEL_OUTOF10: 3
PAINLEVEL_OUTOF10: 0
PAINLEVEL_OUTOF10: 6
PAINLEVEL_OUTOF10: 8
PAINLEVEL_OUTOF10: 4
PAINLEVEL_OUTOF10: 3
PAINLEVEL_OUTOF10: 0
PAINLEVEL_OUTOF10: 0
PAINLEVEL_OUTOF10: 6
PAINLEVEL_OUTOF10: 0
PAINLEVEL_OUTOF10: ASSUMED PAIN PRESENT
PAINLEVEL_OUTOF10: 8
PAINLEVEL_OUTOF10: 0
PAINLEVEL_OUTOF10: 8
PAINLEVEL_OUTOF10: 3
PAINLEVEL_OUTOF10: 0
PAINLEVEL_OUTOF10: 8
PAINLEVEL_OUTOF10: 8
PAINLEVEL_OUTOF10: 4
PAINLEVEL_OUTOF10: 3
PAINLEVEL_OUTOF10: 5
PAINLEVEL_OUTOF10: 0
PAINLEVEL_OUTOF10: 0
PAINLEVEL_OUTOF10: 8
PAINLEVEL_OUTOF10: 0
PAINLEVEL_OUTOF10: 0
PAINLEVEL_OUTOF10: 8
PAINLEVEL_OUTOF10: 5
PAINLEVEL_OUTOF10: 0
PAINLEVEL_OUTOF10: 8
PAINLEVEL_OUTOF10: 8
PAINLEVEL_OUTOF10: 0
PAINLEVEL_OUTOF10: 7

## 2017-01-01 ASSESSMENT — ENCOUNTER SYMPTOMS
PALPITATIONS: 0
VOMITING: 1
DIFFICULTY THINKING: 1
VOMITING: 0
CHILLS: 0
SPUTUM PRODUCTION: 0
CHILLS: 0
FEVER: 0
CLAUDICATION: 0
DEPRESSED MOOD: 1
SPEECH CHANGE: 0
FEVER: 0
DEPRESSED MOOD: 1
SPEECH CHANGE: 0
BACK PAIN: 0
NAUSEA: DENIES
BACK PAIN: 0
BLURRED VISION: 0
DIZZINESS: 0
NERVOUS/ANXIOUS: 0
DIFFICULTY THINKING: 1
HEARTBURN: 0
SHORTNESS OF BREATH: 0
SORE THROAT: 0
COUGH: 0
BLURRED VISION: 0
SORE THROAT: 0
CHILLS: 0
CHILLS: 0
DEPRESSED MOOD: 1
TINGLING: 0
ABDOMINAL PAIN: 0
SORE THROAT: 0
WEAKNESS: 1
DIZZINESS: 0
PALPITATIONS: 0
FEVER: 0
VOMITING: 0
DEPRESSED MOOD: 1
NAUSEA: DENIES
DIFFICULTY THINKING: 1
TREMORS: 0
DIARRHEA: 0
DIZZINESS: 0
FEVER: 0
POOR JUDGMENT: 1
CARDIOVASCULAR NEGATIVE: 1
NECK PAIN: 0
WHEEZING: 0
GASTROINTESTINAL NEGATIVE: 1
DIFFICULTY THINKING: 1
WEIGHT LOSS: 0
CHILLS: 0
WEAKNESS: 1
CHILLS: 0
DOUBLE VISION: 0
SORE THROAT: 0
PALPITATIONS: 0
HEADACHES: 0
SPEECH CHANGE: 0
PALPITATIONS: 0
CHILLS: 0
PHOTOPHOBIA: 0
WEAKNESS: 1
NAUSEA: 0
PALPITATIONS: 0
PALPITATIONS: 0
FOCAL WEAKNESS: 0
BLURRED VISION: 0
NAUSEA: 0
ORTHOPNEA: 0
HEMOPTYSIS: 0
HEADACHES: 0
COUGH: 0
SHORTNESS OF BREATH: 0
ABDOMINAL PAIN: 0
CHILLS: 0
SHORTNESS OF BREATH: 0
SHORTNESS OF BREATH: 0
VOMITING: 0
PHOTOPHOBIA: 0
FEVER: 0
BLURRED VISION: 0
VOMITING: 0
DEPRESSION: 0
DIARRHEA: 0
WEAKNESS: 1
NAUSEA: 0
MYALGIAS: 0
DEPRESSION: 0
COUGH: 0
SHORTNESS OF BREATH: 0
CHILLS: 0
COUGH: 0
DIARRHEA: 0
MYALGIAS: 0
WEAKNESS: 1
NAUSEA: 0
COUGH: 0
VOMITING: 1
MYALGIAS: 0
VOMITING: 0
NECK PAIN: 0
HEARTBURN: 0
NAUSEA: 0
CONSTIPATION: 0
VOMITING: 0
VOMITING: 0
VOMITING: DENIES
FEVER: 0
FOCAL WEAKNESS: 0
DIFFICULTY THINKING: 1
INSOMNIA: 0
DIARRHEA: 0
NAUSEA: 1
WEAKNESS: 1
SHORTNESS OF BREATH: 0
MYALGIAS: 0
SORE THROAT: 0
FEVER: 0
BACK PAIN: 0
LOSS OF CONSCIOUSNESS: 0
CONSTIPATION: 0
SENSORY CHANGE: 0
SHORTNESS OF BREATH: 0
WEIGHT LOSS: 1
MYALGIAS: 1
DIZZINESS: 0
SHORTNESS OF BREATH: 0
WEAKNESS: 1
CHILLS: 0
WEAKNESS: 1
COUGH: 0
WEIGHT LOSS: 1
HEARTBURN: 0
SHORTNESS OF BREATH: 0
COUGH: 0
PSYCHIATRIC NEGATIVE: 1
SPUTUM PRODUCTION: 0
WHEEZING: 0
INSOMNIA: 0
HEARTBURN: 0
DIARRHEA: 1
EYE PAIN: 0
COUGH: 0
PHOTOPHOBIA: 0
SPUTUM PRODUCTION: 0
DEPRESSION: 0
COUGH: 0
MYALGIAS: 0
SORE THROAT: 0
DIZZINESS: 0
CHILLS: 0
HEARTBURN: 0
HEARTBURN: 0
FEVER: 0
FOCAL WEAKNESS: 0
HEMOPTYSIS: 0
ABDOMINAL PAIN: 0
DIZZINESS: 1
DIFFICULTY THINKING: 1
WEAKNESS: 1
HEMOPTYSIS: 0
COUGH: 0
BLURRED VISION: 0
BLURRED VISION: 0
COUGH: 0
COUGH: 0
NAUSEA: 1
MYALGIAS: 1
MYALGIAS: 0
NAUSEA: 1
DIARRHEA: 0
DIFFICULTY THINKING: 1
NECK PAIN: 0
NAUSEA: 0
NAUSEA: 0
WEAKNESS: 1
COUGH: 0
SHORTNESS OF BREATH: 0
BLURRED VISION: 0
HEADACHES: 0
PHOTOPHOBIA: 0
PHOTOPHOBIA: 0
WEAKNESS: 1
SHORTNESS OF BREATH: 0
WEAKNESS: 1
COUGH: 0
SHORTNESS OF BREATH: 0
MENTAL STATUS CHANGE: 0
VOMITING: 0
SHORTNESS OF BREATH: 0
PALPITATIONS: 0
VOMITING: DENIES
BLURRED VISION: 0
INSOMNIA: 0
NERVOUS/ANXIOUS: 0
STRIDOR: 0
MYALGIAS: 0
HEMOPTYSIS: 0
INSOMNIA: 0
NECK PAIN: 0
DIAPHORESIS: 0
LOSS OF CONSCIOUSNESS: 0
HEADACHES: 0
COUGH: 0
FEVER: 0
HEADACHES: 0
NAUSEA: 1
FEVER: 0
NAUSEA: 0
RESPIRATORY SYMPTOMS COMMENTS: NO SOB NOTED OR REPORTED.
LOSS OF CONSCIOUSNESS: 0
CHILLS: 0
VOMITING: 0
MENTAL STATUS CHANGE: 0
FEVER: 0
DIFFICULTY THINKING: 1
CHILLS: 0
HEADACHES: 0
MYALGIAS: 0
NECK PAIN: 0
SHORTNESS OF BREATH: 0
HEARTBURN: 0
WEAKNESS: 1
DIARRHEA: 0
MENTAL STATUS CHANGE: 0
ABDOMINAL PAIN: 0
INSOMNIA: 0
MENTAL STATUS CHANGE: 0
EYE PAIN: 0
COUGH: 0
PHOTOPHOBIA: 0
CLAUDICATION: 0
WEIGHT LOSS: 1
SPEECH CHANGE: 0
NAUSEA: DENIES
DIZZINESS: 0
DEPRESSION: 0
HEADACHES: 0
SEVERE DYSPNEA: 1
WHEEZING: 0
FEVER: 0
SPEECH CHANGE: 0
TREMORS: 0
DEPRESSION: 0
COUGH: 0
NAUSEA: 0
HEARTBURN: 0
WEIGHT LOSS: 1
FEVER: 0
MYALGIAS: 0
NAUSEA: 0
WHEEZING: 0
PHOTOPHOBIA: 0
WEAKNESS: 1
TREMORS: 0
DIARRHEA: 0
PALPITATIONS: 0
WHEEZING: 0
BLURRED VISION: 0
NERVOUS/ANXIOUS: 0
FLANK PAIN: 0
EYES NEGATIVE: 1
DIZZINESS: 0
TREMORS: 0
FEVER: 0
ABDOMINAL PAIN: 0
DIARRHEA: 0
COUGH: 0
COUGH: 0
VOMITING: 0
HEARTBURN: 0
DIAPHORESIS: 0
MENTAL STATUS CHANGE: 0
DIARRHEA: 0
DEPRESSION: 0
MYALGIAS: 0
DIZZINESS: 0
NAUSEA: 1
DOUBLE VISION: 0
SHORTNESS OF BREATH: 0
ABDOMINAL PAIN: 0
SORE THROAT: 1
ABDOMINAL PAIN: 0
WEAKNESS: 1
WEAKNESS: 1
COUGH: 0
FEVER: 0
TINGLING: 0
RESPIRATORY SYMPTOMS COMMENTS: DENIES SOB, RESP EVEN AND UNLABORED, BILAT CHEST RISE
NAUSEA: 0
CONSTIPATION: 0
POOR JUDGMENT: 1
WEIGHT LOSS: 1
NAUSEA: 0
BLURRED VISION: 0
FOCAL WEAKNESS: 0
WEAKNESS: 1
NAUSEA: DENIES
DIARRHEA: 0
WHEEZING: 0
TREMORS: 0
FEVER: 0
VOMITING: 0
DIZZINESS: 0
DIZZINESS: 0
TINGLING: 1
COUGH: 0
DIZZINESS: 0
NAUSEA: 1
VOMITING: 0
HEARTBURN: 0
FEVER: 0
HEADACHES: 0
SORE THROAT: 0
SENSORY CHANGE: 0
BLURRED VISION: 0
VOMITING: 0
RESPIRATORY SYMPTOMS COMMENTS: PT LUNGS CLEAR IN ALL FIELDS, NO ADVANTAGEOUS SOUND NOTED.
SENSORY CHANGE: 0
WEAKNESS: 1
PALPITATIONS: 0
DIZZINESS: 0
DIARRHEA: 1
DEPRESSION: 0
TINGLING: 0
WEAKNESS: 1
HEADACHES: 0
NAUSEA: 0
SENSORY CHANGE: 0
MYALGIAS: 0
TREMORS: 0
COUGH: 0
VOMITING: 0
WEIGHT LOSS: 0
NAUSEA: 0
CHILLS: 0
NAUSEA: 0
SORE THROAT: 1
FEVER: 0
ABDOMINAL PAIN: 0
NAUSEA: 0
WHEEZING: 0
DIARRHEA: 0
SORE THROAT: 0
SHORTNESS OF BREATH: 0
ABDOMINAL PAIN: 0
CHILLS: 0
DIZZINESS: 0
VOMITING: NO
ABDOMINAL PAIN: 0
NAUSEA: 1
CONSTIPATION: 0
NAUSEA: 0
COUGH: 0
FEVER: 0
ABDOMINAL PAIN: 0
DIARRHEA: 1
CHILLS: 0
WHEEZING: 0
DIAPHORESIS: 0
HEADACHES: 0
SHORTNESS OF BREATH: 0
NAUSEA: 0
DEPRESSED MOOD: 1
PALPITATIONS: 0
DIZZINESS: 0
DEPRESSION: 0
NAUSEA: 1
ABDOMINAL PAIN: 0
SPUTUM PRODUCTION: 0
SORE THROAT: 1
ORTHOPNEA: 0
DIZZINESS: 0
SPUTUM PRODUCTION: 0
NAUSEA: 0
BACK PAIN: 0
NERVOUS/ANXIOUS: 0
DOUBLE VISION: 0
WEAKNESS: 1
WEAKNESS: 1
VOMITING: DENIES
SORE THROAT: 0
HEMOPTYSIS: 0
SORE THROAT: 0
SHORTNESS OF BREATH: 0
NERVOUS/ANXIOUS: 0
CHILLS: 0
NAUSEA: DENIES
DEBILITATING PAIN: 1
VOMITING: 0
WHEEZING: 0
CHILLS: 0
VOMITING: NO
PHOTOPHOBIA: 0
NERVOUS/ANXIOUS: 0
FEVER: 0
NECK PAIN: 0
ABDOMINAL PAIN: 0
HEADACHES: 0
BACK PAIN: 0
SENSORY CHANGE: 0
FEVER: 0
WEAKNESS: 1
HEMOPTYSIS: 0
DEPRESSION: 0
VOMITING: 1
DIARRHEA: 0
STRIDOR: 0
NAUSEA: 0
NAUSEA: DENIES
HEARTBURN: 0
DIZZINESS: 0
FEVER: 0
VOMITING: 0
BLURRED VISION: 0
CHILLS: 0
DIAPHORESIS: 0
TINGLING: 0
SORE THROAT: 0
MENTAL STATUS CHANGE: 0
BLURRED VISION: 0
FEVER: 0
MYALGIAS: 0
NAUSEA: 1
DIZZINESS: 0
CLAUDICATION: 0
FEVER: 0
MYALGIAS: 0
CHILLS: 0
CHILLS: 0
TREMORS: 0
DIZZINESS: 0
RESPIRATORY NEGATIVE: 1
CONSTIPATION: 0
VOMITING: DENIES
SHORTNESS OF BREATH: 0
BACK PAIN: 1
NAUSEA: OCCASIONAL
WEAKNESS: 1
HEMOPTYSIS: 0
MENTAL STATUS CHANGE: 0
BLURRED VISION: 0
DIZZINESS: 0
CHILLS: 1
VOMITING: 0
BRUISES/BLEEDS EASILY: 0
NAUSEA: DENIES
SPUTUM PRODUCTION: 0
VOMITING: 0
HEARTBURN: 0
ABDOMINAL PAIN: 0
FOCAL WEAKNESS: 0
BRUISES/BLEEDS EASILY: 0
DIFFICULTY THINKING: 1
ABDOMINAL PAIN: 0
BLURRED VISION: 0
DIARRHEA: 1
PALPITATIONS: 0
CHILLS: 0
WEIGHT LOSS: 1
ABDOMINAL PAIN: 0
NECK PAIN: 0
VOMITING: 0
VOMITING: DENIES
COUGH: 0
HEADACHES: 0
DIZZINESS: 0
VOMITING: DENIES
NAUSEA: 0
NAUSEA: 0
HEARTBURN: 0
BACK PAIN: 0
HEARTBURN: 0
SPUTUM PRODUCTION: 1
CHILLS: 0
NAUSEA: 1
ORTHOPNEA: 0
FEVER: 1
COUGH: 0
CONSTIPATION: 0
NAUSEA: 0
SHORTNESS OF BREATH: 0
VOMITING: 0
HEADACHES: 0
WEIGHT LOSS: 1
POOR JUDGMENT: 1
ABDOMINAL PAIN: 0
BLURRED VISION: 0
PHOTOPHOBIA: 0
NAUSEA: 0
FEVER: 0
HEADACHES: 0
BRUISES/BLEEDS EASILY: 0
VOMITING: 0
SHORTNESS OF BREATH: 0
WEIGHT LOSS: 1
DEPRESSION: 0
DIARRHEA: 0
HEADACHES: 0
DIZZINESS: 0
HEADACHES: 0
INSOMNIA: 0
VOMITING: 0
NAUSEA: 0
DIARRHEA: 0
NAUSEA: 0
COUGH: 0
ABDOMINAL PAIN: 0
PHOTOPHOBIA: 0
WEAKNESS: 0
VOMITING: 0
COUGH: 0
SORE THROAT: 1
DIZZINESS: 0
ORTHOPNEA: 0
ABDOMINAL PAIN: 0
TINGLING: 0
SORE THROAT: 1
TREMORS: 0
SENSORY CHANGE: 0
WEAKNESS: 1
NAUSEA: 1
HEARTBURN: 0
VOMITING: 0
FEVER: 0
ABDOMINAL PAIN: 0
PALPITATIONS: 0
PALPITATIONS: 0
DIZZINESS: 0
DIZZINESS: 0
EYES NEGATIVE: 1
BLURRED VISION: 0
FEVER: 0
ABDOMINAL PAIN: 0
DOUBLE VISION: 0
MYALGIAS: 0
SORE THROAT: 0
VOMITING: 1
COUGH: 0
TINGLING: 0
WEIGHT LOSS: 1
SHORTNESS OF BREATH: 0
CHILLS: 0
FEVER: 0
CHILLS: 0
HEADACHES: 0
DIARRHEA: 0
SPUTUM PRODUCTION: 0
ORTHOPNEA: 0
FOCAL WEAKNESS: 0
FEVER: 0
SPUTUM PRODUCTION: 0
SENSORY CHANGE: 0
NAUSEA: 0
STRIDOR: 0
DEPRESSED MOOD: 1
RESPIRATORY SYMPTOMS COMMENTS: NO SOB NOTED OR REPORTED.
SENSORY CHANGE: 0
TINGLING: 0
BLURRED VISION: 0
DIFFICULTY THINKING: 1
HEARTBURN: 0
HEARTBURN: 0
BLURRED VISION: 0
FEVER: 0
VOMITING: 1
VOMITING: 0
CHILLS: 0
CONSTIPATION: 0
VOMITING: DENIES
HEARTBURN: 0
MYALGIAS: 0
CHILLS: 0
CHILLS: 0
HEARTBURN: 0
SPUTUM PRODUCTION: 0
DEPRESSION: 0
POOR JUDGMENT: 1
VOMITING: 0
MENTAL STATUS CHANGE: 0
SHORTNESS OF BREATH: 0
SENSORY CHANGE: 0
HEADACHES: 0
DIARRHEA: 0
BACK PAIN: 0
EYES NEGATIVE: 1
BACK PAIN: 1
DEBILITATING PAIN: 1
NAUSEA: 1
MYALGIAS: 0
NAUSEA: DENIES
HEADACHES: 0
DIZZINESS: 0
VOMITING: 1
NAUSEA: 0
TREMORS: 0
SHORTNESS OF BREATH: 0
BACK PAIN: 0
ABDOMINAL PAIN: 0
SHORTNESS OF BREATH: 0
MYALGIAS: 1
NAUSEA: DENIES
NAUSEA: 0
DIARRHEA: 0
DOUBLE VISION: 0
TREMORS: 0
COUGH: 0
VOMITING: 0
SHORTNESS OF BREATH: 0
DIARRHEA: 0
VOMITING: 0
HEARTBURN: 0
POOR JUDGMENT: 1
SHORTNESS OF BREATH: 0
DEPRESSION: 0
WHEEZING: 0
DIARRHEA: 0
VOMITING: 0
SHORTNESS OF BREATH: 0
CARDIOVASCULAR NEGATIVE: 1
RESPIRATORY NEGATIVE: 1
COUGH: 0
SENSORY CHANGE: 0
SORE THROAT: 1
ABDOMINAL PAIN: 0
DEPRESSION: 0
HEADACHES: 0
BACK PAIN: 0
DIARRHEA: 0
CLAUDICATION: 0
WHEEZING: 0
RESPIRATORY SYMPTOMS COMMENTS: DENIES SOB, RESP EVEN AND UNLABORED, BILAT CHEST RISE
HEADACHES: 0
DIZZINESS: 0
MYALGIAS: 0
NERVOUS/ANXIOUS: 0
NAUSEA: 0
FEVER: 0
DIAPHORESIS: 0
FEVER: 0
ABDOMINAL PAIN: 0
NAUSEA: DENIES
MYALGIAS: 0
FOCAL WEAKNESS: 0
NECK PAIN: 0
SENSORY CHANGE: 0
TINGLING: 0
CHILLS: 0
SHORTNESS OF BREATH: 0
WEAKNESS: 1
VOMITING: DENIES
BLURRED VISION: 0
BACK PAIN: 0
CHILLS: 0
STRIDOR: 0
DIZZINESS: 0
INSOMNIA: 0
DEPRESSION: 0
DIARRHEA: 1
DIZZINESS: 1
SHORTNESS OF BREATH: 0
ORTHOPNEA: 0
DIARRHEA: 0
HEMOPTYSIS: 0
COUGH: 0
CONSTIPATION: 0
FEVER: 0
DIZZINESS: 0
NAUSEA: 0
EYE REDNESS: 1
WEIGHT LOSS: 0
DIZZINESS: 0
ABDOMINAL PAIN: 0
COUGH: 0
MENTAL STATUS CHANGE: 0
VOMITING: 0
TREMORS: 0
WEAKNESS: 1
NECK PAIN: 0
HEADACHES: 0
SHORTNESS OF BREATH: 0
SHORTNESS OF BREATH: 0
NERVOUS/ANXIOUS: 0
HEADACHES: 0
VOMITING: 0
DIARRHEA: 0
SPEECH CHANGE: 0
HEADACHES: 0
HEARTBURN: 0
FEVER: 0
HEARTBURN: 0
VOMITING: 0
SPUTUM PRODUCTION: 0
DOUBLE VISION: 0
CLAUDICATION: 0
WEAKNESS: 1
ABDOMINAL PAIN: 0
WEAKNESS: 1
DIZZINESS: 0
FEVER: 0
SENSORY CHANGE: 0
NAUSEA: 0
NAUSEA: 0
HEARTBURN: 0
NAUSEA: DENIES
NAUSEA: 1
PALPITATIONS: 0
MYALGIAS: 1
CHILLS: 0
NAUSEA: 0
SORE THROAT: 0
CONSTIPATION: 0
NAUSEA: NO
STRIDOR: 0
CHILLS: 0
ORTHOPNEA: 0
MENTAL STATUS CHANGE: 0
COUGH: 1
WEIGHT LOSS: 1
COUGH: 0
HEADACHES: 0
FEVER: 0
EYES NEGATIVE: 1
DEPRESSION: 0
FEVER: 0

## 2017-01-01 ASSESSMENT — COGNITIVE AND FUNCTIONAL STATUS - GENERAL
DRESSING REGULAR UPPER BODY CLOTHING: A LOT
SUGGESTED CMS G CODE MODIFIER DAILY ACTIVITY: CK
WALKING IN HOSPITAL ROOM: TOTAL
CLIMB 3 TO 5 STEPS WITH RAILING: TOTAL
SUGGESTED CMS G CODE MODIFIER MOBILITY: CM
STANDING UP FROM CHAIR USING ARMS: A LOT
TURNING FROM BACK TO SIDE WHILE IN FLAT BAD: A LOT
MOBILITY SCORE: 8
DRESSING REGULAR UPPER BODY CLOTHING: A LOT
DAILY ACTIVITIY SCORE: 13
STANDING UP FROM CHAIR USING ARMS: TOTAL
WALKING IN HOSPITAL ROOM: TOTAL
MOVING TO AND FROM BED TO CHAIR: UNABLE
DAILY ACTIVITIY SCORE: 11
MOBILITY SCORE: 8
MOVING TO AND FROM BED TO CHAIR: A LOT
MOVING FROM LYING ON BACK TO SITTING ON SIDE OF FLAT BED: UNABLE
MOBILITY SCORE: 6
SUGGESTED CMS G CODE MODIFIER DAILY ACTIVITY: CL
EATING MEALS: A LITTLE
STANDING UP FROM CHAIR USING ARMS: A LOT
EATING MEALS: A LITTLE
MOVING TO AND FROM BED TO CHAIR: UNABLE
MOVING FROM LYING ON BACK TO SITTING ON SIDE OF FLAT BED: UNABLE
MOVING FROM LYING ON BACK TO SITTING ON SIDE OF FLAT BED: UNABLE
STANDING UP FROM CHAIR USING ARMS: TOTAL
DRESSING REGULAR UPPER BODY CLOTHING: A LOT
HELP NEEDED FOR BATHING: TOTAL
TOILETING: TOTAL
CLIMB 3 TO 5 STEPS WITH RAILING: TOTAL
EATING MEALS: A LITTLE
PERSONAL GROOMING: A LITTLE
SUGGESTED CMS G CODE MODIFIER MOBILITY: CN
TOILETING: A LOT
SUGGESTED CMS G CODE MODIFIER DAILY ACTIVITY: CL
PERSONAL GROOMING: A LITTLE
MOBILITY SCORE: 10
TOILETING: TOTAL
CLIMB 3 TO 5 STEPS WITH RAILING: TOTAL
SUGGESTED CMS G CODE MODIFIER MOBILITY: CL
WALKING IN HOSPITAL ROOM: TOTAL
DAILY ACTIVITIY SCORE: 14
HELP NEEDED FOR BATHING: A LOT
SUGGESTED CMS G CODE MODIFIER MOBILITY: CM
TURNING FROM BACK TO SIDE WHILE IN FLAT BAD: A LITTLE
HELP NEEDED FOR BATHING: A LOT
TURNING FROM BACK TO SIDE WHILE IN FLAT BAD: UNABLE
DRESSING REGULAR LOWER BODY CLOTHING: A LOT
MOVING FROM LYING ON BACK TO SITTING ON SIDE OF FLAT BED: UNABLE
DRESSING REGULAR LOWER BODY CLOTHING: TOTAL
WALKING IN HOSPITAL ROOM: TOTAL
TURNING FROM BACK TO SIDE WHILE IN FLAT BAD: A LOT
CLIMB 3 TO 5 STEPS WITH RAILING: TOTAL
MOVING TO AND FROM BED TO CHAIR: A LOT
DRESSING REGULAR LOWER BODY CLOTHING: A LOT
PERSONAL GROOMING: A LITTLE

## 2017-01-01 ASSESSMENT — LIFESTYLE VARIABLES
EVER_SMOKED: NEVER
DO YOU DRINK ALCOHOL: NO
DO YOU DRINK ALCOHOL: NO
EVER_SMOKED: NEVER
DO YOU DRINK ALCOHOL: NO
ALCOHOL_USE: NO
DO YOU DRINK ALCOHOL: NO
EVER_SMOKED: NEVER
ALCOHOL_USE: NO
EVER_SMOKED: NEVER
EVER_SMOKED: NEVER
DO YOU DRINK ALCOHOL: NO
EVER_SMOKED: NEVER
ALCOHOL_USE: NO
EVER_SMOKED: NEVER
DO YOU DRINK ALCOHOL: NO
ALCOHOL_USE: NO
DO YOU DRINK ALCOHOL: NO

## 2017-01-01 ASSESSMENT — COPD QUESTIONNAIRES
DO YOU EVER COUGH UP ANY MUCUS OR PHLEGM?: NO/ONLY WITH OCCASIONAL COLDS OR INFECTIONS
DURING THE PAST 4 WEEKS HOW MUCH DID YOU FEEL SHORT OF BREATH: NONE/LITTLE OF THE TIME
DO YOU EVER COUGH UP ANY MUCUS OR PHLEGM?: NO/ONLY WITH OCCASIONAL COLDS OR INFECTIONS
COPD SCREENING SCORE: 0
HAVE YOU SMOKED AT LEAST 100 CIGARETTES IN YOUR ENTIRE LIFE: NO/DON'T KNOW
COPD SCREENING SCORE: 0
HAVE YOU SMOKED AT LEAST 100 CIGARETTES IN YOUR ENTIRE LIFE: NO/DON'T KNOW

## 2017-01-01 ASSESSMENT — PATIENT HEALTH QUESTIONNAIRE - PHQ9
2. FEELING DOWN, DEPRESSED, IRRITABLE, OR HOPELESS: 00
SUM OF ALL RESPONSES TO PHQ9 QUESTIONS 1 AND 2: 0
SUM OF ALL RESPONSES TO PHQ9 QUESTIONS 1 AND 2: 0
2. FEELING DOWN, DEPRESSED, IRRITABLE, OR HOPELESS: 00
2. FEELING DOWN, DEPRESSED, IRRITABLE, OR HOPELESS: 00
1. LITTLE INTEREST OR PLEASURE IN DOING THINGS: NOT AT ALL
1. LITTLE INTEREST OR PLEASURE IN DOING THINGS: 00
SUM OF ALL RESPONSES TO PHQ QUESTIONS 1-9: 0
1. LITTLE INTEREST OR PLEASURE IN DOING THINGS: NOT AT ALL
2. FEELING DOWN, DEPRESSED, IRRITABLE, OR HOPELESS: NOT AT ALL
1. LITTLE INTEREST OR PLEASURE IN DOING THINGS: NOT AT ALL
SUM OF ALL RESPONSES TO PHQ9 QUESTIONS 1 AND 2: 0
1. LITTLE INTEREST OR PLEASURE IN DOING THINGS: 00
1. LITTLE INTEREST OR PLEASURE IN DOING THINGS: 00
SUM OF ALL RESPONSES TO PHQ9 QUESTIONS 1 AND 2: 0
2. FEELING DOWN, DEPRESSED, IRRITABLE, OR HOPELESS: NOT AT ALL
2. FEELING DOWN, DEPRESSED, IRRITABLE, OR HOPELESS: NOT AT ALL
1. LITTLE INTEREST OR PLEASURE IN DOING THINGS: 00
SUM OF ALL RESPONSES TO PHQ9 QUESTIONS 1 AND 2: 0
1. LITTLE INTEREST OR PLEASURE IN DOING THINGS: NOT AT ALL
SUM OF ALL RESPONSES TO PHQ QUESTIONS 1-9: 0
2. FEELING DOWN, DEPRESSED, IRRITABLE, OR HOPELESS: 00
SUM OF ALL RESPONSES TO PHQ QUESTIONS 1-9: 0
SUM OF ALL RESPONSES TO PHQ QUESTIONS 1-9: 0
1. LITTLE INTEREST OR PLEASURE IN DOING THINGS: NOT AT ALL
2. FEELING DOWN, DEPRESSED, IRRITABLE, OR HOPELESS: NOT AT ALL
SUM OF ALL RESPONSES TO PHQ QUESTIONS 1-9: 0

## 2017-01-01 ASSESSMENT — GAIT ASSESSMENTS
GAIT LEVEL OF ASSIST: UNABLE TO PARTICIPATE

## 2017-04-28 PROBLEM — E86.0 DEHYDRATION: Status: ACTIVE | Noted: 2017-01-01

## 2017-04-28 PROBLEM — E43 SEVERE PROTEIN-CALORIE MALNUTRITION (HCC): Status: ACTIVE | Noted: 2017-01-01

## 2017-04-28 NOTE — CARE PLAN
Problem: Safety  Goal: Will remain free from falls  Outcome: PROGRESSING AS EXPECTED  Pt instructed to use call light when she needs help. Pt stated that she understands that she can't walk by herself and will call for help when needed.    Problem: Fluid Volume:  Goal: Will maintain balanced intake and output  Outcome: PROGRESSING AS EXPECTED  Pt educated on the importance of hydration. Pt encouraged to take fluids and educated on the importance of IV fluids for rehydration.

## 2017-04-28 NOTE — IP AVS SNAPSHOT
4/30/2017    Roger Mckeon  [address]  [address]  [place] [address]    Dear Roger:    [facility] wants to ensure your discharge home is safe and you or your loved ones have had all of your questions answered regarding your care after you leave the hospital.    Below is a list of resources and contact information should you have any questions regarding your hospital stay, follow-up instructions, or active medical symptoms.    Questions or Concerns Regarding… Contact   Medical Questions Related to Your Discharge  (7 days a week, 8am-5pm) Contact a Nurse Care Coordinator   [phone]   Medical Questions Not Related to Your Discharge  (24 hours a day / 7 days a week)  Contact the Nurse Health Line   [phone]    Medications or Discharge Instructions Refer to your discharge packet   or contact your [facility] Primary Care Provider   [phone]   Follow-up Appointment(s) Schedule your appointment via TRAFI   or contact Scheduling [phone]   Billing Review your statement via TRAFI  or contact Billing [phone]   Medical Records Review your records via TRAFI   or contact Medical Records [phone]     You may receive a telephone call within two days of discharge. This call is to make certain you understand your discharge instructions and have the opportunity to have any questions answered. You can also easily access your medical information, test results and upcoming appointments via the TRAFI free online health management tool. You can learn more and sign up at [email or web address]. For assistance setting up your TRAFI account, please call [phone].    Once again, we want to ensure your discharge home is safe and that you have a clear understanding of any next steps in your care. If you have any questions or concerns, please do not hesitate to contact us, we are here for you. Thank you for choosing [facility] for your healthcare needs.    Sincerely,    Your [facility] Healthcare Team

## 2017-04-28 NOTE — DISCHARGE PLANNING
CARO Abel received a call from pt's Home Health CARO Cerna with [facility].  CARO Cerna states that pt has eye surgery scheduled with [facility] next Tuesday.  Pt went to [facility] yesterday with headache and vomiting and was d.c home.  When CARO Cerna saw pt today, she was worried about her mentation and pt still had headache with vomiting so she advised pt to go to [facility] ER.  Pt receives dialysis on Tuesday, Thursday and Saturday at [facility] on [address].  Pt's caregiver is her ex  but the Advanced Directive has not been completed.  Pt has Medicare and secondary is Medicaid FFS for 2 more days then switches to [facility].  [facility] CARO Cerna's extension is [phone].  Pt receives full care with all therapies with [facility]. CARO contacted bs JACOB Sigala to pass on this information.

## 2017-04-28 NOTE — ED NOTES
Pt BIB  via wheelchair c/o headache with N/V. Pt also c/o of generalized body aches and sore throat. Dialysis on Tu, thurs and saturdays.

## 2017-04-28 NOTE — IP AVS SNAPSHOT
CalmSea Access Code: [email or web address]  Expires: 5/30/2017 12:42 PM    Your email address is not on file at [facility].  Email Addresses are required for you to sign up for CalmSea, please contact [phone] to verify your personal information and to provide your email address prior to attempting to register for CalmSea.    Roger Mckeon  [address]  [address]  [place] [address]    CalmSea  A secure, online tool to manage your health information     [facility]’s CalmSea® is a secure, online tool that connects you to your personalized health information from the privacy of your home -- day or night - making it very easy for you to manage your healthcare. Once the activation process is completed, you can even access your medical information using the CalmSea raimundo, which is available for free in the Apple Raimundo store or Google Play store.     To learn more about CalmSea, visit [email or web address]    There are two levels of access available (as shown below):   My Chart Features  [facility] Primary Care Doctor [facility]  Specialists [facility]  Urgent  Care Non-[facility] Primary Care Doctor   Email your healthcare team securely and privately 24/7 X X X    Manage appointments: schedule your next appointment; view details of past/upcoming appointments X      Request prescription refills. X      View recent personal medical records, including lab and immunizations X X X X   View health record, including health history, allergies, medications X X X X   Read reports about your outpatient visits, procedures, consult and ER notes X X X X   See your discharge summary, which is a recap of your hospital and/or ER visit that includes your diagnosis, lab results, and care plan X X  X     How to register for CalmSea:  Once your e-mail address has been verified, follow the following steps to sign up for CalmSea.     1. Go to  [email or web address]  2. Click on the Sign Up Now box, which takes you to the New Member Sign Up page. You  will need to provide the following information:  a. Enter your Affinegy Access Code exactly as it appears at the top of this page. (You will not need to use this code after you’ve completed the sign-up process. If you do not sign up before the expiration date, you must request a new code.)   b. Enter your date of birth.   c. Enter your home email address.   d. Click Submit, and follow the next screen’s instructions.  3. Create a elastic.iot ID. This will be your Affinegy login ID and cannot be changed, so think of one that is secure and easy to remember.  4. Create a Affinegy password. You can change your password at any time.  5. Enter your Password Reset Question and Answer. This can be used at a later time if you forget your password.   6. Enter your e-mail address. This allows you to receive e-mail notifications when new information is available in Affinegy.  7. Click Sign Up. You can now view your health information.    For assistance activating your Affinegy account, call [phone]

## 2017-04-28 NOTE — PROGRESS NOTES
Patient brought up to floor by transport.  Tucked into bed, Delilah LEE checked vitals.  Patient reports she is tired and would like to sleep.  Skin has serveal scars from past skin ulcerations, will check skin with second RN.

## 2017-04-28 NOTE — IP AVS SNAPSHOT
" <p align=\"LEFT\"><IMG SRC=\"//[email or web address]$/[email or web address]\" alt=\"Image\" WIDTH=\"50%\" HEIGHT=\"200\" BORDER=\"\"></p>                   Name:Roger Mckeon  Medical Record Number:[id number]  CSN: [id number]    YOB: 1970   Age: 46 y.o.  Sex: female  HT:1.676 m (5' 5.98\") WT: 48.1 kg (106 lb 0.7 oz)          Admit Date: 4/28/2017     Discharge Date:   Today's Date: 4/30/2017  Attending Doctor:  Isaac Faith M.D.                  Allergies:  Oxycodone          Your appointments     May 01, 2017 To Be Determined   SN-HH-HOME VIS+LPN SUP+HHA SUP with Simone Brink R.N.   [facility] (--)    [facility] [facility]  [facility] [address]   [phone]            May 02, 2017 To Be Determined   AIDE-HH-HOME VISIT with Chelsi Carrero C.N.A.   [facility] (--)    [facility] [facility]  [facility] [address]   [phone]            May 03, 2017  3:00 PM   OT-HH-HOME VISIT with Evens Cottrell O.T.   [facility] (--)    [address] [facility]  [place] [address]   [phone]            May 05, 2017 To Be Determined   AIDE-HH-HOME VISIT with Chelsi Carrero C.N.A.   [facility] (--)    [address]  [place] [address]   [phone]            May 05, 2017 To Be Determined   SN-HH-HOME VIS+LPN SUP+HHA SUP with Elana Baker R.N.   [facility] (--)    [address]  [place] [address]   [phone]            May 05, 2017  3:00 PM   OT-HH-HOME VISIT with Evens Cottrell O.T.   [facility] (--)    [facility] [address] [facility]  [facility] [address]   [phone]            May 08, 2017 To Be Determined   SN-HH-HOME VIS+LPN SUP+HHA SUP with Val Bronson L.P.N.   [facility])    [facility]  [facility] [address]   [phone]            May 09, 2017 To Be Determined   AIDE-HH-HOME VISIT with Chelsi Carrero C.N.A.   [facility] (--)    [facility]  [facility] [address]   [phone]            May 10, 2017 To Be Determined   OT-HH-HOME VISIT with Evens Cottrell O.T. "   [facility] (--)    [facility]  [place] [address]   [phone]            May 11, 2017 To Be Determined   SN-HH-HOME VIS+LPN SUP+HHA SUP with Elana Baker R.N.   [facility] (--)    [facility].  [place] [address]   [phone]            May 12, 2017 To Be Determined   AIDE-HH-HOME VISIT with Christiano Giron C.N.A.   [facility] (--)    [facility] [address] [facility].  [place] [address]   [phone]            May 12, 2017 To Be Determined   GY-OQ-CEKYFTQNKY DISCHARGE with Evens Cottrell O.T.   [facility] (--)    [facility] [address] [facility].  [place] [address]   [phone]            May 16, 2017 To Be Determined   AIDE-HH-HOME VISIT with Chelsi Carrero C.N.A.   [facility] (--)    [facility] [address] [facility].  [place] [address]   [phone]            May 17, 2017 To Be Determined   SN-HH-HOME VISIT + HHA SUP with Elana Baker R.N.   [facility] (--)    [facility] [address] [facility].  [place] [address]   [phone]            May 19, 2017 To Be Determined   AIDE-HH-HOME VISIT with Chelsi Carrero C.N.A.   [facility] (--)    [facility] [address] [facility].  [place] [address]   [phone]            May 23, 2017 To Be Determined   AIDE-HH-HOME VISIT with Chelsi Carrero C.N.A.   [facility] (--)    [facility] [address] [facility].  [place] [address]   [phone]            May 25, 2017 To Be Determined   SN-HH-HOME VISIT with Simone Brink R.N.   [facility] (--)    [facility] [facility] [address]  [place] [address]   [phone]            May 26, 2017 To Be Determined   AIDE-HH-HOME VISIT with Chelsi Carrero C.N.A.   [facility] (--)    [facility]  [facility] [address]   [phone]            May 30, 2017 To Be Determined   AIDE-HH-HOME VISIT with Chelsi Carrero C.N.A.   [facility] (--)    [address]  [facility] [address]   [phone]            Jun 02, 2017 To Be Determined   AIDE-HH-HOME VISIT with Chelsi Carrero C.N.A.   [facility] (--)    [facility] [address]  [facility] [address]   [phone]            Jun 02, 2017 To Be Determined   SN-HH-OASIS RECERT+LPN/HHA SUP with Elana Baker R.N.   [facility] (--)    [address]  [place] [address]   [phone]              Follow-up Information     1. Follow up with Caridad Stone M.D..    Specialty:  Family Medicine    Contact information    [address] [address]  [phone]           Medication List      Take these Medications        Instructions    amlodipine 5 MG Tabs   Commonly known as:  NORVASC    Take 5 mg by mouth every evening.   Dose:  5 mg       carvedilol 6.25 MG Tabs   Commonly known as:  COREG    Take 1 Tab by mouth 2 times a day, with meals.   Dose:  6.25 mg       diphenoxylate-atropine 2.5-0.025 MG Tabs   Commonly known as:  LOMOTIL    Take 2 Tabs by mouth 3 times a day as needed.   Dose:  2 Tab       fluoxetine 10 MG Caps   Commonly known as:  PROZAC    Take 10 mg by mouth every day.   Dose:  10 mg       furosemide 40 MG Tabs   Commonly known as:  LASIX    Take 1 Tab by mouth 2 Times a Day.   Dose:  40 mg       hydrOXYzine 25 MG Tabs   What changed:    - how much to take  - when to take this  - additional instructions   Commonly known as:  ATARAX    Take 1-2 Tabs by mouth every 8 hours as needed for Anxiety.   Dose:  25-50 mg       K-PHOS 500 MG tablet   Generic drug:  potassium phosphate (monobasic)    Take 1 Tab by mouth every day at 6 PM.   Dose:  1 Tab       methocarbamol 750 MG Tabs   Commonly known as:  ROBAXIN    Take 750 mg by mouth every 6 hours as needed (pain, muscle spasm).   Dose:  750 mg       metoclopramide 10 MG Tabs   Commonly known as:  REGLAN    Take 1 Tab by mouth 4 Times a Day,Before Meals and at Bedtime.   Dose:  10 mg       ondansetron 4 MG Tabs tablet   Commonly known as:  ZOFRAN    Take 4 mg by mouth every 6 hours as needed for Nausea/Vomiting.   Dose:  4 mg       promethazine 25 MG Tabs   Commonly known as:  PHENERGAN    Take 25 mg by  mouth every four hours as needed for Nausea/Vomiting. may take every 4 to 6 hours   Dose:  25 mg       sodium bicarbonate 650 MG Tabs   Commonly known as:  SODIUM BICARBONATE    Take 1 Tab by mouth 3 times a day.   Dose:  650 mg       trazodone 50 MG Tabs   Commonly known as:  DESYREL    Take 50 mg by mouth at bedtime as needed for Sleep.   Dose:  50 mg       XANAX 0.25 MG Tabs   Generic drug:  alprazolam    Take 0.25 mg by mouth 3 times a day as needed.   Dose:  0.25 mg

## 2017-04-28 NOTE — IP AVS SNAPSHOT
" Home Care Instructions                                                                                                                  Name:Roger Mckeon  Medical Record Number:[id number]  CSN: [id number]    YOB: 1970   Age: 46 y.o.  Sex: female  HT:1.676 m (5' 5.98\") WT: 48.1 kg (106 lb 0.7 oz)          Admit Date: 4/28/2017     Discharge Date:   Today's Date: 4/30/2017  Attending Doctor:  Isaac Faith M.D.                  Allergies:  Oxycodone            Discharge Instructions       Discharge Instructions    Discharged to home by car with relative. Discharged via wheelchair, hospital escort: Yes.  Special equipment needed: Not Applicable    Be sure to schedule a follow-up appointment with your primary care doctor or any specialists as instructed.     Discharge Plan:   Diet Plan: Discussed  Activity Level: Discussed  Confirmed Follow up Appointment: Patient to Call and Schedule Appointment  Confirmed Symptoms Management: Discussed  Medication Reconciliation Updated: Yes  Influenza Vaccine Indication: Patient Refuses    I understand that a diet low in cholesterol, fat, and sodium is recommended for good health. Unless I have been given specific instructions below for another diet, I accept this instruction as my diet prescription.   Other diet: Renal diet    Special Instructions: None    · Is patient discharged on Warfarin / Coumadin?   No     · Is patient Post Blood Transfusion?  No    Depression / Suicide Risk    As you are discharged from this [facility] facility, it is important to learn how to keep safe from harming yourself.    Recognize the warning signs:  · Abrupt changes in personality, positive or negative- including increase in energy   · Giving away possessions  · Change in eating patterns- significant weight changes-  positive or negative  · Change in sleeping patterns- unable to sleep or sleeping all the time   · Unwillingness or inability to communicate  · Depression  · Unusual " sadness, discouragement and loneliness  · Talk of wanting to die  · Neglect of personal appearance   · Rebelliousness- reckless behavior  · Withdrawal from people/activities they love  · Confusion- inability to concentrate     If you or a loved one observes any of these behaviors or has concerns about self-harm, here's what you can do:  · Talk about it- your feelings and reasons for harming yourself  · Remove any means that you might use to hurt yourself (examples: pills, rope, extension cords, firearm)  · Get professional help from the community (Mental Health, Substance Abuse, psychological counseling)  · Do not be alone:Call your Safe Contact- someone whom you trust who will be there for you.  · Call your local CRISIS HOTLINE 233-1852 or 700-092-9240  · Call your local Children's Mobile Crisis Response Team [place] (596) 095-4588 or www.TiVo  · Call the toll free National Suicide Prevention Hotlines   · National Suicide Prevention Lifeline 683-815-ZNRT (3321)  · [place] Hope Line Network 800-SUICIDE (112-1423)    End-Stage Kidney Disease  The kidneys are two organs that lie on either side of the spine between the middle of the back and the front of the abdomen. The kidneys:   · Remove wastes and extra water from the blood.    · Produce important hormones. These help keep bones strong, regulate blood pressure, and help create red blood cells.    · Balance the fluids and chemicals in the blood and tissues.  End-stage kidney disease occurs when the kidneys are so damaged that they cannot do their job. When the kidneys cannot do their job, life-threatening problems occur. The body cannot stay clean and strong without the help of the kidneys. In end-stage kidney disease, the kidneys cannot get better. You need a new kidney or treatments to do some of the work healthy kidneys do in order to stay alive.  CAUSES   End-stage kidney disease usually occurs when a long-lasting (chronic) kidney disease gets  worse. It may also occur after the kidneys are suddenly damaged (acute kidney injury).   SYMPTOMS   2. Swelling (edema) of the legs, ankles, or feet.    3. Tiredness (lethargy).    4. Nausea or vomiting.    5. Confusion.    6. Problems with urination, such as:    1. Decreased urine production.    2. Frequent urination, especially at night.    3. Frequent accidents in children who are potty trained.    7. Muscle twitches and cramps.    8. Persistent itchiness.    9. Loss of appetite.    10. Headaches.    11. Abnormally dark or light skin.    12. Numbness in the hands or feet.    13. Easy bruising.    14. Frequent hiccups.    15. Menstruation stops.  DIAGNOSIS   Your health care provider will measure your blood pressure and take some tests. These may include:   2. Urine tests.    3. Blood tests.    4. Imaging tests, such as:    1. An ultrasound exam.    2. Computed tomography (CT).  5. A kidney biopsy.  TREATMENT   There are two treatments for end-stage kidney disease:   2. A procedure that removes toxic wastes from the body (dialysis).    3. Receiving a new kidney (kidney transplant).  Both of these treatments have serious risks and consequences. Your health care provider will help you determine which treatment is best for you based on your health, age, and other factors. In addition to having dialysis or a kidney transplant, you may need to take medicines to control high blood pressure (hypertension) and cholesterol and to decrease phosphorus levels in your blood.   HOME CARE INSTRUCTIONS  2. Follow your prescribed diet.    3. Take medicines only as directed by your health care provider.    4. Do not take any new medicines (prescription, over-the-counter, or nutritional supplements) unless approved by your health care provider. Many medicines can worsen your kidney damage or need to have the dose adjusted.    5. Keep all follow-up visits as directed by your health care provider.  MAKE SURE YOU:  · Understand these  instructions.  · Will watch your condition.  · Will get help right away if you are not doing well or get worse.     This information is not intended to replace advice given to you by your health care provider. Make sure you discuss any questions you have with your health care provider.     Document Released: 03/09/2005 Document Revised: 01/08/2016 Document Reviewed: 08/16/2013  [employer] Interactive Patient Education ©2016 [employer]      Your appointments     May 01, 2017 To Be Determined   SN-HH-HOME VIS+LPN SUP+HHA SUP with Simone Brink R.N.   [facility] (--)    [address]  [place] [address]   [phone]            May 02, 2017 To Be Determined   AIDE-HH-HOME VISIT with Chelsi Carrero C.N.A.   [facility] (--)    [address]  [place] [address]   [phone]            May 03, 2017  3:00 PM   OT-HH-HOME VISIT with Evens Cottrell O.T.   [facility] (--)    [address]  [place] [address]   [phone]            May 05, 2017 To Be Determined   AIDE-HH-HOME VISIT with Chelsi Carrero C.N.A.   [facility] (--)    [address]  [place] [address]   [phone]            May 05, 2017 To Be Determined   SN-HH-HOME VIS+LPN SUP+HHA SUP with Elana Baker R.N.   [facility] (--)    [address]  [place] [address]   [phone]            May 05, 2017  3:00 PM   OT-HH-HOME VISIT with Evens Cottrell O.T.   [facility] (--)    [address]  [place] [address]   [phone]            May 08, 2017 To Be Determined   SN-HH-HOME VIS+LPN SUP+HHA SUP with Val Bronson L.P.N.   [facility] (--)    [address]  [place] [address]   [phone]            May 09, 2017 To Be Determined   AIDE-HH-HOME VISIT with Chelsi Carrero C.N.A.   [facility] (--)    [address]  [facility] [address]   [phone]            May 10, 2017 To Be Determined   OT-HH-HOME VISIT with Evens Cottrell O.T.   [facility]  (--)    [address]  [place] [address]   [phone]            May 11, 2017 To Be Determined   SN-HH-HOME VIS+LPN SUP+HHA SUP with Elana Baker R.N.   [facility] (--)    [address]  [place] [address]   [phone]            May 12, 2017 To Be Determined   AIDE-HH-HOME VISIT with Christiano Giron C.N.A.   [facility] (--)    [facility]  [place] [address]   [phone]            May 12, 2017 To Be Determined   TS-IR-YAKJTBSGND DISCHARGE with Evens Cottrell O.T.   [facility] (--)    [facility] [address] [facility].  [place] [address]   [phone]            May 16, 2017 To Be Determined   AIDE-HH-HOME VISIT with Chelsi Carrero C.N.A.   [facility] (--)    [facility] [address] [facility].  [place] [address]   [phone]            May 17, 2017 To Be Determined   SN-HH-HOME VISIT + HHA SUP with Elana Baker R.N.   [facility] (--)    [facility]  [place] [address]   [phone]            May 19, 2017 To Be Determined   AIDE-HH-HOME VISIT with Chelsi Carrero C.N.A.   [facility] (--)    [facility] [address] [facility].  [place] [address]   [phone]            May 23, 2017 To Be Determined   AIDE-HH-HOME VISIT with Chelsi Carrero C.N.A.   [facility] (--)    [facility] [address] [facility].  [place] [address]   [phone]            May 25, 2017 To Be Determined   SN-HH-HOME VISIT with Simone Brink R.N.   [facility] (--)    [facility] [address] [facility].  [place] [address]   [phone]            May 26, 2017 To Be Determined   AIDE-HH-HOME VISIT with Chelsi Carrero C.N.A.   [facility] (--)    [address]  [facility] [address]   [phone]            May 30, 2017 To Be Determined   AIDE-HH-HOME VISIT with Chelsi Carrero C.N.A.   [facility] (--)    [address]  [facility] [address]   [phone]            Jun 02, 2017 To Be Determined   AIDE-HH-HOME VISIT with Chelsi Carrero C.N.A.   [facility] (--)    [address]  [place]  [place] [address]   [phone]            Jun 02, 2017 To Be Determined   SN-HH-OASIS RECERT+LPN/HHA SUP with Elana Baker R.N.   [facility] (--)    [address] [facility]  [place] [address]   [phone]              Follow-up Information     1. Follow up with Caridad Stone M.D..    Specialty:  Family Medicine    Contact information    [address] [address] [address]  [place] [address]  [phone]           Discharge Medication Instructions:    Below are the medications your physician expects you to take upon discharge:    Review all your home medications and newly ordered medications with your doctor and/or pharmacist. Follow medication instructions as directed by your doctor and/or pharmacist.    Please keep your medication list with you and share with your physician.               Medication List      CHANGE how you take these medications        Instructions    Morning Afternoon Evening Bedtime    hydrOXYzine 25 MG Tabs   What changed:    - how much to take  - when to take this  - additional instructions   Commonly known as:  ATARAX        Take 1-2 Tabs by mouth every 8 hours as needed for Anxiety.   Dose:  25-50 mg                          CONTINUE taking these medications        Instructions    Morning Afternoon Evening Bedtime    amlodipine 5 MG Tabs   Last time this was given:  5 mg on 4/29/2017  8:39 PM   Commonly known as:  NORVASC        Take 5 mg by mouth every evening.   Dose:  5 mg                        carvedilol 6.25 MG Tabs   Last time this was given:  6.25 mg on 4/30/2017  8:58 AM   Commonly known as:  COREG        Take 1 Tab by mouth 2 times a day, with meals.   Dose:  6.25 mg                        diphenoxylate-atropine 2.5-0.025 MG Tabs   Commonly known as:  LOMOTIL        Take 2 Tabs by mouth 3 times a day as needed.   Dose:  2 Tab                        fluoxetine 10 MG Caps   Last time this was given:  10 mg on 4/30/2017  8:58 AM   Commonly known as:  PROZAC        Take 10 mg by mouth every day.    Dose:  10 mg                        furosemide 40 MG Tabs   Commonly known as:  LASIX        Take 1 Tab by mouth 2 Times a Day.   Dose:  40 mg                        K-PHOS 500 MG tablet   Generic drug:  potassium phosphate (monobasic)        Take 1 Tab by mouth every day at 6 PM.   Dose:  1 Tab                        methocarbamol 750 MG Tabs   Commonly known as:  ROBAXIN        Take 750 mg by mouth every 6 hours as needed (pain, muscle spasm).   Dose:  750 mg                        metoclopramide 10 MG Tabs   Last time this was given:  10 mg on 4/30/2017 12:13 PM   Commonly known as:  REGLAN        Take 1 Tab by mouth 4 Times a Day,Before Meals and at Bedtime.   Dose:  10 mg                        ondansetron 4 MG Tabs tablet   Commonly known as:  ZOFRAN        Take 4 mg by mouth every 6 hours as needed for Nausea/Vomiting.   Dose:  4 mg                        promethazine 25 MG Tabs   Commonly known as:  PHENERGAN        Take 25 mg by mouth every four hours as needed for Nausea/Vomiting. may take every 4 to 6 hours   Dose:  25 mg                        sodium bicarbonate 650 MG Tabs   Commonly known as:  SODIUM BICARBONATE        Take 1 Tab by mouth 3 times a day.   Dose:  650 mg                        trazodone 50 MG Tabs   Commonly known as:  DESYREL        Take 50 mg by mouth at bedtime as needed for Sleep.   Dose:  50 mg                        XANAX 0.25 MG Tabs   Generic drug:  alprazolam        Take 0.25 mg by mouth 3 times a day as needed.   Dose:  0.25 mg                          STOP taking these medications     AMBIEN 5 MG Tabs   Generic drug:  zolpidem                       Instructions           Diet / Nutrition:    Follow any diet instructions given to you by your doctor or the dietician, including how much salt (sodium) you are allowed each day.    If you are overweight, talk to your doctor about a weight reduction plan.    Activity:    Remain physically active following your doctor's instructions  about exercise and activity.    Rest often.     Any time you become even a little tired or short of breath, SIT DOWN and rest.    Worsening Symptoms:    Report any of the following signs and symptoms to the doctor's office immediately:    *Pain of jaw, arm, or neck  *Chest pain not relieved by medication                               *Dizziness or loss of consciousness  *Difficulty breathing even when at rest   *More tired than usual                                       *Bleeding drainage or swelling of surgical site  *Swelling of feet, ankles, legs or stomach                 *Fever (>100ºF)  *Pink or blood tinged sputum  *Weight gain (3lbs/day or 5lbs /week)           *Shock from internal defibrillator (if applicable)  *Palpitations or irregular heartbeats                *Cool and/or numb extremities    Stroke Awareness    Common Risk Factors for Stroke include:    Age  Atrial Fibrillation  Carotid Artery Stenosis  Diabetes Mellitus  Excessive alcohol consumption  High blood pressure  Overweight   Physical inactivity  Smoking    Warning signs and symptoms of a stroke include:    *Sudden numbness or weakness of the face, arm or leg (especially on one side of the body).  *Sudden confusion, trouble speaking or understanding.  *Sudden trouble seeing in one or both eyes.  *Sudden trouble walking, dizziness, loss of balance or coordination.Sudden severe headache with no known cause.    It is very important to get treatment quickly when a stroke occurs. If you experience any of the above warning signs, call 911 immediately.                   Disclaimer         Quit Smoking / Tobacco Use:    I understand the use of any tobacco products increases my chance of suffering from future heart disease or stroke and could cause other illnesses which may shorten my life. Quitting the use of tobacco products is the single most important thing I can do to improve my health. For further information on smoking / tobacco cessation call  a Toll Free Quit Line at 1-870.551.6749 (*[employer] [place]) or 1-850.536.7483 (American Lung Association) or you can access the web based program at www.lungusa.org.    [place] Tobacco Users Help Line:  (556) 770-9133       Toll Free: 1-403.413.4088  Quit Tobacco Program [facility] [phone]    Crisis Hotline:    [place] Crisis Hotline:  2-987-XUSCAQW or 1-402.801.3038    [place] Crisis Hotline:    1-882.677.6282 or 581-580-6398    Discharge Survey:   Thank you for choosing [facility]. We hope we did everything we could to make your hospital stay a pleasant one. You may be receiving a phone survey and we would appreciate your time and participation in answering the questions. Your input is very valuable to us in our efforts to improve our service to our patients and their families.        My signature on this form indicates that:    1. I have reviewed and understand the above information.  2. My questions regarding this information have been answered to my satisfaction.  3. I have formulated a plan with my discharge nurse to obtain my prescribed medications for home.                  Disclaimer         __________________________________                     __________       ________                       Patient Signature                                                 Date                    Time

## 2017-04-28 NOTE — ED NOTES
Unable to complete med rec at this time, patient was talking about tourists in [facility], could not get her to talk to me.  Tried twice

## 2017-04-28 NOTE — ED NOTES
1315 Assumed pt care Pt sleeping roused to verbal Denies pain Still has mild nausea No emesis VSS Drifts easily back to sleep  NSL patent Awaiting ERP  At bedside re eval  1320 PO decr 88 % while sleeping O 2 applied  1410 2 nd lactate sent

## 2017-04-28 NOTE — ED PROVIDER NOTES
ED Provider Note    CHIEF COMPLAINT  Chief Complaint   Patient presents with   • Headache   • N/V   • Sore Throat   • Body Aches         HPI  Roger Mckeon is a 46 y.o. female who presents to the ED secondary to nausea vomiting and headache. The patient is chronically ill with end-stage renal disease, on hemodialysis, states that she's been having nausea vomiting for approximately week. Last vomited this morning, she vomits 1-2 times daily. She did have some diarrhea 2 days ago. Patient states that she has a headache, intermittent for last 3 days, on the top of her head, radiates from her neck. No numbness, tingling, weakness. Significant other at the bedside is concerned about the patient's mentation today. The patient does complain of some abdominal pain that started today on the right side, dull aching pain. Patient does make small amounts of urine, no hematuria or dysuria. No fevers. Last hemodialysis was yesterday. No chest pains or shortness of breath. Just seen at [facility] a few days ago secondary to headache nausea vomiting and was discharged.    REVIEW OF SYSTEMS  See HPI for further details. All other systems are negative.     PAST MEDICAL HISTORY  Past Medical History   Diagnosis Date   • H/O: hysterectomy    • Hypertension    • Renal failure    • Diabetes      on tx since 1999, diet controlled   • Dental disorder      upper dentures   • Dialysis patient      3 x week       FAMILY HISTORY  Family History   Problem Relation Age of Onset   • Diabetes Mother    • Diabetes Father    • Hypertension Father    • Diabetes Sister        SOCIAL HISTORY  Social History     Social History   • Marital Status:      Spouse Name: N/A   • Number of Children: N/A   • Years of Education: N/A     Social History Main Topics   • Smoking status: Never Smoker    • Smokeless tobacco: None   • Alcohol Use: No   • Drug Use: No   • Sexual Activity: Not Asked     Other Topics Concern   • None     Social History Narrative  "      SURGICAL HISTORY  Past Surgical History   Procedure Laterality Date   • Hysterectomy, vaginal     • Other       back surgery    • Incision and drainage general  2013     Performed by Conrado Prado M.D. at SURGERY [facility]   • Gastroscopy with biopsy N/A 2016     Procedure: GASTROSCOPY WITH BIOPSY;  Surgeon: Rachid Salazar M.D.;  Location: SURGERY [facility];  Service:    • Gyn surgery          • Av fistula creation Left 10/4/2016     Procedure: AV FISTULA GRAFT CREATION;  Surgeon: Travon Franco M.D.;  Location: SURGERY [facility];  Service:        CURRENT MEDICATIONS  Home Medications     Reviewed by Miguel Angel Cordova R.N. (Registered Nurse) on 17 at 1553  Med List Status: Unable to Obtain    Medication Last Dose Status    alprazolam (XANAX) 0.25 MG Tab unk Active    amlodipine (NORVASC) 5 MG Tab unk Active    carvedilol (COREG) 6.25 MG Tab unk Active    diphenoxylate-atropine (LOMOTIL) 2.5-0.025 MG Tab unk Active    fluoxetine (PROZAC) 10 MG Cap unk Active    furosemide (LASIX) 40 MG Tab unk Active    hydrOXYzine (ATARAX) 25 MG Tab unk Active    methocarbamol (ROBAXIN) 750 MG Tab unk Active    metoclopramide (REGLAN) 10 MG Tab unk Active    ondansetron (ZOFRAN) 4 MG Tab tablet unk Active    potassium phosphate, monobasic, (K-PHOS) 500 MG tablet unk Active    promethazine (PHENERGAN) 25 MG Tab unk Active    sodium bicarbonate (SODIUM BICARBONATE) 650 MG Tab unk Active    trazodone (DESYREL) 50 MG Tab unk Active    zolpidem (AMBIEN) 5 MG Tab unk Active                ALLERGIES  Allergies   Allergen Reactions   • Oxycodone Itching     RXN=unknown       PHYSICAL EXAM  VITAL SIGNS: /72 mmHg  Pulse 90  Temp(Src) 36.8 °C (98.2 °F)  Resp 16  Ht 1.676 m (5' 5.98\")  Wt 48.1 kg (106 lb 0.7 oz)  BMI 17.12 kg/m2  SpO2 100%  Constitutional: Chronically ill appearing and in mild to moderate  HENT: Normocephalic, Atraumatic, Bilateral external ears normal, " Oropharynx with thrush and dry mucous membranes, No oral exudates, Nose normal.   Eyes: PERRLA, EOMI, Conjunctiva normal, No discharge.   Neck: Normal range of motion, mild paraspinal tenderness that exacerbates the patient's headache., Supple, No stridor.   Lymphatic: No lymphadenopathy noted.   Cardiovascular: Normal heart rate, Normal rhythm. Dialysis catheter in the right chest wall  Thorax & Lungs: Normal breath sounds, No respiratory distress, No wheezing, No chest tenderness.   Abdomen: Soft, mild tenderness on the right side, no rebound, no peritoneal signs  Skin: Warm, Dry, No erythema, No rash.   Back: No tenderness, No CVA tenderness.   Extremities: Intact distal pulses, No edema, No tenderness.   Neurologic: Awake and alert, slightly disoriented, moves all extremities spontaneously. Good muscle strength throughout    RADIOLOGY/PROCEDURES  CT-ABDOMEN-PELVIS W/O   Final Result      1.  Diffuse intra-abdominal and subcutaneous edema is consistent with anasarca.      2.  Small amount of ascites.      3.  Cholelithiasis      4.  Atherosclerosis.      5.  Moderate amount of stool.      6.  Mild splenomegaly.      7.  Tiny focus of air in the right hemipelvis appears extraperitoneal and may be located within the right vaginal fornix.      CT-HEAD W/O   Final Result      No acute intracranial findings.         DX-CHEST-PORTABLE (1 VIEW)   Final Result      Mild perihilar interstitial opacities are suggestive of mild pulmonary edema.        EKG, interpreted by myself, shows normal sinus rhythm. 96, normal P waves, normal QRS, normal ST and T waves, normal axis, normal intervals, low voltage throughout, overall nonspecific EKG. Unchanged from 9/14/16    COURSE & MEDICAL DECISION MAKING  Pertinent Labs & Imaging studies reviewed. (See chart for details)  Patient is chronically ill who appears dehydrated with nausea vomiting and a headache. Patient does appear ill here today, does appear to be getting dehydrated,  we'll give the patient some IV fluids, patient does have a headache with some altered mental status, due to her chronic illness and dialysis I will get a CT scan of the head, believe her headache is likely secondary to tension headache. The patient is also complaining of some abdominal pain and therefore we'll get a CT scan of the abdomen and pelvis. This all could be viral versus metabolic versus infection versus intracranial abnormality. We will attempt to get medical records from [facility].    Workup here was unremarkable including a CT scan of the head, CT scan abdomen pelvis however the patient still appears ill, dehydrated, with intractable nausea vomiting, discussed the case with Dr. Dixon for admission to hospital.    FINAL IMPRESSION  1. Intractable vomiting with nausea, unspecified vomiting type    2. Generalized abdominal pain    3. ESRD (end stage renal disease) (CMS-HCC)            This dictation was created using voice recognition software. The accuracy of the dictation is limited to the abilities of the software. I expect there may be some errors of grammar and possibly content. The nursing notes were reviewed and certain aspects of this information were incorporated into this note.    Electronically signed by: Mono Wayne, 4/28/2017 10:55 AM

## 2017-04-28 NOTE — PROGRESS NOTES
[facility] Medicine H/P, Adult, Complex               Author: Randy Watts Date & Time created: 4/28/2017  2:33 PM     ID: PCP Dr. Stone     Subjective:  CC: Headache, nausea, vomiting, sore throat, dry, body aches     HPI: Pt is a 46 year old female with a history of end stage renal disease, managing with dialysis, who presents to the ED with complaints of headache, nausea, vomiting, sore throat, and dryness. She has been nausea for a week and has vomited 2x since last night. Her sore throat may be due to the oral thrush which she states has been ongoing for a day. She is extremely lethargic and has been nodding on and off during history and physical examination. She has not been eating well. She is wheelchair bound. Her last dialysis was yesterday. She denies fever, chills, diarrhea, chest pain, shortness of breath.     PMH:  1. End stage Renal Disease  2. Diabetes  3. Benign Hypertension   4. Acute renal failure  5. Candidiasis of esophagus   6. Gastroparesis  7. Pulmonary nodules     PSH:   1. Hysterectomy  2. Gastroscopy with biopsy   3. Gynecological surgery   4. AV fistula 2016  5. Central line placement for dialysis 9/2016    Family History:  · Mother- diabetes  · Father - diabetes, hypertension   · Sister - diabetes     Social History:  · Denies alcohol, tobacco, and illicit drug use     Medications:  · Promethazine 25 MG PO   · Robaxin 750 MG   · Zofran 4 MG  · Xanax 0.25 MG  · Prozac 10 MG   · K-Phos 500 MG   · Ambien 5MG  · Lomotil 2.5-0.025 MG  · Atarax 25 MG  · Norvasc 5 MG  · Desyrel 50 MG  · Lasix 40 MG  · Coreg 6.25 MG  · Reglan 10 MG  · Sodium Bicarbonate 650 MG    Allergies/Adverse Reactions:  · Oxycodone - itching     Objective:    Review of Systems:  Review of Systems   Constitutional: Positive for weight loss. Negative for fever and chills.   HENT: Positive for sore throat.    Eyes: Positive for redness (Left eye).   Respiratory: Negative for cough, hemoptysis and shortness of breath.     Cardiovascular: Negative for chest pain, palpitations, orthopnea and leg swelling.   Gastrointestinal: Positive for nausea and vomiting. Negative for abdominal pain and diarrhea.   Neurological: Positive for weakness. Negative for headaches.   Psychiatric/Behavioral: Negative.    All other systems reviewed and are negative.      Physical Exam:  Physical Exam   Constitutional:   Diffuse muscle wasting    HENT:   Head: Normocephalic and atraumatic.   Oral thrush  Dry mucus membranes   Eyes: Pupils are equal, round, and reactive to light.   Left eye redness   Neck: Normal range of motion. Neck supple.   Cardiovascular: Normal rate, regular rhythm and normal heart sounds.    Pulmonary/Chest: Effort normal and breath sounds normal.   Abdominal: Soft. She exhibits no distension. There is no tenderness. There is no rebound.   Musculoskeletal: Normal range of motion. She exhibits no edema or tenderness.   Neurological:   Lethargic but awoken to sound     Skin:   Scar on lower leg extremities   Healing ulcers    Nursing note and vitals reviewed.      Labs:  Recent Labs      17   1152  17   1410   ISTATSPEC  Venous  Venous     Recent Labs      17   1152   TROPONINI  <0.02   BNPBTYPENAT  536*     Recent Labs      17   1152   SODIUM  134*   POTASSIUM  3.7   CHLORIDE  95*   CO2  31   BUN  17   CREATININE  1.99*   CALCIUM  7.8*     Recent Labs      17   1152   ALTSGPT  13   ASTSGOT  24   ALKPHOSPHAT  101*   TBILIRUBIN  0.5   LIPASE  18   GLUCOSE  84     Recent Labs      17   1152   RBC  2.81*   HEMOGLOBIN  8.6*   HEMATOCRIT  26.8*   PLATELETCT  154*     Recent Labs      17   1152   WBC  3.7*   NEUTSPOLYS  83.20*   LYMPHOCYTES  7.90*   MONOCYTES  7.90   EOSINOPHILS  0.00   BASOPHILS  0.50   ASTSGOT  24   ALTSGPT  13   ALKPHOSPHAT  101*   TBILIRUBIN  0.5           Hemodynamics:  Temp (24hrs), Av.2 °C (98.9 °F), Min:37.2 °C (98.9 °F), Max:37.2 °C (98.9 °F)  Temperature: 37.2 °C (98.9  °F)  Pulse  Av.7  Min: 89  Max: 96Heart Rate (Monitored): 89  Blood Pressure: 143/86 mmHg, NIBP: 130/80 mmHg     Respiratory:    Respiration: 16, Pulse Oximetry: 100 %           Fluids:  No intake or output data in the 24 hours ending 17 1433  Weight: 49.442 kg (109 lb)  GI/Nutrition:  Orders Placed This Encounter   Procedures   • Diet Order     Standing Status: Standing      Number of Occurrences: 1      Standing Expiration Date:      Order Specific Question:  Diet:     Answer:  Renal [8]       Labs:  WBC 3.7, RBC 2.81, Hemoglobin 8.6, HCT 26.8. MCHC 32.1, Platelets 154, PMNs 83.20, Lymphocyte 7.9, Sodium 134, Chloride 95, Creatinine 1.99, Calcium 7.8, Alk Phos 101, Albumin 2.2.      Medical Decision Making, by Problem:  Active Hospital Problems    Diagnosis   • ESRD (end stage renal disease) (CMS-HCC) [N18.6]  · History of and getting dialysis Tuesday, Thursday,   · Central line in place   · AV fistula in place   · BNP high which is expected in dialysis patients   · Low GFR  · Low hemoglobin, Epogen with dialysis, will f/u with nephrology  · Monitor electrolytes  Possible Bacteremia:  · D/t to central line which has been in since last year   · Will follow up with blood cultures    • Diabetes (CMS-HCC) [E11.9]  · Family history of and personal history of   · Physical exam showed scarring of lower legs from healing of ulcers  · Glucose levels normal at 84 on CMP  · High blood sugar seems to have resolved with dialysis treatment   • Dehydration [E86.0]  · Dry mucus membranes, oral thrush growth  · Giving NS infusion, monitor electrolytes   Body Aches:     Oral Thrush  · Evidence on oral exam and sore throat symptom   · Starting oral nystatin   • Severe protein-calorie malnutrition (CMS-HCC) [E43]  · Diffuse muscle wasting   · Dietary consultation    • HTN (hypertension), benign [I10]  · Currently on Coreg, Vasotec   Nausea/Vomitting:  · Zofran, Phengran, compazine regime   Left eye conjunctiva  :  · Artifical tears PRN   Pain:   · Tylenol, Dilaudid   Anxiety:  · Currently on Xanax  Itching:  · Currently on Atarax   Constipation:  · Using miralax, pericolace, milk of magnesia, dulcolax    Code Status: Full Code         EKG reviewed, Radiology images reviewed, Labs reviewed and Medications reviewed

## 2017-04-29 NOTE — CARE PLAN
Problem: Venous Thromboembolism (VTW)/Deep Vein Thrombosis (DVT) Prevention:  Goal: Patient will participate in Venous Thrombosis (VTE)/Deep Vein Thrombosis (DVT)Prevention Measures  Outcome: PROGRESSING AS EXPECTED    04/28/17 2148   OTHER   Risk Assessment Score 2   VTE RISK Moderate   Pharmacologic Prophylaxis Used Unfractionated Heparin

## 2017-04-29 NOTE — H&P
IDENTIFICATION:  The patient is a 46-year-old female, patient of Caridad Stone   as well as nephrology, Dr. Najjar.    CHIEF COMPLAINT:  Nausea, vomiting and weakness for 1 week.    HISTORY OF PRESENT ILLNESS:  The patient is a 46-year-old female who has   end-stage renal disease, on dialysis Tuesday, Thursday, and Saturdays.  She   states that the last week she has been feeling weaker and has had nausea.  She   did vomit a few times as well bilious material.  She did vomit earlier today   was last time she did vomit and her weakness seemed to be progressively   getting worse.  She normally is wheelchair bound and she states she has also   had a headache for last few days as well as sore throat and generalized body   aches.  She did have her last dialysis yesterday.  She denies any fevers,   chills, swelling in her legs, rash, itching, diarrhea or dysuria.    REVIEW OF SYSTEMS:  A 10-point review of systems reviewed and otherwise   negative.  She states she is very sleepy.    PAST MEDICAL HISTORY:  End-stage renal disease, on dialysis Tuesday, Thursday,   Saturdays; essential hypertension; type 2 diabetes mellitus; candidiasis of   the esophagus; external hemorrhoids and pulmonary nodule.    OUTPATIENT MEDICATIONS:  Phenergan 25 mg every 4 hours as needed for nausea or   vomiting, Norvasc 5 mg daily, trazodone 50 mg at bedtime as needed for   insomnia, Robaxin 750 mg every 6 hours as needed for pain, Zofran 4 mg every 6   hours as needed for nausea or vomiting, Xanax 0.25 mg 3 times daily as needed   for anxiety, Prozac 10 mg daily, potassium phosphate 500 mg daily, Ambien 5   mg at bedtime as needed for insomnia, Lomotil 2 tablets 3 times daily as   needed for loose stools, Atarax 25 mg 1-2 tablets every 8 hours as needed for   anxiety, Lasix 40 mg twice daily, Coreg 6.25 mg twice daily with meals, Reglan   10 mg 4 times daily before each meal and at bedtime and sodium bicarbonate   650 mg 3 times daily.    DRUG  ALLERGIES:  OXYCODONE.    SOCIAL HISTORY:  Patient denies any tobacco or alcohol use.  She is mostly   wheelchair bound.  She lives with her .    FAMILY HISTORY:  The patient does have family history of diabetes.    PHYSICAL EXAMINATION:  VITAL SIGNS:  Temperature 98.9, blood pressure 152/90, heart rate 96,   respiratory rate 20, oxygen saturation 98% on room air.  GENERAL:  Patient is a very frail-appearing female who is quite sleepy.  She   is easily arousable, but does fall back asleep easily and appears diffusely   weak.  She also has diffuse muscle wasting.  HEENT:  Head atraumatic.  Extraocular movements intact.  Pupils equal, round,   reactive to light.  Sclera of the left eye is injected.  Sclera of the right   eye is clear.  Conjunctivae pink.  Mucous membranes are dry.  Oropharynx has   white plaques coating the entire tongue.  NECK:  Supple.  No jugular venous distention.  Trachea midline.  No anterior,   posterior cervical or supraclavicular lymphadenopathy.  HEART:  Regular rate and rhythm with no murmur.  Point of maximal impulse is   diffuse.  LUNGS:  Clear to auscultation bilaterally with diminished breath sounds in the   bases.  CHEST:  Symmetric with respiration. Dialysis catheter is in place over the right   Upper chest  ABDOMEN:  Soft, nontender, nondistended with normoactive bowel sounds.  No   palpable hepatosplenomegaly.  EXTREMITIES:  No clubbing, cyanosis or edema.  She does have scars over both   of her lower extremities consistent with prior leg ulcers which are well   healed.  Pedal pulses are diminished bilaterally.  Radial pulses are 2+   bilaterally.  She does not have any leg pain on palpation.  She is moving all   4 extremities equally.  Palpable thrill over the left upper arm.    LABORATORY DATA:  Lactate 0.82.  Beta natriuretic peptide 536.  Lipase 18.    Sodium 134, potassium 3.7, chloride 95, bicarbonate 31, BUN 17, creatinine   1.99, calcium 7.8, albumin 2.2.  Troponin  less than 0.02.  WBC 3.7, hemoglobin   8.6, hematocrit 26.8, platelets of 154.    IMAGING:  CT scan abdomen and pelvis without contrast shows diffuse   intraabdominal and subcutaneous edema, small amount of ascites,   cholelithiasis, atherosclerosis and moderate amount of stool, mild   splenomegaly and tiny focus of air within the right hemipelvis, which appears   to be located in the right vaginal fornix.  CT scan of the head shows no acute   bleed.  Chest x-ray shows mild perivascular congestion.    ASSESSMENT AND PLAN:  1.  Severe dehydration.  I will admit the patient to the hospital and treat   her with cautious IV fluids.  Given her kidney disease, I have consulted   nephrology, Dr. Najjar, on the case.  He will see the patient and evaluate her   for possible dialysis later this evening.  I will check blood cultures as her   Dialysis catheter has been in place since September 2016 and she is  Having progressive weakness and is immunocompromised due to   Her long standing diabetes and renal disease.  2.  End-stage renal disease, on dialysis Tuesday, Thursday, Saturdays.  The   patient will be scheduled for dialysis while in the hospital.  3.  Large amount of stool indicating constipation.  We will treat her with   stool softeners and MiraLax will be fine in this end-stage renal disease   patient.  I will order for MiraLax daily.  4.  Oral thrush which is quite severe.  The patient is having history of   esophageal candidiasis.  I will order for oral nystatin to treat this with   swish and swallow instructions.  5.  Severe protein-calorie malnutrition.  The patient is having diffuse muscle   wasting and having albumin of 2.2.  I will order for dietary consult to see   what supplements or intervention would be useful to help this patient and her   nutritional status.  6.  Hyponatremia with hypochloremia due to dehydration.  I will monitor her   labs with fluids.  7.  Normocytic anemia due to end-stage renal  disease.  The patient received   Epogen therapy with dialysis if indicated per nephrology recommendations.  8.  Patient's code status is a full code.       ____________________________________     MD GIULIANO CASTRO / JUAN    DD:  04/28/2017 15:25:18  DT:  04/28/2017 17:01:33    D#:  [id number]  Job#:  [id number]

## 2017-04-29 NOTE — PROGRESS NOTES
Hemodialysis done today, started @ 1010 and ended @ 1311 with net UF= 2000 ml, report given to JACOB Duron, see flow sheet for details

## 2017-04-29 NOTE — DISCHARGE PLANNING
Medical Social Work    Referral: Pt discussed at IDT rounds this AM.    Intervention: Per flowsheet, pt's ex  is here caregiver and expects to d.c home.  Pt is using [facility] currently.  SW will need a HH order prior to d.c in order to re-establish with HH.  Pt has eye surgery scheduled with [facility] on Tuesday.  (See previous note for more information).      Plan: SW available for any assistance with d.c planning.    Care Transition Team Assessment    Information Source  Orientation : Oriented x 4  Information Given By: Patient    Readmission Evaluation  Is this a readmission?: No    Elopement Risk  Legal Hold: No  Ambulatory or Self Mobile in Wheelchair: No-Not an Elopement Risk  Elopement Risk: Not at Risk for Elopement    Interdisciplinary Discharge Planning  Does Admitting Nurse Feel This Could be a Complex Discharge?: Yes  Primary Care Physician: Chase  Lives with - Patient's Self Care Capacity: Adult Children, Child Less than 18 Years of Age, Significant Other  Patient or legal guardian wants to designate a caregiver (see row info): No  Support Systems: Family Member(s)  Housing / Facility: [address]  Do You Take your Prescribed Medications Regularly: Yes  Able to Return to Previous ADL's: Yes  Mobility Issues: Yes  Prior Services: None  Patient Expects to be Discharged to:: home  Assistance Needed: No  Durable Medical Equipment: Walker (wheelchair)    Discharge Preparedness  What is your plan after discharge?: Home with help  What are your discharge supports?: Partner (Ex  is caregiver )              Vision / Hearing Impairment  Vision Impairment : Yes  Left Eye Vision: Blind  Hearing Impairment : No    Values / Beliefs / Concerns  Values / Beliefs Concerns : No    Advance Directive  Advance Directive?: None    Domestic Abuse  Have you ever been the victim of abuse or violence?: No  Physical Abuse or Sexual Abuse: No  Verbal Abuse or Emotional Abuse: No  Possible Abuse Reported to::  Not Applicable    Psychological Assessment  History of Substance Abuse: None    Discharge Risks or Barriers  Discharge risks or barriers?: Complex medical needs  Patient risk factors: Complex medical needs    Anticipated Discharge Information  Anticipated discharge disposition: Home

## 2017-04-29 NOTE — PROGRESS NOTES
Pt more awake, still lethargic. Finished admit profile. Denies pain at this time. No numbness/tingling. Does have nausea with no vomiting at this time. Discussed POC, no questions. Meds given per MAR. Swallowed well. Warm blanket provided. No other needs at this time. Safety precautions in place.

## 2017-04-29 NOTE — CONSULTS
DATE OF SERVICE:  04/28/2017    DATE OF SERVICE:  04/28/2017    REQUESTING PHYSICIAN:  Grazyna Dixon MD    REASON FOR CONSULTATION:  Management of chronic kidney disease, assessing the   need for dialysis.    HISTORY OF PRESENT ILLNESS:  The patient is an unfortunate 46-year-old lady,   who is very well known to my service.  She has end-stage renal disease,   undergoes hemodialysis on Tuesday, Thursday, Saturday at [facility].    Patient had dialysis yesterday.  She presented to the hospital today with   nausea and vomiting.  She has been admitted and we are called to manage her   kidney disease and assist in the need for urgent dialysis.  The patient has no   fever, no chills, no shortness of breath.    PAST MEDICAL HISTORY:  Significant for:  1.  End-stage renal disease.  2.  Diabetes mellitus.  3.  Gastroparesis.    SOCIAL HISTORY:  No smoking or alcohol use.    FAMILY HISTORY:  Positive for diabetes.    ALLERGIES:  List was reviewed.    MEDICATIONS:  Reviewed.    REVIEW OF SYSTEMS:  Patient is very fatigued.  She has nausea and vomiting.    No diarrhea, no hematuria, no dysuria.  Other review of systems for a total of   10 were negative.    PHYSICAL EXAMINATION:  GENERAL:  Patient is cachectic, but no apparent distress.  VITAL SIGNS:  Showed blood pressure of 149/72 and heart rate was 75.  HEENT:  Normocephalic and atraumatic.  Sclerae are anicteric.  NECK:  No lymphadenopathy.  CHEST:  Normal.  LUNGS:  Clear to auscultation.  HEART:  S1, S2.  ABDOMEN:  Soft.  EXTREMITIES:  There is no lower extremity edema.    LABORATORY DATA:  Her recent labs were reviewed.    ASSESSMENT:  1.  End-stage renal disease.  2.  Hypertension.  3.  Anemia.  4.  Hyponatremia.    PLAN:  1.  There is no acute need for dialysis today.  We will plan on tomorrow.  2.  Renal dose all medications.  3.  Avoid nephrotoxins.    Plan discussed in detail with Dr. Dixon.       ____________________________________     FADI NAJJAR,  MD AL / JUAN    DD:  04/28/2017 15:54:51  DT:  04/28/2017 18:01:24    D#:  [id number]  Job#:  [id number]

## 2017-04-29 NOTE — PROGRESS NOTES
Report received from JACOB Garza. Pt sleeping , RR even/unlabored. Lines patent. Safety precautions in place, call light within reach. Will continue to monitor.

## 2017-04-29 NOTE — PROGRESS NOTES
Pt cleaned of urine incontinence. Wound noticed on left labia/groin. Photo taken and documented. Pt states it is chronic. Meds given per MAR for nausea.

## 2017-04-29 NOTE — DISCHARGE PLANNING
Patient is currently on service with [facility]. Please write home care orders upon discharge to home for continuum of care.[facility] at  /[phone] if there are any questions.

## 2017-04-29 NOTE — PROGRESS NOTES
Hospital Medicine Progress Note, Adult, Complex               Author: Fadi Najjar Date & Time created: 2017  12:45 PM     Interval History:  Pt with ESRD, presented with NV, feels better  Seen and examined while getting HD.      Review of Systems:  Review of Systems   Constitutional: Positive for malaise/fatigue. Negative for fever and chills.   Neurological: Positive for weakness.       Physical Exam:  Physical Exam   HENT:   Head: Normocephalic and atraumatic.   Nose: Nose normal.   Pulmonary/Chest: Effort normal. She has no wheezes.   Musculoskeletal: She exhibits no edema.   Nursing note and vitals reviewed.      Labs:  Recent Labs      17   1152  17   1410   ISTATSPEC  Venous  Venous     Recent Labs      17   115   TROPONINI  <0.02   BNPBTYPENAT  536*     Recent Labs      17   1152  17   0508   SODIUM  134*  135   POTASSIUM  3.7  3.9   CHLORIDE  95*  99   CO2  31  28   BUN  17  23*   CREATININE  1.99*  2.32*   CALCIUM  7.8*  7.5*     Recent Labs      17   1152  17   0508   ALTSGPT  13   --    ASTSGOT  24   --    ALKPHOSPHAT  101*   --    TBILIRUBIN  0.5   --    LIPASE  18   --    GLUCOSE  84  59*     Recent Labs      17   1152  17   0508   RBC  2.81*  2.44*   HEMOGLOBIN  8.6*  7.4*   HEMATOCRIT  26.8*  23.2*   PLATELETCT  154*  138*     Recent Labs      17   1152  17   0508   WBC  3.7*  5.3   NEUTSPOLYS  83.20*  88.50*   LYMPHOCYTES  7.90*  5.50*   MONOCYTES  7.90  5.00   EOSINOPHILS  0.00  0.40   BASOPHILS  0.50  0.20   ASTSGOT  24   --    ALTSGPT  13   --    ALKPHOSPHAT  101*   --    TBILIRUBIN  0.5   --            Hemodynamics:  Temp (24hrs), Av.1 °C (98.8 °F), Min:36.8 °C (98.2 °F), Max:37.6 °C (99.7 °F)  Temperature: 37.6 °C (99.7 °F)  Pulse  Av.2  Min: 85  Max: 101Heart Rate (Monitored): 87  Blood Pressure: 135/67 mmHg, NIBP: 153/92 mmHg     Respiratory:    Respiration: 16, Pulse Oximetry: 99 %, O2 Daily Delivery Respiratory  : Silicone Nasal Cannula     Work Of Breathing / Effort: Shallow  RUL Breath Sounds: Clear, RML Breath Sounds: Diminished, RLL Breath Sounds: Diminished, ZEN Breath Sounds: Clear, LLL Breath Sounds: Diminished  Fluids:    Intake/Output Summary (Last 24 hours) at 04/29/17 1245  Last data filed at 04/29/17 0555   Gross per 24 hour   Intake   1165 ml   Output      0 ml   Net   1165 ml     Weight: 48.1 kg (106 lb 0.7 oz)  GI/Nutrition:  Orders Placed This Encounter   Procedures   • Diet Order     Standing Status: Standing      Number of Occurrences: 1      Standing Expiration Date:      Order Specific Question:  Diet:     Answer:  Renal [8]     Medical Decision Making, by Problem:  Active Hospital Problems    Diagnosis   • ESRD (end stage renal disease) (CMS-[facility]) [N18.6]   • Diabetes (CMS-[facility]) [E11.9]   • Dehydration [E86.0]   • Severe protein-calorie malnutrition (CMS-[facility]) [E43]   • HTN (hypertension), benign [I10]       Labs reviewed                  1 ESRD: HD TTS  2 Anemia: Epo  Stop IVF  Renal dose all meds  Avoid nephrotoxins

## 2017-04-29 NOTE — PROGRESS NOTES
Hospital Medicine Progress Note, Adult, Complex               Author: Isaac Faith Date & Time created: 4/29/2017  12:13 PM     Interval History:  47 yo female with hx esrd on HD here with N/V and weakness. Found to have confusion as well by HH nurse  Feels better but still c/o weakness and being nauseated  No issues overnight  Undergoing hemodialysis    Review of Systems:  Review of Systems   Constitutional: Positive for malaise/fatigue. Negative for fever and chills.   HENT: Negative for hearing loss.    Respiratory: Negative for cough and shortness of breath.    Cardiovascular: Negative for chest pain and palpitations.   Gastrointestinal: Positive for nausea. Negative for vomiting and abdominal pain.   Genitourinary: Negative for dysuria and frequency.   Musculoskeletal: Negative for myalgias and back pain.   Skin: Negative for itching and rash.   Neurological: Positive for dizziness and weakness. Negative for focal weakness and headaches.   Psychiatric/Behavioral: Negative for depression and suicidal ideas.       Physical Exam:  Physical Exam   Constitutional: She is oriented to person, place, and time. She appears lethargic. She appears cachectic. No distress.   HENT:   Head: Normocephalic and atraumatic.   Eyes: Conjunctivae are normal. Pupils are equal, round, and reactive to light.   Neck: Normal range of motion. Neck supple.   Cardiovascular: Normal rate and regular rhythm.    Pulmonary/Chest: Effort normal. She has decreased breath sounds in the right lower field and the left lower field.   Abdominal: Soft. Bowel sounds are normal. She exhibits no distension.   Musculoskeletal: She exhibits no edema or tenderness.   Muscle wasting  LUE fistula  Right chest wall dialysis catheter   Neurological: She is oriented to person, place, and time. She appears lethargic.   Skin: Skin is warm and dry. She is not diaphoretic.   Nursing note and vitals reviewed.      Labs:  Recent Labs      04/28/17   1152  04/28/17    1410   ISTATSPEC  Venous  Venous     Recent Labs      17   115   TROPONINI  <0.02   BNPBTYPENAT  536*     Recent Labs      17   1152  17   0508   SODIUM  134*  135   POTASSIUM  3.7  3.9   CHLORIDE  95*  99   CO2  31  28   BUN  17  23*   CREATININE  1.99*  2.32*   CALCIUM  7.8*  7.5*     Recent Labs      17   11517   0508   ALTSGPT  13   --    ASTSGOT  24   --    ALKPHOSPHAT  101*   --    TBILIRUBIN  0.5   --    LIPASE  18   --    GLUCOSE  84  59*     Recent Labs      17   1152  17   0508   RBC  2.81*  2.44*   HEMOGLOBIN  8.6*  7.4*   HEMATOCRIT  26.8*  23.2*   PLATELETCT  154*  138*     Recent Labs      17   11517   0508   WBC  3.7*  5.3   NEUTSPOLYS  83.20*  88.50*   LYMPHOCYTES  7.90*  5.50*   MONOCYTES  7.90  5.00   EOSINOPHILS  0.00  0.40   BASOPHILS  0.50  0.20   ASTSGOT  24   --    ALTSGPT  13   --    ALKPHOSPHAT  101*   --    TBILIRUBIN  0.5   --            Hemodynamics:  Temp (24hrs), Av.1 °C (98.8 °F), Min:36.8 °C (98.2 °F), Max:37.6 °C (99.7 °F)  Temperature: 37.6 °C (99.7 °F)  Pulse  Av.2  Min: 85  Max: 101Heart Rate (Monitored): 87  Blood Pressure: 135/67 mmHg, NIBP: 153/92 mmHg     Respiratory:    Respiration: 16, Pulse Oximetry: 99 %, O2 Daily Delivery Respiratory : Silicone Nasal Cannula     Work Of Breathing / Effort: Shallow  RUL Breath Sounds: Clear, RML Breath Sounds: Diminished, RLL Breath Sounds: Diminished, ZEN Breath Sounds: Clear, LLL Breath Sounds: Diminished  Fluids:    Intake/Output Summary (Last 24 hours) at 17 1213  Last data filed at 17 0555   Gross per 24 hour   Intake   1165 ml   Output      0 ml   Net   1165 ml     Weight: 48.1 kg (106 lb 0.7 oz)  GI/Nutrition:  Orders Placed This Encounter   Procedures   • Diet Order     Standing Status: Standing      Number of Occurrences: 1      Standing Expiration Date:      Order Specific Question:  Diet:     Answer:  Renal [8]     Medical Decision Making, by  Problem:  Active Hospital Problems    Diagnosis   • ESRD (end stage renal disease) (CMS-H[facility]) [N18.6]  - dialysis today  - per Nephrology     • Diabetes (CMS-HCC) [E11.9]  - not on meds and somewhat hypoglycemic     • Dehydration [E86.0]  - encourage po intake  - cautious with any IVFs given esrd     • Severe protein-calorie malnutrition (CMS-HCC) [E43]  - dietary c/s     • HTN (hypertension), benign [I10]  - continue home meds     Dispo: likely home in am if no issues overnight and cultures remain negative    Medications reviewed, Labs reviewed and Radiology images reviewed        DVT Prophylaxis: Heparin

## 2017-04-29 NOTE — DIETARY
Nutrition Services Consult for poor oral intake prior to admit.  Patient also with noted BMI of less than 19.  BMI = 17.12.  46 year old female admitted for dehydration.    PMH:  ESRD w/dialysis, essential HTN, type 2 DM, esophageal candidiasis  Pertinent Labs:  Glucose 59  Pertinent Medications:  Epogen, Reglan, Senokot S, Miralax  Weight:  48.1 kg (106 lbs)  BMI:  17.12  Diet Rx:  Renal  Patient slept through breakfast this am.  Visited with patient at lunch.  She was having dialysis and was sleeping again.    Plan/Recommendations:  1)  Resource Breeze supplement due to low body weight and poor oral intake.  2)  RD to follow-up with patient when more appropriate.  3)  RD to monitor PO intake.  4)  Nutrition Services will work with patient for food preferences.

## 2017-04-30 PROBLEM — E86.0 DEHYDRATION: Status: RESOLVED | Noted: 2017-01-01 | Resolved: 2017-01-01

## 2017-04-30 NOTE — PROGRESS NOTES
Hourly rounding in place, patient resting on and off in bed with no s/s of distress noted, respirations even and unlabored, safety precautions in place, CLIP

## 2017-04-30 NOTE — PROGRESS NOTES
Pt passed BM. Reported burning of the perineal area. CNA and I cleaned her up, changed pads/chucks, and repositioned. Preparing for discharge home. Family in room to visit.

## 2017-04-30 NOTE — CARE PLAN
Problem: Communication  Goal: The ability to communicate needs accurately and effectively will improve  Oriented patient to the call light in order to alert staff of her needs. Educated patient about the plan of care, procedures, treatments, medications, and allowing for patient questions and answering them appropriately    Problem: Safety  Goal: Will remain free from falls  Assessing patient for risk factors for falls and implementing fall precautions as needed. Bed alarm is on, bed controls are on and bed is locked in a low position, treaded slipper socks on patient, CLIP    Problem: Respiratory:  Goal: Respiratory status will improve  Assessing and monitoring patient's pulmonary status, administering and titrating oxygen therapy as needed. Educating patient to perform coughing and deep breathing

## 2017-04-30 NOTE — PROGRESS NOTES
Patients vaginal area open wound, labia reddened, inflammed, painful, barrier cream applied for now until wound care comes to see patient.

## 2017-04-30 NOTE — DISCHARGE INSTRUCTIONS
Discharge Instructions    Discharged to home by car with relative. Discharged via wheelchair, hospital escort: Yes.  Special equipment needed: Not Applicable    Be sure to schedule a follow-up appointment with your primary care doctor or any specialists as instructed.     Discharge Plan:   Diet Plan: Discussed  Activity Level: Discussed  Confirmed Follow up Appointment: Patient to Call and Schedule Appointment  Confirmed Symptoms Management: Discussed  Medication Reconciliation Updated: Yes  Influenza Vaccine Indication: Patient Refuses    I understand that a diet low in cholesterol, fat, and sodium is recommended for good health. Unless I have been given specific instructions below for another diet, I accept this instruction as my diet prescription.   Other diet: Renal diet    Special Instructions: None    · Is patient discharged on Warfarin / Coumadin?   No     · Is patient Post Blood Transfusion?  No    Depression / Suicide Risk    As you are discharged from this [facility] facility, it is important to learn how to keep safe from harming yourself.    Recognize the warning signs:  · Abrupt changes in personality, positive or negative- including increase in energy   · Giving away possessions  · Change in eating patterns- significant weight changes-  positive or negative  · Change in sleeping patterns- unable to sleep or sleeping all the time   · Unwillingness or inability to communicate  · Depression  · Unusual sadness, discouragement and loneliness  · Talk of wanting to die  · Neglect of personal appearance   · Rebelliousness- reckless behavior  · Withdrawal from people/activities they love  · Confusion- inability to concentrate     If you or a loved one observes any of these behaviors or has concerns about self-harm, here's what you can do:  · Talk about it- your feelings and reasons for harming yourself  · Remove any means that you might use to hurt yourself (examples: pills, rope, extension cords,  firearm)  · Get professional help from the community (Mental Health, Substance Abuse, psychological counseling)  · Do not be alone:Call your Safe Contact- someone whom you trust who will be there for you.  · Call your local CRISIS HOTLINE 903-7382 or 053-112-1062  · Call your local Children's Mobile Crisis Response Team [place] (736) 002-9698 or www.[employer]  · Call the toll free National Suicide Prevention Hotlines   · National Suicide Prevention Lifeline 647-587-ZXSM (8599)  · [employer] Hope Line Network 800-SUICIDE (897-5978)    End-Stage Kidney Disease  The kidneys are two organs that lie on either side of the spine between the middle of the back and the front of the abdomen. The kidneys:   · Remove wastes and extra water from the blood.    · Produce important hormones. These help keep bones strong, regulate blood pressure, and help create red blood cells.    · Balance the fluids and chemicals in the blood and tissues.  End-stage kidney disease occurs when the kidneys are so damaged that they cannot do their job. When the kidneys cannot do their job, life-threatening problems occur. The body cannot stay clean and strong without the help of the kidneys. In end-stage kidney disease, the kidneys cannot get better. You need a new kidney or treatments to do some of the work healthy kidneys do in order to stay alive.  CAUSES   End-stage kidney disease usually occurs when a long-lasting (chronic) kidney disease gets worse. It may also occur after the kidneys are suddenly damaged (acute kidney injury).   SYMPTOMS   2. Swelling (edema) of the legs, ankles, or feet.    3. Tiredness (lethargy).    4. Nausea or vomiting.    5. Confusion.    6. Problems with urination, such as:    1. Decreased urine production.    2. Frequent urination, especially at night.    3. Frequent accidents in children who are potty trained.    7. Muscle twitches and cramps.    8. Persistent itchiness.    9. Loss of appetite.     10. Headaches.    11. Abnormally dark or light skin.    12. Numbness in the hands or feet.    13. Easy bruising.    14. Frequent hiccups.    15. Menstruation stops.  DIAGNOSIS   Your health care provider will measure your blood pressure and take some tests. These may include:   2. Urine tests.    3. Blood tests.    4. Imaging tests, such as:    1. An ultrasound exam.    2. Computed tomography (CT).  5. A kidney biopsy.  TREATMENT   There are two treatments for end-stage kidney disease:   2. A procedure that removes toxic wastes from the body (dialysis).    3. Receiving a new kidney (kidney transplant).  Both of these treatments have serious risks and consequences. Your health care provider will help you determine which treatment is best for you based on your health, age, and other factors. In addition to having dialysis or a kidney transplant, you may need to take medicines to control high blood pressure (hypertension) and cholesterol and to decrease phosphorus levels in your blood.   HOME CARE INSTRUCTIONS  2. Follow your prescribed diet.    3. Take medicines only as directed by your health care provider.    4. Do not take any new medicines (prescription, over-the-counter, or nutritional supplements) unless approved by your health care provider. Many medicines can worsen your kidney damage or need to have the dose adjusted.    5. Keep all follow-up visits as directed by your health care provider.  MAKE SURE YOU:  · Understand these instructions.  · Will watch your condition.  · Will get help right away if you are not doing well or get worse.     This information is not intended to replace advice given to you by your health care provider. Make sure you discuss any questions you have with your health care provider.     Document Released: 03/09/2005 Document Revised: 01/08/2016 Document Reviewed: 08/16/2013  [employer] Interactive Patient Education ©2016 [employer]

## 2017-04-30 NOTE — PROGRESS NOTES
Assessment performed, patient is lethargic but A&O x 4 at this time, patient denies any pain or discomfort, due medications administered per MAR, patient declining stool softeners and heparin at this time, patient laying comfortably in bed, safety precautions in place, CLIP

## 2017-04-30 NOTE — PROGRESS NOTES
Bedside report received from Earnestine RN, patient updated about POC and denies any needs or complaints at this time, safety precautions in place, CLIP

## 2017-04-30 NOTE — PROGRESS NOTES
Report given to Day Shift RN Earnestine, patient laying comfortably in bed and has no complaints at this time, safety precautions in place, CLIP

## 2017-05-01 NOTE — DISCHARGE SUMMARY
DATE OF ADMISSION:  04/28/2017.    DATE OF DISCHARGE:  04/30/2017.    ADMISSION DIAGNOSES:  1.  Severe dehydration.  2.  End-stage renal disease, on hemodialysis.  3.  Constipation.  4.  Oral thrush.  5.  Severe protein-calorie malnutrition.  6.  Hyponatremia with hypochloremia.  7.  Normocytic anemia.    DIAGNOSES AT THE TIME OF DISCHARGE:  1.  Dehydration, resolved.  2.  End-stage renal disease, on hemodialysis.  3.  Severe protein-calorie malnutrition.  4.  History of diabetes.  5.  Normocytic anemia, likely secondary to chronic disease and renal disease.  6.  Hypochloremic hyponatremia, improved.    CONSULTS DURING HOSPITALIZATION:  Nephrology.    PROCEDURES:  Hemodialysis.    HOSPITAL COURSE:  Patient is a chronically ill 46-year-old female with a   history of end-stage renal disease, on hemodialysis and other chronic medical   conditions, presented to the hospital complaining of nausea, vomiting, and   weakness that have been progressively worsening for one week.  She states that   she is unable to hold anything down and had decreased appetite.  She was   found to have dehydration based on her labs and physical examination.  The   patient was treated with gentle IV fluids and nephrology was consulted for   hemodialysis.  Furthermore, there was concern that she had hemodialysis   catheter that was in place for quite a while and cultures were obtained and   these cultures remained negative.  During the hospitalization, she felt much   better.  She was able to tolerate a diet.  She no longer had any nausea or   vomiting.  She was ambulating without assistance.  She received hemodialysis.    Her vital signs and her labs remained stable.  She is going to be discharged   to home in stable condition.  She needs a follow up with the PCP and   nephrology as soon as possible upon discharge.    DISCHARGE ACTIVITY:  As tolerated.    DISCHARGE DIET:  Renal.    DISCHARGE MEDICATIONS:  1.  Xanax 0.25 mg p.o. t.i.d. as  needed.  2.  Norvasc 5 mg every evening.  3.  Carvedilol 6.25 mg b.i.d.  4.  Lomotil two tablets t.i.d. as needed.  5.  Prozac 10 mg daily.  6.  Lasix 40 mg b.i.d.  7.  Atarax every 8 hours as needed.  8.  Robaxin 750 mg every 6 hours as needed.  9.  Reglan 10 mg four times a day.  10.  Zofran as needed.  11.  K-Phos daily.  12.  Phenergan as needed.  13.  Sodium bicarbonate t.i.d.  14.  Trazodone 50 mg as needed for sleep.    TOTAL TIME OF DISCHARGE:  35 minutes.       ____________________________________     MD SOPHIA Lockett / JUAN    DD:  04/30/2017 10:40:54  DT:  04/30/2017 20:49:31    D#:  [id number]  Job#:  [id number]

## 2017-05-04 PROBLEM — R56.9 SEIZURE (HCC): Status: ACTIVE | Noted: 2017-01-01

## 2017-05-04 NOTE — ED PROVIDER NOTES
ED Provider Note    Scribed for Simone Sanches M.D. by Nael Kim. 5/4/2017  6:45 AM    Primary care provider: Caridad Stone M.D.  Means of arrival: EMS  History obtained from: patient and nurse  History limited by: patient amnesia of event    CHIEF COMPLAINT  Chief Complaint   Patient presents with   • Seizure     seizure like activity per family, new onset       HPI  Roger Mckeon is a 46 y.o. female who presents to the emergency department with headache status post seizure this morning. She describes her pain as 5/10 severity. Patient reports a history of seizure, with her last one 6 months ago, diabetes, hypertension, renal failure. Dr. Puente is her nephrologist. She attends dialysis Tuesday, Thursday, Saturday and has not missed an appointment. Patient denies fever cough, diarrhea, abdominal pain, loss of bladder control. Patient cannot describe her prior seizure, cannot describe what her family saw when she had the alleged event and is not on antiepileptics.    Further HPI cannot be obtained due to the patient's amnesia of the event.    REVIEW OF SYSTEMS  Pertinent positives include: headache, seizure.  Pertinent negatives include: fever, cough, diarrhea, abdominal pain, incontinence.      Further ROS cannot be obtained due to the patient's amnesia of the event.    C.    PAST MEDICAL HISTORY  Past Medical History   Diagnosis Date   • H/O: hysterectomy    • Hypertension    • Renal failure    • Diabetes      on tx since 1999, diet controlled   • Dental disorder      upper dentures   • Dialysis patient      3 x week       FAMILY HISTORY  Family History   Problem Relation Age of Onset   • Diabetes Mother    • Diabetes Father    • Hypertension Father    • Diabetes Sister        SOCIAL HISTORY  Social History   Substance Use Topics   • Smoking status: Never Smoker    • Smokeless tobacco: Non noted   • Alcohol Use: No     History   Drug Use No       SURGICAL HISTORY  Past Surgical History   Procedure  Laterality Date   • Hysterectomy, vaginal     • Other       back surgery    • Incision and drainage general  2013     Performed by Conrado Prado M.D. at SURGERY [facility]   • Gastroscopy with biopsy N/A 2016     Procedure: GASTROSCOPY WITH BIOPSY;  Surgeon: Rachid Salazar M.D.;  Location: SURGERY [facility];  Service:    • Gyn surgery          • Av fistula creation Left 10/4/2016     Procedure: AV FISTULA GRAFT CREATION;  Surgeon: Travon Franco M.D.;  Location: SURGERY [facility];  Service:        CURRENT MEDICATIONS  No current facility-administered medications for this encounter.    Current outpatient prescriptions:   •  promethazine (PHENERGAN) 25 MG Tab, Take 25 mg by mouth every four hours as needed for Nausea/Vomiting. may take every 4 to 6 hours, Disp: , Rfl:   •  methocarbamol (ROBAXIN) 750 MG Tab, Take 750 mg by mouth every 6 hours as needed (pain, muscle spasm)., Disp: , Rfl:   •  ondansetron (ZOFRAN) 4 MG Tab tablet, Take 4 mg by mouth every 6 hours as needed for Nausea/Vomiting., Disp: , Rfl:   •  alprazolam (XANAX) 0.25 MG Tab, Take 0.25 mg by mouth 3 times a day as needed., Disp: , Rfl:   •  fluoxetine (PROZAC) 10 MG Cap, Take 10 mg by mouth every day., Disp: , Rfl:   •  potassium phosphate, monobasic, (K-PHOS) 500 MG tablet, Take 1 Tab by mouth every day at 6 PM., Disp: , Rfl:   •  diphenoxylate-atropine (LOMOTIL) 2.5-0.025 MG Tab, Take 2 Tabs by mouth 3 times a day as needed., Disp: , Rfl:   •  hydrOXYzine (ATARAX) 25 MG Tab, Take 1-2 Tabs by mouth every 8 hours as needed for Anxiety. (Patient taking differently: Take 25 mg by mouth 4 times a day. Patient taking 4 times daily, not every 8 hours), Disp: 31 Tab, Rfl: 0  •  amlodipine (NORVASC) 5 MG Tab, Take 5 mg by mouth every evening., Disp: , Rfl:   •  trazodone (DESYREL) 50 MG Tab, Take 50 mg by mouth at bedtime as needed for Sleep., Disp: , Rfl:   •  furosemide (LASIX) 40 MG Tab, Take 1 Tab by mouth  "2 Times a Day., Disp: 60 Tab, Rfl: 1  •  carvedilol (COREG) 6.25 MG Tab, Take 1 Tab by mouth 2 times a day, with meals., Disp: 60 Tab, Rfl: 1  •  metoclopramide (REGLAN) 10 MG Tab, Take 1 Tab by mouth 4 Times a Day,Before Meals and at Bedtime., Disp: 120 Tab, Rfl: 3  •  sodium bicarbonate (SODIUM BICARBONATE) 650 MG Tab, Take 1 Tab by mouth 3 times a day., Disp: 120 Tab, Rfl: 3    ALLERGIES  Allergies   Allergen Reactions   • Oxycodone Itching     RXN=unknown   • Percocet [Apap-Fd&C Red #40 Al Miller-Oxycodone] Itching       PHYSICAL EXAM  VITAL SIGNS: /84 mmHg  Pulse 102  Temp(Src) 36.8 °C (98.2 °F)  Resp 20  Ht 1.676 m (5' 5.98\")  Wt 49.442 kg (109 lb)  BMI 17.60 kg/m2  SpO2 98%  Reviewed and is hypertensive and tachycardic.   Constitutional: Well developed, Well nourished, Somnolent.  HENT: Normocephalic, atraumatic, bilateral external ears normal, oropharynx moist, No exudates or erythema.   Eyes: PERRLA, conjunctiva pink, no scleral icterus. Left subconjunctival hemorrhage. 4 mm pupil on left, 2.5 mm on right. Left APD. Able to perceive light in left eye.  Cardiovascular: Tachycardic, No murmur rubs or gallops.  Respiratory: Lungs clear to auscultation.   Gastrointestinal: Soft nontender.   Skin: No erythema, no rash. Healing ulcerations to bilateral lower extremities.   Genitourinary:  No costovertebral angle tenderness. No flank pain.   Neurologic: Alert & oriented x 3, cranial nerves 2-12 intact by passive exam.  No focal deficit noted.  Psychiatric: Affect normal, Judgment normal, Mood normal.     DIFFERENTIAL DIAGNOSIS:  Epilepsy, drug or alcohol use, Hyponatremia, intercranial hemorrhage    RADIOLOGY/PROCEDURES  CT-HEAD W/O   Final Result      1.  No acute intracranial abnormality.   2.  Moderate atrophy.   3.  Air within the left optic globe.               INTERPRETING LOCATION:  [address] [place] [address], [facility]      I have reviewed the radiologist interpretation.     LABORATORY:  Results for " orders placed or performed during the hospital encounter of 05/04/17   CBC WITH DIFFERENTIAL   Result Value Ref Range    WBC 3.8 (L) 4.8 - 10.8 K/uL    RBC 2.89 (L) 4.20 - 5.40 M/uL    Hemoglobin 8.3 (L) 12.0 - 16.0 g/dL    Hematocrit 27.2 (L) 37.0 - 47.0 %    MCV 94.1 81.4 - 97.8 fL    MCH 28.7 27.0 - 33.0 pg    MCHC 30.5 (L) 33.6 - 35.0 g/dL    RDW 47.6 35.9 - 50.0 fL    Platelet Count 134 (L) 164 - 446 K/uL    MPV 10.8 9.0 - 12.9 fL    Neutrophils-Polys 85.30 (H) 44.00 - 72.00 %    Lymphocytes 4.20 (L) 22.00 - 41.00 %    Monocytes 8.90 0.00 - 13.40 %    Eosinophils 0.30 0.00 - 6.90 %    Basophils 0.50 0.00 - 1.80 %    Immature Granulocytes 0.80 0.00 - 0.90 %    Nucleated RBC 0.00 /100 WBC    Neutrophils (Absolute) 3.26 2.00 - 7.15 K/uL    Lymphs (Absolute) 0.16 (L) 1.00 - 4.80 K/uL    Monos (Absolute) 0.34 0.00 - 0.85 K/uL    Eos (Absolute) 0.01 0.00 - 0.51 K/uL    Baso (Absolute) 0.02 0.00 - 0.12 K/uL    Immature Granulocytes (abs) 0.03 0.00 - 0.11 K/uL    NRBC (Absolute) 0.00 K/uL   BASIC METABOLIC PANEL   Result Value Ref Range    Sodium 130 (L) 135 - 145 mmol/L    Potassium 3.6 3.6 - 5.5 mmol/L    Chloride 97 96 - 112 mmol/L    Co2 26 20 - 33 mmol/L    Glucose 62 (L) 65 - 99 mg/dL    Bun 25 (H) 8 - 22 mg/dL    Creatinine 2.40 (H) 0.50 - 1.40 mg/dL    Calcium 7.5 (L) 8.5 - 10.5 mg/dL    Anion Gap 7.0 0.0 - 11.9   DIAGNOSTIC ALCOHOL   Result Value Ref Range    Diagnostic Alcohol 0.00 0.00 g/dL   Urine triage pending.    INTERVENTIONS:  Patient ordered by mouth glucose and a meal.  Case discussed with Dr. Get Soria who recommended admission for seizure workup  Case discussed with Dr. Grazyna Dixon who will admit the patient.      COURSE & MEDICAL DECISION MAKING  Review of past medical records shows the patient's CT head on April 20th was negative. Patient is not currently being followed by a neurologist.     6:45 AM Patient seen and examined at bedside. Ordered for CT head, CBC with differential, CMP, BMP,  Diagnostic alcohol, Urine drug screen, Estimated GFR to evaluate.     8:35 AM - Patient reevaluated at bedside. Discussed results with the patient. Informed her that her results are normal, but the radiologist has noted some air in her left eye. Patient repots that her eye is not in pain and her vision is baseline. She reports that she had a recent eye surgery performed at [facility] on Tuesday by a doctor from [facility]. I will consult with her opthamologist. Patient is able to see light out of the eye.      8:42 AM - Paged [facility].     8:45 AM - I discussed the patient's case and the above findings with opthamologist from [facility] who states that her condition is normal post procedure and will be there for a while. Dr. Knight was her surgeon for the procedure.     8:45 AM - Paged Nephrology.     8:46 AM - I discussed the patient's case and the above findings with Dr. Deal (nephrologist) who agrees to admit the patient after admission. I will admit to hospitalist.     8:49 AM - I discussed the patient's case and the above findings with Dr. Dixon (hospitalist) who agrees to admit the patient.     DISPOSITION:  Patient will be admitted to hospitalist in guarded condition.    Medical decision making: This patient presents with a witnessed seizure in a postictal phase as for her by paramedics. It is not certain she's ever had a seizure before. She is a very poor historian. Today her seizure may have been due to borderline hypoglycemia coupled with borderline hyponatremia. There is no evidence of alcohol intoxication or use. Drug abuse testing is pending. There is no evidence of subdural hematoma. There are no symptoms suggestive of meningitis or encephalitis    PLAN:  Admission for EEG, MRI, dialysis, neurology consultation    CONDITION: Guarded    FINAL IMPRESSION  1. New onset seizure (CMS-HCC)    2.     Borderline hypoglycemia  3.     Mild hyponatremia  4.      End-stage renal disease  5.     History of diabetes      Electronically signed by: Nael Kim, 5/4/2017 6:45 AM    The note accurately reflects work and decisions made by me.  Simone Sanches  5/4/2017  9:22 AM

## 2017-05-04 NOTE — CONSULTS
DATE OF SERVICE:  05/04/2017    REQUESTING PHYSICIAN:  Grazyna Dixon M.D.    REASON FOR CONSULTATION:  End-stage renal disease, assess the need for   dialysis.    HISTORY OF PRESENT ILLNESS:  The patient is an 46-year-old patient with   end-stage renal disease on a Tuesday, Thursday and Saturday schedule at [facility] under Dr. Najjar.  She was admitted via the emergency room for   seizure.  She apparently had another seizure 6 months ago and does not appear   to be on any anti-seizure medications.  Subsequently, she was admitted for   further evaluation.  She also had a significant history of severe headache,   she missed her normally scheduled dialysis.  Her CT scan of   the head that was done showed matter atrophy, no intracranial abnormality, and   air within the left optic globe.    PAST MEDICAL HISTORY:  Significant for end-stage renal disease, on   hemodialysis at [facility] secondary hyperparathyroidism of renal origin and   anemia of chronic kidney disease, diabetes mellitus type 2, gastroparesis,   back pain, occasional nausea, and anxiety for which she is on Xanax,   hypertension for which she is on Norvasc and Lasix as well as Coreg and   acidosis secondary to her renal dysfunction for which she is on sodium   bicarbonate as an outpatient.    PHYSICAL EXAMINATION:  VITAL SIGNS:  Blood pressure 148/82, heart rate 98, respiratory 16, and O2 sat   of 99%.  GENERAL:  Lying in bed, appears fatigued.  EYES:  Normal sclerae are anicteric.  ENT:  Mucous membranes are dry.  NECK:  No elevation in jugular venous pulsation.  CARDIOVASCULAR:  Regular rate.  No murmurs, rubs, or gallops.  ABDOMEN:  Soft, nontender, and nondistended.  EXTREMITIES:  No lower extremity edema.  SKIN:  No rashes or nonhealing lesions.    LABORATORY STUDIES:  On the day of consultation her white blood cell count is   3.8, hemoglobin 8.3, and platelet count of 134.  Sodium 130, potassium 3.6,   chloride 97, bicarbonate 26, BUN  25, and creatinine 2.4.    ASSESSMENT:  1.  End-stage renal disease.  We will schedule her dialysis for today and as   today is her normally scheduled dialysis  UF if blood pressure tolerates.  2.  Anemia of chronic kidney disease.  We will give Epogen and monitor.  We   will give subcutaneously for greater effect.  3.  Hyponatremia.  Limit water intake and UF as tolerated.  4.  Hypertension.  Blood pressure appears to be controlled for end-stage renal   disease.  5.  Seizure, defer to primary team for evaluation.    Thank you for asking us to see this patient.  We will follow closely with you.       ____________________________________     MD SABRINA PHELAN / JUAN    DD:  05/04/2017 13:19:00  DT:  05/04/2017 16:50:06    D#:  [id number]  Job#:  [id number]

## 2017-05-04 NOTE — PROGRESS NOTES
Went down to dialysis to assess patient, she appears awake and alert and able to follow simple commands, verbal and responsive but slightly lethargic and weak/fatigued, nephrologist suggested we cancel dialysis for the day, ICU rapid nurse at bedside, blood sugar checks and 67, called pharmacy to have D50W IV push tubed down, updated ICU rapid nurse on patients admission and condition when she arrived to the unit from the ED

## 2017-05-04 NOTE — IP AVS SNAPSHOT
" Home Care Instructions                                                                                                                  Name:Roger Mckeon  Medical Record Number:[id number]  CSN: [id number]    YOB: 1970   Age: 47 y.o.  Sex: female  HT:1.676 m (5' 5.98\") WT: 49.442 kg (109 lb)          Admit Date: 5/4/2017     Discharge Date:   Today's Date: 5/6/2017  Attending Doctor:  Isaac Faith M.D.                  Allergies:  Oxycodone and Percocet            Discharge Instructions       Discharge Instructions    Discharged to home by car with relative. Discharged via wheelchair, hospital escort: Yes.  Special equipment needed: Not Applicable    Be sure to schedule a follow-up appointment with your primary care doctor or any specialists as instructed.     Discharge Plan:   Diet Plan: Discussed  Activity Level: Discussed  Confirmed Follow up Appointment: Appointment Scheduled  Confirmed Symptoms Management: Discussed  Medication Reconciliation Updated: Yes    I understand that a diet low in cholesterol, fat, and sodium is recommended for good health. Unless I have been given specific instructions below for another diet, I accept this instruction as my diet prescription.   Other diet: Renal    Special Instructions: None    · Is patient discharged on Warfarin / Coumadin?   No     · Is patient Post Blood Transfusion?  No    Depression / Suicide Risk    As you are discharged from this [facility] facility, it is important to learn how to keep safe from harming yourself.    Recognize the warning signs:  · Abrupt changes in personality, positive or negative- including increase in energy   · Giving away possessions  · Change in eating patterns- significant weight changes-  positive or negative  · Change in sleeping patterns- unable to sleep or sleeping all the time   · Unwillingness or inability to communicate  · Depression  · Unusual sadness, discouragement and loneliness  · Talk of wanting to " die  · Neglect of personal appearance   · Rebelliousness- reckless behavior  · Withdrawal from people/activities they love  · Confusion- inability to concentrate     If you or a loved one observes any of these behaviors or has concerns about self-harm, here's what you can do:  · Talk about it- your feelings and reasons for harming yourself  · Remove any means that you might use to hurt yourself (examples: pills, rope, extension cords, firearm)  · Get professional help from the community (Mental Health, Substance Abuse, psychological counseling)  · Do not be alone:Call your Safe Contact- someone whom you trust who will be there for you.  · Call your local CRISIS HOTLINE 927-7522 or 665-645-0332  · Call your local Children's Mobile Crisis Response Team [place] (912) 003-6595 or www.HCDC  · Call the toll free National Suicide Prevention Hotlines   · National Suicide Prevention Lifeline 919-296-MHHY (6548)  · National Hope Line Network 800-SUICIDE (504-4801)        Your appointments     May 08, 2017 To Be Determined   SN-HH-HOME VIS+LPN SUP+HHA SUP with Simone Brink R.N.   [facility] (--)    [address]  [facility] [address]   [phone]            May 09, 2017 To Be Determined   MSW-HH-HOME VISIT with MATEUSZ Bustillos   [facility] (--)    [address]  [place] [address]   [phone]            May 09, 2017  1:30 PM   AIDE-HH-HOME VISIT with Chelsi Carrero C.N.A.   [facility] (--)    [address]  [place] [address]   [phone]            May 10, 2017 12:30 PM   OT-HH-HOME VISIT with Evens Cottrell O.T.   [facility] (--)    [address]  [place] [address]   [phone]            May 11, 2017 To Be Determined   SN-HH-HOME VIS+LPN SUP+HHA SUP with Elana Baker R.N.   [facility] (--)    [address] [facility] [address]   [phone]            May 12, 2017  5:00 AM   AIDE-HH-HOME VISIT with SHANTELL Forde  Home Care (--)    [address]  [place] [address]   [phone]            May 12, 2017 12:30 PM   [id number] with Evens Cottrell O.T.   [facility] (--)    [address]  [place] [address]   [phone]            May 16, 2017 To Be Determined   AIDE-HH-HOME VISIT with Chelsi Carrero C.N.A.   [facility] (--)    [address]  [place] [address]   [phone]            May 17, 2017 To Be Determined   SN-HH-HOME VISIT + HHA SUP with Elana Baker R.N.   [facility] (--)    [address]  [place] [address]   [phone]            May 19, 2017 To Be Determined   AIDE-HH-HOME VISIT with Chelsi Carrero C.N.A.   [facility] (--)    [address]  [place] [address]   [phone]            May 23, 2017 To Be Determined   AIDE-HH-HOME VISIT with Chelsi Carrero C.N.A.   [facility] (--)    [address]  [place] [address]   [phone]            May 25, 2017 To Be Determined   SN-HH-HOME VISIT with Simone Brink R.N.   [facility] (--)    [address]  [place] [address]   [phone]            May 26, 2017 To Be Determined   AIDE-HH-HOME VISIT with Chelsi Carrero C.N.A.   [facility] (--)    [address]  [place] [address]   [phone]            May 30, 2017 To Be Determined   AIDE-HH-HOME VISIT with Chelsi Carrero C.N.A.   [facility] (--)    [address]  [place] [address]   [phone]            Jun 02, 2017 To Be Determined   AIDE-HH-HOME VISIT with Chelsi Carrero C.N.A.   [facility] (--)    [address] [facility]  [facility] [address]   [phone]            Jun 02, 2017 To Be Determined   SN-HH-OASIS RECERT+LPN/HHA SUP with Elana Baker R.N.   [facility] (--)    [address]  [facility] [address]   [phone]              Follow-up Information     1. Follow up with Caridad Stone M.D..    Specialty:  Family Medicine    Contact information    [address]  [facility] [address]  [facility] [address]  [phone]           Discharge Medication Instructions:    Below are the medications your physician expects you to take upon discharge:    Review all your home medications and newly ordered medications with your doctor and/or pharmacist. Follow medication instructions as directed by your doctor and/or pharmacist.    Please keep your medication list with you and share with your physician.               Medication List      CONTINUE taking these medications        Instructions    Morning Afternoon Evening Bedtime    amlodipine 5 MG Tabs   Last time this was given:  5 mg on 5/5/2017  9:05 PM   Commonly known as:  NORVASC        Take 5 mg by mouth every evening.   Dose:  5 mg                        carvedilol 6.25 MG Tabs   Last time this was given:  6.25 mg on 5/6/2017  8:35 AM   Commonly known as:  COREG        Take 1 Tab by mouth 2 times a day, with meals.   Dose:  6.25 mg                        diphenoxylate-atropine 2.5-0.025 MG Tabs   Commonly known as:  LOMOTIL        Take 2 Tabs by mouth 3 times a day as needed.   Dose:  2 Tab                        fluoxetine 10 MG Caps   Last time this was given:  10 mg on 5/6/2017  8:35 AM   Commonly known as:  PROZAC        Take 10 mg by mouth every day.   Dose:  10 mg                        furosemide 40 MG Tabs   Commonly known as:  LASIX        Take 1 Tab by mouth 2 Times a Day.   Dose:  40 mg                        hydrOXYzine 25 MG Tabs   Last time this was given:  25 mg on 5/5/2017  1:17 PM   Commonly known as:  ATARAX        Take 25 mg by mouth 4 times a day.   Dose:  25 mg                        K-PHOS 500 MG tablet   Generic drug:  potassium phosphate (monobasic)        Take 1 Tab by mouth every day at 6 PM.   Dose:  1 Tab                        methocarbamol 750 MG Tabs   Commonly known as:  ROBAXIN        Take 750 mg by mouth every 6 hours as needed (pain, muscle spasm).   Dose:  750 mg                        metoclopramide 10 MG Tabs    Last time this was given:  10 mg on 5/6/2017  2:14 PM   Commonly known as:  REGLAN        Take 1 Tab by mouth 4 Times a Day,Before Meals and at Bedtime.   Dose:  10 mg                        ondansetron 4 MG Tabs tablet   Commonly known as:  ZOFRAN        Take 4 mg by mouth every 6 hours as needed for Nausea/Vomiting.   Dose:  4 mg                        promethazine 25 MG Tabs   Commonly known as:  PHENERGAN        Take 25 mg by mouth every four hours as needed for Nausea/Vomiting. may take every 4 to 6 hours   Dose:  25 mg                        sodium bicarbonate 650 MG Tabs   Last time this was given:  650 mg on 5/5/2017  9:14 AM   Commonly known as:  SODIUM BICARBONATE        Take 1 Tab by mouth 3 times a day.   Dose:  650 mg                        trazodone 50 MG Tabs   Last time this was given:  50 mg on 5/5/2017  9:15 PM   Commonly known as:  DESYREL        Take 50 mg by mouth at bedtime as needed for Sleep.   Dose:  50 mg                        XANAX 0.25 MG Tabs   Generic drug:  alprazolam        Take 0.25 mg by mouth 3 times a day as needed.   Dose:  0.25 mg                                Orders for after discharge     REFERRAL TO HOME HEALTH    Complete by:  As directed    Home health will create and establish a plan of care             Instructions           Diet / Nutrition:    Follow any diet instructions given to you by your doctor or the dietician, including how much salt (sodium) you are allowed each day.    If you are overweight, talk to your doctor about a weight reduction plan.    Activity:    Remain physically active following your doctor's instructions about exercise and activity.    Rest often.     Any time you become even a little tired or short of breath, SIT DOWN and rest.    Worsening Symptoms:    Report any of the following signs and symptoms to the doctor's office immediately:    *Pain of jaw, arm, or neck  *Chest pain not relieved by medication                                *Dizziness or loss of consciousness  *Difficulty breathing even when at rest   *More tired than usual                                       *Bleeding drainage or swelling of surgical site  *Swelling of feet, ankles, legs or stomach                 *Fever (>100ºF)  *Pink or blood tinged sputum  *Weight gain (3lbs/day or 5lbs /week)           *Shock from internal defibrillator (if applicable)  *Palpitations or irregular heartbeats                *Cool and/or numb extremities    Stroke Awareness    Common Risk Factors for Stroke include:    Age  Atrial Fibrillation  Carotid Artery Stenosis  Diabetes Mellitus  Excessive alcohol consumption  High blood pressure  Overweight   Physical inactivity  Smoking    Warning signs and symptoms of a stroke include:    *Sudden numbness or weakness of the face, arm or leg (especially on one side of the body).  *Sudden confusion, trouble speaking or understanding.  *Sudden trouble seeing in one or both eyes.  *Sudden trouble walking, dizziness, loss of balance or coordination.Sudden severe headache with no known cause.    It is very important to get treatment quickly when a stroke occurs. If you experience any of the above warning signs, call 911 immediately.                   Disclaimer         Quit Smoking / Tobacco Use:    I understand the use of any tobacco products increases my chance of suffering from future heart disease or stroke and could cause other illnesses which may shorten my life. Quitting the use of tobacco products is the single most important thing I can do to improve my health. For further information on smoking / tobacco cessation call a Toll Free Quit Line at 1-735.740.8314 (*National Cancer [place]) or 1-486.706.7284 (American Lung Association) or you can access the web based program at www.lungusa.org.    [place] Tobacco Users Help Line:  (862) 150-8602       Toll Free: 1-882.327.9704  Quit Tobacco Program [facility]  (753) 584-9291    Crisis Hotline:    [facility] Crisis Hotline:  0-270-GCUKOBC or 1-313.559.2261    [place] Crisis Hotline:    1-277.605.3434 or 809-739-2264    Discharge Survey:   Thank you for choosing [facility]. We hope we did everything we could to make your hospital stay a pleasant one. You may be receiving a phone survey and we would appreciate your time and participation in answering the questions. Your input is very valuable to us in our efforts to improve our service to our patients and their families.        My signature on this form indicates that:    1. I have reviewed and understand the above information.  2. My questions regarding this information have been answered to my satisfaction.  3. I have formulated a plan with my discharge nurse to obtain my prescribed medications for home.                  Disclaimer         __________________________________                     __________       ________                       Patient Signature                                                 Date                    Time

## 2017-05-04 NOTE — H&P
IDENTIFICATION:  The patient is a 46-year-old female patient of Dr. Caridad Stone, as well as Nephrology, Dr. Deal.    CHIEF COMPLAINT:  Possible seizure.    HISTORY OF PRESENT ILLNESS:  The patient is a 46-year-old female who was   recently admitted to the hospital at [facility] and was discharged on   4/30/2017.  Today, she was at home and was complaining of a headache and a   family member states that she had shaking and patient states she had a   seizure, so she comes to the hospital to be evaluated.  Patient states that   she currently does not have a headache, but she does have some sore throat.    She denies any recent illness with fevers or chills.  She does have constant   nausea, but no vomiting, it seems to have improved since her last admission,   which was for nausea, vomiting and dehydration.  She is due for dialysis   today, but has not yet had dialysis.    REVIEW OF SYSTEMS:  A 10-point review of systems reviewed and otherwise   negative.    PAST MEDICAL HISTORY:  End-stage renal disease, on dialysis Tuesday, Thursday,   Saturdays; gastroparesis with chronic nausea and vomiting; essential   hypertension; type 2 diabetes mellitus; candidiasis of the esophagus; external   hemorrhoids; pulmonary nodule; anxiety.    OUTPATIENT MEDICATIONS:  Norvasc 5 mg daily; Coreg 6.25 mg twice daily; Prozac   10 mg daily; Atarax 25 mg every 8 hours as needed for anxiety; Robaxin 750 mg   every 6 hours as needed for muscle spasm;   Reglan 10 mg 4 times daily; Phenergan 25 mg every 4 hours as needed for   nausea; sodium bicarbonate 650 mg 3 times daily; trazodone 50 mg at bedtime as   needed for insomnia; Xanax 0.25 mg 3 times daily as needed for anxiety;   however, the patient states she takes this just as she needs it, sometimes   more than this and sometimes less; Lomotil 2 tablets 3 times daily as needed   for diarrhea; potassium phosphate 500 mg daily; Lasix 40 mg twice daily.    DRUG ALLERGIES:  OXYCODONE  AND PERCOCET.    SOCIAL HISTORY:  Patient denies any tobacco, alcohol or drug use.  She is   wheelchair bound and lives with her .    FAMILY HISTORY:  No family history of diabetes or seizures.    PHYSICAL EXAMINATION:  VITAL SIGNS:  Temperature 98.2, blood pressure 151/84, heart rate 100,   respiratory rate 20, oxygen saturation 98% on room air.  GENERAL:  The patient is a pleasant, very thin female with diffuse muscle   wasting.  She is sitting comfortably in no apparent distress.  HEENT:  Head atraumatic.  Extraocular movements intact.  Sclerae clear.    Conjunctivae pink.  The patient has white plaques over her tongue and oral   mucosa.  Mucous membranes are dry.  NECK:  Supple.  No jugular venous distention.  Trachea midline.  No anterior,   posterior cervical or supraclavicular lymphadenopathy.  HEART:  Regular rate and rhythm with no murmur.  Point of maximal impulse is   diffuse.  LUNGS:  Clear to auscultation bilaterally with good breath sounds at the   bases.  CHEST:  Symmetric with respiration.  ABDOMEN:  Soft, nontender, nondistended with normoactive bowel sounds and no   palpable hepatosplenomegaly.  EXTREMITIES:  Have no clubbing, cyanosis or edema.  She has scars over   bilateral lower extremities consistent with prior diabetic ulcers, which have   healed.  Pedal pulses 2+ bilaterally.  No evidence of rash or bruising.  NEUROLOGIC:  The patient is alert and oriented x3.  She has no tremor on   examination.    LABORATORY DATA:  WBC 3.8, hemoglobin 8.3, platelets 134.  Sodium 130,   potassium 3.6, chloride 97, bicarbonate 26, BUN 25, creatinine 2.4, glucose   62.    IMAGING:  CT scan of the head shows moderate atrophy, air within the left   optic globe.    ASSESSMENT AND PLAN:  1.  Seizures at home, concerning for hypoglycemia induced, as the patient's blood   sugar here was measured at 62.  The patient states she has had seizures in the   past, but has not required any antiepileptic medication  previously for them;   therefore, I am even more suspicious that this is due to low blood sugar   levels or possibly withdrawals from Xanax; however, patient states she does   not take this medication consistently and has not taken it more than 3 times a   day recently and possibly less than this.  We will admit her to the hospital,   check fingerstick blood sugars and order for dextrose as needed for low blood   sugar and monitor her for any seizure activity, also order for   electroencephalogram as well and treat with Ativan as needed for any seizure   activity.  2.  End-stage renal disease, on dialysis Tuesday, Thursday and Saturday.    Nephrology, Dr. Deal has been consulted on the case and will see the   patient for dialysis while in the hospital.  3.  Air within the left optic globe due to recent procedure with   ophthalmology.  The emergency physician did call the patient's ophthalmologist   and there is no intervention that will be pursued for this due to her recent   intervention with Dr. Knight, who will follow up with her after discharge.  4.  Oral thrush.  We will order nystatin to treat her for this.  5.  Hyponatremia.  The patient will have correction of this with dialysis.  6.  The patient is a full code.       ____________________________________     MD GIULIANO CASTRO / JUAN    DD:  05/04/2017 09:14:23  DT:  05/04/2017 10:43:03    D#:  [id number]  Job#:  [id number]

## 2017-05-04 NOTE — PROGRESS NOTES
Dialysis called that patient was briefly unresponsive and had observed 2 clonic-tonic seizures, suggested patient be sent to ICU rather than come back to the nuero unit, asked if I need to call a rapid but dialysis nurse suggested that was not necessary, spoke with my charge nurse JACOB Pepper and informed of situation, reccommended to dialysis nurse to call a rapid and agreed, patient then had woken up and was more alert with no more seizure activity noted

## 2017-05-04 NOTE — PROGRESS NOTES
Patient arrived to unit via stretcher from ED, appears thin and fatigued, slightly lethargic, alert and oriented x2 and able to follow commands, slow to ambulate and a 2 person moderate assist, bed alarm on, seizure precautions in place

## 2017-05-04 NOTE — PROGRESS NOTES
Notified Dr. Faith of patients condition, called ICU nurse down in dialysis as she remains at the patients bedside

## 2017-05-04 NOTE — IP AVS SNAPSHOT
5/6/2017    Roger Mckeon  [address]  [address]  [place] [address]    Dear Roger:    [facility] wants to ensure your discharge home is safe and you or your loved ones have had all of your questions answered regarding your care after you leave the hospital.    Below is a list of resources and contact information should you have any questions regarding your hospital stay, follow-up instructions, or active medical symptoms.    Questions or Concerns Regarding… Contact   Medical Questions Related to Your Discharge  (7 days a week, 8am-5pm) Contact a Nurse Care Coordinator   [phone]   Medical Questions Not Related to Your Discharge  (24 hours a day / 7 days a week)  Contact the Nurse Health Line   [phone]    Medications or Discharge Instructions Refer to your discharge packet   or contact your [facility] Primary Care Provider   [phone]   Follow-up Appointment(s) Schedule your appointment via [facility]   or contact Scheduling [phone]   Billing Review your statement via [facility]  or contact Billing [phone]   Medical Records Review your records via [facility] [facility]   or contact Medical Records [phone]     You may receive a telephone call within two days of discharge. This call is to make certain you understand your discharge instructions and have the opportunity to have any questions answered. You can also easily access your medical information, test results and upcoming appointments via the [facility] [facility] free online health management tool. You can learn more and sign up at [employer]. For assistance setting up your [facility] account, please call [phone].    Once again, we want to ensure your discharge home is safe and that you have a clear understanding of any next steps in your care. If you have any questions or concerns, please do not hesitate to contact us, we are here for you. Thank you for choosing [facility] for your healthcare needs.    Sincerely,    Your [facility] Healthcare Team

## 2017-05-04 NOTE — ED NOTES
"Roger Mckeon    Chief Complaint   Patient presents with   • Seizure     seizure like activity per family, new onset       Pt BIBA from home when family witnessed seizure like activity.  Per EMS pt had tonic clonic like movements.  Upon EMS arrival, pt was nonverbal then became AOx4 and GCS 15.  PIV placed.   Pt denies pain,N/V,dizziness.  FSBS 74.  Pt is currently AOx3 confused to situation.  Pt is a dialysis pt (Tues, Thurs, Sat)    Blood Pressure: 151/84 mmHg, Pulse: 100, Respiration: 18, Temperature: 36.8 °C (98.2 °F), Height: 167.6 cm (5' 5.98\"), Weight: 49.442 kg (109 lb), BMI (Calculated): 17.6, BSA (Calculated): 1.5, Pulse Oximetry: 99 %, O2 Delivery: None (Room Air)    "

## 2017-05-04 NOTE — IP AVS SNAPSHOT
Plexisoft Access Code: [email or web address]  Expires: 5/30/2017 12:42 PM    Your email address is not on file at "[employer].  Email Addresses are required for you to sign up for Plexisoft, please contact [phone] to verify your personal information and to provide your email address prior to attempting to register for Plexisoft.    Roger Mckeon  [address]  [address]  [place] [address]    Plexisoft  A secure, online tool to manage your health information     "[employer]’s Plexisoft® is a secure, online tool that connects you to your personalized health information from the privacy of your home -- day or night - making it very easy for you to manage your healthcare. Once the activation process is completed, you can even access your medical information using the Plexisoft raimundo, which is available for free in the Apple Raimundo store or Google Play store.     To learn more about Plexisoft, visit [email or web address]    There are two levels of access available (as shown below):   My Chart Features  [facility] Primary Care Doctor [facility]  Specialists [facility]  Urgent  Care Non-[facility] Primary Care Doctor   Email your healthcare team securely and privately 24/7 X X X    Manage appointments: schedule your next appointment; view details of past/upcoming appointments X      Request prescription refills. X      View recent personal medical records, including lab and immunizations X X X X   View health record, including health history, allergies, medications X X X X   Read reports about your outpatient visits, procedures, consult and ER notes X X X X   See your discharge summary, which is a recap of your hospital and/or ER visit that includes your diagnosis, lab results, and care plan X X  X     How to register for Plexisoft:  Once your e-mail address has been verified, follow the following steps to sign up for Plexisoft.     1. Go to  [email or web address]  2. Click on the Sign Up Now box, which takes you to the New Member Sign Up page. You  will need to provide the following information:  a. Enter your Kiptronic Access Code exactly as it appears at the top of this page. (You will not need to use this code after you’ve completed the sign-up process. If you do not sign up before the expiration date, you must request a new code.)   b. Enter your date of birth.   c. Enter your home email address.   d. Click Submit, and follow the next screen’s instructions.  3. Create a [employer] ID. This will be your Kiptronic login ID and cannot be changed, so think of one that is secure and easy to remember.  4. Create a Kiptronic password. You can change your password at any time.  5. Enter your Password Reset Question and Answer. This can be used at a later time if you forget your password.   6. Enter your e-mail address. This allows you to receive e-mail notifications when new information is available in Kiptronic.  7. Click Sign Up. You can now view your health information.    For assistance activating your Kiptronic account, call [phone]

## 2017-05-05 NOTE — PROCEDURES
DATE OF SERVICE:  05/04/2017  ROUTINE ELECTROENCEPHALOGRAM REPORT        INDICATION:     PT HAD SZ LIKE ACTIVITY AT HOME PER FAMILY.  PT REPORTS HX OF SZ, PT HAS JAW/FACIAL MVMTS DURING SZ.   (LAST ONE 6 MONTHS AGO) HX OF DIABETES, HTN, RENAL FAILURE.    TECHNIQUE: 30 channel routine electroencephalogram (EEG) was performed in accordance with the international 10-20 system. The study was reviewed in bipolar and referential montages. The recording examined the patient during wakeful and drowsy state(s).     DESCRIPTION OF THE RECORD:    The EEG background consists of posterior dominant alpha rhythm 10-11 Hz, alpha   rhythm, normal reactivity to eye opening.  There are monomorphic data slowing   and 2 Hz or so over the frontal region, lasting for 10 seconds or so more in the   left frontal, but there are no epileptiform discharges.  There are no   electrical seizures  In the first half of the EEG recording patient was very sleepy.    ACTIVATION PROCEDURES:      Hyperventilation and Photic Stimulation were done    ICTAL AND/OR INTERICTAL FINDINGS:    No focal or generalized epileptiform activity noted. Monomorphic Frontal delta bursts-  No clinical events or seizures were reported or recorded during the study.      EKG: sampling of the EKG recording demonstrated sinus rhythm.        INTERPRETATION:    ________________________________________________________________________    Nonspecific cortical dysfunction - more over frontal  Could be metabolic, toxic or even post-ictal  Outpatient neurology clinic follow up if necessary    Sleepy during this record  ________________________________________________________________________                   ____________________________________     MD ABIGAIL HANSEN / JUAN    DD:  05/05/2017 00:20:29  DT:  05/05/2017 01:08:10    D#:  [id number]  Job#:  [id number]

## 2017-05-05 NOTE — PROGRESS NOTES
Pt AAOx4, c/o nausea, zofran given. Frailty and general weakness noted. Poor appettite, PO intake encouraged. Drank 1 container of juice for breakfast and able to drink one boost shake at aprox 1115. No apparent seizure activity. Labial wound noted, wound consult ordered per protocol. Incontinent of B&B. Dialysis cath dressing changed.

## 2017-05-05 NOTE — PROGRESS NOTES
Blood sugar 75, patient refusing to eat dinner, drank 120 ml of cranberry juice with pills, appears lethargic but responsive and don't see a decline in condition since admitted to floor this am at 1030, weak and fatigued, having loose incontinent bowel movements, apap given for low grade temp, no witnessed seizure activity, will continue to monitor and update on coming night nurse

## 2017-05-05 NOTE — DISCHARGE PLANNING
[facility] is [facility]. Please write home care orders upon discharge to home for continuum of care.[facility] at  /[phone] if there are any questions.

## 2017-05-05 NOTE — PROGRESS NOTES
Hospital Medicine Progress Note, Adult, Complex               Author: Manuel Deal Date & Time created: 2017  12:08 PM     Interval History:  46 year old with TTS schedule, came in with possible seizure, had another such episode yesterday before dialysis resulting in cancelling dialysis. HD scheduled for today. No further episodes per patient.    Review of Systems:  Review of Systems   Constitutional: Negative for fever and chills.       Physical Exam:  Physical Exam   Constitutional: She is oriented to person, place, and time. She appears well-developed and well-nourished.   Cardiovascular: Normal rate and regular rhythm.    Pulmonary/Chest: Effort normal and breath sounds normal.   Musculoskeletal: She exhibits no edema or tenderness.   Neurological: She is alert and oriented to person, place, and time.       Labs:        Invalid input(s): [id number]      Recent Labs      17   0332   SODIUM  130*  134*   POTASSIUM  3.6  3.8   CHLORIDE  97  99   CO2  26  26   BUN  25*  31*   CREATININE  2.40*  2.83*   CALCIUM  7.5*  7.7*     Recent Labs      17   0332   GLUCOSE  62*  64*     Recent Labs      17   0332   RBC  2.89*  2.88*   HEMOGLOBIN  8.3*  8.5*   HEMATOCRIT  27.2*  26.8*   PLATELETCT  134*  120*     Recent Labs      17   0332   WBC  3.8*  2.7*   NEUTSPOLYS  85.30*  81.10*   LYMPHOCYTES  4.20*  10.90*   MONOCYTES  8.90  5.70   EOSINOPHILS  0.30  0.40   BASOPHILS  0.50  0.80           Hemodynamics:  Temp (24hrs), Av.6 °C (97.9 °F), Min:36.1 °C (97 °F), Max:37 °C (98.6 °F)  Temperature: 36.6 °C (97.8 °F)  Pulse  Av.9  Min: 79  Max: 102   Blood Pressure: 125/66 mmHg     Respiratory:    Respiration: 20, Pulse Oximetry: 98 %        RUL Breath Sounds: Clear, RML Breath Sounds: Clear;Diminished, RLL Breath Sounds: Clear;Diminished, ZEN Breath Sounds: Clear, LLL Breath Sounds:  Clear;Diminished  Fluids:    Intake/Output Summary (Last 24 hours) at 05/05/17 1208  Last data filed at 05/05/17 0400   Gross per 24 hour   Intake    268 ml   Output      0 ml   Net    268 ml        GI/Nutrition:  Orders Placed This Encounter   Procedures   • Diet Order     Standing Status: Standing      Number of Occurrences: 1      Standing Expiration Date:      Order Specific Question:  Diet:     Answer:  Renal [8]     Medical Decision Making, by Problem:  Active Hospital Problems    Diagnosis   • ESRD (end stage renal disease) (CMS-[facility]) [N18.6]   • Candidiasis of esophagus (CMS-HCC) [B37.81]   • Seizure (CMS-HCC) [R56.9]   • Severe protein-calorie malnutrition (CMS-HCC) [E43]   • HTN (hypertension), benign [I10]   • Hypoglycemia [E16.2]     1. ESRD - HD today  2. Sz like episode - defer to neurology  3. Acidosis - stop bicarb    Core Measures

## 2017-05-05 NOTE — EEG PROGRESS NOTE
EEG 05/05/2017 12:10 AM    ROUTINE ELECTROENCEPHALOGRAM REPORT        INDICATION:     PT HAD SZ LIKE ACTIVITY AT HOME PER FAMILY.  PT REPORTS HX OF SZ, PT HAS JAW/FACIAL MVMTS DURING SZ.   (LAST ONE 6 MONTHS AGO) HX OF DIABETES, HTN, RENAL FAILURE.    TECHNIQUE: 30 channel routine electroencephalogram (EEG) was performed in accordance with the international 10-20 system. The study was reviewed in bipolar and referential montages. The recording examined the patient during wakeful and drowsy state(s).     DESCRIPTION OF THE RECORD:      ACTIVATION PROCEDURES:      Hyperventilation and Photic Stimulation were done    ICTAL AND/OR INTERICTAL FINDINGS:    No focal or generalized epileptiform activity noted. Monomorphic Frontal delta bursts-  No clinical events or seizures were reported or recorded during the study.      EKG: sampling of the EKG recording demonstrated sinus rhythm.        INTERPRETATION:    ________________________________________________________________________    Nonspecific cortical dysfunction - more over frontal  Could be metabolic, toxic or even post-ictal  Outpatient neurology clinic follow up if necessary    Sleepy during this record  ________________________________________________________________________

## 2017-05-05 NOTE — CARE PLAN
Problem: Nutritional:  Goal: Achieve adequate nutritional intake  Patient will consume ~50% of meals  Outcome: NOT MET

## 2017-05-05 NOTE — DIETARY
Nutrition Services: Low BMI, Poor PO and Pressure Ulcer on Nutrition Admit Screen   46 year old female with admit dx of seizure  Past Pertinent Med Hx: ESRD on dialysis, gastroparesis with chronic nausea and vomiting, essential HTN, type 2 diabetes mellitus, candidiasis of the esophagus, external hemorrhoids, pulmonary nodule, anxiety    Attempted twice to visit with pt, pt was sleeping and busy with other discipline. Spoke with nursing student and states pt is not really eating anything, however pt does enjoy the boost. RD added TID with meals. Per chart pt PO 0% 3 meals documented. Per chart review pt's wt on 10/4/16 - 69.6 kg via stand up scale. Wt loss percent from stated admit wt is 29% in 7 months which is severe. Need measured wt.     Ht: 167.6 cm   Wt 5/4: 49.442 kg - stated wt  BMI: 17.6  Diet: Renal  Pertinent Labs: Na 134, Glucose 64, BUN 31, Creatinine 2.83, POC glucose 108, 146, 87  Pertinent Meds: norvasc, coreg, prozac, reglan, miralax, atarax (prn)  Fluids: No IV fluids noted in MAR  Skin: Pressure ulcer coccyx;sacrum and wound rash perineum left. Wound team consult pending, will await wound staging to make recommendations.  GI: Last BM 5/5    PLAN/RECOMMEND -   1) Nutrition rep to see patient daily for meal and snack preferences.  2) Encourage PO  3) Needs measured weight when feasible. Weekly weights to monitor fluid and nutrition status  4) Please document PO intake for each meal as percentage of meals consumed    RD following

## 2017-05-06 PROBLEM — R56.9 SEIZURE (HCC): Status: RESOLVED | Noted: 2017-01-01 | Resolved: 2017-01-01

## 2017-05-06 NOTE — PROGRESS NOTES
Hospital Medicine Progress Note, Adult, Complex               Author: Israjoce Marcell Date & Time created: 5/5/2017  5:48 PM     Interval History:  47 yo female with extensive history here with new onset seizures  No further events since yesterday  HD held yesterday but she did have it today  Feels better overall  Decreased appetite  Complains of generalized weakness and fatigue    Review of Systems:  Review of Systems   Constitutional: Positive for malaise/fatigue. Negative for fever and chills.   HENT: Negative for hearing loss.    Respiratory: Negative for cough.    Cardiovascular: Negative for chest pain and palpitations.   Gastrointestinal: Negative for nausea and vomiting.   Genitourinary: Negative for dysuria and frequency.   Musculoskeletal: Negative for myalgias and back pain.   Skin: Negative for itching and rash.   Neurological: Positive for dizziness and tingling. Negative for headaches.   Psychiatric/Behavioral: Negative for depression and suicidal ideas.       Physical Exam:  Physical Exam   Constitutional: She is oriented to person, place, and time. She appears cachectic. She has a sickly appearance.   HENT:   Head: Normocephalic and atraumatic.   Eyes: Conjunctivae are normal. Pupils are equal, round, and reactive to light.   Neck: Normal range of motion. Neck supple.   Cardiovascular: Normal rate and regular rhythm.    Pulmonary/Chest: Effort normal and breath sounds normal.   Abdominal: Soft. Bowel sounds are normal.   Musculoskeletal: She exhibits no edema or tenderness.   Neurological: She is alert and oriented to person, place, and time.   Skin: Skin is warm and dry.   Lower extremity scars   Nursing note and vitals reviewed.      Labs:        Invalid input(s): [id number]      Recent Labs      05/04/17   0630  05/05/17   0332   SODIUM  130*  134*   POTASSIUM  3.6  3.8   CHLORIDE  97  99   CO2  26  26   BUN  25*  31*   CREATININE  2.40*  2.83*   CALCIUM  7.5*  7.7*     Recent Labs       17   0630  17   0332   GLUCOSE  62*  64*     Recent Labs      17   0630  17   0332   RBC  2.89*  2.88*   HEMOGLOBIN  8.3*  8.5*   HEMATOCRIT  27.2*  26.8*   PLATELETCT  134*  120*     Recent Labs      17   0630  17   0332   WBC  3.8*  2.7*   NEUTSPOLYS  85.30*  81.10*   LYMPHOCYTES  4.20*  10.90*   MONOCYTES  8.90  5.70   EOSINOPHILS  0.30  0.40   BASOPHILS  0.50  0.80           Hemodynamics:  Temp (24hrs), Av.6 °C (97.8 °F), Min:36.1 °C (97 °F), Max:37 °C (98.6 °F)  Temperature: 36.7 °C (98 °F)  Pulse  Av.7  Min: 79  Max: 102   Blood Pressure: 122/62 mmHg     Respiratory:    Respiration: 20, Pulse Oximetry: 99 %        RUL Breath Sounds: Clear, RML Breath Sounds: Clear;Diminished, RLL Breath Sounds: Clear;Diminished, ZEN Breath Sounds: Clear, LLL Breath Sounds: Clear;Diminished  Fluids:    Intake/Output Summary (Last 24 hours) at 17 1748  Last data filed at 17 0400   Gross per 24 hour   Intake    268 ml   Output      0 ml   Net    268 ml        GI/Nutrition:  Orders Placed This Encounter   Procedures   • Diet Order     Standing Status: Standing      Number of Occurrences: 1      Standing Expiration Date:      Order Specific Question:  Diet:     Answer:  Renal [8]     Medical Decision Making, by Problem:  Active Hospital Problems    Diagnosis   • ESRD (end stage renal disease) (CMS-HCC) [N18.6]  - HD per nephro     • Candidiasis of esophagus (CMS-HCC) [B37.81]     • Seizure (CMS-HCC) [R56.9]  - likely due to hypoglycemia  - denies any hx of sz  - monitor glucose levels  - hold off on antiepileptics for now  - may need neuro c/s if recurs     • Severe protein-calorie malnutrition (CMS-HCC) [E43]  - dietary consult  - boost     • HTN (hypertension), benign [I10]  - norvasc, coreg     • Hypoglycemia [E16.2]  - encourage oral intake         Medications reviewed, Labs reviewed and Radiology images reviewed        DVT Prophylaxis: Heparin

## 2017-05-06 NOTE — PROGRESS NOTES
[facility] Medicine Progress Note, Adult, Complex               Author: Manuel Deal Date & Time created: 2017  10:07 AM     Interval History:  46 year old with TTS schedule, came in with possible seizure.  Seen on dialysis, tolerating treatment, without difficulty    Review of Systems:  Review of Systems   Constitutional: Negative for fever and chills.       Physical Exam:  Physical Exam   Constitutional: She is oriented to person, place, and time. She appears well-developed and well-nourished.   Cardiovascular: Normal rate and regular rhythm.    Pulmonary/Chest: Effort normal and breath sounds normal.   Musculoskeletal: She exhibits no edema or tenderness.   Neurological: She is alert and oriented to person, place, and time.       Labs:        Invalid input(s): [id number]      Recent Labs      17   0155   SODIUM  130*  134*  133*   POTASSIUM  3.6  3.8  4.4   CHLORIDE  97  99  100   CO2  26  26  28   BUN  25*  31*  14   CREATININE  2.40*  2.83*  1.66*   CALCIUM  7.5*  7.7*  7.3*     Recent Labs      17   0155   GLUCOSE  62*  64*  105*     Recent Labs      17   0155   RBC  2.89*  2.88*  2.79*   HEMOGLOBIN  8.3*  8.5*  8.1*   HEMATOCRIT  27.2*  26.8*  26.3*   PLATELETCT  134*  120*  101*     Recent Labs      17   0155   WBC  3.8*  2.7*  2.9*   NEUTSPOLYS  85.30*  81.10*  73.30*   LYMPHOCYTES  4.20*  10.90*  6.00*   MONOCYTES  8.90  5.70  13.80*   EOSINOPHILS  0.30  0.40  1.70   BASOPHILS  0.50  0.80  0.00           Hemodynamics:  Temp (24hrs), Av.5 °C (97.7 °F), Min:36.2 °C (97.1 °F), Max:36.9 °C (98.4 °F)  Temperature: 36.2 °C (97.2 °F)  Pulse  Av.2  Min: 79  Max: 102   Blood Pressure: 121/71 mmHg     Respiratory:    Respiration: 20, Pulse Oximetry: 94 %        RUL Breath Sounds: Diminished, RML Breath Sounds: Diminished, RLL Breath Sounds:  Diminished, ZEN Breath Sounds: Diminished, LLL Breath Sounds: Diminished  Fluids:    Intake/Output Summary (Last 24 hours) at 05/06/17 1007  Last data filed at 05/06/17 0000   Gross per 24 hour   Intake    980 ml   Output   2500 ml   Net  -1520 ml        GI/Nutrition:  Orders Placed This Encounter   Procedures   • Diet Order     Standing Status: Standing      Number of Occurrences: 1      Standing Expiration Date:      Order Specific Question:  Diet:     Answer:  Renal [8]     Medical Decision Making, by Problem:  Active Hospital Problems    Diagnosis   • ESRD (end stage renal disease) (CMS-[facility]) [N18.6]   • Candidiasis of esophagus (CMS-[facility]) [B37.81]   • Seizure (CMS-[facility]) [R56.9]   • Severe protein-calorie malnutrition (CMS-[facility]) [E43]   • HTN (hypertension), benign [I10]   • Hypoglycemia [E16.2]     1. ESRD - HD , see flowsheet for details  2. Sz like episode - defer to neurology      Core Measures

## 2017-05-06 NOTE — PROGRESS NOTES
Nephrologist ordered 3 hours hd tx. Initiated at 1710 and completed at 2010 .  Net uf 2000 ml. Tolerated HD.See flow sheet for details.  Report given to Alfa JAMES.

## 2017-05-06 NOTE — DISCHARGE INSTRUCTIONS
Discharge Instructions    Discharged to home by car with relative. Discharged via wheelchair, hospital escort: Yes.  Special equipment needed: Not Applicable    Be sure to schedule a follow-up appointment with your primary care doctor or any specialists as instructed.     Discharge Plan:   Diet Plan: Discussed  Activity Level: Discussed  Confirmed Follow up Appointment: Appointment Scheduled  Confirmed Symptoms Management: Discussed  Medication Reconciliation Updated: Yes    I understand that a diet low in cholesterol, fat, and sodium is recommended for good health. Unless I have been given specific instructions below for another diet, I accept this instruction as my diet prescription.   Other diet: Renal    Special Instructions: None    · Is patient discharged on Warfarin / Coumadin?   No     · Is patient Post Blood Transfusion?  No    Depression / Suicide Risk    As you are discharged from this [facility], it is important to learn how to keep safe from harming yourself.    Recognize the warning signs:  · Abrupt changes in personality, positive or negative- including increase in energy   · Giving away possessions  · Change in eating patterns- significant weight changes-  positive or negative  · Change in sleeping patterns- unable to sleep or sleeping all the time   · Unwillingness or inability to communicate  · Depression  · Unusual sadness, discouragement and loneliness  · Talk of wanting to die  · Neglect of personal appearance   · Rebelliousness- reckless behavior  · Withdrawal from people/activities they love  · Confusion- inability to concentrate     If you or a loved one observes any of these behaviors or has concerns about self-harm, here's what you can do:  · Talk about it- your feelings and reasons for harming yourself  · Remove any means that you might use to hurt yourself (examples: pills, rope, extension cords, firearm)  · Get professional help from the community (Mental Health, Substance  Abuse, psychological counseling)  · Do not be alone:Call your Safe Contact- someone whom you trust who will be there for you.  · Call your local CRISIS HOTLINE 621-7189 or 743-972-0895  · Call your local Children's Mobile Crisis Response Team [place] (707) 251-7724 or www.CoachSeek  · Call the toll free National Suicide Prevention Hotlines   · National Suicide Prevention Lifeline 501-271-RJBV (0446)  · National Hope Line Network 800-SUICIDE (263-9364)

## 2017-05-06 NOTE — PROGRESS NOTES
Pt is A+Ox4. Red eye observed on left.  HORN. Generalized weakness t/o. Pt denies pain or n/t. C/o nausea, medicated per MAR. Pt encouraged to eat snacks. Pt moves self in bed, pillows in use to encourage changes in position. Pt has redness on sacrum, wound noted in groin area. Pt has multiple healed wounds t/o body. All questions answered regarding POC.

## 2017-05-06 NOTE — FACE TO FACE
Face to Face Supporting Documentation - Home Health    The encounter with this patient was in whole or in part the primary reason for home health admission.    Date of encounter:   Patient:                    MRN:                       YOB: 2018  Roger Mckeon  [id number]  1970     Home health to see patient for:  Skilled Nursing care for assessment, interventions & education, Physical Therapy evaluation and treatment and Home health aide    Skilled need for:  Recent Deterioration of Health Status seizures; esrd on hd    Skilled nursing interventions to include:  Venous access care    Homebound status evidenced by:  Need the aid of supportive devices such as crutches, canes, wheelchairs or walkers. Leaving home requires a considerable and taxing effort. There is a normal inability to leave the home.    Community Physician to provide follow up care: Caridad Stone M.D.     Optional Interventions? No      I certify the face to face encounter for this home health care referral meets the CMS requirements and the encounter/clinical assessment with the patient was, in whole, or in part, for the medical condition(s) listed above, which is the primary reason for home health care. Based on my clinical findings: the service(s) are medically necessary, support the need for home health care, and the homebound criteria are met.  I certify that this patient has had a face to face encounter by myself.  Isaac Faith M.D. - NPI: [id number]

## 2017-05-06 NOTE — PROGRESS NOTES
Dc instructions provided. PIV lines and monitors dc'd. Pt to be escorted by wheel chair to private vehicle with all belongings. Pt verbalized understanding

## 2017-05-06 NOTE — CARE PLAN
Problem: Communication  Goal: The ability to communicate needs accurately and effectively will improve  D/W pt on n/v status and encouraged oral intake     Problem: Venous Thromboembolism (VTW)/Deep Vein Thrombosis (DVT) Prevention:  Goal: Patient will participate in Venous Thrombosis (VTE)/Deep Vein Thrombosis (DVT)Prevention Measures  D/W pt regarding chemical prophylaxis for DVT

## 2017-05-06 NOTE — CARE PLAN
Problem: Safety  Goal: Will remain free from injury  Outcome: PROGRESSING AS EXPECTED  Bed alarm in place    Problem: Skin Integrity  Goal: Risk for impaired skin integrity will decrease  Outcome: PROGRESSING SLOWER THAN EXPECTED  q 2 hr turn

## 2017-05-06 NOTE — PROGRESS NOTES
Pt dialyzed for 3 hrs today from 0921 to 1222.  Net UF  1.5L.  Tolerated tx. See dialysis flow sheet for details. Report called to STEVENSON Mccarty RN

## 2017-05-07 NOTE — DISCHARGE SUMMARY
DATE OF ADMISSION:  05/04/2017    DATE OF DISCHARGE:  05/06/2017    ADMISSION DIAGNOSES:  1.  Seizures.  2.  End-stage renal disease, on hemodialysis.  3.  Oral thrush.  4.  Hyponatremia.    DISCHARGE DIAGNOSES:  1.  Seizure.  This is secondary to hypoglycemia.  2.  End-stage renal disease, on hemodialysis.  3.  Oral thrush.  4.  Hyponatremia, improved.    CONSULTS DURING HOSPITALIZATION:  Oncology.    PROCEDURES DURING HOSPITALIZATION:  EEG.    HOSPITAL COURSE:  Patient is a 47-year-old female that has an extensive past   medical history.  She is chronically ill, has a history of end-stage renal   disease, on hemodialysis and a history of type 2 diabetes.  She presented to   the hospital for a possible seizure at home.  Apparently, the patient was   complaining of a headache, then had witnessed seizure-like activity and was   subsequently brought to the hospital here.  She has no history of prior   seizures.  When she was evaluated, she was found to have a blood glucose level   of approximately 60, and per this, we continued to monitor her glucose levels   during the hospitalization.  When she arrived, nephrology was consulted and   the patient was taken for hemodialysis, but prior to hemodialysis beginning   the first day, she had 2 witnessed seizure-like activities and was brought   back to the floor.  Her vitals remained stable.  Patient reports that she has   been having decreased appetite, has not been eating much.  She appears to be   cachectic and her blood glucose levels have been on the lower side during the   hospitalization.    When her blood glucose levels are better controlled, she had no symptoms and   felt back to her baseline.  She received 2 further hemodialysis during the   hospitalization.  Also, an EEG was performed and this did not show any   significant findings except that there could possibly be a focus of cortical   dysfunction in the frontal region and this could be secondary to metabolic    toxic _____ even postictal.  There were no further events during the   hospitalization.  She opted not to be started on antiepileptics.  She is going   to be discharged home in stable condition.  She should follow up with her PCP   and nephrology as scheduled.    DISCHARGE ACTIVITY:  As tolerated.    DISCHARGE DIET:  Renal.    DISCHARGE MEDICATIONS:  1.  Xanax 0.25 mg t.i.d. as needed.  2.  Norvasc 5 mg daily.  3.  Carvedilol 6.25 mg b.i.d.  4.  Lomotil as needed.  5.  Prozac 10 mg daily.  6.  Lasix 40 mg b.i.d.  7.  Atarax 25 mg 4 times a day.  8.  Robaxin every 6 hours as needed.  9.  Reglan 4 times a day.  10.  Zofran as needed.  11.  Potassium phosphate daily.  12.  Phenergan as needed.  13.  Sodium bicarbonate t.i.d.  14.  Trazodone as needed.    TOTAL TIME OF DISCHARGE:  35 minutes.       ____________________________________     MD SOPHIA Lockett / JUAN    DD:  05/06/2017 13:23:07  DT:  05/07/2017 11:25:10    D#:  [id number]  Job#:  [id number]

## 2017-05-08 PROBLEM — R62.7 FTT (FAILURE TO THRIVE) IN ADULT: Status: ACTIVE | Noted: 2017-01-01

## 2017-05-08 NOTE — IP AVS SNAPSHOT
" <p align=\"LEFT\"><IMG SRC=\"//[email or web address]$/[email or web address]\" alt=\"Image\" WIDTH=\"50%\" HEIGHT=\"200\" BORDER=\"\"></p>                   Name:Roger Mckeon  Medical Record Number:[id number]  CSN: [id number]    YOB: 1970   Age: 47 y.o.  Sex: female  HT:1.676 m (5' 6\") WT: 48.5 kg (106 lb 14.8 oz)          Admit Date: 5/8/2017     Discharge Date:   Today's Date: 5/14/2017  Attending Doctor:  Ja Godinez M.D.                  Allergies:  Oxycodone and Percocet          Follow-up Information     1. Follow up with Caridad Stone M.D..    Specialty:  Family Medicine    Contact information    [address] [address]  [phone]          2. Schedule an appointment as soon as possible for a visit with GUMARO Cain M.D..    Specialty:  Infectious Disease    Why:  Follow up this week in his office.    Contact information    [address] [facility] [address]  [address]  [facility] [address] [phone]          3. Follow up with [facility].    Specialty:  Nephrology    Why:  as per your usual appointments    Contact information    [address] [facility] [address] [phone]         Medication List      Take these Medications        Instructions    azithromycin 250 MG Tabs   Commonly known as:  ZITHROMAX    DISPENSE 10TABS       diphenoxylate-atropine 2.5-0.025 MG Tabs   Commonly known as:  LOMOTIL    Take 2 Tabs by mouth every 8 hours. Indications: Diarrhea   Dose:  2 Tab       fluconazole 200 MG Tabs   Commonly known as:  DIFLUCAN    Take 1 Tab by mouth every day.   Dose:  200 mg       fluoxetine 10 MG Caps   Commonly known as:  PROZAC    Take 10 mg by mouth every day.   Dose:  10 mg       hydrOXYzine 25 MG Tabs   Commonly known as:  ATARAX    Take 25 mg by mouth 4 times a day.   Dose:  25 mg       K-PHOS 500 MG tablet   Generic drug:  potassium phosphate (monobasic)    Take 1 Tab by mouth every day at 6 PM.   Dose:  1 Tab       nystatin 938320 UNIT/ML Susp   Commonly known as:  MYCOSTATIN    Take " 500,000 Units by mouth 4 times a day.   Dose:  341110 Units       ondansetron 4 MG Tabs tablet   Commonly known as:  ZOFRAN    Take 4 mg by mouth every 6 hours as needed for Nausea/Vomiting.   Dose:  4 mg       prednisoLONE acetate 1 % Susp   Commonly known as:  PRED FORTE    Place 1 Drop in left eye 4 times a day.   Dose:  1 Drop       sodium bicarbonate 650 MG Tabs   Commonly known as:  SODIUM BICARBONATE    Take 1 Tab by mouth 3 times a day.   Dose:  650 mg       sulfamethoxazole-trimethoprim 800-160 MG tablet   Commonly known as:  BACTRIM DS    Take 0.5 Tabs by mouth every 12 hours.   Dose:  0.5 Tab       XANAX 0.25 MG Tabs   Generic drug:  alprazolam    Take 0.25 mg by mouth 3 times a day as needed.   Dose:  0.25 mg

## 2017-05-08 NOTE — LETTER
[facility] - [facility]68  EHR eReferral Notification Requirements    To be sent by secure email to [email or web address] or   by fax to [phone]       FIELDS ARE REQUIRED TO BE COMPLETED     Patient Name:  Roger Mckeon  MRN:  [id number]   Account Number: [id number]                YOB: 1970    Date Roomed in ED:    5/8/2017   3:55 PM  Date First Observation Order Placed:      Date First Inpatient Order Placed: 5/8/2017   10:27 PM     Date of Previous Admission (Needed For Readmission Reviews Only):     Discharge Date and Time (if applicable): No discharge date for patient encounter.     PLEASE CHECK OFF TYPE OF REVIEW & CURRENT ADMISSION STATUS FROM LISTS BELOW  Type of Review:  Continued stay review    Dates to be Reviewed: 5/9-5/10        Current Admission Status:  Inpatient    / Contact Number for EHR outcome/recommendation call:    Lizz Alcazar [phone]     Attending Physician/ Contact Number (if not the same as in electronic record):  Dr. LILIANA Godinez [phone]     Comments:        Additional Information being Emailed or Faxed:  yes       Fax Handwritten Supporting Documents to EHR at [phone]      [address] [address]  [phone]  [email or web address]    Updated December 17, 2014

## 2017-05-08 NOTE — IP AVS SNAPSHOT
5/14/2017    Roger Mckeon  [address]  [address]  [place] [address]    Dear Roger:    [facility] wants to ensure your discharge home is safe and you or your loved ones have had all of your questions answered regarding your care after you leave the hospital.    Below is a list of resources and contact information should you have any questions regarding your hospital stay, follow-up instructions, or active medical symptoms.    Questions or Concerns Regarding… Contact   Medical Questions Related to Your Discharge  (7 days a week, 8am-5pm) Contact a Nurse Care Coordinator   [phone]   Medical Questions Not Related to Your Discharge  (24 hours a day / 7 days a week)  Contact the Nurse Health Line   [phone]    Medications or Discharge Instructions Refer to your discharge packet   or contact your [facility] Primary Care Provider   [phone]   Follow-up Appointment(s) Schedule your appointment via zuuka!   or contact Scheduling [phone]   Billing Review your statement via zuuka!  or contact Billing [phone]   Medical Records Review your records via zuuka!   or contact Medical Records [phone]     You may receive a telephone call within two days of discharge. This call is to make certain you understand your discharge instructions and have the opportunity to have any questions answered. You can also easily access your medical information, test results and upcoming appointments via the zuuka! free online health management tool. You can learn more and sign up at [employer]/zuuka!. For assistance setting up your zuuka! account, please call [phone].    Once again, we want to ensure your discharge home is safe and that you have a clear understanding of any next steps in your care. If you have any questions or concerns, please do not hesitate to contact us, we are here for you. Thank you for choosing [facility] for your healthcare needs.    Sincerely,    Your [facility] Healthcare Team

## 2017-05-08 NOTE — IP AVS SNAPSHOT
EnteGreat Access Code: [email or web address]  Expires: 5/30/2017 12:42 PM    Your email address is not on file at [facility].  Email Addresses are required for you to sign up for EnteGreat, please contact [phone] to verify your personal information and to provide your email address prior to attempting to register for EnteGreat.    Roger Mckeon  [address]  [address]  [place] [address]    EnteGreat  A secure, online tool to manage your health information     [facility] [facility]’s EnteGreat® is a secure, online tool that connects you to your personalized health information from the privacy of your home -- day or night - making it very easy for you to manage your healthcare. Once the activation process is completed, you can even access your medical information using the EnteGreat raimundo, which is available for free in the Apple Raimundo store or Google Play store.     To learn more about EnteGreat, visit [email or web address]    There are two levels of access available (as shown below):   My Chart Features  [facility] Primary Care Doctor [facility]  Specialists [facility]  Urgent  Care Non-[facility] Primary Care Doctor   Email your healthcare team securely and privately 24/7 X X X    Manage appointments: schedule your next appointment; view details of past/upcoming appointments X      Request prescription refills. X      View recent personal medical records, including lab and immunizations X X X X   View health record, including health history, allergies, medications X X X X   Read reports about your outpatient visits, procedures, consult and ER notes X X X X   See your discharge summary, which is a recap of your hospital and/or ER visit that includes your diagnosis, lab results, and care plan X X  X     How to register for EnteGreat:  Once your e-mail address has been verified, follow the following steps to sign up for EnteGreat.     1. Go to  [email or web address]  2. Click on the Sign Up Now box, which takes you to the New Member Sign Up page. You  will need to provide the following information:  a. Enter your Kanga Access Code exactly as it appears at the top of this page. (You will not need to use this code after you’ve completed the sign-up process. If you do not sign up before the expiration date, you must request a new code.)   b. Enter your date of birth.   c. Enter your home email address.   d. Click Submit, and follow the next screen’s instructions.  3. Create a [employer] Solutionst ID. This will be your Kanga login ID and cannot be changed, so think of one that is secure and easy to remember.  4. Create a Kanga password. You can change your password at any time.  5. Enter your Password Reset Question and Answer. This can be used at a later time if you forget your password.   6. Enter your e-mail address. This allows you to receive e-mail notifications when new information is available in Kanga.  7. Click Sign Up. You can now view your health information.    For assistance activating your Kanga account, call [phone]

## 2017-05-08 NOTE — IP AVS SNAPSHOT
" Home Care Instructions                                                                                                                  Name:Roger Mckeon  Medical Record Number:[id number]  CSN: [id number]    YOB: 1970   Age: 47 y.o.  Sex: female  HT:1.676 m (5' 6\") WT: 48.5 kg (106 lb 14.8 oz)          Admit Date: 5/8/2017     Discharge Date:   Today's Date: 5/14/2017  Attending Doctor:  Ja Godinez M.D.                  Allergies:  Oxycodone and Percocet            Discharge Instructions       Discharge Instructions    Discharged to home by car with relative. Discharged via wheelchair, hospital escort: Yes.  Special equipment needed: Not Applicable    Be sure to schedule a follow-up appointment with your primary care doctor or any specialists as instructed.     Discharge Plan: Home  Influenza Vaccine Indication: Not indicated: Previously immunized this influenza season and > 8 years of age    I understand that a diet low in cholesterol, fat, and sodium is recommended for good health. Unless I have been given specific instructions below for another diet, I accept this instruction as my diet prescription.   Other diet: as per prior to discharge.  Special Instructions: See follow-up information.    · Is patient discharged on Warfarin / Coumadin?   No     · Is patient Post Blood Transfusion?  No    Depression / Suicide Risk    As you are discharged from this [facility] facility, it is important to learn how to keep safe from harming yourself.    Recognize the warning signs:  · Abrupt changes in personality, positive or negative- including increase in energy   · Giving away possessions  · Change in eating patterns- significant weight changes-  positive or negative  · Change in sleeping patterns- unable to sleep or sleeping all the time   · Unwillingness or inability to communicate  · Depression  · Unusual sadness, discouragement and loneliness  · Talk of wanting to die  · Neglect of personal " appearance   · Rebelliousness- reckless behavior  · Withdrawal from people/activities they love  · Confusion- inability to concentrate     If you or a loved one observes any of these behaviors or has concerns about self-harm, here's what you can do:  · Talk about it- your feelings and reasons for harming yourself  · Remove any means that you might use to hurt yourself (examples: pills, rope, extension cords, firearm)  · Get professional help from the community (Mental Health, Substance Abuse, psychological counseling)  · Do not be alone:Call your Safe Contact- someone whom you trust who will be there for you.  · Call your local CRISIS HOTLINE 316-3096 or 005-665-6130  · Call your local Children's Mobile Crisis Response Team [place] (961) 840-2440 or www.[employer]  · Call the toll free National Suicide Prevention Hotlines   · National Suicide Prevention Lifeline 362-394-DNHR (0471)  · [place] Line Network 800-SUICIDE (140-3598)        Follow-up Information     1. Follow up with Caridad Stone M.D..    Specialty:  Family Medicine    Contact information    [address] [address]  [place] [address]  [phone]          2. Schedule an appointment as soon as possible for a visit with GUMARO Cain M.D..    Specialty:  Infectious Disease    Why:  Follow up this week in his office.    Contact information    [address]  [address]  [place] [address]  [phone]          3. [facility].    Specialty:  Nephrology    Why:  as per your usual appointments    Contact information    [address] [facility] [address] [phone]         Discharge Medication Instructions:    Below are the medications your physician expects you to take upon discharge:    Review all your home medications and newly ordered medications with your doctor and/or pharmacist. Follow medication instructions as directed by your doctor and/or pharmacist.    Please keep your medication list with you and share  with your physician.               Medication List      START taking these medications        Instructions    Morning Afternoon Evening Bedtime    azithromycin 250 MG Tabs   Last time this was given:  1,250 mg on 5/14/2017  3:07 AM   Commonly known as:  ZITHROMAX        DISPENSE 10TABS                        fluconazole 200 MG Tabs   Commonly known as:  DIFLUCAN        Take 1 Tab by mouth every day.   Dose:  200 mg                        sulfamethoxazole-trimethoprim 800-160 MG tablet   Commonly known as:  BACTRIM DS        Take 0.5 Tabs by mouth every 12 hours.   Dose:  0.5 Tab                          CONTINUE taking these medications        Instructions    Morning Afternoon Evening Bedtime    diphenoxylate-atropine 2.5-0.025 MG Tabs   Commonly known as:  LOMOTIL        Take 2 Tabs by mouth every 8 hours. Indications: Diarrhea   Dose:  2 Tab                        fluoxetine 10 MG Caps   Last time this was given:  10 mg on 5/14/2017  9:29 AM   Commonly known as:  PROZAC        Take 10 mg by mouth every day.   Dose:  10 mg                        hydrOXYzine 25 MG Tabs   Last time this was given:  25 mg on 5/14/2017  9:28 AM   Commonly known as:  ATARAX        Take 25 mg by mouth 4 times a day.   Dose:  25 mg                        K-PHOS 500 MG tablet   Generic drug:  potassium phosphate (monobasic)        Take 1 Tab by mouth every day at 6 PM.   Dose:  1 Tab                        nystatin 326488 UNIT/ML Susp   Commonly known as:  MYCOSTATIN        Take 500,000 Units by mouth 4 times a day.   Dose:  715212 Units                        ondansetron 4 MG Tabs tablet   Commonly known as:  ZOFRAN        Take 4 mg by mouth every 6 hours as needed for Nausea/Vomiting.   Dose:  4 mg                        prednisoLONE acetate 1 % Susp   Commonly known as:  PRED FORTE        Place 1 Drop in left eye 4 times a day.   Dose:  1 Drop                        sodium bicarbonate 650 MG Tabs   Last time this was given:  650 mg on  5/14/2017  9:29 AM   Commonly known as:  SODIUM BICARBONATE        Take 1 Tab by mouth 3 times a day.   Dose:  650 mg                        XANAX 0.25 MG Tabs   Generic drug:  alprazolam        Take 0.25 mg by mouth 3 times a day as needed.   Dose:  0.25 mg                             Where to Get Your Medications      You can get these medications from any pharmacy     Bring a paper prescription for each of these medications    - azithromycin 250 MG Tabs  - fluconazole 200 MG Tabs  - sulfamethoxazole-trimethoprim 800-160 MG tablet            Orders for after discharge     REFERRAL TO HOME HEALTH    Complete by:  As directed    Home health will create and establish a plan of care             Instructions           Diet / Nutrition:    Follow any diet instructions given to you by your doctor or the dietician, including how much salt (sodium) you are allowed each day.    If you are overweight, talk to your doctor about a weight reduction plan.    Activity:    Remain physically active following your doctor's instructions about exercise and activity.    Rest often.     Any time you become even a little tired or short of breath, SIT DOWN and rest.    Worsening Symptoms:    Report any of the following signs and symptoms to the doctor's office immediately:    *Pain of jaw, arm, or neck  *Chest pain not relieved by medication                               *Dizziness or loss of consciousness  *Difficulty breathing even when at rest   *More tired than usual                                       *Bleeding drainage or swelling of surgical site  *Swelling of feet, ankles, legs or stomach                 *Fever (>100ºF)  *Pink or blood tinged sputum  *Weight gain (3lbs/day or 5lbs /week)           *Shock from internal defibrillator (if applicable)  *Palpitations or irregular heartbeats                *Cool and/or numb extremities    Stroke Awareness    Common Risk Factors for Stroke include:    Age  Atrial Fibrillation  Carotid  Artery Stenosis  Diabetes Mellitus  Excessive alcohol consumption  High blood pressure  Overweight   Physical inactivity  Smoking    Warning signs and symptoms of a stroke include:    *Sudden numbness or weakness of the face, arm or leg (especially on one side of the body).  *Sudden confusion, trouble speaking or understanding.  *Sudden trouble seeing in one or both eyes.  *Sudden trouble walking, dizziness, loss of balance or coordination.Sudden severe headache with no known cause.    It is very important to get treatment quickly when a stroke occurs. If you experience any of the above warning signs, call 911 immediately.                   Disclaimer         Quit Smoking / Tobacco Use:    I understand the use of any tobacco products increases my chance of suffering from future heart disease or stroke and could cause other illnesses which may shorten my life. Quitting the use of tobacco products is the single most important thing I can do to improve my health. For further information on smoking / tobacco cessation call a Toll Free Quit Line at 1-953.734.7289 (*National Cancer [place]) or 1-443.833.8867 (American Lung Association) or you can access the web based program at www.lungusa.org.    [place] Tobacco Users Help Line:  (381) 522-6771       Toll Free: 1-294.231.9717  Quit Tobacco Program [facility] (186)866-8593    Crisis Hotline:    [place] Crisis Hotline:  9-328-LCQHEUX or 1-713.269.2552    [place] Crisis Hotline:    1-406.274.4906 or 760-919-2385    Discharge Survey:   Thank you for choosing [facility]. We hope we did everything we could to make your hospital stay a pleasant one. You may be receiving a phone survey and we would appreciate your time and participation in answering the questions. Your input is very valuable to us in our efforts to improve our service to our patients and their families.        My signature on this form indicates that:    1. I have reviewed and  understand the above information.  2. My questions regarding this information have been answered to my satisfaction.  3. I have formulated a plan with my discharge nurse to obtain my prescribed medications for home.                  Disclaimer         __________________________________                     __________       ________                       Patient Signature                                                 Date                    Time

## 2017-05-09 NOTE — PROGRESS NOTES
Nephrology/Hemodialysis note    Patient with ESRD/HD  Seen and examined during HD  Tolerates well  VS stable  3 H/ 3 K. UF 1L  Please see dialysis flow sheet for details

## 2017-05-09 NOTE — DIETARY
Nutrition Services:  Patient with noted BMI of less than 19.  BMI = 17.27.  47 year old female admitted for failure to thrive.    PMH:  DM, ESRD w/dialysis, gastroparesis, esophageal candidiasis  Pertinent Labs:  Na 132, Glucose 51  Pertinent Medications:  Reglan  Weight:  48.5 kg (107 lbs)  BMI:  17.27  Diet Rx:  Clear liquid  Patient with Nutrition Admit Triggers for poor oral intake and weight loss prior to admit.  Patient's home health nurse reported at admit that the patient has lost 10 lbs(8.5%) in the past month.  The patient did not take in any of the clear liquids at breakfast this am.    Plan/Recommendations:  1)  Boost Breeze w/meals.  2)  RD to monitor PO intake.  3)  Nutrition Services to see patient for food preferences when diet advanced.

## 2017-05-09 NOTE — PROGRESS NOTES
RN assisting Ana Luisa JAMES for labia/perineal wound pictures. Clinical pictures uploaded onto pt's chart.

## 2017-05-09 NOTE — DISCHARGE PLANNING
Patient is currently on service with [facility]. Please write home care orders upon discharge to home for continuum of care.[facility] at  /[phone] if there are any questions.

## 2017-05-09 NOTE — ED NOTES
hospitalist here for admit orders. Iv fluconazole to be given AFTER fungal blood culture obtained. Lab to snet spec container, dr ruiz aware

## 2017-05-09 NOTE — ED NOTES
"Plan reviewed with pt-rn unable to establish iv access. erp aware and left ej inserted by same with  Blood to lab. Pt states dc from hospital yesterday and home health nurse told her \"she has lost 10 lbs in 1 month and needs to come to hospital\".pt states sore throat and chest pain x 1 month  "

## 2017-05-09 NOTE — CONSULTS
DATE OF SERVICE:  05/08/2017    DATE OF SERVICE:  05/08/2017    REQUESTING PHYSICIAN:  Daylin Medellin MD    SECOND REASON FOR CONSULTATION:  To evaluate and provide dialysis for patient   with end-stage renal disease.    HISTORY OF PRESENT ILLNESS:  The patient is a 47-year-old female with   end-stage renal disease, on hemodialysis, who has been receiving dialysis   treatments on Tuesday, Thursday, Saturday at [facility],   recently hospitalized with seizures, upon discharge, the home health nurse was   assigned, who apparently came home and found the patient is not really being   taking care, losing weight, not eating.  Currently, patient states doing well.    She said she has no complaints, no headache, no dizziness, no fever or   chills.  No chest pain, no abdominal pain, no nausea or vomiting, not able to   ambulate on her own.    REVIEW OF SYSTEMS:  All 12 points reveal negative except history of present   illness per CMS/AMA criteria.    PAST MEDICAL HISTORY:  End-stage renal disease, on hemodialysis, multiple   readmissions for sepsis, failure to thrive, diabetes mellitus type 2,   gastroparesis, back pain, and hypertension.    FAMILY HISTORY:  No history of kidney disease.    SOCIAL HISTORY:  No tobacco, alcohol or drug use.    PHYSICAL EXAMINATION:  VITAL SIGNS:  Blood pressure 129/75, heart rate 91, temperature 36.7.  GENERAL APPEARANCE:  Very thin, cachectic female, in no acute distress.  HEENT:  Normocephalic, atraumatic.  Pupils equal, round, reactive to light.    Extraocular movement intact.  Nose patent.  Oropharynx, slightly dry mucosa.  NECK:  No lymphadenopathy.  No thyromegaly appreciated.  A tunneled dialysis   catheter in place.  LUNGS:  Clear to auscultation bilaterally.  No wheezes, no rhonchi.  HEART:  Regular rate.  No rub or gallop.  ABDOMEN:  Soft, nontender, nondistended.  Bowel sounds present.  EXTREMITIES:  No cyanosis, clubbing or edema.  NEUROLOGIC:  Alert and  oriented x3.  No focal deficits.    LABORATORY RESULTS:  Hemoglobin level 8.0.  Sodium 129, potassium 4.0, BUN 36   and creatinine 2.55.    ASSESSMENT AND PLAN:  The patient is a 47-year-old female with multiple   medical problems, end-stage renal disease, on hemodialysis, admitted with   failure to thrive.  1.  End-stage renal disease.  We will continue dialysis per patient's schedule   on Tuesday, Thursday and Saturday.  2.  Electrolytes, noticed worsening hyponatremia to avoid free water.  3.  Anemia, to add Epogen with hemodialysis treatment.  4.  Hypertension.  Blood pressure remains well controlled.  5.  Negative volume, not significantly overloaded.    RECOMMENDATIONS:  Dialysis Tuesday, Thursday and Saturday, home medications,   adjust to renal doses, to consult nutrition.  We will follow patient closely.    Thank you for the consult.       ____________________________________     MD GURDEEP MONTILLA / JUAN    DD:  05/08/2017 18:59:34  DT:  05/08/2017 21:27:38    D#:  [id number]  Job#:  [id number]

## 2017-05-09 NOTE — ED PROVIDER NOTES
ED Provider Note    CHIEF COMPLAINT  Chief Complaint   Patient presents with   • Heartburn   • Dehydration       HPI  Roger Mckeon is a 47 y.o. female who presents to the Emergency Department a history of end-stage renal  disease, on hemodialysis and a history of type 2 diabetes. He should have a prior admission recently for seizures. She presents to the emergency department today stating she is weak, nauseated not eating and has no one to care for her at home. Apparently she was sent after seeing her home health nurse was worried that she has lost 10 pounds in the last month. She is on Phenergan and Zofran but has continued nausea. She does go to dialysis and went to dialysis on Monday    REVIEW OF SYSTEMS  Positive for nausea abdominal pain, Negative for fever or dysuria.  As above all other systems are negative.    PAST MEDICAL HISTORY   has a past medical history of H/O: hysterectomy; Hypertension; Renal failure; Diabetes; Dental disorder; and Dialysis patient. She also has no past medical history of Fall.    FAMILY HISTORY  Family History   Problem Relation Age of Onset   • Diabetes Mother    • Diabetes Father    • Hypertension Father    • Diabetes Sister         SOCIAL HISTORY  Social History     Social History Main Topics   • Smoking status: Never Smoker    • Smokeless tobacco: Not on file   • Alcohol Use: No   • Drug Use: No   • Sexual Activity: Not on file       SURGICAL HISTORY   has past surgical history that includes hysterectomy, vaginal; other; incision and drainage general (4/21/2013); gastroscopy with biopsy (N/A, 9/14/2016); gyn surgery; and av fistula creation (Left, 10/4/2016).    CURRENT MEDICATIONS  Reviewed.  See Encounter Summary.  Include   Current facility-administered medications:   •  fluconazole (DIFLUCAN) in NS IVPB 200 mg, 200 mg, Intravenous, Q24HRS, Eugenio Sterling M.D.    Current outpatient prescriptions:   •  hydrOXYzine (ATARAX) 25 MG Tab, Take 25 mg by mouth 4 times a day.,  "Disp: , Rfl:   •  promethazine (PHENERGAN) 25 MG Tab, Take 25 mg by mouth every four hours as needed for Nausea/Vomiting. may take every 4 to 6 hours, Disp: , Rfl:   •  methocarbamol (ROBAXIN) 750 MG Tab, Take 750 mg by mouth every 6 hours as needed (pain, muscle spasm)., Disp: , Rfl:   •  ondansetron (ZOFRAN) 4 MG Tab tablet, Take 4 mg by mouth every 6 hours as needed for Nausea/Vomiting., Disp: , Rfl:   •  alprazolam (XANAX) 0.25 MG Tab, Take 0.25 mg by mouth 3 times a day as needed., Disp: , Rfl:   •  fluoxetine (PROZAC) 10 MG Cap, Take 10 mg by mouth every day., Disp: , Rfl:   •  potassium phosphate, monobasic, (K-PHOS) 500 MG tablet, Take 1 Tab by mouth every day at 6 PM., Disp: , Rfl:   •  diphenoxylate-atropine (LOMOTIL) 2.5-0.025 MG Tab, Take 2 Tabs by mouth 3 times a day as needed., Disp: , Rfl:   •  amlodipine (NORVASC) 5 MG Tab, Take 5 mg by mouth every evening., Disp: , Rfl:   •  trazodone (DESYREL) 50 MG Tab, Take 50 mg by mouth at bedtime as needed for Sleep., Disp: , Rfl:   •  furosemide (LASIX) 40 MG Tab, Take 1 Tab by mouth 2 Times a Day., Disp: 60 Tab, Rfl: 1  •  carvedilol (COREG) 6.25 MG Tab, Take 1 Tab by mouth 2 times a day, with meals., Disp: 60 Tab, Rfl: 1  •  metoclopramide (REGLAN) 10 MG Tab, Take 1 Tab by mouth 4 Times a Day,Before Meals and at Bedtime., Disp: 120 Tab, Rfl: 3  •  sodium bicarbonate (SODIUM BICARBONATE) 650 MG Tab, Take 1 Tab by mouth 3 times a day., Disp: 120 Tab, Rfl: 3      ALLERGIES  Allergies   Allergen Reactions   • Oxycodone Itching     RXN=unknown   • Percocet [Apap-Fd&C Red #40 Al Miller-Oxycodone] Itching       PHYSICAL EXAM  VITAL SIGNS: /75 mmHg  Pulse 93  Temp(Src) 36.7 °C (98 °F)  Resp 16  Ht 1.676 m (5' 6\")  Wt 49.442 kg (109 lb)  BMI 17.60 kg/m2  SpO2 98%  Constitutional:  Weak, alert, able to answer questions  HENT: Nose is normal in appearance, external ears are normal,  Dry, oral thrush present  Eyes: AnictericLeft eye erythmea  Neck: The " trachea is midline, there is no obvious mass or meningeal signs  Cardiovascular: Good perfusion,  regular rate and rhythm without murmurs gallops or rubs  Thorax & Lungs: Respiratory rate and effort are normal. There is normal chest excursion with respiration.  No wheezes rhonchi or rales noted. Right chest porst  Abdomen: Abdomen is normal in appearance, no gross peritoneal signs   : Per nursing exam she has diffuse erythema and white plaquing on the groin No CVA tenderness to palpation  Musculoskeletal: No deformities noted in all 4 extremities. Left forearm fistula with bruit  Skin: Visualized skin is warm without rash.  Neurologic:  Cranial nerves II through XII are intact there is no focal abnormality noted.  Psychiatric: Normal mood and mentation    RADIOLOGY/PROCEDURES  Imaging Studies:    No orders to display         Pertinent Labs   Results for orders placed or performed during the hospital encounter of 05/08/17   CBC WITH DIFFERENTIAL   Result Value Ref Range    WBC 4.3 (L) 4.8 - 10.8 K/uL    RBC 2.76 (L) 4.20 - 5.40 M/uL    Hemoglobin 8.0 (L) 12.0 - 16.0 g/dL    Hematocrit 25.4 (L) 37.0 - 47.0 %    MCV 92.0 81.4 - 97.8 fL    MCH 29.0 27.0 - 33.0 pg    MCHC 31.5 (L) 33.6 - 35.0 g/dL    RDW 47.8 35.9 - 50.0 fL    Platelet Count 111 (L) 164 - 446 K/uL    MPV 11.2 9.0 - 12.9 fL    Neutrophils-Polys 83.20 (H) 44.00 - 72.00 %    Lymphocytes 5.40 (L) 22.00 - 41.00 %    Monocytes 9.10 0.00 - 13.40 %    Eosinophils 0.90 0.00 - 6.90 %    Basophils 0.50 0.00 - 1.80 %    Immature Granulocytes 0.90 0.00 - 0.90 %    Nucleated RBC 0.00 /100 WBC    Neutrophils (Absolute) 3.57 2.00 - 7.15 K/uL    Lymphs (Absolute) 0.23 (L) 1.00 - 4.80 K/uL    Monos (Absolute) 0.39 0.00 - 0.85 K/uL    Eos (Absolute) 0.04 0.00 - 0.51 K/uL    Baso (Absolute) 0.02 0.00 - 0.12 K/uL    Immature Granulocytes (abs) 0.04 0.00 - 0.11 K/uL    NRBC (Absolute) 0.00 K/uL   COMP METABOLIC PANEL   Result Value Ref Range    Sodium 129 (L) 135 - 145  mmol/L    Potassium 4.0 3.6 - 5.5 mmol/L    Chloride 93 (L) 96 - 112 mmol/L    Co2 25 20 - 33 mmol/L    Anion Gap 11.0 0.0 - 11.9    Glucose 69 65 - 99 mg/dL    Bun 36 (H) 8 - 22 mg/dL    Creatinine 2.55 (H) 0.50 - 1.40 mg/dL    Calcium 7.4 (L) 8.4 - 10.2 mg/dL    AST(SGOT) 21 12 - 45 U/L    ALT(SGPT) 9 2 - 50 U/L    Alkaline Phosphatase 97 30 - 99 U/L    Total Bilirubin 0.4 0.1 - 1.5 mg/dL    Albumin 2.1 (L) 3.2 - 4.9 g/dL    Total Protein 6.4 6.0 - 8.2 g/dL    Globulin 4.3 (H) 1.9 - 3.5 g/dL    A-G Ratio 0.5 g/dL   PROTHROMBIN TIME   Result Value Ref Range    PT 12.5 12.0 - 14.6 sec    INR 0.95 0.87 - 1.13   APTT   Result Value Ref Range    APTT 32.0 24.7 - 36.0 sec   URINALYSIS CULTURE, IF INDICATED   Result Value Ref Range    Micro Urine Req Microscopic     Color Yellow     Character Cloudy (A)     Specific Gravity 1.020 <1.035    Ph 8.0 5.0-8.0    Glucose Negative Negative mg/dL    Ketones Negative Negative mg/dL    Protein 100 (A) Negative mg/dL    Bilirubin Negative Negative    Nitrite Negative Negative    Leukocyte Esterase Moderate (A) Negative    Occult Blood Moderate (A) Negative    Culture Indicated Yes UA Culture   COD (ADULT)   Result Value Ref Range    ABO Grouping Only O     Rh Grouping Only POS     Antibody Screen-Cod NEG    TROPONIN   Result Value Ref Range    Troponin I <0.02 0.00 - 0.04 ng/mL   ESTIMATED GFR   Result Value Ref Range    GFR If  24 (A) >60 mL/min/1.73 m 2    GFR If Non African American 20 (A) >60 mL/min/1.73 m 2   URINE MICROSCOPIC (W/UA)   Result Value Ref Range    WBC 10-20 (A) /hpf    RBC 2-5 (A) /hpf    Bacteria Moderate (A) None /hpf    Epithelial Cells Few Few /hpf    Urine Crystals Few Amorphous /hpf    Yeast Cells Rare (A) None /hpf               COURSE & MEDICAL DECISION MAKING  Nursing notes and vital signs were reviewed. (See chart for details)  The patients  records were reviewed, history was obtained from the patient ;     Initial evaluation of the  patient she was unable to have an IV placed by nursing after several attempts and an EJ was placed in her left neck by myself    The patient presents with poor oral intake nausea, and the differential diagnosis includes but is not limited to infection, dehydration, failure to thrive, cancer, GI ulcer or gastritis.       Initial orders in the Emergency Department included CBC CMP and lipase, urinalysis and initial treatment in the Emergency Department included nephrology and hospitalist consultation.    ED testing reveals probable urinary tract infection, global weakness, hospitalist found thrush on exam. She was seen by nephrology and will be dialyzed tomorrow and admitted for inpatient evaluation.  will need to be contacted to ensure home safety    O    FINAL IMPRESSION  1. Failure to thrive  2. Urinary tract infection  3. Thrush  4.  End-stage renal disease on dialysis  5.  EJ line placement by myself  DISPOSITION  Admit    Electronically signed by: Daylin Medellin, 5/8/2017 5:07 PM

## 2017-05-09 NOTE — PROGRESS NOTES
Spoke to Dr. Betancourt, via telephone, okay for pt to get CT with oral and IV contrast after dialysis.

## 2017-05-09 NOTE — PROGRESS NOTES
POc dicussed with pt. Pt looks malnourished, pt at high risk for skin break down, pt encouraged to turn frequently. Pt educated on risk of skin break down and pressure ulcers and understands and agrees to turn frequently to reduce pressure points. Pt is incontinent, rizwan care done with CNA, skin maceration and edema noted on rizwan area, will take photo of wound, and notify MD. Safety measures in place.

## 2017-05-09 NOTE — DISCHARGE PLANNING
SW noted IP consult for this SW to met with this patient.  SW attempted to met with this patient bedside however she was having dialysis.  SW will try back later.

## 2017-05-09 NOTE — PROGRESS NOTES
Hospital Medicine Progress Note, Adult, Complex               Author: Ja Godinez Date & Time created: 5/9/2017  1:10 PM     Interval History:  47YR OLD F WITH ORAL THRUSH AND SEVERE MALNUTRITION    Review of Systems:  Review of Systems   Unable to perform ROS  Constitutional: Negative for fever, chills and weight loss.   HENT: Negative for hearing loss and tinnitus.    Eyes: Negative for blurred vision, double vision and photophobia.   Respiratory: Negative for cough, hemoptysis and sputum production.    Cardiovascular: Negative for chest pain, palpitations and orthopnea.   Gastrointestinal: Negative for heartburn, abdominal pain and diarrhea.   Genitourinary: Negative for frequency and hematuria.   Musculoskeletal: Negative for back pain and neck pain.   Skin: Negative for itching.   Neurological: Negative for dizziness, tingling, tremors and headaches.       Physical Exam:  Physical Exam   Constitutional: She is oriented to person, place, and time.   HENT:   Head: Normocephalic and atraumatic.   Severe oral thrush   Eyes: Right eye exhibits no discharge. Left eye exhibits discharge.   Neck: Neck supple.   Cardiovascular: Normal rate and regular rhythm.    Pulmonary/Chest: No stridor. No respiratory distress. She has no wheezes. She has no rales.   Abdominal: She exhibits no distension. There is no tenderness. There is no rebound.   Musculoskeletal: She exhibits no edema or tenderness.   Neurological: She is alert and oriented to person, place, and time.   Skin: Skin is warm and dry. She is not diaphoretic.       Labs:        Invalid input(s): [id number]  Recent Labs      05/08/17 1711   TROPONINI  <0.02     Recent Labs      05/08/17   1711 05/09/17 0419   SODIUM  129*  132*   POTASSIUM  4.0  4.2   CHLORIDE  93*  97   CO2  25  27   BUN  36*  34*   CREATININE  2.55*  2.72*   CALCIUM  7.4*  7.5*     Recent Labs      05/08/17   1711 05/09/17 0419   ALTSGPT  9   --    ASTSGOT  21   --     ALKPHOSPHAT  97   --    TBILIRUBIN  0.4   --    GLUCOSE  69  51*     Recent Labs      17   17117   0419  17   0420   RBC  2.76*   --   2.93*   HEMOGLOBIN  8.0*   --   8.6*   HEMATOCRIT  25.4*   --   26.9*   PLATELETCT  111*   --   112*   PROTHROMBTM  12.5   --    --    APTT  32.0   --    --    INR  0.95   --    --    IRON   --   11*   --    TOTIRONBC   --   174*   --      Recent Labs      17   0420   WBC  4.3*  3.7*   NEUTSPOLYS  83.20*  83.60*   LYMPHOCYTES  5.40*  5.40*   MONOCYTES  9.10  8.60   EOSINOPHILS  0.90  1.60   BASOPHILS  0.50  0.30   ASTSGOT  21   --    ALTSGPT  9   --    ALKPHOSPHAT  97   --    TBILIRUBIN  0.4   --            Hemodynamics:  Temp (24hrs), Av.8 °C (98.3 °F), Min:36.7 °C (98 °F), Max:37 °C (98.6 °F)  Temperature: 36.8 °C (98.2 °F)  Pulse  Av.3  Min: 78  Max: 109   Blood Pressure: 125/66 mmHg, NIBP: 149/90 mmHg     Respiratory:    Respiration: 16, Pulse Oximetry: 99 %           Fluids:    Intake/Output Summary (Last 24 hours) at 17 1310  Last data filed at 17 0900   Gross per 24 hour   Intake    100 ml   Output      0 ml   Net    100 ml     Weight: 48.5 kg (106 lb 14.8 oz)  GI/Nutrition:  Orders Placed This Encounter   Procedures   • DIET ORDER     Standing Status: Standing      Number of Occurrences: 1      Standing Expiration Date:      Order Specific Question:  Diet:     Answer:  Clear Liquid [10]     Medical Decision Making, by Problem:  Active Hospital Problems    Diagnosis   • FTT (failure to thrive) in adult [R62.7]   47YR OLD F WITH ORAL THRUSH  ON DIFLUCAN IV  ?OF CANDIDA ESOPHAGITIS  CALLED GI FOR A CONSULT    ESRD  AS PER NEPHEOLOGY  DIALYSIS    MALNUTRITION  SEVERE  PRE-ALBUMIN LEVEL  DIETITIAN CONSULT    HTN  COREG  AMLODIPINE                  DVT prophylaxis - mechanical: SCDs

## 2017-05-09 NOTE — PROGRESS NOTES
[facility] Progress Note    Hemodialysis treatment ordered today per Dr. Betancourt x 3 hours. Treatment initiated at 1120, ended at 1420.     Unable to reach UF goal r/t low BP; see paper flow sheet for details.  MD aware.    Net UF 1000 mL.     Post tx, CVC flushed with saline then locked with heparin 1000 units/mL per designated amount in each wing then clamped and capped. Aspirate heparin prior to next CVC use.    Report given to Primary RN.

## 2017-05-09 NOTE — ED NOTES
Mini-cathed urine obtained and sent to lab.  Labia noted to be red/swollen/tender.  Large ammount of white discharge noted.  Will inform ERP.

## 2017-05-09 NOTE — H&P
"CHIEF COMPLAINT:  \"They say I am not eating\".    HISTORY OF PRESENT ILLNESS:  This is an unfortunate 47-year-old female with   severe diabetes.  This progressed to end-stage renal disease, currently on   dialysis.  She has had multiple admissions to this hospital for a myriad of   issues.  She is discharged from the hospital a couple of days ago and   apparently was told by her home health nurse that she has lost 10 pounds in a   month and needs to come to the hospital again today.  She states that she does   eat, but they state she does not, but does endorse weight loss.  She says she   has intermittent abdominal pain for sometime and difficulty swallowing.  She   has a known history of recurrent thrush and a candidal esophagitis.  She   denies any fevers or chills, has been tolerating dialysis.    PAST MEDICAL HISTORY:  1.  Type 2 diabetes.  2.  End-stage renal disease, on dialysis Tuesday, Thursday, and Saturdays.  2.  Benign essential hypertension.  3.  History of esophageal candidiasis.  4.  History of pulmonary nodule.  5.  History of gastroparesis.  6.  Anemia related to chronic renal failure.  7.  Anxiety.  8.  Chronic metabolic acidosis, related to renal failure.    PAST SURGICAL HISTORY:  1.  Hysterectomy.  2.  Low back surgery.  3.  Recent eye surgery, she believes her laser eye surgery for diabetic   retinopathy.    SOCIAL HISTORY:  No tobacco, alcohol, or drugs.  She currently lives with her   ex- and children.  She states her  is getting frustrated with   her chronic illness.    FAMILY HISTORY:  Diabetes and renal failure in father.    REVIEW OF SYSTEMS:  Otherwise, negative.    PHYSICAL EXAMINATION:  GENERAL:  Cachectic, chronically ill-appearing female in no acute distress.  VITAL SIGNS:  Temperature 36.7, pulse is 91, respirations are 16.  She is 100%   on room air, blood pressure 129/75.  LUNGS:  Clear to auscultation bilaterally.  HEART:  Regular rate and rhythm.  No murmurs.  HEENT: "  Oropharynx shows severe thrush.  Extraocular movements are intact.    The left eye was injected.  ABDOMEN:  Scaphoid, diffusely tender.  EXTREMITIES:  Joints are intact _____ drainage or effusion.  SKIN:  Intact without lesion or rash.  NEUROLOGIC:  She is alert and oriented, otherwise nonfocal.  There is evidence   for alopecia.  She is cachectic, appears malnourished.    RESULTS REVIEW:  CBC shows a white blood cell count of 4.3, hemoglobin of 8,   platelet count of 111.  Sodium is 129, chloride 93, creatinine 2.55.    Urinalysis shows 100+ protein, 10-20 whites, 2-5 red cells, moderate bacteria,   nitrite negative, leukocyte esterase is moderate.    ASSESSMENT:  1.  Severe protein calorie malnutrition.  2.  Severe oral thrush with possible esophageal candidiasis.  3.  Pancytopenia, chronic of unclear etiology.  4.  Other chronic medical problems as listed above.    PLAN:  The patient will be admitted.   will be consulted as   patient does not thriving at home.  She may need placement.  I am going to   place her on to IV Diflucan and she likely needs to gastroenterology consult   for consideration of an endoscopy and possible prolonged antifungals that she   has persistent candidal esophagitis, fungal cultures will be obtained in the   blood.  I would like to repeat an erythrocyte sedimentation rate as that was   markedly elevated in the past.  If her reticulocyte count is down, bone marrow   biopsy may need to be considered in this patient given her pancytopenia.    Hematology consultation also has a consideration based on her results.  The   patient has a poor and worsening prognosis over the last several months.  I am   concerned for possible malignancy and in addition to the above-mentioned   issues.  She had a CT scan in 2007 of her abdomen and pelvis that was   essentially normal and has as an abnormal chest x-ray.  I am going to obtain a   CT of the chest, abdomen and pelvis to evaluate her  for possible   malignancies, especially given her history of pulmonary nodules in the past.    The patient will require a nutrition consult and ongoing attic nutrition.  She   has a borderline urinary tract infection.  At this point, I am going to avoid   treating any given her severe thrush as she has no symptoms, prognosis for   this woman is poor.  Medical decision making is complex.       ____________________________________     MD JESSE WINSLOW / JUAN    DD:  05/08/2017 19:19:15  DT:  05/08/2017 21:02:08    D#:  [id number]  Job#:  [id number]

## 2017-05-09 NOTE — CARE PLAN
Problem: Mobility  Goal: Risk for activity intolerance will decrease  Orders received for PT/OT, treat and eval.

## 2017-05-09 NOTE — PROGRESS NOTES
Patient arrived to unit from ER in [facility]; pt was transferred to hospital bed, 2-assist and tolerated well; pt is A&Ox4, VSS. Pt states she is unable to mobilize due to general weakness and fatigue. Skin assessment complete, not WDL; pt's skin appears black, dark and dusky, B/L feet are cracked and cool. Pt denies pain, nausea, vomiting, C/P, SOB, lightheadedness, dizziness. Discussed POC for the night and CT tomorrow as well as medications and tests; pt verbalized understanding and agrees to plan. Fall precautions in place; call light within reach; hourly rounding. Admit profile complete. Juice offered as pt remains on clear liquids.

## 2017-05-09 NOTE — CARE PLAN
Problem: Skin Integrity  Goal: Risk for impaired skin integrity will decrease  Pt educated on frequent turns, pt demonstrates understanding.

## 2017-05-09 NOTE — CARE PLAN
Problem: Safety  Goal: Will remain free from falls  Outcome: PROGRESSING AS EXPECTED  Intervention: Implement fall precautions  Call light within reach; bed locked and in lowest position; fall precautions in place; pt instructed to call before ambulating OOB.       Problem: Skin Integrity  Goal: Risk for impaired skin integrity will decrease  Outcome: PROGRESSING AS EXPECTED  Intervention: Assess risk factors for impaired skin integrity and/or pressure ulcers  Preserving skin integrity by providing frequent changes and cleansing of the skin. Pt instructed to ambulate frequently and to call if needing linen changes.

## 2017-05-10 NOTE — PROGRESS NOTES
Spoke with Robert from the lab, informed me that pt's WBC was 2.5. Results read back to [facility]. Paged hospitalist-on call JEROME Waters and notified her of critical lab. Per hospiatlist, continue to monitor pt.

## 2017-05-10 NOTE — PROGRESS NOTES
GI Consults  Re: yeast esophagitis    Impression:  1.  70# unintentional weight loss past 1 year  2.  Odynophagia  3.  Hx yeast esophagitis  4.  Oral thrush  5.  HIV reactive.  I have not discussed with patient. Not sexually active past 2-3 years.  No hx of JOSE or IVDU. HBV and HCV negative.  6.  ESRD  7.  Type II DM.  Not on medication  8.  Delayed gastric emptying.  GES t1/2 241 min  9.  Multiple admissions for sepsis  10.  HTN  11.  Iron deficiency anemia  12.  Severe protein calorie malnutrition  13.  Chronic nonproductive cough.    Recs:  1.  Currently on IV fluconazole  2.  EGD with anesthesia today   3.  Defer to hospitalist for CD4 and viral load studies  4.  Consider IV iron infusion  5.  Enteral feeding vs. TPN.  Will know more about possibility of yeast esophagitis this afternoon which will impede her PO intake.

## 2017-05-10 NOTE — PROGRESS NOTES
CT techgabriel, notified pt is close to finishing oral contrast, pt can go down to CT in about 1 hour, per CT tech.

## 2017-05-10 NOTE — DISCHARGE PLANNING
Care Transition Team Assessment    Information Source  Orientation : Oriented x 4  Information Given By: Patient  Informant's Name: Roger  Who is responsible for making decisions for patient? : Patient    Readmission Evaluation  Is this a readmission?: Yes - unplanned readmission  Why do you think you were readmitted?: still not feeling well    Elopement Risk  Legal Hold: No  Ambulatory or Self Mobile in Wheelchair: No-Not an Elopement Risk  Elopement Risk: Not at Risk for Elopement    Interdisciplinary Discharge Planning  Does Admitting Nurse Feel This Could be a Complex Discharge?: Yes  Primary Care Physician: Chase  Lives with - Patient's Self Care Capacity: Adult Children, Other (Comments), Child Less than 18 Years of Age (ex )  Patient or legal guardian wants to designate a caregiver (see row info): No  Support Systems: Family Member(s), Friends / Neighbors  Housing / Facility: 1 Story Apartment / Condo  Do You Take your Prescribed Medications Regularly: Yes  Able to Return to Previous ADL's: Other  Mobility Issues: Yes  Prior Services: Skilled Home Health Services ([facility])  Patient Expects to be Discharged to:: Home  Assistance Needed: Unknown at this Time  Durable Medical Equipment: Unknown    Discharge Preparedness  What is your plan after discharge?: Home with help         Finances  Financial Barriers to Discharge: No  Prescription Coverage: Yes    Vision / Hearing Impairment  Vision Impairment : No  Hearing Impairment : No    Values / Beliefs / Concerns  Values / Beliefs Concerns : No  Special Hospitalization Concerns: N/A    Advance Directive  Advance Directive?: None    Domestic Abuse  Have you ever been the victim of abuse or violence?: No  Physical Abuse or Sexual Abuse: No  Verbal Abuse or Emotional Abuse: No  Possible Abuse Reported to:: Not Applicable    Psychological Assessment  History of Substance Abuse: None  History of Psychiatric Problems: No    Discharge Risks or Barriers  Discharge  risks or barriers?: Complex medical needs    Anticipated Discharge Information  Anticipated discharge disposition: Home

## 2017-05-10 NOTE — CONSULTS
DATE OF SERVICE:  05/10/2017    DATE OF CONSULTATION:  05/10/2017.    REFERRING PHYSICIAN:  Ja Godinez MD.    REASON FOR THE CONSULT:  History of yeast esophagitis now with odynophagia,   thrush, and probable recurrent yeast esophagitis.    HISTORY OF PRESENT ILLNESS:  The patient is a 47-year-old  female born   in the  who has had a 70-pound unintentional weight loss over the past 1   year.  She does have chest pain and odynophagia and has a history of yeast   esophagitis.  She has oral thrush.  She denies abdominal pain or change in her   bowel habits, melena, and hematochezia.  She did have some diarrhea   yesterday.  She states she is no longer taking medication for her diabetes due   to the weight loss.  She states her last hemoglobin A1c was in the 5 range.    She has not been sexually active for the past 2-3 years.  She denies   intranasal drug use or intravenous drug use.  First tattoo was at age 29.  She   has been found to be HIV reactive.  Hepatitis B negative, hepatitis C   negative.  QuantiFERON Gold was negative.    ALLERGIES:  OXYCODONE AND PERCOCET.    MEDICATIONS PRIOR TO ADMISSION:  She is unable to name her medications.  The   list of her outpatient prescriptions include:  1.  Hydroxyzine 25 mg 4 times daily.  2.  Promethazine 25 mg every 4 hours p.r.n.  3.  Methocarbamol 750 mg every 6 hours p.r.n.  4.  Ondansetron 4 mg every 6 hours p.r.n.  5.  Alprazolam 0.25 mg 3 times a day p.r.n.  6.  Fluoxetine 10 mg everyday.  7.  K-Phos 500 mg everyday.  8.  Diphenoxylate atropine 2 tablets 3 times a day p.r.n.  9.  Amlodipine 5 mg every evening.  10.  Trazodone 50 mg at bedtime as needed.  11.  Furosemide 40 mg 1 tablet twice daily.  12.  Carvedilol 6.25 mg 1 tablet twice daily.  13.  Metoclopramide 1 tablet 4 times daily.  14.  Sodium bicarbonate 650 mg 1 tablet 3 times daily.    SURGERIES:  Vaginal hysterectomy,  x3, and back surgery.    MEDICAL ILLNESSES:  1.  Chronic  back pain.  2.  End-stage renal disease.  3.  Type 2 diabetes mellitus.  4.  History of yeast esophagitis.    SOCIAL HISTORY:  She is .  She has 5 children.  She is a nonsmoker.    She does not drink alcohol.  Other history is given above.    FAMILY HISTORY:  Strong family history of diabetes.  No GI diseases or   malignancies.    REVIEW OF SYSTEMS:  CONSTITUTIONAL:  Denies fevers, chills, nausea, or vomiting.  HEENT:  Denies diplopia or epistaxis.  PULMONARY:  Chronic cough, dry, ongoing for many weeks.  GASTROINTESTINAL:  No abdominal pain.  Please see above.  GENITOURINARY:  Denies dysuria and hematuria.  MUSCULOSKELETAL:  No new arthralgias or myalgias.  SKIN:  No new rashes.  NEUROLOGIC:  States she had a seizure at the time of the admission.  No   syncope or stroke.    PHYSICAL EXAMINATION:  VITAL SIGNS:  Temperature 36.7, pulse 88, respirations 16, and blood pressure   is 103/52.  GENERAL:  This is a cachectic 47-year-old female, in no acute distress.  Her   hair appears fine and breakable.  HEENT:  Pupils are round.  Sclerae are anicteric.  She has white exudate on   her tongue.  I could not get a good look at her buccal mucosa.  NECK:  Soft.  No adenopathy.  LUNGS:  Clear to auscultation.  CARDIOVASCULAR:  S1, S2, without murmur, gallop or rub.  ABDOMEN:  Bowel sounds are present.  It is soft, nontender, no masses, no   hepatosplenomegaly.  RECTAL:  Deferred.  EXTREMITIES:  No clubbing, cyanosis or peripheral edema.  SKIN:  Smooth, moist, and warm.  Tattoos on her upper extremities.  NEUROLOGIC:  She is awake, alert, oriented, and moving all extremities.    LABORATORY DATA:  Sodium 133, potassium 3.8, chloride 98, bicarbonate 29, BUN   14, creatinine 1.75, albumin 1.6, iron 11, TIBC is 174, and percent saturation   of 6.  WBC is 2.5, hemoglobin 7.5, hematocrit 23.9, and platelets are 108.    Hepatitis B surface antigen negative, hepatitis C antibody negative, HIV   reactive.    IMAGING:  CT of the  abdomen and pelvis on May 9th showed ill-defined patchy   ground-glass opacities in the right upper lobe and throughout the left lung.    Irregular wall thickening of the urinary bladder.  Diffuse anasarca.    IMPRESSION:  1.  A 70-pound unintentional weight loss in the past 1 year.  2.  Odynophagia.  3.  History of yeast esophagitis.  4.  Oral thrush.  5.  Human immunodeficiency virus reactive (I have not discussed this with the   patient).  6.  End-stage renal disease.  7.  Type 2 diabetes mellitus and patient denies taking medication as an   outpatient.  8.  Delayed gastric emptying with a gastric emptying scan _____ minutes.  9.  Multiple admissions for sepsis.  10.  Hypertension.  11.  Iron deficiency anemia with low TIBC consistent with anemia of chronic   disease.  12.  Severe protein-calorie malnutrition.  13.  Chronic nonproductive cough.    RECOMMENDATIONS:  1.  Currently on IV fluconazole.  2.  EGD with anesthesia today.  I have gone over the procedure with her and   she would like to proceed.  3.  I defer to the hospitalist regarding CD4 count and viral load studies.  4.  Consider IV iron infusion.  5.  Enteral feeding versus TPN.  _____ more about the possibility of yeast   esophagitis this afternoon and at present that certainly would impede her p.o.   intake.  If it is severe esophagitis, it may not be wise to have a feeding   tube chronically and would lead me to consider TPN.    Thank you for involving us in the care of this patient.       ____________________________________     MD LILY HUDDLESTON / JUAN    DD:  05/10/2017 09:13:15  DT:  05/10/2017 12:35:26    D#:  [id number]  Job#:  [id number]

## 2017-05-10 NOTE — PROGRESS NOTES
Hospital Medicine Progress Note, Adult, Complex               Author: Ja Godinez Date & Time created: 5/10/2017  8:55 AM     Interval History:  47YR OLD F WITH ORAL THRUSH AND SEVERE MALNUTRITION    Review of Systems:  Review of Systems   Constitutional: Positive for weight loss. Negative for fever and chills.   HENT: Negative for hearing loss and tinnitus.    Eyes: Negative for blurred vision, double vision and photophobia.   Respiratory: Negative for cough, hemoptysis and sputum production.    Cardiovascular: Negative for chest pain, palpitations and orthopnea.   Gastrointestinal: Negative for heartburn, nausea and vomiting.   Genitourinary: Negative for dysuria, urgency and frequency.   Musculoskeletal: Negative for back pain and neck pain.   Skin: Negative for itching.   Neurological: Negative for dizziness, tingling, tremors and headaches.       Physical Exam:  Physical Exam   Constitutional: She is oriented to person, place, and time. No distress.   HENT:   Head: Normocephalic and atraumatic.   SEVERE ORAL THRUSH   Eyes: Right eye exhibits no discharge.   Neck: Neck supple. No JVD present.   Cardiovascular: Normal rate and regular rhythm.    Pulmonary/Chest: No stridor. No respiratory distress. She has no wheezes. She has no rales.   Abdominal: Soft. She exhibits no distension. There is no rebound and no guarding.   Musculoskeletal: She exhibits no tenderness.   Neurological: She is alert and oriented to person, place, and time.   Skin: Skin is warm. She is not diaphoretic.       Labs:        Invalid input(s): [id number]  Recent Labs      05/08/17 1711   TROPONINI  <0.02     Recent Labs      05/08/17   1711  05/09/17   0419  05/10/17   0412   SODIUM  129*  132*  133*   POTASSIUM  4.0  4.2  3.8   CHLORIDE  93*  97  98   CO2  25  27  29   BUN  36*  34*  14   CREATININE  2.55*  2.72*  1.75*   CALCIUM  7.4*  7.5*  7.3*     Recent Labs      05/08/17   1711  05/09/17   0419  05/10/17   0412   ALTSGPT   9   --   8   ASTSGOT     ALKPHOSPHAT  97   --   79   TBILIRUBIN  0.4   --   0.4   GLUCOSE  69  51*  48*     Recent Labs      05/08/17   1711  05/09/17   0419  05/09/17   0420  05/10/17   0413   RBC  2.76*   --   2.93*  2.60*   HEMOGLOBIN  8.0*   --   8.6*  7.5*   HEMATOCRIT  25.4*   --   26.9*  23.9*   PLATELETCT  111*   --   112*  108*   PROTHROMBTM  12.5   --    --    --    APTT  32.0   --    --    --    INR  0.95   --    --    --    IRON   --   11*   --    --    TOTIRONBC   --   174*   --    --      Recent Labs      05/08/17   1711  05/09/17   0420  05/10/17   0412  05/10/17   0413   WBC  4.3*  3.7*   --   2.5*   NEUTSPOLYS  83.20*  83.60*   --   78.00*   LYMPHOCYTES  5.40*  5.40*   --   8.40*   MONOCYTES  9.10  8.60   --   10.00   EOSINOPHILS  0.90  1.60   --   2.40   BASOPHILS  0.50  0.30   --   0.40   ASTSGOT     --   17   --    ALTSGPT  9   --   8   --    ALKPHOSPHAT  97   --   79   --    TBILIRUBIN  0.4   --   0.4   --            Hemodynamics:  Temp (24hrs), Av.6 °C (97.9 °F), Min:36.2 °C (97.2 °F), Max:36.8 °C (98.3 °F)  Temperature: 36.7 °C (98 °F)  Pulse  Av.5  Min: 78  Max: 109   Blood Pressure: 103/52 mmHg     Respiratory:    Respiration: 16, Pulse Oximetry: 99 %           Fluids:    Intake/Output Summary (Last 24 hours) at 05/10/17 0855  Last data filed at 17 1909   Gross per 24 hour   Intake    720 ml   Output   1500 ml   Net   -780 ml        GI/Nutrition:  Orders Placed This Encounter   Procedures   • DIET NPO     Standing Status: Standing      Number of Occurrences: 1      Standing Expiration Date:      Order Specific Question:  Restrict to:     Answer:  Strict [1]     Medical Decision Making, by Problem:  Active Hospital Problems    Diagnosis   • FTT (failure to thrive) in adult [R62.7]   47YR OLD F WITH ORAL THRUSH  ON DIFLUCAN IV  ?OF ANT ESOPHAGITIS  SPOKE TO GI FOR CONSULT    ESRD  AS PER NEPHEOLOGY  DIALYSIS    MALNUTRITION  SEVERE  PRE-ALBUMIN LEVEL  DIETITIAN  CONSULT  D/W PHARMACY TO START TPN  CT OF THE CHEST AND ABD NOTED    ?OF PNEUMONIA OF CT OF THE CHEST  LESS LIKVIELKA PNEUMONIA  HAS NO FEVER AND DOES NOT LOOK TOXIC  HAS NOT NEEDED ANY O2    HTN  COREG  AMLODIPINE    HIV  RACTIVE  WILL CONSULT I.D              DVT prophylaxis - mechanical: SCDs

## 2017-05-10 NOTE — PROGRESS NOTES
Oral contrast started. Pt instructed to drink 1/4 of the bottle every 15 minutes tolerated. Pt denies any contrast or iodine allergies.

## 2017-05-10 NOTE — DISCHARGE PLANNING
Patient discussed in morning rounds.  Per Hospitalist patient will be started on TPN today. Patient reportedly lives with family and plans to return home.  Patient was recently approved for 14 hrs of PCA services a week and has a hx of having [facility]. Patient pending PT and OT evals. SW will continue to monitor for possible placement or additional services referrals.

## 2017-05-10 NOTE — PROGRESS NOTES
Orders received for morphine, pt states she has had  Morphine n the past and denies any past allergic reaction.

## 2017-05-10 NOTE — PROGRESS NOTES
Patient resting in bed at the moment; denies any needs. Offered toileting and linen change but is refusing at this time; denies pain and nausea; WCTM.

## 2017-05-10 NOTE — OR SURGEON
Immediate Post-Operative Note      PreOp Diagnosis: odynophagia, oral thrush, history of yeast esophagitis    PostOp Diagnosis: carditis, gastric polyp, otherwise normal exam with random bx esoph, stomach and duodenum    Procedure(s):  GASTROSCOPY- Upper Endoscopy  - Wound Class: Clean Contaminated    Surgeon(s):  Irene Jenkins M.D.    Anesthesiologist/Type of Anesthesia:  Anesthesiologist: Froy Cox M.D./MAC    Surgical Staff:  Circulator: Yoav Harman R.N.  Endoscopy Technician: Aleksey Fajardo    Specimen: a. Duodenum, b. Random stomach including 2mm polyp, c. Cardia, d. esophagus  Estimated Blood Loss: 5 ml    Findings:   Esoph:  Normal, random bx taken  Stomach: 2mm fundic polyp biopsied and in jar with random bx, cardia fold red, edematous and biopsied  Duod: Normal, random bx taken    Complications: none        5/10/2017 2:54 PM Irene Jenkins

## 2017-05-10 NOTE — OR NURSING
1453: To PACU from OR via bed, respirations spontaneous and non-labored.  1500: Not arousing yet, still has respirations that are spontaneous and non-labored.  1515: Pt still sleeping, not arousing to voice or touch yet.  1530: Still asleep, respirations spontaneous and non-labored.  1545: Pt continues to sleep, is arousing to touch, but falls right back to sleep.  1600: Pt still sleepy, starting to awaken more with stimulation.  1615: Pt more awake, Meets criteria to transfer to floor.  1630: Report called and pt readied for transport back to floor.

## 2017-05-10 NOTE — PROGRESS NOTES
Pt off to CT in stable condition, SL. Patient cleaned up before leaving unit; linen change complete and medicated appropriately for pain by day RN. Patient ambulated from bed to wheelchair, stby-assist w/ no assistive devices.

## 2017-05-10 NOTE — WOUND TEAM
"[facility]  Inpatient Services  Initial Wound & Skin Care Evaluation    Admission Date:  5/8/2017   HPI, PMH, SH: Reviewed  Unit where seen by Wound Team: 2203/01    WOUND CONSULT RELATED TO: labial wounds    SUBJECTIVE:   \"I can turn.\"    Self Report / Pain Level: no c/o pain      OBJECTIVE: on pressure redistribution mattress, pt able to turn when asked  WOUND TYPE, LOCATION, CHARACTERISTICS (Pressure ulcers: location, stage, POA or date identified)  Wound Perineum;Labia Anterior  Periwound Skin: Intact  Drainage : None       Tissue Type and %:    Perineum red and some slough/ labia 100% red  Wound Edges:    open    Odor:     None   Exposed structure(s):   No   Signs and Symptoms of Infection: none    Measurements: taken 5/9/17  Perineum/Labia   Length (cm): 0.4/4.5  Width (cm): 0.7/2  Depth (cm): 0.1 both     Tracts/undermining: None     INTERVENTIONS BY WOUND TEAM: assessed wounds, measured, applied barrier paste and viscopaste strip to L labia, mepilex to sacrococcygeal area.   Dressing Options: Open to Air (barrier paste and viscopaste strip change PRN)    Interdisciplinary consultation:   With Nursing;With Patient    EVALUATION: pt has partial thickness skin loss to L labia, and perineum. Barrier paste and viscopaste strip to protect and provide moist healing environment. Sacrum with scar tissue mepilex for protection. There are resolving wounds to BLE with scarring, all dry and GERALDINE.     Factors affecting wound healing: DM, failure to thrive  Goals: decreased wound size 1% each week      NURSING PLAN OF CARE ORDERS (X):    Dressing changes: See Dressing Maintenance orders:   Skin care: See Skin Care orders: x  Rectal tube care: See Rectal Tube Care orders:   Other orders:    RSKIN: CURRENT (X) ORDERED (O)  Q shift Alli:  X  Q shift pressure point assessments:  X  Pressure redistribution mattress    x    JENNY      Bariatric JENNY      Bariatric foam        Heel float boots       Heels floated on " pillows      Barrier wipes      Barrier Cream      Barrier paste    x  Sacral silicone dressing    placed  Padded O2 tubing      Anchorfast      Trach with Optifoam split foam       Waffle cushion      Rectal tube or BMS      Antifungal tx    Turn q 2 hours x remind pt  Up to chair  Ambulate   PT/OT     Dietician      PO  x   TF   TPN     PVN    NPO   # days   Other       WOUND TEAM PLAN OF CARE (X):   NPWT change 3 x week:        Dressing changes by wound team:       Follow up as needed:    x   Other (explain):    Anticipated discharge plans (X):  SNF:           Home Care:           Outpatient Wound Center:            Self Care:            Other:  tbd

## 2017-05-10 NOTE — PROGRESS NOTES
in this am.pt made npo for procedure this afternoon.  Was incont of urine.labia cleaned and barrier cream and viscopaste reapplied.area red,painful.  Also has some white discharge noted from vaginal area  Pt.turns self.

## 2017-05-10 NOTE — PROGRESS NOTES
Med rec updated and complete  Allergies reviewed  Pt is not sure what medications she is taking.   Called Meg (Daughter in law) @ [phone], Meg emailed me the list of medications.  Called  @ [phone] to verify when pt took her medications last.  Called [facility] @[phone] to verify all prescriptions.

## 2017-05-10 NOTE — CARE PLAN
Problem: Safety  Goal: Will remain free from falls  Outcome: PROGRESSING AS EXPECTED  Intervention: Implement fall precautions  Call light within reach; bed locked and in lowest position; fall precautions in place; bed alarm on. Pt instructed to call before ambulating OOB.       Problem: Skin Integrity  Goal: Risk for impaired skin integrity will decrease  Outcome: PROGRESSING AS EXPECTED  Intervention: Implement precautions to protect skin integrity in collaboration with the interdisciplinary team  Encouraged frequent moving, turning and repositioning while in bed to preserve skin and help prevent breakdown.   Wound consult ordered for patient; Mepilex dressing applied to sacral area and barrier cream implemented to help prevent further breakdown of labial skin.

## 2017-05-11 NOTE — PROGRESS NOTES
Hospital Medicine Progress Note, Adult, Complex               Author: Ja Godinez Date & Time created: 5/11/2017  10:48 AM     Interval History:  47YR OLD F WITH ORAL THRUSH AND SEVERE MALNUTRITION  HIV +    Review of Systems:  Review of Systems   Constitutional: Positive for weight loss and malaise/fatigue. Negative for fever and chills.   HENT: Negative for hearing loss and tinnitus.    Eyes: Negative for blurred vision, double vision and photophobia.   Respiratory: Negative for cough, hemoptysis and sputum production.    Cardiovascular: Negative for chest pain, palpitations and orthopnea.   Gastrointestinal: Negative for heartburn, nausea, vomiting and abdominal pain.   Genitourinary: Negative for dysuria, urgency and frequency.   Musculoskeletal: Negative for back pain and neck pain.   Skin: Negative for itching and rash.   Neurological: Positive for weakness. Negative for dizziness, tingling, tremors and headaches.       Physical Exam:  Physical Exam   Constitutional: She is oriented to person, place, and time. No distress.   MALNOURISHE   HENT:   Head: Normocephalic and atraumatic.   Eyes: Right eye exhibits no discharge. Left eye exhibits no discharge.   Neck: Neck supple. No JVD present.   Cardiovascular: Normal rate and regular rhythm.    Pulmonary/Chest: No stridor. No respiratory distress. She has no wheezes.   Abdominal: Soft. She exhibits no distension. There is no tenderness. There is no rebound.   Musculoskeletal: She exhibits no edema or tenderness.   Neurological: She is alert and oriented to person, place, and time.   Skin: Skin is warm and dry. She is not diaphoretic.       Labs:        Invalid input(s): [id number]  Recent Labs      05/08/17   1711   TROPONINI  <0.02     Recent Labs      05/08/17   1711  05/09/17   0419  05/10/17   0412   SODIUM  129*  132*  133*   POTASSIUM  4.0  4.2  3.8   CHLORIDE  93*  97  98   CO2  25  27  29   BUN  36*  34*  14   CREATININE  2.55*  2.72*  1.75*    CALCIUM  7.4*  7.5*  7.3*     Recent Labs      05/08/17   1711  05/09/17   0419  05/10/17   0412   ALTSGPT  9   --   8   ASTSGOT     ALKPHOSPHAT  97   --   79   TBILIRUBIN  0.4   --   0.4   GLUCOSE  69  51*  48*     Recent Labs      05/08/17   1711  05/09/17   0419  05/09/17   0420  05/10/17   0413  05/11/17   0050   RBC  2.76*   --   2.93*  2.60*  2.72*   HEMOGLOBIN  8.0*   --   8.6*  7.5*  7.9*   HEMATOCRIT  25.4*   --   26.9*  23.9*  25.0*   PLATELETCT  111*   --   112*  108*  126*   PROTHROMBTM  12.5   --    --    --    --    APTT  32.0   --    --    --    --    INR  0.95   --    --    --    --    IRON   --   11*   --    --    --    TOTIRONBC   --   174*   --    --    --      Recent Labs      05/08/17   1711  05/09/17   0420  05/10/17   0412  05/10/17   0413  05/11/17   0050   WBC  4.3*  3.7*   --   2.5*  3.8*   NEUTSPOLYS  83.20*  83.60*   --   78.00*  87.70*   LYMPHOCYTES  5.40*  5.40*   --   8.40*  4.40*   MONOCYTES  9.10  8.60   --   10.00  5.00   EOSINOPHILS  0.90  1.60   --   2.40  1.80   BASOPHILS  0.50  0.30   --   0.40  0.30   ASTSGOT  -   17   --    --    ALTSGPT  9   --   8   --    --    ALKPHOSPHAT  97   --   79   --    --    TBILIRUBIN  0.4   --   0.4   --    --            Hemodynamics:  Temp (24hrs), Av.3 °C (97.4 °F), Min:36.2 °C (97.2 °F), Max:36.8 °C (98.2 °F)  Temperature: 36.6 °C (97.9 °F)  Pulse  Av.1  Min: 73  Max: 109Heart Rate (Monitored): 74  Blood Pressure: 118/70 mmHg     Respiratory:    Respiration: 18, Pulse Oximetry: 97 %           Fluids:    Intake/Output Summary (Last 24 hours) at 17 1048  Last data filed at 05/10/17 2359   Gross per 24 hour   Intake   1500 ml   Output      0 ml   Net   1500 ml        GI/Nutrition:  Orders Placed This Encounter   Procedures   • DIET ORDER     Standing Status: Standing      Number of Occurrences: 1      Standing Expiration Date:      Order Specific Question:  Diet:     Answer:  Diabetic [3]     Medical Decision  Making, by Problem:  Active Hospital Problems    Diagnosis   • FTT (failure to thrive) in adult [R62.7]     47YR OLD F WITH ORAL THRUSH  HAS IMPROVED  ON DIFLUCAN IV  ENDOSCOPY RULED OUT CANDIDA ESOPHAGITIS  GI INPUT IS NOTED    ESRD  AS PER NEPHEOLOGY  DIALYSIS    MALNUTRITION  SEVERE  PRE-ALBUMIN LEVEL  DIETITIAN CONSULT  SHE IS EATING NOW AND NO NEED FOR TPN  CT OF THE CHEST AND ABD NOTED    ?OF PNEUMONIA OF CT OF THE CHEST  LESS LIKLEY PNEUMONIA  HAS NO FEVER AND DOES NOT LOOK TOXIC  HAS NOT NEEDED ANY O2    HTN  COREG  AMLODIPINE    HIV  RACTIVE  INFORMED THE PT  D/W I.D  LABS ARE SENT    UA RESULT IS NOTED  WILL AWAIT FURTHER I.D INPUT     Julien catheter: No Julien        DVT prophylaxis - mechanical: SCDs

## 2017-05-11 NOTE — PROGRESS NOTES
[facility]    Hemodialysis ordered by Dr. Betancourt.    Start time 0730.  End time 1030.      Net UF of 1500 mL.      VSS throughout treatment, w/ no complaints.  RIJ permacath dialysis catheter heparin locked, clamped, and capped.  No S/S of infection.  Dressing CDI.       Report given to KARLA Fontenot RN.  See dialysis flowsheet for further details.

## 2017-05-11 NOTE — PROGRESS NOTES
Per day shift RN; pt tolerated test dose iron well w/ no adverse reactions. It has been over one-hour post-test dose and pt is in stable condition with stable vitals.    Discussed risks/benefits of Iron repletion including, but not limited to: Hypotension, Nausea/vomiting/diarrhea, Hypertension, Allergic reaction, Chest pain, Pruritus, Back pain, fever and chills. Pt verbalizes understanding and agrees to proceed.     Stayed with patient for the first 20 minutes of infusion, VS remain stable; pt denies any adverse reaction. Made pt aware that infusion will take 4 hours and constant monitoring will take effect. No further needs at this time; denies pain, nausea, C/P and SOB; remains A&Ox4.

## 2017-05-11 NOTE — PROGRESS NOTES
2033- Patient denies any adverse reactions at the moment; pt is sleepy, VS remain stable.     2128- Iron continuing to infuse; pt denies any adverse reactions at the moment; VS remain stable; denies pain or nausea. Medicated appropriately with noc meds; held off on amlodipine for tonight due to low BP. Toileting offered and refused.     2236- Patient drowsy and lethargic; sleeping with eyes closed. Pt denies any adverse reactions and VS remain stable. Toileting offered and refused.     0000- Iron Dextran infusion complete; pt tolerated well w/ no adverse reaction. Pt incontinent of stool, complete linen change performed and barrier paste applied to labial folds. VSS

## 2017-05-11 NOTE — THERAPY
Occupational Therapy- OT marcia attempted, pt currently undergoing dialysis.  Will monitor for appropriate time to evaluate.

## 2017-05-11 NOTE — PROGRESS NOTES
Gastroenterology Progress Note     Author: Rachid Salazar   Date & Time Created: 5/11/2017 11:55 AM    Interval History:  Odynophagia    Mild nausea and no abd pain todayl. Reviewed findings on EGD from yesterday with her.    Review of Systems:  Review of Systems   Constitutional: Positive for malaise/fatigue.   Gastrointestinal: Positive for nausea and diarrhea.       Physical Exam:  Physical Exam   Constitutional: She is oriented to person, place, and time. No distress.   cachectic   HENT:   Head: Atraumatic.   Eyes: EOM are normal.   Pulmonary/Chest: No respiratory distress.   Abdominal: Soft. She exhibits no distension. There is no tenderness. There is no rebound.   Musculoskeletal: She exhibits no edema.   Neurological: She is alert and oriented to person, place, and time.   Skin: Skin is warm and dry. There is erythema.   Psychiatric: She has a normal mood and affect. Her behavior is normal. Thought content normal.       Labs:        Invalid input(s): [id number]  Recent Labs      05/08/17 1711   TROPONINI  <0.02     Recent Labs      05/08/17   1711  05/09/17   0419  05/10/17   0412   SODIUM  129*  132*  133*   POTASSIUM  4.0  4.2  3.8   CHLORIDE  93*  97  98   CO2  25  27  29   BUN  36*  34*  14   CREATININE  2.55*  2.72*  1.75*   CALCIUM  7.4*  7.5*  7.3*     Recent Labs      05/08/17 1711 05/09/17 0419  05/10/17   0412   ALTSGPT  9   --   8   ASTSGOT  21   --   17   ALKPHOSPHAT  97   --   79   TBILIRUBIN  0.4   --   0.4   GLUCOSE  69  51*  48*     Recent Labs      05/08/17 1711 05/09/17 0419  05/09/17   0420  05/10/17   0413  05/11/17   0050   RBC  2.76*   --   2.93*  2.60*  2.72*   HEMOGLOBIN  8.0*   --   8.6*  7.5*  7.9*   HEMATOCRIT  25.4*   --   26.9*  23.9*  25.0*   PLATELETCT  111*   --   112*  108*  126*   PROTHROMBTM  12.5   --    --    --    --    APTT  32.0   --    --    --    --    INR  0.95   --    --    --    --    IRON   --   11*   --    --    --    TOTIRONBC   --   174*   --     --    --      Recent Labs      17   1711  17   0420  05/10/17   0412  05/10/17   0413  17   0050   WBC  4.3*  3.7*   --   2.5*  3.8*   NEUTSPOLYS  83.20*  83.60*   --   78.00*  87.70*   LYMPHOCYTES  5.40*  5.40*   --   8.40*  4.40*   MONOCYTES  9.10  8.60   --   10.00  5.00   EOSINOPHILS  0.90  1.60   --   2.40  1.80   BASOPHILS  0.50  0.30   --   0.40  0.30   ASTSGOT  21   --   17   --    --    ALTSGPT  9   --   8   --    --    ALKPHOSPHAT  97   --   79   --    --    TBILIRUBIN  0.4   --   0.4   --    --      Hemodynamics:  Temp (24hrs), Av.4 °C (97.6 °F), Min:36.2 °C (97.2 °F), Max:37.1 °C (98.7 °F)  Temperature: 37.1 °C (98.7 °F)  Pulse  Av.7  Min: 72  Max: 109Heart Rate (Monitored): 74  Blood Pressure: 123/67 mmHg     Respiratory:    Respiration: 18, Pulse Oximetry: 96 %           Fluids:    Intake/Output Summary (Last 24 hours) at 17 1155  Last data filed at 17 1035   Gross per 24 hour   Intake   2000 ml   Output   2000 ml   Net      0 ml        GI/Nutrition:  Orders Placed This Encounter   Procedures   • DIET ORDER     Standing Status: Standing      Number of Occurrences: 1      Standing Expiration Date:      Order Specific Question:  Diet:     Answer:  Regular [1]     Medical Decision Making, by Problem:  Active Hospital Problems    Diagnosis   • FTT (failure to thrive) in adult [R62.7]     Impression and Plan    1) Odynophagia. - No sign of candida esophagitis and EGD essentially normal. Suspect secondary to thrush.  2) wt loss - multifactorial  3) HIV  4) Thrush  5) DM  6) Anemia - secondary to ESRD most likely    Will sign off. Nothing to add. No need to follow up with GI as outpatient    EMO        Core Measures

## 2017-05-11 NOTE — PROCEDURES
DATE OF SERVICE:  05/10/2017    DATE OF PROCEDURE:  05/10/2017    PROCEDURE:  Flexible esophagogastroduodenoscopy with biopsies.      PREPROCEDURE DIAGNOSES:   A 47-year-old female with failure to thrive,   70-pound unintentional weight loss, oral thrush, odynophagia, a history of   esophagitis, HIV reactive.      POSTPROCEDURE DIAGNOSES:    1.  Esophagus normal, random biopsies taken.    2.  Diminutive gastric polyp, biopsies taken.    3.  Carditis, biopsies taken and normal duodenum, biopsies taken.      Risks, benefits, alternatives explained to patient.  Patient gave consent to   proceed.      ANESTHESIA:  Monitored anesthesia care, Dr. Froy Cox.      DESCRIPTION OF PROCEDURE:  Patient was in the left lateral decubitus position   and monitoring was in place.  The Olympus adult flexible endoscope was   inserted into the esophagus under direct visualization.  Any residual debris   was lavaged away and the mucosa appeared normal.  There was no obvious   evidence of yeast esophagitis.  The endoscope was advanced to the GE junction   where the cardia folds were inflamed and edematous.  The endoscope was   advanced further into the stomach and air was insufflated.  I did not   appreciate any abnormalities in the body or the antrum.  Retroflexion was   performed and there was diminutive beefy red polyp in the fundus.    Retroflexion was taken down and the endoscope was advanced easily through the   pylorus into the first, second, and third portions of the duodenum.  There   were no mucosal abnormalities.  At least 4 biopsies were taken of the normal   appearing duodenal mucosa and labeled jar A.  The endoscope was pulled back   into the stomach and at least 4 6 random biopsies of the stomach including the   diminutive fundic polyp were taken and labeled jar B.  Biopsies were taken of   the cardia inflammation and labeled jar C.  The endoscope was pulled back   into the esophagus and at least 8 biopsies of the  esophagus were taken   randomly and labeled jar D.  The endoscope was removed.  The patient tolerated   procedure well.  There were no complications.       ____________________________________     MD LILY HUDDLESTON / JUAN    DD:  05/10/2017 15:02:06  DT:  05/10/2017 17:36:31    D#:  [id number]  Job#:  [id number]

## 2017-05-11 NOTE — CARE PLAN
Problem: Safety  Goal: Will remain free from falls  Outcome: PROGRESSING AS EXPECTED  Intervention: Implement fall precautions  Bed in low position, treaded slipper socks on, and call light in reach. Pt instructed to call nurse if ambulating out of bed.           Problem: Skin Integrity  Goal: Risk for impaired skin integrity will decrease  Outcome: PROGRESSING AS EXPECTED  Intervention: Implement precautions to protect skin integrity in collaboration with the interdisciplinary team  Encouraging frequent movement, turning and repositioning in bed to prevent skin breakdown.

## 2017-05-11 NOTE — PROGRESS NOTES
Iron test dose finished.vss  Pt states she feels fine.no sign of reaction at present  Ate small amt of dinner.  Family at bedside

## 2017-05-11 NOTE — THERAPY
"Physical Therapy Evaluation completed.   Bed Mobility:  Supine to Sit: Minimal Assist  Transfers: Sit to Stand: Contact Guard Assist to stand briefly at EOB.   Gait: Level Of Assist: Unable to Participate with Front-Wheel Walker       Plan of Care: Will benefit from Physical Therapy 4 times per week  Discharge Recommendations: Equipment: Will Continue to Assess for Equipment Needs. Post-acute therapy Discharge to home with home health for additional skilled therapy services IF patient is able to become ambulatory during hospital stay. If not she may require therapy PRIOR to return to home, however she states she used all her days at [facility].    48 yo female admitted w/ <10 pound weight loss in the last month and overall failure to thrive. PMH is complex w/ ESRD on hemodialysis 3x/wk, DM, and new diagnosis of HIV+. Pt has had a significant weight loss over the past year. Pt presents w/ weakness and impaired mobility from her baseline of household mobility and therefore will benefit from acute PT services to promote return to home (pt's goal) with home health services, caregiver services and family assist as previous. RN and CNA updated.     See \"Rehab Therapy-Acute\" Patient Summary Report for complete documentation.     "

## 2017-05-11 NOTE — DIETARY
"Nutrition Services:  Nutrition Admit Triggers for poor oral intake and weight loss prior to admit.  Also with BMI less than 19.  BMI = 17.27.  47 year old female admitted for Failure to Thrive.  PMH:  Type 2 DM, ESRD w/diaylysis, esophageal candidiasis, gastroparesis  Pertinent Labs:  Na 133, Glucose 48, K wnl  Pertinent Medications:  Diflucan, Reglan  BMI:  17.27  Weight:  48.5 kg (106 lbs)  Ht:  5'6\"   %IBW:  81%  Patient was advanced to a Diabetic diet yesterday.  She did well with dinner last night.  She ordered a grilled cheese and French fries and ate %.  This am she did not take in as much for breakfast, however, she was having dialysis and had just received some bad news.  Discussed with RN about changing diet to Regular vs Diabetic as patient's blood glucose is 48 and she is underweight.  RN (per ok from MD) changed diet to Regular.  Patient has lost ~10 lbs in the past month, and per MD notes, she has lost ~70 lbs in the past year.  She is currently receiving supplements.  Plan:  1)  Continue with Boost Breeze w/meals.  2)  RD to monitor PO intake.  3)  Nutrition Services to work with patient for food preferences.  "

## 2017-05-11 NOTE — CONSULTS
Infectious Diseases consult    Asked to see patient regarding new diagnosis of HIV.  Pt too lethargic to answer questions.  Will try to answer some simple questions but quickly falls back to sleep.  I do not think anyone has told her the results of the HIV test.    Spoke to Dr Godinez earlier and requested he order a CD4 count, HIV viral load, and HIV phenosense.  Will need these results to determine extent of disease and potential therapies.  Other studies need to be obtained as well.    1. Quantiferon Gold: assessment of TB risk: should have already had this as part of evaluation for dialysis  2. RPR  3. Toxoplasma IgG level  4. HBsAG, antiHBsAg, Hep C AB, Hep A antibody: she might have had this prior to dialysis as well  5. CMV antibody, IgG  6. HLA B 5701 test ( result needed if to use certain antiretroviral meds )    She needs to be more alert to understand result of test, and to see how she may have contracted HIV.  ? Ex .    Discussed with JACOB Islas

## 2017-05-12 NOTE — PROGRESS NOTES
Nephrology Progress Note, Adult, Complex               Author: Yaquelin Danielmilagro Date & Time created: 5/12/2017  4:33 PM     Interval History:  46 y/o female with ESRD/HD admitted with poor po intake  C/o fatigue, had N/V this morning -improved  HD TTS     Review of Systems:  Review of Systems   Constitutional: Positive for weight loss and malaise/fatigue. Negative for fever and chills.   HENT: Negative for congestion, nosebleeds and sore throat.    Eyes: Negative.    Respiratory: Negative for cough, hemoptysis, shortness of breath and wheezing.    Cardiovascular: Negative for chest pain, orthopnea and leg swelling.   Gastrointestinal: Positive for nausea and vomiting. Negative for heartburn, abdominal pain and diarrhea.   Genitourinary: Negative for dysuria, urgency and frequency.   Musculoskeletal: Negative for myalgias, back pain and joint pain.   Neurological: Negative for dizziness, sensory change, focal weakness and headaches.       Physical Exam:  Physical Exam   Constitutional: She is oriented to person, place, and time.   HENT:   Head: Normocephalic and atraumatic.   oral thrush   Eyes: Conjunctivae are normal. Pupils are equal, round, and reactive to light. Right eye exhibits no discharge.   Neck: Neck supple.   Cardiovascular: Normal rate and regular rhythm.    Pulmonary/Chest: No stridor. No respiratory distress. She has no wheezes. She has no rales.   Abdominal: She exhibits no distension. There is no tenderness. There is no rebound.   Musculoskeletal: She exhibits no edema or tenderness.   Neurological: She is alert and oriented to person, place, and time. No cranial nerve deficit.   Skin: Skin is warm and dry. She is not diaphoretic.       Labs:        Invalid input(s): [id number]      Recent Labs      05/10/17   0412  05/12/17   0412   SODIUM  133*  137   POTASSIUM  3.8  3.8   CHLORIDE  98  102   CO2  29  30   BUN  14  11   CREATININE  1.75*  1.85*   CALCIUM  7.3*  7.3*     Recent Labs      05/10/17    04117   ALTSGPT  8   --    ASTSGOT  17   --    ALKPHOSPHAT  79   --    TBILIRUBIN  0.4   --    GLUCOSE  48*  72     Recent Labs      05/10/17   0413  05/11/17   0050  05/12/17   0412   RBC  2.60*  2.72*  2.52*   HEMOGLOBIN  7.5*  7.9*  7.4*   HEMATOCRIT  23.9*  25.0*  23.5*   PLATELETCT  108*  126*  125*     Recent Labs      05/10/17   0412  05/10/17   0413  05/11/17   0050  05/12/17   0412   WBC   --   2.5*  3.8*  2.6*   NEUTSPOLYS   --   78.00*  87.70*  88.00*   LYMPHOCYTES   --   8.40*  4.40*  7.00*   MONOCYTES   --   10.00  5.00  2.00   EOSINOPHILS   --   2.40  1.80  0.00   BASOPHILS   --   0.40  0.30  0.00   ASTSGOT  17   --    --    --    ALTSGPT  8   --    --    --    ALKPHOSPHAT  79   --    --    --    TBILIRUBIN  0.4   --    --    --            Hemodynamics:  Temp (24hrs), Av.6 °C (97.8 °F), Min:36.3 °C (97.4 °F), Max:36.7 °C (98 °F)  Temperature: 36.3 °C (97.4 °F)  Pulse  Av.9  Min: 72  Max: 109   Blood Pressure: 118/66 mmHg     Respiratory:    Respiration: 18, Pulse Oximetry: 99 %           Fluids:    Intake/Output Summary (Last 24 hours) at 17 1633  Last data filed at 17 0930   Gross per 24 hour   Intake    150 ml   Output    150 ml   Net      0 ml        GI/Nutrition:  Orders Placed This Encounter   Procedures   • DIET ORDER     Standing Status: Standing      Number of Occurrences: 1      Standing Expiration Date:      Order Specific Question:  Diet:     Answer:  Regular [1]     Medical Decision Making, by Problem:  Active Hospital Problems    Diagnosis   • FTT (failure to thrive) in adult [R62.7]                     DVT prophylaxis - mechanical: SCDs        Assessment and plan    1.ESRD/HD TTS  2.HTN: BP well controlled  3.Electrolytes: well controlled.  4.Anemia: low Hb level -epogen 10 000 units with HD  5.Volume:not overloaded  Recs:   HD TTS  Renal diet

## 2017-05-12 NOTE — THERAPY
Occupational Therapy- Attempted eval as ordered.  Educated pt on role of OT, and purpose of OOB activity.  She politely declined therapy stating that she is exhausted and just feeling too ill today.  She did have emesis this am per RN, and is currently nauseated and refusing any attempts to eat lunch.  Will follow and eval when pt able to tolerate activity. In the meantime, recommend that pt sit EOB, get up to commode or up to chair as tolerated with nursing, thierno over the weekend.  Pt will need to be more mobile if she is to d/c home.  Pt reports that she's used up her skilled days, so she may have limited options for transitional care services outside the home.  OT will follow and eval as able.

## 2017-05-12 NOTE — PROGRESS NOTES
Pt finished with dialysis this am.franck well.a little fatigued after.  Lab called pt has ESBL in her urine. notified

## 2017-05-12 NOTE — PROGRESS NOTES
Hospital Medicine Progress Note, Adult, Complex               Author: Ja Godinez Date & Time created: 5/12/2017  9:25 AM     Interval History:  47YR OLD F WITH ORAL THRUSH AND SEVERE MALNUTRITION  HIV +    Review of Systems:  Review of Systems   Constitutional: Negative for fever, chills and weight loss.   HENT: Negative for hearing loss and tinnitus.    Eyes: Negative for blurred vision, double vision and photophobia.   Respiratory: Negative for cough, hemoptysis and sputum production.    Cardiovascular: Negative for chest pain, palpitations and orthopnea.   Gastrointestinal: Negative for heartburn, nausea and vomiting.   Genitourinary: Negative for dysuria, urgency and frequency.   Musculoskeletal: Negative for back pain and neck pain.   Skin: Negative for itching.   Neurological: Negative for dizziness, tingling, tremors and headaches.       Physical Exam:  Physical Exam   Constitutional: No distress.   HENT:   Head: Normocephalic and atraumatic.   Eyes: Right eye exhibits no discharge. Left eye exhibits no discharge.   Neck: Neck supple. No JVD present.   Cardiovascular: Normal rate and regular rhythm.    Pulmonary/Chest: No stridor. No respiratory distress. She has no wheezes. She has no rales.   Abdominal: Soft. She exhibits no distension. There is no tenderness. There is no rebound.   Musculoskeletal: She exhibits no tenderness.   Neurological: She is alert.   Skin: Skin is warm and dry. She is not diaphoretic.       Labs:        Invalid input(s): [id number]      Recent Labs      05/10/17   0412  05/12/17   0412   SODIUM  133*  137   POTASSIUM  3.8  3.8   CHLORIDE  98  102   CO2  29  30   BUN  14  11   CREATININE  1.75*  1.85*   CALCIUM  7.3*  7.3*     Recent Labs      05/10/17   0412  05/12/17   0412   ALTSGPT  8   --    ASTSGOT  17   --    ALKPHOSPHAT  79   --    TBILIRUBIN  0.4   --    GLUCOSE  48*  72     Recent Labs      05/10/17   0413  05/11/17   0050  05/12/17 0412   RBC  2.60*  2.72*   2.52*   HEMOGLOBIN  7.5*  7.9*  7.4*   HEMATOCRIT  23.9*  25.0*  23.5*   PLATELETCT  108*  126*  125*     Recent Labs      05/10/17   0412  05/10/17   0413  17   0050  17   WBC   --   2.5*  3.8*  2.6*   NEUTSPOLYS   --   78.00*  87.70*  88.00*   LYMPHOCYTES   --   8.40*  4.40*  7.00*   MONOCYTES   --   10.00  5.00  2.00   EOSINOPHILS   --   2.40  1.80  0.00   BASOPHILS   --   0.40  0.30  0.00   ASTSGOT  17   --    --    --    ALTSGPT  8   --    --    --    ALKPHOSPHAT  79   --    --    --    TBILIRUBIN  0.4   --    --    --            Hemodynamics:  Temp (24hrs), Av.6 °C (97.9 °F), Min:36.6 °C (97.8 °F), Max:36.7 °C (98 °F)  Temperature: 36.6 °C (97.8 °F)  Pulse  Av.1  Min: 72  Max: 109   Blood Pressure: 107/66 mmHg     Respiratory:    Respiration: 18, Pulse Oximetry: 100 %           Fluids:    Intake/Output Summary (Last 24 hours) at 17 0925  Last data filed at 17 1800   Gross per 24 hour   Intake   1100 ml   Output   2000 ml   Net   -900 ml        GI/Nutrition:  Orders Placed This Encounter   Procedures   • DIET ORDER     Standing Status: Standing      Number of Occurrences: 1      Standing Expiration Date:      Order Specific Question:  Diet:     Answer:  Regular [1]     Medical Decision Making, by Problem:  Active Hospital Problems    Diagnosis   • FTT (failure to thrive) in adult [R62.7]   47YR OLD F WITH  UTI  ESBL POSITIVE  D/W I.D YESTERDAY  MEREPENOM    ORAL THRUSH  HAS IMPROVED  ON DIFLUCAN IV  ENDOSCOPY RULED OUT CANDIDA ESOPHAGITIS  GI INPUT IS NOTED    ESRD  AS PER NEPHEOLOGY  DIALYSIS    MALNUTRITION  SEVERE  DIETITIAN CONSULT  SHE IS EATING NOW AND NO NEED FOR TPN  CT OF THE CHEST AND ABD NOTED      HTN  COREG  AMLODIPINE    HIV  RACTIVE  INFORMED THE PT  D/W I.D  LABS ARE SENT  QAUNTIFERON TB GOLD NEGATIVE IN            Core Measures

## 2017-05-12 NOTE — CARE PLAN
Problem: Venous Thromboembolism (VTW)/Deep Vein Thrombosis (DVT) Prevention:  Goal: Patient will participate in Venous Thrombosis (VTE)/Deep Vein Thrombosis (DVT)Prevention Measures  Outcome: PROGRESSING AS EXPECTED  Intervention: Encourage patient to perform ankle flex, foot rotation, and knee flex exercises in addition to other prophylatic measures every hour while awake  SCDs in place.  Encourage to perform flexion of the feet while in bed and awake, verbalize understanding.  Encourage ambulation TID.      Problem: Pain Management  Goal: Pain level will decrease to patient’s comfort goal  Outcome: PROGRESSING AS EXPECTED  Intervention: Follow pain managment plan developed in collaboration with patient and Interdisciplinary Team  Pain assessment q4h or q2h after medication intervention.  Encourage patient to report pain, verbalize understanding. Medicate PRN

## 2017-05-12 NOTE — PROGRESS NOTES
Infectious Disease Progress Note    Author: Jena Park Date & Time created: 5/12/2017  10:34 AM    Interval History:  Meropenem Day #1.  Public health came to talk to her this am.  States she told her ex  about test results.  He does not want her to tell their adult children.  He is taking care of their 13 year old son, and 5 year old granddtr.    Labs Reviewed, Medications Reviewed, Fluids Reviewed and GI Nutrition Reviewed.    Review of Systems:  Review of Systems   Constitutional: Positive for weight loss and malaise/fatigue. Negative for fever and chills.   HENT: Negative.    Eyes: Negative.    Respiratory: Negative.    Cardiovascular: Negative.    Gastrointestinal: Positive for vomiting.        Had emesis this am.  Before breakfast, but after she took am meds.  Only water seen.   Skin: Negative.    Neurological: Positive for weakness.       Physical Exam:  Physical Exam   Constitutional: She is oriented to person, place, and time.   Thin, cachectic   HENT:   Head: Normocephalic.   Eyes: Conjunctivae are normal. Pupils are equal, round, and reactive to light.   Cardiovascular: Normal rate and regular rhythm.    Pulmonary/Chest: Effort normal and breath sounds normal.   Dialysis access in right anterior chest.   Abdominal: Soft. Bowel sounds are normal.   Musculoskeletal: She exhibits no edema or tenderness.   Neurological: She is alert and oriented to person, place, and time.   Skin: Skin is warm and dry. No erythema.   Multiple old scarred lesions on legs.       Labs:  Recent Results (from the past 24 hour(s))   CBC WITH DIFFERENTIAL    Collection Time: 05/12/17  4:12 AM   Result Value Ref Range    WBC 2.6 (L) 4.8 - 10.8 K/uL    RBC 2.52 (L) 4.20 - 5.40 M/uL    Hemoglobin 7.4 (L) 12.0 - 16.0 g/dL    Hematocrit 23.5 (L) 37.0 - 47.0 %    MCV 93.3 81.4 - 97.8 fL    MCH 29.4 27.0 - 33.0 pg    MCHC 31.5 (L) 33.6 - 35.0 g/dL    RDW 48.8 35.9 - 50.0 fL    Platelet Count 125 (L) 164 - 446 K/uL    MPV 10.9 9.0  - 12.9 fL    Nucleated RBC 0.00 /100 WBC    NRBC (Absolute) 0.00 K/uL    Neutrophils-Polys 88.00 (H) 44.00 - 72.00 %    Lymphocytes 7.00 (L) 22.00 - 41.00 %    Monocytes 2.00 0.00 - 13.40 %    Eosinophils 0.00 0.00 - 6.90 %    Basophils 0.00 0.00 - 1.80 %    Neutrophils (Absolute) 2.37 2.00 - 7.15 K/uL    Lymphs (Absolute) 0.18 (L) 1.00 - 4.80 K/uL    Monos (Absolute) 0.05 0.00 - 0.85 K/uL    Eos (Absolute) 0.00 0.00 - 0.51 K/uL    Baso (Absolute) 0.00 0.00 - 0.12 K/uL    Anisocytosis 1+     Macrocytosis 1+     Microcytosis 1+    BASIC METABOLIC PANEL    Collection Time: 05/12/17  4:12 AM   Result Value Ref Range    Sodium 137 135 - 145 mmol/L    Potassium 3.8 3.6 - 5.5 mmol/L    Chloride 102 96 - 112 mmol/L    Co2 30 20 - 33 mmol/L    Glucose 72 65 - 99 mg/dL    Bun 11 8 - 22 mg/dL    Creatinine 1.85 (H) 0.50 - 1.40 mg/dL    Calcium 7.3 (L) 8.4 - 10.2 mg/dL    Anion Gap 5.0 0.0 - 11.9   ESTIMATED GFR    Collection Time: 05/12/17  4:12 AM   Result Value Ref Range    GFR If African American 35 (A) >60 mL/min/1.73 m 2    GFR If Non  29 (A) >60 mL/min/1.73 m 2   DIFFERENTIAL MANUAL    Collection Time: 05/12/17  4:12 AM   Result Value Ref Range    Bands-Stabs 3.00 0.00 - 10.00 %    Manual Diff Status PERFORMED    PLATELET ESTIMATE    Collection Time: 05/12/17  4:12 AM   Result Value Ref Range    Plt Estimation Decreased    MORPHOLOGY    Collection Time: 05/12/17  4:12 AM   Result Value Ref Range    RBC Morphology Present     Poikilocytosis 1+     Ovalocytes 1+     Target Cells 1+     Basophilic Stippling Few      Results     Procedure Component Value Units Date/Time    URINE CULTURE(NEW) [[id number]]  (Abnormal)  (Susceptibility) Collected:  05/08/17 2273    Order Status:  Completed Specimen Information:  Urine Updated:  05/11/17 1014     Significant Indicator POS (POS)      Source UR      Site --      Urine Culture -- (A)      Urine Culture -- (A)      Result:        Klebsiella oxytoca ESBL  >100,000  cfu/mL  Extended Spectrum Beta-lactamase (ESBL) isolated.  ESBL's may be clinically resistant to therapy with  Penicillins,Cephalosporins or Aztreonam despite  apparent in vitro susceptibility to some of these agents.  The patient requires contact isolation.  Please contact pharmacy or an Infectious Disease Specialist  if you have any questions about appropriate therapy.       Urine Culture -- (A)      Result:        Citrobacter youngae  10-50,000 cfu/mL      Narrative:      CALL  [facility] tel. ,    Culture & Susceptibility     CITROBACTER YOUNGAE     Antibiotic Sensitivity Microscan Unit Status    Ampicillin Resistant >16 mcg/mL Final    Cefepime Sensitive <=8 mcg/mL Final    Cefotaxime Intermediate 32 mcg/mL Final    Cefotetan Resistant >32 mcg/mL Final    Ceftazidime Resistant >16 mcg/mL Final    Ceftriaxone Resistant >32 mcg/mL Final    Cefuroxime Resistant >16 mcg/mL Final    Cephalothin Resistant >16 mcg/mL Final    Ciprofloxacin Sensitive <=1 mcg/mL Final    Gentamicin Sensitive <=4 mcg/mL Final    Levofloxacin Sensitive <=2 mcg/mL Final    Nitrofurantoin Sensitive <=32 mcg/mL Final    Pip/Tazobactam Sensitive <=16 mcg/mL Final    Piperacillin Resistant >64 mcg/mL Final    Tigecycline Sensitive <=2 mcg/mL Final    Tobramycin Sensitive <=4 mcg/mL Final    Trimeth/Sulfa Sensitive <=2/38 mcg/mL Final              KLEBSIELLA OXYTOCA ESBL     Antibiotic Sensitivity Microscan Unit Status    Ampicillin Resistant >16 mcg/mL Final    Cefepime Resistant >16 mcg/mL Final    Cefotaxime Extended Spectrum Beta Lactamase >32 mcg/mL Final    Cefotetan Sensitive <=16 mcg/mL Final    Ceftazidime Extended Spectrum Beta Lactamase 16 mcg/mL Final    Ceftriaxone Extended Spectrum Beta Lactamase >32 mcg/mL Final    Cefuroxime Resistant >16 mcg/mL Final    Cephalothin Resistant >16 mcg/mL Final    Ciprofloxacin Sensitive <=1 mcg/mL Final    Gentamicin Resistant >8 mcg/mL Final    Levofloxacin Sensitive <=2 mcg/mL Final     Nitrofurantoin Sensitive <=32 mcg/mL Final    Pip/Tazobactam Sensitive <=16 mcg/mL Final    Piperacillin Resistant >64 mcg/mL Final    Tigecycline Sensitive <=2 mcg/mL Final    Tobramycin Resistant >8 mcg/mL Final    Trimeth/Sulfa Resistant >2/38 mcg/mL Final                       FUNGAL BLOOD CULTURE [[id number]] Collected:  17 1940    Order Status:  Completed Specimen Information:  Blood from Blood Updated:  17 0821     Significant Indicator NEG      Source BLD      Site BLOOD      Fungal Culture Culture in progress.     URINALYSIS CULTURE, IF INDICATED [[id number]]  (Abnormal) Collected:  17 175    Order Status:  Completed Specimen Information:  Other Updated:  17 181     Micro Urine Req Microscopic      Color Yellow      Character Cloudy (A)      Specific Gravity 1.020      Ph 8.0      Glucose Negative mg/dL      Ketones Negative mg/dL      Protein 100 (A) mg/dL      Bilirubin Negative      Nitrite Negative      Leukocyte Esterase Moderate (A)      Occult Blood Moderate (A)      Culture Indicated Yes UA Culture             Hemodynamics:  Temp (24hrs), Av.6 °C (97.9 °F), Min:36.6 °C (97.8 °F), Max:36.7 °C (98 °F)  Temperature: 36.6 °C (97.8 °F)  Pulse  Av.1  Min: 72  Max: 109   Blood Pressure: 107/66 mmHg     PIV Group Right Antecubital 20g Flexible Catheter (Active)       PIV Group Left External Jugular 20g Flexible Catheter;Saline Lock (Active)   Line Secured Taped;Transparent 2017  7:30 PM   Site Condition / Description Assessed 2017  7:30 PM   Dressing Type / Description Occlusive;Clean;Dry;Intact 2017  7:30 PM   Dressing Status Observed 2017  7:30 PM   Saline Locked Yes 5/10/2017 10:00 AM   Infiltration Grading Used by [facility] 0 2017  7:30 PM   Phlebitis Scale (Used by [facility]) 0 2017  7:30 PM   Date Primary Tubing Changed 05/10/17 2017  7:30 PM   Date Secondary Tubing Changed 17 10:35 AM   NEXT Primary Tubing Change   05/14/17 5/11/2017  7:30 PM   NEXT Secondary Tubing Change  05/12/17 5/11/2017 10:35 AM   NEXT Site Change Date 05/17/17 5/10/2017  3:00 PM       Dialysis Access Group Left;Upper Arm AV Fistula (Active)   Access Site Clean;Dry;Intact 5/11/2017  7:30 PM   AV Fistula Thrill Present 5/11/2017  7:30 PM   Dialysis Performed No 5/9/2017  2:00 PM       Dialysis Access Group Subclavian;Right (Active)       Dialysis Access Group Right;Internal Jugular Permacath (Active)   Access Site Clean;Dry;Intact 5/11/2017  7:30 PM   Dressing Type / Description Transparent 5/11/2017  7:30 PM   Dressing Status Observed 5/11/2017  7:30 PM   Dialysis Performed Yes 5/11/2017 10:35 AM   Next Dressing Change  05/13/17 5/11/2017 10:35 AM       Wound:  Pressure Ulcer  Coccyx;Sacrum (Active)       Wound POA Labia Left (Active)       Wound Rash Perineum Left (Active)       Wound Perineum;Labia Anterior (Active)   Wound Bed Red 5/11/2017  7:30 PM   Drainage  None 5/11/2017  7:30 PM   Periwound Skin Intact 5/11/2017  7:30 PM   Cleansing Not Applicable 5/11/2017  7:30 PM   Dressing Options Open to Air 5/11/2017  7:30 PM   Dressing Cleansing/Solutions Not Applicable 5/11/2017  7:30 PM   Periwound Protectant Barrier Paste 5/11/2017  7:30 PM   Length (cm) 4.5 5/9/2017  8:00 PM   Width (cm) 2 5/9/2017  8:00 PM   Depth (cm) 0.1 5/9/2017  8:00 PM   Weekly Photo (Inpatient Only) 05/09/17 5/9/2017  8:00 PM   Dressing Change Frequency Every 72 hrs 5/9/2017  8:00 PM   Protocol Orders Initiated Yes 5/9/2017  8:00 PM   Time Spent with Patient (mins) 35 5/9/2017  8:00 PM          Fluids:  Intake/Output       05/10/17 0700 - 05/11/17 0659 05/11/17 0700 - 05/12/17 0659 05/12/17 0700 - 05/13/17 0659      1425-1857 3471-4297 Total 4225-4651 9957-8738 Total [id number] 1900-0659 Total       Intake    P.O.  225  -- 225  600  -- 600  --  -- --    P.O. 225 -- 225 600 -- 600 -- -- --    I.V.  1100  250 1350  --  -- --  --  -- --    Crystalloid Intake 1100 -- 1100 -- -- --  -- -- --    IV Piggyback Volume (Iron Dextran) -- 250 250 -- -- -- -- -- --    Dialysis  --  -- --  500  -- 500  --  -- --    Dialysis Volume (Dialysis Intake) -- -- -- 500 -- 500 -- -- --    Total Intake 0831 081 7779 1100 -- 1100 -- -- --       Output    Urine  --  -- --  --  -- --  --  -- --    Number of Times Incontinent of Urine -- 1 x 1 x 1 x 2 x 3 x -- -- --    Dialysis  --  -- --  2000  -- 2000  --  -- --    Dialysis Output (Dialysis Output) -- -- -- 2000 -- 2000 -- -- --    Stool  --  -- --  --  -- --  --  -- --    Number of Times Stooled -- -- -- 1 x -- 1 x -- -- --    Total Output -- -- -- 2000 -- 2000 -- -- --       Net I/O     0483 143 0418 -900 -- -900 -- -- --             GI/Nutrition:  Orders Placed This Encounter   Procedures   • DIET ORDER     Standing Status: Standing      Number of Occurrences: 1      Standing Expiration Date:      Order Specific Question:  Diet:     Answer:  Regular [1]       Medications:  Current Facility-Administered Medications   Medication Last Dose   • meropenem (MERREM) 500 mg in  mL IVPB Stopped at 05/11/17 1630   • NS infusion 1,000 mL Stopped at 05/10/17 1638   • heparin 1000 units/mL injection 2,000 Units 2,000 Units at 05/11/17 0725   • heparin 1000 units/mL injection 3,700 Units 3,700 Units at 05/11/17 1035   • morphine (pf) 4 mg/ml injection 2 mg 2 mg at 05/12/17 0628   • fluconazole (DIFLUCAN) in NS IVPB 200 mg 200 mg at 05/12/17 0920   • alprazolam (XANAX) tablet 0.25 mg     • carvedilol (COREG) tablet 6.25 mg 6.25 mg at 05/12/17 0920   • amlodipine (NORVASC) tablet 5 mg 5 mg at 05/11/17 2029   • fluoxetine (PROZAC) capsule 10 mg 10 mg at 05/12/17 0920   • metoclopramide (REGLAN) tablet 10 mg 10 mg at 05/12/17 0623   • hydrOXYzine (ATARAX) tablet 25 mg 25 mg at 05/12/17 0920   • methocarbamol (ROBAXIN) tablet 750 mg     • sodium bicarbonate (SODIUM BICARBONATE) tablet 650 mg 650 mg at 05/12/17 0920   • trazodone (DESYREL) tablet 50 mg     • senna-docusate  (PERICOLACE or SENOKOT S) 8.6-50 MG per tablet 2 Tab 2 Tab at 05/12/17 0920    And   • polyethylene glycol/lytes (MIRALAX) PACKET 1 Packet      And   • magnesium hydroxide (MILK OF MAGNESIA) suspension 30 mL      And   • bisacodyl (DULCOLAX) suppository 10 mg         Medical Decision Making, by Problem:  Active Hospital Problems    Diagnosis   • FTT (failure to thrive) in adult [R62.7]   Newly diagnosed HIV  Probably HiV wasting syndrome  ESBL Klebsiella bacteruria  ESRD      Plan:  Still waiting for CD4 count, HIV VL and phenosense.  I did speak to the lab, and they have been drawn and have been sent out.  I expect that due to her recent and profound weight loss, her CD4 count is going to be low and VL high.  If CD4 count is < 200 will need prophylaxis against certain OIs.  [facility] has been by to offer support.    Discussed with pt, RN and [facility] RNs  35 min  >50% of time spent in coordination of care/counseling.

## 2017-05-12 NOTE — CONSULTS
DATE OF SERVICE:  05/11/2017    REFERRING PHYSICIAN:  Ja Godinez MD    REASON FOR CONSULTATION:  Evaluation and antibiotic management of a   47-year-old female with end-stage renal disease with newly diagnosed HIV.    HISTORY OF PRESENT ILLNESS:  Patient is a 47-year-old female who [facility]   Infectious Diseases has previously seen at [facility].  We initially saw her   back in 11/2016 when she presented with abdominal pain and was ultimately   diagnosed with acalculous cholecystitis and treated as such.  She has   subsequently been seen other times for line infections related to her dialysis   catheter, a Strep mitis endocarditis with vegetation seen around the aortic   valve and pneumonia.  We last saw her in February of this year.    She apparently was seen in the emergency room on 04/23 at [facility] for a   headache and subsequently was seen at the [facility] Emergency Room from 04/28 to   04/30 for dehydration and then most recently was admitted 05/04 through 05/06   for seizures.  CT scans on both recent admissions were negative for any   abnormalities.  She has been having home health visit her every 4-7 days and   apparently on 05/03 was recommended to come into the hospital as she had lost   a significant amount of weight over a fairly short period of time.  She was   subsequently admitted with sort of a failure to thrive picture and it was   noted that she had severe oral candidiasis, an HIV test was ordered,   surprisingly the HIV test was positive.  [facility] Infectious Diseases has been   asked to assist with her HIV workup.    PAST MEDICAL HISTORY AND PAST SURGICAL HISTORY:  History of end-stage renal   disease, has only been on dialysis since September of this year.  She has had   type 2 diabetes, hypertension, gastroparesis, anemia of chronic disease.  We   have treated her for strep mitis aortic valve endocarditis with ceftriaxone   for 6 weeks.  We treated her for a line infection, pneumonia,  acalculous   cholecystitis.  Surgical history consists of a hysterectomy, lumbar surgery,   eye surgery for retinopathy.    SOCIAL HISTORY:  She is  since 1992.  Her ex- is living with   her to help take care of her 5-year-old granddaughter and her 13-year-old son.    She denies any tobacco, alcohol or drugs.  She states that she has not been   sexually active for at least 5 years.  Her last partner was not her ,   but she is no longer involved with that gentleman any longer.  She does not   believe that he used any IV drugs, at least she never saw him use it.  Unknown   whether or not he had a history of sex with men or other high-risk   activities.    MEDICATIONS:  Include trazodone and stool softeners.  P.r.n. medications;   fluconazole 200 mg, Atarax, iron replacement and Xanax.    ALLERGIES:  SHE IS NOT ALLERGIC TO ANY ANTIBIOTICS, BUT DOES HAVE PROBLEMS   WITH OXYCODONE AND PERCOCET CAUSING ITCHING.  I JUST DID PLACE HER ON   MEROPENEM FOR AN ESBL KLEBSIELLA URINARY TRACT INFECTION.    REVIEW OF SYSTEMS:  + weight loss, poor appetite.                                            +/- urinary discomfort: does not have much urine output    PHYSICAL EXAMINATION:  VITAL SIGNS:  Temperature is 37.1, blood pressure is 123/67, heart rate 72,   respiratory rate of 18.  GENERAL:  She is quite cachectic in comparison to when I last saw her about   2-1/2 months ago.  HEENT:  Extraocular movements are intact.  Oropharynx is clear, although she   does still have some residual thrush.  NECK:  Supple.  HEART:  Regular rate and rhythm.  LUNGS:  Clear.  ABDOMEN:  Soft, nontender.  EXTREMITIES:  Very thin with very little muscular tone.  There is a tattoo   over her left forearm.  On her bilateral lower extremities, there are areas of   scarring surrounded by hyperpigmentation.  She states that those lesions   started several years ago, but these are fairly old and none have been acute   within the last 6  months.    LABORATORY DATA:  Her white count is currently 3.8, hemoglobin of 7.9,   hematocrit of 25.0, platelets of 126.  Sodium is 133, potassium 3.8, chloride   98, CO2 of 29, BUN of 14, creatinine 1.74, glucose of 48, albumin is 1.6.  A   CD4 count, viral load, HIV PhenoSense is pending.  Yesterday evening, I added   various serologies and titers that are typical for an HIV phenotype workup   including RPR, toxoplasma, IgG, CMV, hepatitis.  She is , I did not   order a G6PD test.  I also ordered an HLA-B 5701 to determine whether or not   abacavir could be used as part of her antiretroviral regimen.    IMPRESSION:  1.  End-stage renal disease.  2.  Newly diagnosed human immunodeficiency virus, presumably acquired   immunodeficiency syndrome.  Due to the fact that she currently has an   opportunistic infection of Candida and likely Candida esophagitis.  3.  Failure to thrive.  4.  Protein-calorie malnutrition, severe.  5.  Anemia of chronic disease.  6.  Hypertension.  7.  Gastroparesis.  8.  Previous strep mitis endocarditis.  9.  Previous line infection related to her dialysis catheter.  10.  Acalculous cholecystitis.  11. ESBL Klebsiella bacteruria    DISCUSSION:  At this point, the patient is newly diagnosed with HIV, likely   has AIDS as I suspect her recent weight loss can be attributed to HIV wasting   disease.  I anticipate her CD4 count to be very low.  Her viral load to be   high.  Ideally, we need to wait for PhenoSense in order to determine what the   best antiretroviral regimen will be for this patient.  Her potential contact   was possibly a sexual partner she had 5 years ago.  She does not really have   any other high-risk behaviors.    Obviously, this information has been somewhat of a shock for her.  I think she   is still struggling in dealing with the fact that she is HIV positive.  She   has not made a decision with regards to notifying her family and her   ex-.  I told her that  when she decides if she needs one of the   Infectious Diseases physicians to be there to help answer questions.  We will   be happy to do so.    She has a + urine cx.  She does not produce much urine.  Only symptom is some fullness.  Will treat with a short course of meropenem.    Thank you for allowing us to assist in the management of this patient.       ____________________________________     MD CAL HART / JUAN    DD:  05/11/2017 11:15:46  DT:  05/11/2017 15:38:05    D#:  [id number]  Job#:  [id number]    cc: TYLOR KIMBROUGH MD, ZIYAD VILLAR MD, AZUL CASTAÑEDA MD

## 2017-05-12 NOTE — PROGRESS NOTES
After am meds pt vomited up clear liquid.juan elaine with md ,new order received for zofran.  Medicated x 1 with ms for labia pain.poor appetite this am.

## 2017-05-12 NOTE — PROGRESS NOTES
Nephrology Progress Note, Adult, Complex               Author: Yaquelin Danielcristinahipolito Date & Time created: 5/11/2017  7:17 PM     Interval History:  48 y/o female with ESRD/HD admitted with poor po intake  S/p EGD -no abnormalities  HD TTS -received treatment this AM -tolerated well    Review of Systems:  Review of Systems   Constitutional: Positive for weight loss and malaise/fatigue. Negative for fever and chills.   HENT: Negative for congestion, nosebleeds and sore throat.    Eyes: Negative.    Respiratory: Negative for cough, hemoptysis, shortness of breath and wheezing.    Cardiovascular: Negative for chest pain, orthopnea and leg swelling.   Gastrointestinal: Negative for heartburn, nausea, vomiting, abdominal pain and diarrhea.   Genitourinary: Negative for dysuria, urgency and frequency.   Musculoskeletal: Negative for myalgias, back pain and joint pain.   Neurological: Negative for dizziness, sensory change, focal weakness and headaches.       Physical Exam:  Physical Exam   Constitutional: She is oriented to person, place, and time.   HENT:   Head: Normocephalic and atraumatic.   oral thrush   Eyes: Conjunctivae are normal. Pupils are equal, round, and reactive to light. Right eye exhibits no discharge.   Neck: Neck supple.   Cardiovascular: Normal rate and regular rhythm.    Pulmonary/Chest: No stridor. No respiratory distress. She has no wheezes. She has no rales.   Abdominal: She exhibits no distension. There is no tenderness. There is no rebound.   Musculoskeletal: She exhibits no edema or tenderness.   Neurological: She is alert and oriented to person, place, and time. No cranial nerve deficit.   Skin: Skin is warm and dry. She is not diaphoretic.       Labs:        Invalid input(s): [id number]      Recent Labs      05/09/17   0419  05/10/17   0412   SODIUM  132*  133*   POTASSIUM  4.2  3.8   CHLORIDE  97  98   CO2  27  29   BUN  34*  14   CREATININE  2.72*  1.75*   CALCIUM  7.5*  7.3*     Recent Labs       05/09/17   0419  05/10/17   0412   ALTSGPT   --   8   ASTSGOT   --   17   ALKPHOSPHAT   --   79   TBILIRUBIN   --   0.4   GLUCOSE  51*  48*     Recent Labs      05/09/17   0419  05/09/17   0420  05/10/17   0413  05/11/17   0050   RBC   --   2.93*  2.60*  2.72*   HEMOGLOBIN   --   8.6*  7.5*  7.9*   HEMATOCRIT   --   26.9*  23.9*  25.0*   PLATELETCT   --   112*  108*  126*   IRON  11*   --    --    --    TOTIRONBC  174*   --    --    --      Recent Labs      05/09/17   0420  05/10/17   0412  05/10/17   0413  05/11/17   0050   WBC  3.7*   --   2.5*  3.8*   NEUTSPOLYS  83.60*   --   78.00*  87.70*   LYMPHOCYTES  5.40*   --   8.40*  4.40*   MONOCYTES  8.60   --   10.00  5.00   EOSINOPHILS  1.60   --   2.40  1.80   BASOPHILS  0.30   --   0.40  0.30   ASTSGOT   --   17   --    --    ALTSGPT   --   8   --    --    ALKPHOSPHAT   --   79   --    --    TBILIRUBIN   --   0.4   --    --            Hemodynamics:  Temp (24hrs), Av.4 °C (97.6 °F), Min:36.2 °C (97.2 °F), Max:37.1 °C (98.7 °F)  Temperature: 36.6 °C (97.8 °F)  Pulse  Av.6  Min: 72  Max: 109   Blood Pressure: 129/69 mmHg     Respiratory:    Respiration: 18, Pulse Oximetry: 97 %           Fluids:    Intake/Output Summary (Last 24 hours) at 17 1917  Last data filed at 17 1800   Gross per 24 hour   Intake   1350 ml   Output   2000 ml   Net   -650 ml        GI/Nutrition:  Orders Placed This Encounter   Procedures   • DIET ORDER     Standing Status: Standing      Number of Occurrences: 1      Standing Expiration Date:      Order Specific Question:  Diet:     Answer:  Regular [1]     Medical Decision Making, by Problem:  Active Hospital Problems    Diagnosis   • FTT (failure to thrive) in adult [R62.7]                     DVT prophylaxis - mechanical: SCDs        Assessment and plan    1.ESRD/HD TTS  2.HTN: BP well controlled  3.Electrolytes: well controlled.  4.Anemia: low Hb level -epogen 10 000 units with HD  5.Volume:not overloaded  Recs:   HD  TTS  Renal diet

## 2017-05-12 NOTE — CARE PLAN
Problem: Safety  Goal: Will remain free from injury  Outcome: PROGRESSING AS EXPECTED  Pt to remain injury free  Call light in reach  Hourly rounding    Problem: Mobility  Goal: Risk for activity intolerance will decrease  Outcome: PROGRESSING SLOWER THAN EXPECTED  Increase activity as franck  Working with nursing and P.T

## 2017-05-13 PROBLEM — N39.0 UTI (URINARY TRACT INFECTION): Status: ACTIVE | Noted: 2017-01-01

## 2017-05-13 PROBLEM — E88.A HIV WASTING SYNDROME (HCC): Status: ACTIVE | Noted: 2017-01-01

## 2017-05-13 PROBLEM — E46 MALNUTRITION (HCC): Status: ACTIVE | Noted: 2017-01-01

## 2017-05-13 PROBLEM — B20 HIV WASTING SYNDROME (HCC): Status: ACTIVE | Noted: 2017-01-01

## 2017-05-13 NOTE — PROGRESS NOTES
Nephrology Progress Note, Adult, Complex               Author: Yaquelin Danielcristinahipolito Date & Time created: 5/13/2017  4:40 PM     Interval History:  48 y/o female with ESRD/HD admitted with poor po intake  No complaints -doing better  HD TTS     Review of Systems:  Review of Systems   Constitutional: Positive for weight loss and malaise/fatigue. Negative for fever and chills.   HENT: Negative for congestion, nosebleeds and sore throat.    Eyes: Negative.    Respiratory: Negative for cough, hemoptysis, shortness of breath and wheezing.    Cardiovascular: Negative for chest pain, orthopnea and leg swelling.   Gastrointestinal: Negative for heartburn, nausea, vomiting, abdominal pain and diarrhea.   Genitourinary: Negative for dysuria, urgency and frequency.   Musculoskeletal: Negative for myalgias, back pain and joint pain.   Neurological: Negative for dizziness, sensory change, focal weakness and headaches.       Physical Exam:  Physical Exam   Constitutional: She is oriented to person, place, and time.   HENT:   Head: Normocephalic and atraumatic.   oral thrush   Eyes: Conjunctivae are normal. Pupils are equal, round, and reactive to light. Right eye exhibits no discharge.   Neck: Neck supple.   Cardiovascular: Normal rate and regular rhythm.    Pulmonary/Chest: No stridor. No respiratory distress. She has no wheezes. She has no rales.   Abdominal: She exhibits no distension. There is no tenderness. There is no rebound.   Musculoskeletal: She exhibits no edema or tenderness.   Neurological: She is alert and oriented to person, place, and time. No cranial nerve deficit.   Skin: Skin is warm and dry. She is not diaphoretic.       Labs:        Invalid input(s): [id number]      Recent Labs      05/12/17   0412  05/13/17   0509   SODIUM  137  136   POTASSIUM  3.8  4.0   CHLORIDE  102  102   CO2  30  28   BUN  11  16   CREATININE  1.85*  2.56*   CALCIUM  7.3*  7.3*     Recent Labs      05/12/17   0412  05/13/17   0509   GLUCOSE   72  47*     Recent Labs      17   0050  17   0412  17   0509   RBC  2.72*  2.52*  2.54*   HEMOGLOBIN  7.9*  7.4*  7.3*   HEMATOCRIT  25.0*  23.5*  23.4*   PLATELETCT  126*  125*  119*     Recent Labs      17   0050  17   0412  17   0509   WBC  3.8*  2.6*  2.6*   NEUTSPOLYS  87.70*  88.00*  80.40*   LYMPHOCYTES  4.40*  7.00*  7.40*   MONOCYTES  5.00  2.00  9.00   EOSINOPHILS  1.80  0.00  1.60   BASOPHILS  0.30  0.00  0.40           Hemodynamics:  Temp (24hrs), Av.6 °C (97.8 °F), Min:36.4 °C (97.5 °F), Max:36.8 °C (98.2 °F)  Temperature: 36.5 °C (97.7 °F)  Pulse  Av.2  Min: 72  Max: 109   Blood Pressure: 103/63 mmHg     Respiratory:    Respiration: 18, Pulse Oximetry: 94 %           Fluids:    Intake/Output Summary (Last 24 hours) at 17 1640  Last data filed at 17 1115   Gross per 24 hour   Intake    600 ml   Output   2500 ml   Net  -1900 ml        GI/Nutrition:  Orders Placed This Encounter   Procedures   • DIET ORDER     Standing Status: Standing      Number of Occurrences: 1      Standing Expiration Date:      Order Specific Question:  Diet:     Answer:  Regular [1]     Medical Decision Making, by Problem:  Active Hospital Problems    Diagnosis   • FTT (failure to thrive) in adult [R62.7]                     DVT prophylaxis - mechanical: SCDs        Assessment and plan    1.ESRD/HD TTS  2.HTN: BP well controlled  3.Electrolytes: well controlled.  4.Anemia: low Hb level -epogen with HD  5.Volume:not overloaded  Recs:   HD TTS  Renal diet

## 2017-05-13 NOTE — DISCHARGE PLANNING
Medical Social Work    Referral: Pt discussed at IDT rounds this AM.    Intervention: Per flowsheet, pt lives with spouse and expects to d.c home.  Pt is out of skilled days according to [facility] who called yesterday and were inquiring on PCA.  Based upon CARO Bruno's note, Pt approved for 14 hours PCA and has used HH before.  CARO requested HH order.       Plan: CARO will obtain HH choice and submit referral.

## 2017-05-13 NOTE — PROGRESS NOTES
Hospital Medicine Progress Note, Adult, Complex               Author: Ja Godinez Date & Time created: 5/13/2017  10:23 AM     Interval History:    47YR OLD F WITH ORAL THRUSH AND SEVERE MALNUTRITION  HIV + AS NEW DIAGNOSIS  Review of Systems:  Review of Systems   Constitutional: Negative for fever, chills and weight loss.   HENT: Negative for hearing loss and tinnitus.    Eyes: Negative for blurred vision, double vision and photophobia.   Respiratory: Negative for cough, hemoptysis and sputum production.    Cardiovascular: Negative for chest pain, palpitations and orthopnea.   Gastrointestinal: Negative for heartburn, nausea and vomiting.   Genitourinary: Negative for dysuria, urgency and frequency.   Musculoskeletal: Negative for back pain and neck pain.   Skin: Negative for itching and rash.   Neurological: Negative for dizziness, tingling, tremors and headaches.       Physical Exam:  Physical Exam   Constitutional: She is oriented to person, place, and time. No distress.   HENT:   Head: Normocephalic and atraumatic.   Eyes: Right eye exhibits no discharge. Left eye exhibits no discharge.   Neck: Neck supple. No JVD present.   Cardiovascular: Normal rate and regular rhythm.    Pulmonary/Chest: No stridor. No respiratory distress. She has no wheezes. She has no rales.   Abdominal: Soft. She exhibits no distension. There is no tenderness. There is no rebound.   Musculoskeletal: She exhibits no edema or tenderness.   Neurological: She is alert and oriented to person, place, and time.   Skin: Skin is warm and dry. She is not diaphoretic.       Labs:        Invalid input(s): [id number]      Recent Labs      05/12/17 0412  05/13/17   0509   SODIUM  137  136   POTASSIUM  3.8  4.0   CHLORIDE  102  102   CO2  30  28   BUN  11  16   CREATININE  1.85*  2.56*   CALCIUM  7.3*  7.3*     Recent Labs      05/12/17   0412  05/13/17   0509   GLUCOSE  72  47*     Recent Labs      05/11/17   0050  05/12/17 0412   17   0509   RBC  2.72*  2.52*  2.54*   HEMOGLOBIN  7.9*  7.4*  7.3*   HEMATOCRIT  25.0*  23.5*  23.4*   PLATELETCT  126*  125*  119*     Recent Labs      17   0050  17   0412  17   0509   WBC  3.8*  2.6*  2.6*   NEUTSPOLYS  87.70*  88.00*  80.40*   LYMPHOCYTES  4.40*  7.00*  7.40*   MONOCYTES  5.00  2.00  9.00   EOSINOPHILS  1.80  0.00  1.60   BASOPHILS  0.30  0.00  0.40           Hemodynamics:  Temp (24hrs), Av.5 °C (97.7 °F), Min:36.3 °C (97.4 °F), Max:36.8 °C (98.2 °F)  Temperature: 36.8 °C (98.2 °F)  Pulse  Av.6  Min: 72  Max: 109   Blood Pressure: 109/70 mmHg     Respiratory:    Respiration: 18, Pulse Oximetry: 96 %           Fluids:    Intake/Output Summary (Last 24 hours) at 17 1023  Last data filed at 17 1800   Gross per 24 hour   Intake    175 ml   Output      0 ml   Net    175 ml        GI/Nutrition:  Orders Placed This Encounter   Procedures   • DIET ORDER     Standing Status: Standing      Number of Occurrences: 1      Standing Expiration Date:      Order Specific Question:  Diet:     Answer:  Regular [1]     Medical Decision Making, by Problem:  Active Hospital Problems    Diagnosis   • FTT (failure to thrive) in adult [R62.7]   47YR OLD F WITH  HIV POSITIVE  NEW DIAGNOSIS  ID INPUT IS NOTED  D/W ID  AWAITING CD4 COUNT TO SEE IF THE PT   NEEDS TO BE ON PROPHYLAXIS      UTI  ESBL POSITIVE  D/W I.D YESTERDAY  MEREPENOM    ORAL THRUSH  HAS IMPROVED  ON DIFLUCAN IV  ENDOSCOPY RULED OUT CANDIDA ESOPHAGITIS  GI INPUT IS NOTED    ESRD  AS PER NEPHEOLOGY  DIALYSIS    MALNUTRITION  SEVERE  DIETITIAN CONSULT  SHE IS EATING NOW AND NO NEED FOR TPN  CT OF THE CHEST AND ABD NOTED        Julien catheter: No Julien        DVT prophylaxis - mechanical: SCDs

## 2017-05-13 NOTE — PROGRESS NOTES
HD treatment were ordered by Dr. Betancourt, pt was able to tolerate HD treatment and pulled 2.5 liters of fluids. Gave report to Rush JAMES, pt CVC dressing were dry and intact, no swelling or redness were noted post tx. Access were locked with heparin 1000 units/ml, 1.8 ml arterial port and 1.9 ml venous port. Pt was stable post tx, denies any complaint of pain and SOB, no fever or any distress were noted post tx. Treatment started at 0810 and ended at 1115 see flow sheets for further details.

## 2017-05-13 NOTE — PROGRESS NOTES
Received report from night RN.  Assumed pt care.  Contact precautions in place.  Dialysis at bedside.  Updated pt w/ POC.  Will continue with care.

## 2017-05-13 NOTE — DISCHARGE PLANNING
Received choice form from [facility]. Referral sent to [facility] at 1444 on 05-13-17.

## 2017-05-13 NOTE — FACE TO FACE
Face to Face Supporting Documentation - Home Health    The encounter with this patient was in whole or in part the primary reason for home health admission.    Date of encounter:   Patient:                    MRN:                       YOB: 2018  Roger Mckeon  [id number]  1970     Home health to see patient for:  Skilled Nursing care for assessment, interventions & education    Skilled need for:  Exacerbation of Chronic Disease State ESRD AND NEW DIAGNOSIS OF HIV WITH SEVERE MALNUTRITION    Skilled nursing interventions to include:  Comment: ESRD AND NEW DIAGNOSIS OF HIV WITH SEVERE MALNUTRITION    Homebound status evidenced by:  Needs the assistance of another person in order to leave the home. Leaving home requires a considerable and taxing effort. There is a normal inability to leave the home.    Community Physician to provide follow up care: Caridad Stone M.D.     Optional Interventions? Yes, Details: ESRD AND NEW DIAGNOSIS OF HIV WITH SEVERE MALNUTRITION      I certify the face to face encounter for this home health care referral meets the CMS requirements and the encounter/clinical assessment with the patient was, in whole, or in part, for the medical condition(s) listed above, which is the primary reason for home health care. Based on my clinical findings: the service(s) are medically necessary, support the need for home health care, and the homebound criteria are met.  I certify that this patient has had a face to face encounter by myself.  Ja Godinez M.D. - NPI: [id number]

## 2017-05-13 NOTE — DISCHARGE PLANNING
CARO Abel met with pt to obtain verbal consent for HH as pt is in iso room.  Pt wishes to continue with [facility].  CARO faxed choice form to ALICE Campos for referral.

## 2017-05-13 NOTE — PROGRESS NOTES
Pt feeling better this evening,eating a small amt of dinner.  Still a little nauseated.medicated with zofran

## 2017-05-13 NOTE — THERAPY
Occupational Therapy- OT eval attempted again, pt currently on dialysis until lunch time, per RN, will likely not be up to much activity after.  Will monitor and see when able.

## 2017-05-14 NOTE — CARE PLAN
Problem: Infection  Goal: Will remain free from infection  Outcome: PROGRESSING AS EXPECTED  Intervention: Implement standard precautions and perform hand washing before and after patient contact  Hand washing every encounter. IV ports scrubbed with alcohol when hanging medicine. Patient watch for s/s of infection. Patient taucht to report s/s of infection, verbalizes understanding.      Problem: Venous Thromboembolism (VTW)/Deep Vein Thrombosis (DVT) Prevention:  Goal: Patient will participate in Venous Thrombosis (VTE)/Deep Vein Thrombosis (DVT)Prevention Measures  Outcome: PROGRESSING AS EXPECTED  Intervention: Encourage patient to perform ankle flex, foot rotation, and knee flex exercises in addition to other prophylatic measures every hour while awake  SCDs in place.  Encourage to perform flexion of the feet while in bed and awake, verbalize understanding.  Encourage ambulation TID.

## 2017-05-14 NOTE — DISCHARGE INSTRUCTIONS
Discharge Instructions    Discharged to home by car with relative. Discharged via wheelchair, hospital escort: Yes.  Special equipment needed: Not Applicable    Be sure to schedule a follow-up appointment with your primary care doctor or any specialists as instructed.     Discharge Plan: Home  Influenza Vaccine Indication: Not indicated: Previously immunized this influenza season and > 8 years of age    I understand that a diet low in cholesterol, fat, and sodium is recommended for good health. Unless I have been given specific instructions below for another diet, I accept this instruction as my diet prescription.   Other diet: as per prior to discharge.  Special Instructions: See follow-up information.    · Is patient discharged on Warfarin / Coumadin?   No     · Is patient Post Blood Transfusion?  No    Depression / Suicide Risk    As you are discharged from this [facility] facility, it is important to learn how to keep safe from harming yourself.    Recognize the warning signs:  · Abrupt changes in personality, positive or negative- including increase in energy   · Giving away possessions  · Change in eating patterns- significant weight changes-  positive or negative  · Change in sleeping patterns- unable to sleep or sleeping all the time   · Unwillingness or inability to communicate  · Depression  · Unusual sadness, discouragement and loneliness  · Talk of wanting to die  · Neglect of personal appearance   · Rebelliousness- reckless behavior  · Withdrawal from people/activities they love  · Confusion- inability to concentrate     If you or a loved one observes any of these behaviors or has concerns about self-harm, here's what you can do:  · Talk about it- your feelings and reasons for harming yourself  · Remove any means that you might use to hurt yourself (examples: pills, rope, extension cords, firearm)  · Get professional help from the community (Mental Health, Substance Abuse, psychological  counseling)  · Do not be alone:Call your Safe Contact- someone whom you trust who will be there for you.  · Call your local CRISIS HOTLINE 992-2335 or 183-127-0794  · Call your local Children's Mobile Crisis Response Team [place] (463) 296-6313 or www.[employer]  · Call the toll free National Suicide Prevention Hotlines   · National Suicide Prevention Lifeline 414-469-LMWI (6807)  · National Hope Line Network 800-SUICIDE (615-5470)

## 2017-05-14 NOTE — PROGRESS NOTES
Returned from xray.  IV removed and dry sterile dressing applied.  Wound care to perineal area completed prior to discharge.  Patient's daughter reports she is aware of wound care and how to do it.  Permcath to right chest intact without noted problems at discharge.  Medicated prior to discharge with Zofran for reported nausea.  Patient and family aware of need for medications to be taken today and follow-up appointments that are necessary.

## 2017-05-14 NOTE — DISCHARGE SUMMARY
DATE OF ADMISSION:  05/08/2017    DATE OF DISCHARGE:  05/14/2017    IDENTIFICATION:  This is a 47-year-old female who comes to the emergency room   with a complaint of lack of appetite.  Patient was noted to have oral thrush,   admitted to the hospital for further management.    HOSPITAL PRESENTATION:  During the hospital stay, patient was noted to have   oral thrush and severe protein-calorie malnutrition.  Patient was started on   Diflucan initially.  An HIV test was sent and it came back reactive.    Infectious disease consultation was placed.  The infectious disease doctor saw   the patient and the patient was diagnosed with HIV wasting.  The patient also   had Gold QuantiFERON test for TB done in September 2016 that was negative at   that time, also had hepatitis panels that were negative for WBC.  Her CD4   count and her viral loads have been sent, but the results are still pending.    Toxoplasmosis gondii is negative.  Also, the patient was found to have ESBL   urinary tract infection.  The patient was placed on 3 days of meropenem and   has received that intravenously.  Also, has end-stage renal disease and was   receiving dialysis here in the hospital.  For severe protein-calorie   malnutrition, the patient had consultation with dietitian.  Also, GI   consultation was placed and endoscopy was done, Candida esophagitis was ruled   out.  Also, I informed the patient about her new diagnosis of HIV, she was   informed about that.  The patient also started on prophylaxis with Bactrim and   also with azithromycin.  I spoke to the patient today.  She is at baseline at   this time and will be discharged home to follow up with her primary care   physician and also infectious disease this coming week for further management   of HIV and also starting the antiretroviral therapy as soon as possible.    RADIOLOGICAL STUDIES:  The patient had radiological studies, had a head CT   that was done and it was negative.  No  acute intracranial abnormality noted,   moderate atrophy.  Also, patient had a chest CT that was done; impression,   ill-defined patchy ground glass opacities in the right upper lobe and   throughout the left lung, in keeping with with infection, irregular wall   thickening of the urinary bladder concerning for cystitis, diffuse anasarca,   small amount of fluid in the pelvis.  However, I do not believe that the   patient had pneumonia, Pneumocystis jiroveci as the possibility; however,   patient was started on Zithromax.    PHYSICAL EXAMINATION:  VITAL SIGNS:  Temperature of 36.7, pulse of 82, respiratory rate of 18, blood   pressure of 118/70, O2 saturation 100% on room air.  GENERAL APPEARANCE:  Patient is awake, alert, oriented x3, and malnourished   looking.  HEENT:  Neck is supple.  Lips are moist.  Oral cavity is clean.  No white   patches are noted anymore.  CARDIOVASCULAR:  S1, S2, regular.  LUNGS:  Clear bilaterally, no wheezing or crackles.  ABDOMEN:  Soft, nontender.  EXTREMITIES:  Lower extremities, no edema noted.    LABORATORY DATA:  As of today, WBC of 2.6, H and H 7.5 and 24, platelets of   108,000.  Sodium is 133, potassium 3.8, chloride of 99, bicarbonate is 30, BUN   of 10, creatinine is 2.47.    ASSESSMENT AND PLAN:  This is a 47-year-old female with newly diagnosed human   immunodeficiency virus wasting.  1.  Patient needs to follow up with infectious disease doctor this coming week   to start antiretroviral therapy as soon as possible.  2.  For severe malnutrition:  a.  Most likely secondary to not being able to be eat secondary to oral thrush   and also due to human immunodeficiency virus wasting.  A dietitian   consultation was placed.  Recommendations were given to the patient.  b.  Oral thrush with human immunodeficiency virus.  Patient has received 5   days of Diflucan IV here in the hospital.  c.  I will continue with the Diflucan 200 mg p.o. daily for next 10 days as   well.  3.  For  end-stage renal disease, follow up with the nephrologist with a   scheduled dialysis.  4.  For human immunodeficiency virus and prevention of the infection   especially with most likely the CD4 count that is low.  a.  Continue with the Bactrim half a tablet p.o. b.i.d.  b.  Zithromax 1250 mg p.o. once a week.    FOLLOWUP:  The patient needs to follow up with ID this coming week, primary   care physician this coming week and dialysis as scheduled.       ____________________________________     MD NIYA Raya / JUAN    DD:  05/14/2017 09:30:06  DT:  05/14/2017 10:27:44    D#:  [id number]  Job#:  [id number]

## 2017-05-14 NOTE — PROGRESS NOTES
Patient reports MD here and said he will discharge her today and give po antibiotics for home use.  States her family will be here later to discuss this and take her home.

## 2017-05-14 NOTE — PROGRESS NOTES
Infectious Disease Progress Note    Author: GUMARO Cain M.D. Date & Time created: 2017  11:19 PM    Chief Complaint:  Chief Complaint   Patient presents with   • Heartburn   • Dehydration     Interval History:  Meropenem Day #2.  Public health came to talk to her .  States she told her ex  about test results.  He does not want her to tell their adult children.  He is taking care of their 13 year old son, and 5 year old granddtr.  Coughed a little last night but no cough today.  Mild dysuria.  No diarrhea.  CMV IGG +, Toxoplasma IgG negative. RPR negative. QF TB gold negative.  Labs Reviewed, Medications Reviewed, Radiology Reviewed and Fluids Reviewed.    Review of Systems:  Review of Systems   Constitutional: Positive for malaise/fatigue. Negative for fever and chills.   Respiratory: Negative for cough and shortness of breath.    Cardiovascular: Negative for chest pain.   Gastrointestinal: Negative for nausea, vomiting, abdominal pain and diarrhea.   Genitourinary: Positive for dysuria.   Musculoskeletal: Negative for myalgias and joint pain.   Skin: Negative for rash.   Neurological: Negative for headaches.     Hemodynamics:  Temp (24hrs), Av.7 °C (98 °F), Min:36.5 °C (97.7 °F), Max:36.9 °C (98.4 °F)  Temperature: 36.9 °C (98.4 °F)  Pulse  Av.1  Min: 72  Max: 109   Blood Pressure: 113/70 mmHg       Physical Exam:  Physical Exam   Constitutional: She is oriented to person, place, and time. No distress.   THin.    HENT:   No temporal wasting or alopecia. No malar or other facial rash. No conjunctival injection or petechiae. No intraoral ulcers, nodules or thrush. No cervical lymphadenopathy or JVD.     Cardiovascular: Normal rate.  Exam reveals no gallop.    No murmur heard.  Pulmonary/Chest: Effort normal and breath sounds normal. No respiratory distress. She has no wheezes.   Abdominal: Soft. Bowel sounds are normal. She exhibits no distension. There is tenderness.   Musculoskeletal:  She exhibits no edema.   Neurological: She is alert and oriented to person, place, and time.   Skin: Skin is warm and dry. No rash noted. She is not diaphoretic.   Psychiatric: She has a normal mood and affect.   Nursing note and vitals reviewed.    Meds:  •  epoetin jesse (EPOGEN,PROCRIT) injection 10,000 Units, 10,000 Units, Intravenous, TUE+THU+SAT, Yaquelin Betancourt M.D., 10,000 Units at 05/13/17 1100  •  NS infusion 1,000 mL, 1,000 mL, Intravenous, Intra-Op Once PRN, Irene Jenkins M.D., Stopped at 05/10/17 1638  •  heparin 1000 units/mL injection 3,700 Units, 3,700 Units, Intracatheter, DIALYSIS PRN, Yaquelin Betancourt M.D., 3,700 Units at 05/13/17 1120  ••  fluconazole (DIFLUCAN) in NS IVPB 200 mg, 200 mg, Intravenous, Q24HRS, Eugenio Sterling M.D., Stopped at 05/13/17 1338  •  carvedilol (COREG) tablet 6.25 mg, 6.25 mg, Oral, BID WITH MEALS, Franki Woods M.D., 6.25 mg at 05/13/17 1710  •  amlodipine (NORVASC) tablet 5 mg, 5 mg, Oral, Q EVENING, Franki Woods M.D., 5 mg at 05/13/17 2039  •  fluoxetine (PROZAC) capsule 10 mg, 10 mg, Oral, DAILY, Franki Woods M.D., 10 mg at 05/13/17 1116  •  metoclopramide (REGLAN) tablet 10 mg, 10 mg, Oral, 4X/DAY ACHS, Franki Woods M.D., 10 mg at 05/13/17 2039  •  hydrOXYzine (ATARAX) tablet 25 mg, 25 mg, Oral, 4X/DAY, Franki Woods M.D., 25 mg at 05/13/17 2039  •  methocarbamol (ROBAXIN) tablet 750 mg, 750 mg, Oral, Q6HRS PRN, Franki Woods M.D., 750 mg at 05/13/17 1437  •  sodium bicarbonate (SODIUM BICARBONATE) tablet 650 mg, 650 mg, Oral, TID, Franki Woods M.D., 650 mg at 05/13/17 2039    Labs:  Recent Labs      05/11/17   0050  05/12/17   0412  05/13/17   0509   WBC  3.8*  2.6*  2.6*   RBC  2.72*  2.52*  2.54*   HEMOGLOBIN  7.9*  7.4*  7.3*   HEMATOCRIT  25.0*  23.5*  23.4*   MCV  91.9  93.3  92.1   MCH  29.0  29.4  28.7   RDW  47.6  48.8  47.8   PLATELETCT  126*  125*  119*   MPV  11.0  10.9  10.6   NEUTSPOLYS  87.70*  88.00*  80.40*   LYMPHOCYTES  4.40*  7.00*  7.40*    MONOCYTES  5.00  2.00  9.00   EOSINOPHILS  1.80  0.00  1.60   BASOPHILS  0.30  0.00  0.40   RBCMORPHOLO   --   Present   --      Recent Labs      05/12/17   0412  05/13/17   0509   SODIUM  137  136   POTASSIUM  3.8  4.0   CHLORIDE  102  102   CO2  30  28   GLUCOSE  72  47*   BUN  11  16     Recent Labs      05/12/17   0412  05/13/17   0509   CREATININE  1.85*  2.56*     Imaging:  Ct-chest,abdomen,pelvis With  5/9/2017   COMPARISON: 4/28/2017. FINDINGS: CT Chest: Lungs: Ill-defined patchy groundglass opacities in the right upper lobe and throughout the left lung. Airway: Patent Pleura: No pleural effusion or pneumothorax. Thoracic aorta and great vessels:  No dissection or aneurysm. Pulmonary arteries: Nonenlarged. No pulmonary embolus. Heart and pericardium:   No significant coronary artery calcification. No pericardial effusion. Lymph nodes: No enlarged mediastinal or hilar lymph nodes. Chest wall: Right central venous catheter with tip at cavoatrial junction.. Thoracic spine:  No acute fracture or malalignment. CT Abdomen: Diffuse anasarca. Liver: Unremarkable. Spleen: Unremarkable. Pancreas: Unremarkable. Gallbladder: Contracted. Adrenal glands: normal in size. Kidneys: Unremarkable. The kidneys enhance symmetrically. The abdominal aorta is normal in caliber. There is no lymphadenopathy. No bowel wall thickening or bowel dilatation. CT Pelvis: Irregular wall thickening of the urinary bladder. Small pelvic ascites. Moderate degenerative change of the lumbar spine at L5-S1. ___________________________________   IMPRESSION  1. Ill-defined patchy and groundglass opacities in the right upper lobe and throughout the left lung, in keeping with infection. 2. Irregular wall thickening of the urinary bladder, concerning for cystitis. 3. Diffuse anasarca. Small amount fluid in the pelvis.    Micro:  BLOOD CULTURE   Date Value Ref Range Status   04/28/2017 No growth after 5 days of incubation.  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  Check sputum for Pneumocystis.  Would also consider starting ART sooner rather than later.

## 2017-05-14 NOTE — CARE PLAN
Problem: Bowel/Gastric:  Goal: Normal bowel function is maintained or improved  Outcome: PROGRESSING AS EXPECTED  Bowels working.    Problem: Skin Integrity  Goal: Risk for impaired skin integrity will decrease  Outcome: PROGRESSING AS EXPECTED  Discharged with wound care supplies and instructions.    Problem: Mobility  Goal: Risk for activity intolerance will decrease  Outcome: PROGRESSING SLOWER THAN EXPECTED  Declines most activity even with encouragement.

## 2017-05-15 NOTE — DOCUMENTATION QUERY
DOCUMENTATION QUERY    PROVIDERS: Please select “Cosign w/ note”to reply to query.    To better represent the severity of illness of your patient, please review the following information and exercise your independent professional judgment in responding to this query.     Per wound team assessment pt has labial wounds and sacrococcygeal area and scaring of the sacrum is documented in the Wound Therapy Progress Notes. Based upon the clinical findings, risk factors, and treatment, please specify if this condition was present on admission or developed after admission    • Agree with Wound Care RN assessment  • Disagree with Wound Care RN assessment (document your clinical findings)  • Other explanation of clinical findings  • Unable to determine        The medical record reflects the following:   Clinical Findings  per wound team, perineum and anterior labial noted to be red with some slough, this is a partial thickness skin loss to the (L) labia and perineum.  Resolving wounds to BLE w/scarring are noted to be dry.   Treatment  barrier past and viscopaste strip to labia to protect and provide moist healing environment.  Mediplex to sacrococcygeal area    Risk Factors  pt is admitted w/failure to thrive/severe malnutrition and newly diagnosed HIV   Location within medical record  History and Physical, Progress Notes and Lab Results      Thank you,   Keira Shields RN  Clinical   Ext [phone]

## 2017-06-03 PROBLEM — R56.9 SEIZURE (HCC): Status: ACTIVE | Noted: 2017-01-01

## 2017-06-03 NOTE — IP AVS SNAPSHOT
6/7/2017    Roger Mckeon  [address]  [place] [address]    Dear Roger:    Replaced by [facility] wants to ensure your discharge home is safe and you or your loved ones have had all of your questions answered regarding your care after you leave the hospital.    Below is a list of resources and contact information should you have any questions regarding your hospital stay, follow-up instructions, or active medical symptoms.    Questions or Concerns Regarding… Contact   Medical Questions Related to Your Discharge  (7 days a week, 8am-5pm) Contact a Nurse Care Coordinator   [phone]   Medical Questions Not Related to Your Discharge  (24 hours a day / 7 days a week)  Contact the Nurse Health Line   [phone]    Medications or Discharge Instructions Refer to your discharge packet   or contact your [facility] Primary Care Provider   [phone]   Follow-up Appointment(s) Schedule your appointment via [employer]   or contact Scheduling [phone]   Billing Review your statement via [employer]  or contact Billing [phone]   Medical Records Review your records via [employer]   or contact Medical Records [phone]     You may receive a telephone call within two days of discharge. This call is to make certain you understand your discharge instructions and have the opportunity to have any questions answered. You can also easily access your medical information, test results and upcoming appointments via the [employer] free online health management tool. You can learn more and sign up at [employer]Copy. For assistance setting up your [employer] account, please call [phone].    Once again, we want to ensure your discharge home is safe and that you have a clear understanding of any next steps in your care. If you have any questions or concerns, please do not hesitate to contact us, we are here for you. Thank you for choosing [facility] for your healthcare needs.    Sincerely,    Your [facility] Healthcare Team

## 2017-06-03 NOTE — IP AVS SNAPSHOT
[facility] Access Code: [email or web address]  Expires: 7/7/2017  8:36 AM    Your email address is not on file at [facility].  Email Addresses are required for you to sign up for [facility], please contact [phone] to verify your personal information and to provide your email address prior to attempting to register for [facility].    Roger Mckeon  [address]  [place] [address]    [facility]  A secure, online tool to manage your health information     [facility]’s [facility]® is a secure, online tool that connects you to your personalized health information from the privacy of your home -- day or night - making it very easy for you to manage your healthcare. Once the activation process is completed, you can even access your medical information using the [facility] raimundo, which is available for free in the Apple Raimundo store or Google Play store.     To learn more about [facility], visit [email or web address]    There are two levels of access available (as shown below):   My Chart Features  [facility] Primary Care Doctor [facility]  Specialists [facility]  Urgent  Care Non-[facility] Primary Care Doctor   Email your healthcare team securely and privately 24/7 X X X    Manage appointments: schedule your next appointment; view details of past/upcoming appointments X      Request prescription refills. X      View recent personal medical records, including lab and immunizations X X X X   View health record, including health history, allergies, medications X X X X   Read reports about your outpatient visits, procedures, consult and ER notes X X X X   See your discharge summary, which is a recap of your hospital and/or ER visit that includes your diagnosis, lab results, and care plan X X  X     How to register for [facility]:  Once your e-mail address has been verified, follow the following steps to sign up for [facility].     1. Go to  [email or web address]  2. Click on the Sign Up Now box, which takes you to the New Member Sign Up page. You will  need to provide the following information:  a. Enter your [employer] Access Code exactly as it appears at the top of this page. (You will not need to use this code after you’ve completed the sign-up process. If you do not sign up before the expiration date, you must request a new code.)   b. Enter your date of birth.   c. Enter your home email address.   d. Click Submit, and follow the next screen’s instructions.  3. Create a Jipiot ID. This will be your [employer] login ID and cannot be changed, so think of one that is secure and easy to remember.  4. Create a [employer] password. You can change your password at any time.  5. Enter your Password Reset Question and Answer. This can be used at a later time if you forget your password.   6. Enter your e-mail address. This allows you to receive e-mail notifications when new information is available in [employer].  7. Click Sign Up. You can now view your health information.    For assistance activating your [employer] account, call [phone]

## 2017-06-03 NOTE — ED PROVIDER NOTES
ED Provider Note    Scribed for Alexx Rai M.D. by Tamy Madera. 6/3/2017  11:14 AM    Primary care provider: Caridad Stone M.D.  Means of arrival: Ambulance  History obtained from: Patient  History limited by: None    CHIEF COMPLAINT  Chief Complaint   Patient presents with   • Seizure       HPI  Roger Mckeon is a 47 y.o. female who presents to the Emergency Department by ambulance for a seizure with an onset of today.  Patient was sitting in a chair waiting to get dialysis when she experienced a witnessed seizure. Episode lasted approximately two minutes and is described as the patient having muscle contractions.  First seizure occurred two months ago.  Patient denies a tongue laceration, head injury, chest pain, abdominal pain or vomiting.      REVIEW OF SYSTEMS  Pertinent positives include seizure.   Pertinent negatives include no tongue laceration, head injury, chest pain, abdominal pain or vomiting.    All other systems reviewed and negative.  C.      PAST MEDICAL HISTORY  Patient has a past medical history of H/O: hysterectomy; Hypertension; Renal failure; Diabetes; Dental disorder; Dialysis patient; and Seizure disorder (CMS-HCC).      SURGICAL HISTORY  Patient has past surgical history that includes hysterectomy, vaginal; other; incision and drainage general (4/21/2013); gastroscopy with biopsy (N/A, 9/14/2016); gyn surgery; av fistula creation (Left, 10/4/2016); and gastroscopy (N/A, 5/10/2017).      SOCIAL HISTORY  Social History   Substance Use Topics   • Smoking status: Never Smoker    • Smokeless tobacco: None   • Alcohol Use: No      History   Drug Use No       FAMILY HISTORY  Family History   Problem Relation Age of Onset   • Diabetes Mother    • Diabetes Father    • Hypertension Father    • Diabetes Sister        CURRENT MEDICATIONS  Home Medications     Reviewed by Carolina Graff R.N. (Registered Nurse) on 06/03/17 at 1103  Med List Status: Unable to Obtain    Medication Last Dose  "Status    alprazolam (XANAX) 0.25 MG Tab 5/8/2017 Active    azithromycin (ZITHROMAX) 250 MG Tab  Active    diphenoxylate-atropine (LOMOTIL) 2.5-0.025 MG Tab 5/8/2017 Active    fluconazole (DIFLUCAN) 200 MG Tab  Active    fluoxetine (PROZAC) 10 MG Cap 5/8/2017 Active    hydrOXYzine (ATARAX) 25 MG Tab 5/8/2017 Active    moxifloxacin (VIGAMOX) 0.5 % Solution  Active    nystatin (MYCOSTATIN) 944203 UNIT/ML Suspension 5/8/2017 Active    ondansetron (ZOFRAN) 4 MG Tab tablet Unknown Active    potassium phosphate, monobasic, (K-PHOS) 500 MG tablet 5/8/2017 Active    prednisoLONE acetate (PRED FORTE) 1 % Suspension 5/8/2017 Active    sodium bicarbonate (SODIUM BICARBONATE) 650 MG Tab 5/8/2017 Active    sulfamethoxazole-trimethoprim (BACTRIM DS) 800-160 MG tablet  Active                ALLERGIES  Allergies   Allergen Reactions   • Oxycodone Itching   • Percocet [Apap-Fd&C Red #40 Al Miller-Oxycodone] Itching       PHYSICAL EXAM  VITAL SIGNS: /88 mmHg  Pulse 88  Temp(Src) 36 °C (96.8 °F)  Resp 18  Ht 1.676 m (5' 6\")  Wt 48.081 kg (106 lb)  BMI 17.12 kg/m2  LMP 01/01/2010      Nursing note and vitals reviewed.    Constitutional: Chronically ill appearing female. No distress.  HENT: Head is normocephalic and atraumatic. Dry mucous membranes. Oropharynx is clear without exudate, thrush or erythema. Ecchymosis to right zygoma.   Eyes: Pupils are equal, round, and reactive to light. Conjunctiva are normal.   Cardiovascular: Normal rate and regular rhythm. No murmur heard. Normal radial pulses.  Pulmonary/Chest: Breath sounds normal. No wheezes or rales. Dialysis catheter to right upper chest, surrounding skin is benign.  Abdominal: Soft and non-tender. No distention    Musculoskeletal: Extremities exhibit normal range of motion without edema or tenderness.   Neurological: Awake, alert and oriented to person, place, and time. No focal deficits noted.  Skin: Skin is warm and dry. No rash.   Psychiatric: Normal mood and " affect. Appropriate for clinical situation.      DIAGNOSTIC STUDIES / PROCEDURES      LABS  Results for orders placed or performed during the hospital encounter of 06/03/17   CBC WITH DIFFERENTIAL   Result Value Ref Range    WBC 3.7 (L) 4.8 - 10.8 K/uL    RBC 2.80 (L) 4.20 - 5.40 M/uL    Hemoglobin 7.7 (L) 12.0 - 16.0 g/dL    Hematocrit 25.4 (L) 37.0 - 47.0 %    MCV 90.7 81.4 - 97.8 fL    MCH 27.5 27.0 - 33.0 pg    MCHC 30.3 (L) 33.6 - 35.0 g/dL    RDW 52.0 (H) 35.9 - 50.0 fL    Platelet Count 154 (L) 164 - 446 K/uL    MPV 10.6 9.0 - 12.9 fL    Neutrophils-Polys 86.10 (H) 44.00 - 72.00 %    Lymphocytes 6.00 (L) 22.00 - 41.00 %    Monocytes 6.30 0.00 - 13.40 %    Eosinophils 0.30 0.00 - 6.90 %    Basophils 0.50 0.00 - 1.80 %    Immature Granulocytes 0.80 0.00 - 0.90 %    Nucleated RBC 0.00 /100 WBC    Neutrophils (Absolute) 3.17 2.00 - 7.15 K/uL    Lymphs (Absolute) 0.22 (L) 1.00 - 4.80 K/uL    Monos (Absolute) 0.23 0.00 - 0.85 K/uL    Eos (Absolute) 0.01 0.00 - 0.51 K/uL    Baso (Absolute) 0.02 0.00 - 0.12 K/uL    Immature Granulocytes (abs) 0.03 0.00 - 0.11 K/uL    NRBC (Absolute) 0.00 K/uL   COMP METABOLIC PANEL   Result Value Ref Range    Sodium 132 (L) 135 - 145 mmol/L    Potassium 3.9 3.6 - 5.5 mmol/L    Chloride 97 96 - 112 mmol/L    Co2 30 20 - 33 mmol/L    Anion Gap 5.0 0.0 - 11.9    Glucose 73 65 - 99 mg/dL    Bun 26 (H) 8 - 22 mg/dL    Creatinine 2.46 (H) 0.50 - 1.40 mg/dL    Calcium 7.4 (L) 8.5 - 10.5 mg/dL    AST(SGOT) 15 12 - 45 U/L    ALT(SGPT) 5 2 - 50 U/L    Alkaline Phosphatase 104 (H) 30 - 99 U/L    Total Bilirubin 0.3 0.1 - 1.5 mg/dL    Albumin 2.2 (L) 3.2 - 4.9 g/dL    Total Protein 6.1 6.0 - 8.2 g/dL    Globulin 3.9 (H) 1.9 - 3.5 g/dL    A-G Ratio 0.6 g/dL   ESTIMATED GFR   Result Value Ref Range    GFR If African American 25 (A) >60 mL/min/1.73 m 2    GFR If Non African American 21 (A) >60 mL/min/1.73 m 2   EKG (ER)   Result Value Ref Range    Report       [facility]  Emergency Dept.    Test Date:  2017  Pt Name:    KIM GALLARDO              Department: ER  MRN:        [id number]                      Room:       RD 12  Gender:     F                            Technician: [id number]  :        1970                   Requested By:ORESTES GTZ  Order #:    [id number]                    Reading MD:    Measurements  Intervals                                Axis  Rate:       83                           P:          78  HI:         176                          QRS:        86  QRSD:       86                           T:          55  QT:         420  QTc:        494    Interpretive Statements  SINUS RHYTHM  BORDERLINE LOW VOLTAGE IN FRONTAL LEADS  BORDERLINE PROLONGED QT INTERVAL  Compared to ECG 2017 11:12:18  Right-axis deviation no longer present          All labs reviewed by me.      RADIOLOGY  CT-HEAD W/O   Final Result      1.  Cerebral atrophy.      2.  White matter lucencies most consistent with small vessel ischemic change versus demyelination or gliosis.      3.  Otherwise, Head CT without contrast with no acute findings. No evidence of acute cerebral infarction, hemorrhage or mass lesion.        The radiologist's interpretation of all radiological studies have been reviewed by me.      COURSE & MEDICAL DECISION MAKING  Pertinent Labs & Imaging studies reviewed. (See chart for details)    Differential Diagnosis include but are not limited to:  Electrolyte Abnormality, intracranial hemorrhage, CNS lymphoma, medication side effect    11:15 AM Reviewed patient's electronic medical record which indicates an admission in 2017.  New diagnosis of HIV in 2017. Last CD4 count was 6.       11:20 AM Patient seen and examined at bedside. Patient presents for a seizure.      Laboratory studies are consistent with in stage renal disease. CT scan does not reveal any evidence of intracranial hemorrhage.    Initial orders in the Emergency Department included EKG, CT head  and laboratory testing: CMP, estimated GFR and CBC with differential.  Patient verbalized their understanding and agreement to this plan.    12:53 PM Spoke with Dr. Oconnell, Hospitalist, regarding the patient's history, recent seizures and CT results.  He will consult and admit the patient for further evaluation and care.      DISPOSITION  Patient will be admitted to Dr. Oconnell, Hospitalist, in stable condition.      DIAGNOSIS  1. Seizure (CMS-[facility])    2. HIV wasting syndrome (CMS-[facility])    3. ESRD (end stage renal disease) (CMS-[facility])           The note accurately reflects work and decisions made by me.  Alexx Rai  6/3/2017  1:07 PM     Tamy URENA (Scribe), am scribing for, and in the presence of, Alexx Rai M.D.    Electronically signed by: Tamy Madera ([place]), 6/3/2017    Alexx URENA M.D. personally performed the services described in this documentation, as scribed by Tamy Madera in my presence, and it is both accurate and complete.

## 2017-06-03 NOTE — ED NOTES
Pt BIB EMS from dialysis for seizure lasting 2 minutes per staff at dialysis.  Pt was sitting in a chair awaiting to have dialysis started when seizure occurred. Pt denies injuries. Spouse reports had 2 seizures last month. Pt AAO x 4

## 2017-06-03 NOTE — IP AVS SNAPSHOT
" Home Care Instructions                                                                                                                  Name:Roger Mckeon  Medical Record Number:[id number]  CSN: [id number]    YOB: 1970   Age: 47 y.o.  Sex: female  HT:1.676 m (5' 6\") WT: 49.5 kg (109 lb 2 oz)          Admit Date: 6/3/2017     Discharge Date:   Today's Date: 6/7/2017  Attending Doctor:  Amy Linares M.D.                  Allergies:  Oxycodone            Discharge Instructions       [place] [facility] [place] [address]   Activity: As tolerated.   Diet: renal   Followup:   -[facility] Dr Morgan- today for HIV treatment medications   -Nephrology for HD as scheduled   Instructions:   -Given instructions to return to the ER if any new or worsening symptoms, worsening condition, or failure to improve   -Call PCP for followup   -No smoking, no alcohol, no caffeine   -Encourage risk factor reduction with tobacco and alcohol abstinence, diet changes, weight loss, and exercise.    Discharge Instructions    Discharged to home by car with relative. Discharged via wheelchair, hospital escort: Yes.  Special equipment needed: Not Applicable    Be sure to schedule a follow-up appointment with your primary care doctor or any specialists as instructed.     Discharge Plan:   Influenza Vaccine Indication: Not indicated: Previously immunized this influenza season and > 8 years of age    I understand that a diet low in cholesterol, fat, and sodium is recommended for good health. Unless I have been given specific instructions below for another diet, I accept this instruction as my diet prescription.   Other diet: Regular    Special Instructions: None    · Is patient discharged on Warfarin / Coumadin?   No     · Is patient Post Blood Transfusion?  No    Depression / Suicide Risk    As you are discharged from this [facility] facility, it is important to learn how to keep safe " from harming yourself.    Recognize the warning signs:  · Abrupt changes in personality, positive or negative- including increase in energy   · Giving away possessions  · Change in eating patterns- significant weight changes-  positive or negative  · Change in sleeping patterns- unable to sleep or sleeping all the time   · Unwillingness or inability to communicate  · Depression  · Unusual sadness, discouragement and loneliness  · Talk of wanting to die  · Neglect of personal appearance   · Rebelliousness- reckless behavior  · Withdrawal from people/activities they love  · Confusion- inability to concentrate     If you or a loved one observes any of these behaviors or has concerns about self-harm, here's what you can do:  · Talk about it- your feelings and reasons for harming yourself  · Remove any means that you might use to hurt yourself (examples: pills, rope, extension cords, firearm)  · Get professional help from the community (Mental Health, Substance Abuse, psychological counseling)  · Do not be alone:Call your Safe Contact- someone whom you trust who will be there for you.  · Call your local CRISIS HOTLINE 069-1330 or 655-069-4001  · Call your local Children's Mobile Crisis Response Team [place] (403) 815-7167 or www.[employer]  · Call the toll free National Suicide Prevention Hotlines   · National Suicide Prevention Lifeline 363-255-OXDO (1939)  · National Hope Line Network 800-SUICIDE (449-8208)        Follow-up Information     1. Follow up with [facility].    Why:  Dr. Morgan for HIV follow ups    Contact information    [address] [place] [id number] [phone]        2. Go to Nephrology.    Why:  As scheduled for HD         Discharge Medication Instructions:    Below are the medications your physician expects you to take upon discharge:    Review all your home medications and newly ordered medications with your doctor and/or pharmacist. Follow medication instructions as  directed by your doctor and/or pharmacist.    Please keep your medication list with you and share with your physician.               Medication List      START taking these medications        Instructions    Morning Afternoon Evening Bedtime    ondansetron 4 MG Tbdp   Last time this was given:  4 mg on 6/6/2017  2:23 AM   Commonly known as:  ZOFRAN ODT        Take 1 Tab by mouth every four hours as needed for Nausea/Vomiting (give PO if no IV route available).   Dose:  4 mg                        sulfamethoxazole-trimethoprim 400-80 MG Tabs   Last time this was given:  1 Tab on 6/6/2017  5:53 PM   Commonly known as:  BACTRIM   Replaces:  sulfamethoxazole-trimethoprim 800-160 MG tablet        Take 1 Tab by mouth every day.   Dose:  1 Tab                          CONTINUE taking these medications        Instructions    Morning Afternoon Evening Bedtime    acyclovir 800 MG Tabs   Last time this was given:  800 mg on 6/6/2017  1:28 PM   Commonly known as:  ZOVIRAX        Take 800 mg by mouth every day. Pt started a 7 day course on 6/2   Dose:  800 mg                        azithromycin 250 MG Tabs   Last time this was given:  500 mg on 6/6/2017  1:27 PM   Commonly known as:  ZITHROMAX        Take 250 mg by mouth every day. Pt started a 10 day course on 5/14   Dose:  250 mg                        diphenoxylate-atropine 2.5-0.025 MG Tabs   Commonly known as:  LOMOTIL        Take 2 Tabs by mouth 4 times a day as needed for Diarrhea. Indications: Diarrhea   Dose:  2 Tab                        ethambutol 400 MG Tabs   Last time this was given:  800 mg on 6/6/2017  1:28 PM   Commonly known as:  MYAMBUTOL        Take 800 mg by mouth every day. Pt started a 14 day course on 6/2   Dose:  800 mg                        fluconazole 200 MG Tabs   Last time this was given:  200 mg on 6/6/2017  5:15 PM   Commonly known as:  DIFLUCAN        Take 200 mg by mouth every day. Pt started a 10 day course on 5/14   Dose:  200 mg                          fluoxetine 10 MG Caps   Last time this was given:  10 mg on 6/6/2017  1:28 PM   Commonly known as:  PROZAC        Take 10 mg by mouth every day. Indications: Depression   Dose:  10 mg                        hydrOXYzine 25 MG Tabs   Commonly known as:  ATARAX        Take 25 mg by mouth 4 times a day.   Dose:  25 mg                        K-PHOS 500 MG tablet   Generic drug:  potassium phosphate (monobasic)        Take 1 Tab by mouth every day. Indications: Supplement   Dose:  1 Tab                        moxifloxacin 0.5 % Soln   Last time this was given:  1 Drop on 6/7/2017  5:33 AM   Commonly known as:  VIGAMOX        Place 1 Drop in left eye 4 times a day. Indications: EYE   Dose:  1 Drop                        Non Formulary Request        Take 25 mg by mouth every day. Rilpivirine- take with food   Dose:  25 mg                        nystatin 196646 UNIT/ML Susp   Commonly known as:  MYCOSTATIN        Take 500,000 Units by mouth 4 times a day. Indications: Candidiasis Fungal Infection of the Oropharynx   Dose:  465869 Units                        prednisoLONE acetate 1 % Susp   Last time this was given:  1 Drop on 6/7/2017  5:33 AM   Commonly known as:  PRED FORTE        Place 1 Drop in left eye 4 times a day. Indications: post eye surgery   Dose:  1 Drop                        sodium bicarbonate 650 MG Tabs   Last time this was given:  650 mg on 6/6/2017 10:05 PM   Commonly known as:  SODIUM BICARBONATE        Take 1 Tab by mouth 3 times a day.   Dose:  650 mg                        TIVICAY 50 MG Tabs tablet   Last time this was given:  50 mg on 6/6/2017  1:28 PM   Generic drug:  dolutegravir        Take 50 mg by mouth every day.   Dose:  50 mg                        XANAX 0.25 MG Tabs   Last time this was given:  0.25 mg on 6/4/2017 10:08 AM   Generic drug:  alprazolam        Take 0.25 mg by mouth 3 times a day as needed for Anxiety. Indications: Feeling Anxious   Dose:  0.25 mg                           STOP taking these medications     sulfamethoxazole-trimethoprim 800-160 MG tablet   Commonly known as:  BACTRIM DS   Replaced by:  sulfamethoxazole-trimethoprim 400-80 MG Tabs                    Where to Get Your Medications      These medications were sent to [facility] [place] [address]  [address] [place] [address]     Phone:  [phone]    - ondansetron 4 MG Tbdp  - sulfamethoxazole-trimethoprim 400-80 MG Tabs            Instructions           Diet / Nutrition:    Follow any diet instructions given to you by your doctor or the dietician, including how much salt (sodium) you are allowed each day.    If you are overweight, talk to your doctor about a weight reduction plan.    Activity:    Remain physically active following your doctor's instructions about exercise and activity.    Rest often.     Any time you become even a little tired or short of breath, SIT DOWN and rest.    Worsening Symptoms:    Report any of the following signs and symptoms to the doctor's office immediately:    *Pain of jaw, arm, or neck  *Chest pain not relieved by medication                               *Dizziness or loss of consciousness  *Difficulty breathing even when at rest   *More tired than usual                                       *Bleeding drainage or swelling of surgical site  *Swelling of feet, ankles, legs or stomach                 *Fever (>100ºF)  *Pink or blood tinged sputum  *Weight gain (3lbs/day or 5lbs /week)           *Shock from internal defibrillator (if applicable)  *Palpitations or irregular heartbeats                *Cool and/or numb extremities    Stroke Awareness    Common Risk Factors for Stroke include:    Age  Atrial Fibrillation  Carotid Artery Stenosis  Diabetes Mellitus  Excessive alcohol consumption  High blood pressure  Overweight   Physical inactivity  Smoking    Warning signs and symptoms of a stroke include:    *Sudden numbness or weakness of the face,  arm or leg (especially on one side of the body).  *Sudden confusion, trouble speaking or understanding.  *Sudden trouble seeing in one or both eyes.  *Sudden trouble walking, dizziness, loss of balance or coordination.Sudden severe headache with no known cause.    It is very important to get treatment quickly when a stroke occurs. If you experience any of the above warning signs, call 911 immediately.                   Disclaimer         Quit Smoking / Tobacco Use:    I understand the use of any tobacco products increases my chance of suffering from future heart disease or stroke and could cause other illnesses which may shorten my life. Quitting the use of tobacco products is the single most important thing I can do to improve my health. For further information on smoking / tobacco cessation call a Toll Free Quit Line at 1-305.890.8129 (*National Cancer [place]) or 1-677.616.8873 (American Lung Association) or you can access the web based program at www.lungIdentityForge.org.    [place] Tobacco Users Help Line:  (898) 874-7667       Toll Free: 1-787.839.2269  Quit Tobacco Program [facility] (215)320-0938    Crisis Hotline:    [place] Crisis Hotline:  3-088-XNSEBVS or 1-317.205.9909    [place] Crisis Hotline:    1-453.133.3136 or 036-275-4218    Discharge Survey:   Thank you for choosing [facility]. We hope we did everything we could to make your hospital stay a pleasant one. You may be receiving a phone survey and we would appreciate your time and participation in answering the questions. Your input is very valuable to us in our efforts to improve our service to our patients and their families.        My signature on this form indicates that:    1. I have reviewed and understand the above information.  2. My questions regarding this information have been answered to my satisfaction.  3. I have formulated a plan with my discharge nurse to obtain my prescribed medications for home.                     Disclaimer         __________________________________                     __________       ________                       Patient Signature                                                 Date                    Time

## 2017-06-03 NOTE — ED NOTES
Med rec complete per Pt at bedside  Allergies reviewed  Pt completed a 10 day course of Fluconazole and Azithromycin on 5/23  Pt completed a 14 day course of Bactrim on 5/27  Pt started a 7 day course of Acyclovir on 6/2  Pt started a 14 day course of Ethambutol on 6/2

## 2017-06-04 PROBLEM — A60.00 GENITAL HERPES: Status: ACTIVE | Noted: 2017-01-01

## 2017-06-04 PROBLEM — B20 HIV (HUMAN IMMUNODEFICIENCY VIRUS INFECTION) (HCC): Status: ACTIVE | Noted: 2017-01-01

## 2017-06-04 PROBLEM — A31.9 MYCOBACTERIAL INFECTION: Status: ACTIVE | Noted: 2017-01-01

## 2017-06-04 PROBLEM — B37.81 ESOPHAGEAL CANDIDIASIS (HCC): Status: ACTIVE | Noted: 2017-01-01

## 2017-06-04 PROBLEM — R78.81 BACTEREMIA: Status: ACTIVE | Noted: 2017-01-01

## 2017-06-04 NOTE — ASSESSMENT & PLAN NOTE
CD4 was less than 10  Cont dolutegravir and rilpivirine per ID - await family to bring in rilpivirine as we do not carry this on formulary  Cont bactrim for PCP prophylaxis  Very high risk for OI and already has mac, hsv, fungal candidiasis  Poor prognosis- ID recommends hospice care   Meds given per [facility] clinic- pt is set to rec OP meds

## 2017-06-04 NOTE — ASSESSMENT & PLAN NOTE
afb on last blood cx thought to be MAC per ID, no pulmonary symptoms  abx per ID- cont azith and ethambutol

## 2017-06-04 NOTE — PROGRESS NOTES
Patient received into S187-2.   Oriented patient to room and use of call light. Verbalizes understanding.  Patient on airborne precautions.  Patient denying pain, n/v and numbness/tingling at this time.  HORN without difficulty.   Patient incontinent of stool X1. Complete linen change done.  Wounds to patient vaginal area as well as buttocks noted upon assessment. Pictures uploaded to patient's chart. Wounds very sensitive especially when wiping after voiding/BM.  Guaze and tape present to Dialysis permacath. Dressing changed and appropriate dressing placed.   Fistula on left arm also noted. Bruit and thrill both present.  All needs addressed at this time. Call light and personal belongings within reach, bed in lowest position, bed alarm on, room near nurses station and hourly rounding in place.

## 2017-06-04 NOTE — CONSULTS
DATE OF SERVICE:  06/03/2017    REQUESTING PHYSICIAN:  Marcelino Oconnell MD    REASON FOR CONSULTATION:  Seizure.    HISTORY OF PRESENT ILLNESS:  A 47-year-old female with end-stage renal disease   on dialysis Tuesday, Thursday and Saturday as well as HIV who had a seizure   prior to going to dialysis.  She subsequently was brought in to the ER and was   postictal.  She states that she started having seizures about 2 months ago.    Prior to that time, no episodes of seizure and this is being worked up.  The   patient missed her scheduled dialysis, but her basic metabolic panel appears   to be stable. She dialyzes at [facility], TTS schedule.    PAST MEDICAL HISTORY:  End-stage renal disease, HIV, hypertension,   candidiasis, pulmonary nodule, gastroparesis.    SOCIAL HISTORY:  No tobacco or alcohol use.    FAMILY HISTORY:  Noncontributory.    REVIEW OF SYSTEMS:  As per HPI, otherwise negative.    PHYSICAL EXAMINATION:  VITAL SIGNS:  Blood pressure 157/88, heart rate 83, respirations 16,   temperature 36.4.  GENERAL:  Lying in bed, appears fatigued.  EYES:  _____ sclerae are anicteric.  ENT:  Mucous membranes are dry.  NECK:  No elevation in jugular venous pulsation.  CARDIOVASCULAR:  Regular.  ABDOMEN:  Soft.  EXTREMITIES:  No lower extremity edema.  SKIN:  No rashes or nonhealing lesions.    LABORATORY STUDIES:  White blood cell count of 3.7, hemoglobin 7.7, platelet   count 154.  Sodium 132, potassium 3.9, chloride 97, bicarbonate 30, BUN 26,       ASSESSMENT:  1.  End-stage renal disease.  We will monitor the patient closely.  No acute   indication for dialysis currently.  If electrolytes indicate, we will dialyze   the patient or put her back on her usual schedule.  2.  Hyponatremia, unclear etiology, but recent seizure.  We will monitor.  No   fluid restriction at this point.  3.  Anemia, likely multifactorial.  We will reinstate Epogen when we will   reinstate dialysis.  4.  Seizure.  Workup per primary  team.    Thank you for asking us to see this patient.  We will follow closely with you.       ____________________________________     MD SABRINA PHELAN / JUAN    DD:  06/03/2017 20:57:25  DT:  06/03/2017 21:16:55    D#:  [id number]  Job#:  [id number]

## 2017-06-04 NOTE — ASSESSMENT & PLAN NOTE
Has seizure hx past. Mri normal. No mengitis sx. High risk for this. No LP per ID recs. Continue meds. Consider neuro.

## 2017-06-04 NOTE — PROGRESS NOTES
Seen at request of Dr. Sterling  Full note dictated  Newly diagnosed AIDS   dialysis patient   CD4 - 2, HIV RNA pending  Asess:  AIDS wasting   Likely MAC bacteremia  HSV perineal ulcerations  Esophagitis   Seizures- negative CT scan and non--focal exam   Renal failure  REC:   Continue dolutegravir/rilpivirine for HIV therapy  Bactrim DS for PCP prophylaxis  Fluconazole for esophagitis/crypto prophylaxis  Azithro/ethambutol for presumptive MAC  Pain control  keppra for seizures   If patient's family brings in rilpivirine (Edurant ) 25 mg tab, please add to her medication list.I could not figure how to add a non-formulary med to her regimen. Thanks

## 2017-06-04 NOTE — PROGRESS NOTES
[facility] Hospital[facility] Progress [facility]    Date of Service: 2017    Chief Complaint  47 y.o. female admitted 6/3/2017 with seizures. Hx of severe HIV and esrd on dialysis. Recent MAC bacteremia.     Interval Problem Update  No seizures over night. Discussed with ID and dr jimenez believes she looks a little better from friday when he saw her. Severe anal/perineal pain.     Consultants/Specialty  ID- Cathy    Disposition  Guarded.   We spent 21min discussing code status today. She wishes to be a DNR.   Guarded prognosis. May need palliative care consult.     MRI:  1.  No evidence of acute territorial infarct, intracranial hemorrhage or mass lesion.  2.  Mild diffuse cerebral and cerebellar substance loss.  3.  Mild microangiopathic ischemic change versus demyelination or gliosis.      Review of Systems   Constitutional: Negative for fever and chills.   HENT: Negative for sore throat.    Eyes: Negative for blurred vision and pain.   Respiratory: Negative for cough and shortness of breath.    Cardiovascular: Negative for chest pain and palpitations.   Gastrointestinal: Negative for nausea, vomiting and abdominal pain.   Genitourinary: Negative for dysuria and urgency.   Musculoskeletal: Negative for back pain and neck pain.   Skin: Negative for itching and rash.   Neurological: Negative for dizziness, tingling and headaches.   Psychiatric/Behavioral: Negative for depression. The patient does not have insomnia.    All other systems reviewed and are negative.     Physical Exam  Laboratory/Imaging   Hemodynamics  Temp (24hrs), Av.1 °C (97 °F), Min:35.7 °C (96.3 °F), Max:36.4 °C (97.6 °F)   Temperature: (!) 35.7 °C (96.3 °F)  Pulse  Av.1  Min: 79  Max: 102 Heart Rate (Monitored): 83  Blood Pressure: 152/94 mmHg, NIBP: 151/87 mmHg      Respiratory      Respiration: 16, Pulse Oximetry: 100 %        RUL Breath Sounds: Diminished, RML Breath Sounds: Diminished, RLL Breath Sounds: Diminished, ZEN Breath Sounds:  Diminished, LLL Breath Sounds: Diminished    Fluids  No intake or output data in the 24 hours ending 06/04/17 1244    Nutrition  Orders Placed This Encounter   Procedures   • Diet Order     Standing Status: Standing      Number of Occurrences: 1      Standing Expiration Date:      Order Specific Question:  Diet:     Answer:  Regular [1]     Physical Exam   Constitutional: She is oriented to person, place, and time. She appears well-developed and well-nourished. No distress.   Thin and ill appearing.    HENT:   Right Ear: External ear normal.   Left Ear: External ear normal.   Nose: Nose normal.   Eyes: Conjunctivae are normal. Right eye exhibits no discharge. Left eye exhibits no discharge.   Neck: No JVD present.   Cardiovascular: Regular rhythm and normal heart sounds.    No murmur heard.  Pulmonary/Chest: Effort normal and breath sounds normal. No stridor. No respiratory distress. She has no rales.   Abdominal: Soft. Bowel sounds are normal. She exhibits no distension. There is no tenderness.   Genitourinary:   Perineal blisters.    Musculoskeletal: She exhibits no edema or tenderness.   Neurological: She is alert and oriented to person, place, and time.   Skin: Skin is warm and dry. She is not diaphoretic. No erythema.   Psychiatric: She has a normal mood and affect. Her behavior is normal.   Nursing note and vitals reviewed.      Recent Labs      06/03/17   1157  06/04/17   0352   WBC  3.7*  3.8*   RBC  2.80*  3.15*   HEMOGLOBIN  7.7*  8.5*   HEMATOCRIT  25.4*  27.5*   MCV  90.7  87.3   MCH  27.5  27.0   MCHC  30.3*  30.9*   RDW  52.0*  50.4*   PLATELETCT  154*  144*   MPV  10.6  10.0     Recent Labs      06/03/17   1157  06/04/17   0356   SODIUM  132*  136   POTASSIUM  3.9  4.1   CHLORIDE  97  100   CO2  30  27   GLUCOSE  73  59*   BUN  26*  29*   CREATININE  2.46*  2.43*   CALCIUM  7.4*  7.4*                      Assessment/Plan     ESRD (end stage renal disease) (CMS-[facility]) (present on admission)  Assessment &  Plan  Dialysis per nephrology    Acute renal failure (CMS-HCC) (present on admission)  Assessment & Plan  Dialysis per nephrology.     Seizure (CMS-HCC)  Assessment & Plan  Has seizure hx past. Mri normal. No mengitis sx. High risk for this. No LP per ID recs. Continue meds. Consider neuro.     HIV (human immunodeficiency virus infection) (CMS-HCC)  Assessment & Plan  cd4 was less than 10. Very high risk for OI and already has mac, hsv, fungal candidiasis. Poor prognosis. meds given to patient at last id visit and family will bring them in.     Esophageal candidiasis (CMS-HCC)  Assessment & Plan  Diflucan. Iv.     Bacteremia  Assessment & Plan  afb on last blood cx thought to be MAC per ID> will remove isolation. No pulmonary sx. abx per ID    Mycobacterial infection  Assessment & Plan  Blood cx x1 positive recently. abx per ID      Genital herpes  Assessment & Plan  P;ain control. Acyclovir.       Core Measures

## 2017-06-04 NOTE — PROGRESS NOTES
Hospital Medicine Progress Note, Adult, Complex               Author: Manuel Deal Date & Time created: 2017  2:17 PM     Interval History:  46 year old with TTS schedule, came in with possible seizure.  Holding HD, no acute indication, no SOB. Noted seen by ID.   She is currently DNR.    Review of Systems:  Review of Systems   Constitutional: Negative for fever and chills.       Physical Exam:  Physical Exam   Constitutional: She is oriented to person, place, and time. She appears well-developed and well-nourished.   Cardiovascular: Normal rate and regular rhythm.    Pulmonary/Chest: Effort normal and breath sounds normal.   Musculoskeletal: She exhibits no edema or tenderness.   Neurological: She is alert and oriented to person, place, and time.       Labs:        Invalid input(s): [id number]      Recent Labs      17   0356   SODIUM  132*  136   POTASSIUM  3.9  4.1   CHLORIDE  97  100   CO2  30  27   BUN  26*  29*   CREATININE  2.46*  2.43*   CALCIUM  7.4*  7.4*     Recent Labs      17   0356   ALTSGPT  5  6   ASTSGOT  15  16   ALKPHOSPHAT  104*  104*   TBILIRUBIN  0.3  0.3   GLUCOSE  73  59*     Recent Labs      17   0352   RBC  2.80*  3.15*   HEMOGLOBIN  7.7*  8.5*   HEMATOCRIT  25.4*  27.5*   PLATELETCT  154*  144*     Recent Labs      17   0352  17   0356   WBC  3.7*  3.8*   --    NEUTSPOLYS  86.10*   --    --    LYMPHOCYTES  6.00*   --    --    MONOCYTES  6.30   --    --    EOSINOPHILS  0.30   --    --    BASOPHILS  0.50   --    --    ASTSGOT  15   --   16   ALTSGPT  5   --   6   ALKPHOSPHAT  104*   --   104*   TBILIRUBIN  0.3   --   0.3           Hemodynamics:  Temp (24hrs), Av.1 °C (97 °F), Min:35.7 °C (96.3 °F), Max:36.4 °C (97.6 °F)  Temperature: (!) 35.7 °C (96.3 °F)  Pulse  Av.1  Min: 79  Max: 102Heart Rate (Monitored): 83  Blood Pressure: 152/94 mmHg, NIBP: 151/87 mmHg      Respiratory:    Respiration: 16, Pulse Oximetry: 100 %        RUL Breath Sounds: Diminished, RML Breath Sounds: Diminished, RLL Breath Sounds: Diminished, ZEN Breath Sounds: Diminished, LLL Breath Sounds: Diminished  Fluids:  No intake or output data in the 24 hours ending 06/04/17 1417  Weight: 49.5 kg (109 lb 2 oz)  GI/Nutrition:  Orders Placed This Encounter   Procedures   • Diet Order     Standing Status: Standing      Number of Occurrences: 1      Standing Expiration Date:      Order Specific Question:  Diet:     Answer:  Regular [1]     Medical Decision Making, by Problem:  Active Hospital Problems    Diagnosis   • ESRD (end stage renal disease) (CMS-HCC) [N18.6]   • Candidiasis of esophagus (CMS-[facility]) [B37.81]   • Seizure (CMS-[facility]) [R56.9]   • Severe protein-calorie malnutrition (CMS-HCC) [E43]   • HTN (hypertension), benign [I10]   • Hypoglycemia [E16.2]     1. ESRD - No acute need for HD, next HD Monday if needed  2. Sz like episode   3. HIV infection    -Will follow      Core Measures

## 2017-06-04 NOTE — H&P
PRIMARY CARE PHYSICIAN:  Dr. Stone.    CHIEF COMPLAINT:  Seizure.    HISTORY OF PRESENT ILLNESS:  The patient is a 47-year-old female just recently   diagnosed with HIV infection, came to the ER with above.  Patient has a   history of end-stage renal disease, on hemodialysis, Tuesday, Thursday,   Saturday.  Today while at the dialysis, she developed seizure episode,   witnessed by a family member.  There was saying that she was having   tonic-clonic seizure episode and lasted for about 5 minutes.  After that,   patient was having postictal confusion as well.  Per family member, patient   has a total of 4 seizure episodes in the past 1-2 months' time.  So, they   decided to bring her here for further checkup.  Apart from that, the patient   denied any chest pain or palpitation.  Patient has HIV wasting syndrome and   she is a cachectic-looking female.  In the ER, she has a CT scan of the head   done without any significant finding.  I expect her hospitalization will be   more than 2 midnights.    REVIEW OF SYSTEMS:  All other systems were reviewed and negative, the rest per   HPI.    ALLERGY HISTORY:  ALLERGIC TO OXYCODONE.    PAST MEDICAL HISTORY:  End-stage renal disease, HIV infection, essential   hypertension, esophageal candidiasis, pulmonary nodule, and gastroparesis.    PAST SURGICAL HISTORY:  Hysterectomy, low back surgery, and recent eye   surgery.    SOCIAL HISTORY:  She denies smoking, alcohol drinking, or illicit drug abuse.    FAMILY HISTORY:  No pertinent family history.    OUTPATIENT MEDICATIONS:  Tivicay 50 mg daily, ethambutol 800 mg daily,   acyclovir 800 mg daily, azithromycin 250 mg daily, fluconazole 200 mg daily,   Prozac 10 mg daily, sodium bicarbonate 650 mg t.i.d.    PHYSICAL EXAMINATION:  VITAL SIGNS:  Temperature 96.8, pulse rate 82, respiratory rate 15, blood   pressure 156/79, satting 99% on room air.  GENERAL:  Patient is alert, but cachectic-looking.  HEENT:  Normocephalic and  atraumatic.  NECK:  Supple.  No neck gland enlargement.  CARDIOVASCULAR:  Normal first and second sounds.  No murmur.  RESPIRATORY:  Normal respiratory effort.  No wheezing.  ABDOMEN:  Soft, bowel sounds present, nontender.  CENTRAL NERVOUS SYSTEM:  Cranial nerves II-XII are intact.  SKIN:  Warm and moist.  EXTREMITIES:  No edema.    LABORATORY DATA:  WBC 3.7, hemoglobin _____, platelet 154.  Sodium 132, BUN   26, creatinine 2.46.    IMAGING:  CT scan of the head is negative.    ASSESSMENT AND PLAN:  1.  Seizure-like activity, happened 4 times over the past 2 months.  Patient   has underlying human immunodeficiency virus infection and patient is on   prophylaxis antibiotics including Bactrim, fluconazole, azithromycin, and   acyclovir.  It could be related to her human immunodeficiency virus related   seizure or infectious related seizure or medication? Vs CMV?.  I will get MRI of the brain as well as   EEG for her seizure.  Patient will be put on seizure precaution, aspiration   precaution fall precaution.  Infectious disease has been consulted as well.   Per pharmacy: bactrim may cause her seizure especially with ESRD. Will leave it for   ID and pharmacy for dosing.  2.  End-stage renal disease, on hemodialysis.  The patient did not finish   hemodialysis today.  Nephrology has been consulted.  3.  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    Patient was seen by Dr. Cain during last admission and planned to start her   on antiretroviral therapy soon.  4.  Normocytic anemia from chronic kidney disease, stable at baseline.  5.  Deep venous thrombosis prophylaxis, heparin.  6.  Patient is a full code.       ____________________________________     MD ARIS TO / JUAN    DD:  06/03/2017 13:51:41  DT:  06/03/2017 17:05:33    D#:  [id number]  Job#:  [id number]

## 2017-06-04 NOTE — CARE PLAN
Problem: Infection  Goal: Will remain free from infection  Outcome: PROGRESSING AS EXPECTED    Problem: Bowel/Gastric:  Goal: Normal bowel function is maintained or improved  Outcome: PROGRESSING AS EXPECTED

## 2017-06-04 NOTE — CARE PLAN
Problem: Safety  Goal: Free from accidental injury  Outcome: PROGRESSING AS EXPECTED  Safety education provided; bed in lowest position, bed alarm on, room near nurses station and hourly rounding in place    Problem: Skin Integrity  Goal: Skin Integrity is maintained or improved  Outcome: PROGRESSING SLOWER THAN EXPECTED  Wound noted on buttocks and vaginal areas; areas very sensitive; barrier paste applied.

## 2017-06-04 NOTE — CONSULTS
DATE OF SERVICE:  06/04/2017    DATE OF CONSULTATION:  06/04/2017    REQUESTING PHYSICIAN:   Eugenio Sterling MD    IDENTIFICATION:  This is a 47-year-old newly diagnosed AIDS patient seen for   evaluation.    HISTORY OF PRESENT ILLNESS:  This is a 47-year-old female who was diagnosed   about 3 weeks ago with AIDS wasting and thrush/ esophageal  candidiasis.  CD4 count was   2.  She was referred to the [facility] on Friday, at which time she came in   complaining of severe perineal pain and wasting, her   physical exam showed a very wasted female who was crying in pain.  Physical   exam at that time showed resolved thrush, clear lungs, and her perineal   area had multiple very painful ulcerations consistent with a herpes infection.   She was started on HIV medicines.  She had previously been   started about 2 weeks earlier on Bactrim and fluconazole.  Those were   continued.  Because she was found to have a positive acid fast organism in her   blood with a negative chest x-ray and a negative QuantiFERON, she was started   on empiric treatment for Mycobacterium avium and she was placed on pain meds.    Apparently, she has had a history of seizures in the past few months and was brought in   by her family after seizures.  This seizure resolved and is seen in   followup.  She continues to complain of perineal pain, but she is alert and   able to answer appropriately.    PAST MEDICAL HISTORY:  End-stage renal disease, not clear the cause of this   and gastroparesis.    SOCIAL HISTORY:  She has 5 children at home.    FAMILY HISTORY:  Noncontributory.    MEDICATIONS:  These were started yesterday.  They include dolutegravir 50 mg a   day, rilpivirine 25 mg a day, acyclovir 800 mg daily-renal adjusting,   ethambutol 800 mg a day, azithromycin 500 mg a day.  I think she was already   on fluconazole 200 mg a day and Bactrim on a renal dosing and I am not clear   whether or not the Prozac was also started at that  time.    ALLERGIES:  PATIENT IS ALLERGIC TO OXYCODONE.  No known antibiotic allergies.    REVIEW OF SYSTEMS:  Presently, she is able to eat.  Denies any headache,   denies confusion.  She is breathing comfortably, but does complain of her   perineal area pain.    PHYSICAL EXAMINATION:  GENERAL:  She is a markedly wasted-appearing female, weight approximately 70   Pounds.She is fully alert and oriented  HEENT:  She has no oral thrush.  NECK:  Supple.  LUNGS:  Clear.  I do not appreciate any cardiac murmur.  BELLY:  She has no liver or spleen enlargement.  PERINEAL:  Exam on Friday showed multiple ulcerations and swelling.  EXTREMITIES:  Showed multiple scars and wasted.    LABORATORY DATA:  Her CD4 count was 2 with a viral load and a phenotype   pending, previously ordered.  White count was 3.8.  Absolute lymphocyte count   of 220.  Renal function shows a creatinine 2.43 on dialysis.  Blood sugar 59.    Transaminases normal, albumin 2.2.  Her toxo titer was negative.  RPR was   negative.  She had a positive HIV test, negative hepatitis B and C.  She had a   CAT scan done yesterday for head without contrast showing cerebral atrophy   and possibly ischemic changes.  Chest x-ray was clear.  She had a QuantiFERON   test done approximately 3 weeks ago, which was negative for CMV, IgM was   negative.  CMV IgG was positive.    ASSESSMENT:  1.  This is a newly diagnosed 47-year-old female with very advanced acquired   immunodeficiency syndrome, comes in with seizures.  She is fully alert right   now.  Neurologic exam is unremarkable.  There are no focal findings.    Possibilities would include neurosyphilis, but her RPR was negative.  Toxo,   she had negative titer, so this is unlikely to be toxic encephalitis.  PML,   there are no focal abnormalities and CT scan was unremarkable.  It is not   clear what is precipitating seizures.  2.  Her other medical problems include esophagitis, she is on fluconazole.    This will also  treat/prophylaxis for cryptococcus t but seems   unlikely.  PCP prophylaxis, which she is on Bactrim, possible disseminated   Mycobacterium avium given the QuantiFERON and chest x-ray were negative and   she is very advanced.  She is going to resume her azithromycin and ethambutol.     3.  For human immunodeficiency virus,, I have selected 2 HIV medications,   which are not renally adjusted, and I have put her on  dolutegravir  as well as   rilpivirine.  This is a 2-drug regimen, which has been shown to be as   effective as the  standard 3-drug regimen.  4. Possible HSV genital infection- viral cultures pending from [facility], on acyclovir    4.  Acquired immunodeficiency syndrome wasting secondary to her human   immunodeficiency virus condition.    RECOMMENDATIONS:  I have adjusted the medicines as noted.  Would continue pain   control for her perineal infection for which she is also on acyclovir, and   she should consider hospice since her prognosis is extremely guarded at this   point.    Thank you very much for allowing me to consult on this patient.       ____________________________________     MD PEDRO GAYTAN / JUAN    DD:  06/04/2017 09:23:44  DT:  06/04/2017 10:21:06    D#:  [id number]  Job#:  [id number]

## 2017-06-05 PROBLEM — R11.10 VOMITING: Status: ACTIVE | Noted: 2017-01-01

## 2017-06-05 NOTE — EEG PROGRESS NOTE
VIDEO ELECTROENCEPHALOGRAM REPORT      Referring MD: Dr. Oconnell.     DOS:  6/5/2017 (total recording for 27 minutes).     INDICATION:  Roger Mckeon 47 y.o. female presenting with seizure.     CURRENT ANTIEPILEPTIC REGIMEN: None.     TECHNIQUE: 30 channel video electroencephalogram (EEG) was performed in accordance with the international 10-20 system. The study was reviewed in bipolar and referential montages. The recording examined the patient during wakeful and drowsy/sleep state(s).     DESCRIPTION OF THE RECORD:  During the wakefulness, the background showed a symmetrical 8 Hz alpha activity posteriorly with amplitude of 70 mV.  There was reactivity to eye closure/opening.  A normal anterior-posterior gradient was noted with faster beta frequencies seen anteriorly.  During drowsiness, theta/delta frequencies were seen.    During the sleep state, background shows diffuse high-amplitude 4-5 Hz delta activity.  Symmetrical high-amplitude sleep spindles and vertex sharps were seen in the leads over the central regions.     ACTIVATION PROCEDURES:   Hyperventilation was performed by the patient for a total of 3 minutes. The technician performing the test noted poor effort. This technique produced electroencephalographic changes in keeping with the expected bilaterally synchronous, frontally predominant, high amplitude slow waves build up.     Intermittent Photic stimulation was performed in a stepwise fashion from 1 to 30 Hz and elicited a normal response (photic driving), most noticeable in the posterior leads.      ICTAL AND/OR INTERICTAL FINDINGS:   No focal or generalized epileptiform activity noted. No regional slowing was seen during this routine study.  No clinical events or seizures were reported or recorded during the study.     EKG: sampling of the EKG recording demonstrated sinus rhythm.       INTERPRETATION:  This is a normal video EEG recording in the awake, drowsy, and sleep state(s).  Clinical  correlation is recommended.    Note: A normal EEG does not rule out epilepsy.  If the clinical suspicion remains high for seizures, a prolonged recording to capture clinical or subclinical events may be helpful.        Juan Ramon Grissom MD  Medical Director, Epilepsy and Neurodiagnostics.   Clinical  of Neurology [facility].   Diplomate in Neurology, Epilepsy, and Electrodiagnostic Medicine.   Office: [phone]  Fax: [phone]

## 2017-06-05 NOTE — PROGRESS NOTES
Paged Eryn Whatley. Notified of patient Hgb drop from 8.5 to 7.0. Also patient BS 62. Pt given orange juice but was nauseated and vomited shortly after drinking. After giving patient Zofran, she still feels nauseated. Paged to get another form to provide sugar to increase glucose. Awaiting orders.

## 2017-06-05 NOTE — PROGRESS NOTES
Hospital Medicine Progress Note, Adult, Complex               Author: Milan Najjar Date & Time created: 6/5/2017  2:07 PM     Interval History:  47 year old with TTS schedule, came in with possible seizure.  Holding HD, no acute indication, no SOB. Noted seen by ID.   She is currently DNR.    Review of Systems:  Review of Systems   Constitutional: Positive for malaise/fatigue. Negative for fever and chills.   Respiratory: Negative for cough.    Cardiovascular: Negative for chest pain and leg swelling.   Gastrointestinal: Positive for nausea and vomiting.   Genitourinary: Negative for dysuria and urgency.   Neurological: Positive for weakness.       Physical Exam:  Physical Exam   Constitutional: She is oriented to person, place, and time. She appears cachectic.   HENT:   Head: Normocephalic and atraumatic.   Nose: Nose normal.   Eyes: Right eye exhibits no discharge. Left eye exhibits no discharge. No scleral icterus.   Cardiovascular: Normal rate and regular rhythm.    No murmur heard.  Pulmonary/Chest: Effort normal and breath sounds normal.   Abdominal: Soft. She exhibits no distension. There is no tenderness.   Musculoskeletal: She exhibits no edema or tenderness.   Neurological: She is alert and oriented to person, place, and time.       Labs:        Invalid input(s): [id number]      Recent Labs      06/03/17   1157 06/04/17   0356  06/05/17   0208   SODIUM  132*  136  131*   POTASSIUM  3.9  4.1  4.5   CHLORIDE  97  100  99   CO2  30  27  27   BUN  26*  29*  38*   CREATININE  2.46*  2.43*  3.19*   MAGNESIUM   --    --   2.0   PHOSPHORUS   --    --   5.0*   CALCIUM  7.4*  7.4*  7.2*     Recent Labs      06/03/17   1157 06/04/17   0356  06/05/17   0208   ALTSGPT  5  6  <5   ASTSGOT  15  16  11*   ALKPHOSPHAT  104*  104*  93   TBILIRUBIN  0.3  0.3  0.3   GLUCOSE  73  59*  62*     Recent Labs      06/03/17   1157  06/04/17   0352  06/05/17   0530   RBC  2.80*  3.15*  2.55*   HEMOGLOBIN  7.7*  8.5*  7.0*    HEMATOCRIT  25.4*  27.5*  23.0*   PLATELETCT  154*  144*  129*     Recent Labs      17   1157  17   0352  17   0356  17   0208  17   0530   WBC  3.7*  3.8*   --    --   3.1*   NEUTSPOLYS  86.10*   --    --    --   93.60*   LYMPHOCYTES  6.00*   --    --    --   0.00*   MONOCYTES  6.30   --    --    --   0.00   EOSINOPHILS  0.30   --    --    --   0.00   BASOPHILS  0.50   --    --    --   0.00   ASTSGOT  15   --   16  11*   --    ALTSGPT  5   --   6  <5   --    ALKPHOSPHAT  104*   --   104*  93   --    TBILIRUBIN  0.3   --   0.3  0.3   --            Hemodynamics:  Temp (24hrs), Av.6 °C (97.9 °F), Min:36.3 °C (97.3 °F), Max:36.9 °C (98.4 °F)  Temperature: 36.6 °C (97.8 °F)  Pulse  Av.1  Min: 79  Max: 102   Blood Pressure: 138/83 mmHg     Respiratory:    Respiration: 16, Pulse Oximetry: 98 %        RUL Breath Sounds: Diminished, RML Breath Sounds: Diminished, RLL Breath Sounds: Diminished, ZEN Breath Sounds: Diminished, LLL Breath Sounds: Diminished  Fluids:  No intake or output data in the 24 hours ending 17 1407     GI/Nutrition:  Orders Placed This Encounter   Procedures   • Diet Order     Standing Status: Standing      Number of Occurrences: 1      Standing Expiration Date:      Order Specific Question:  Diet:     Answer:  Regular [1]     Medical Decision Making, by Problem:  Active Hospital Problems    Diagnosis   • ESRD (end stage renal disease) (CMS-HCC) [N18.6]   • Candidiasis of esophagus (CMS-HCC) [B37.81]   • Seizure (CMS-HCC) [R56.9]   • Severe protein-calorie malnutrition (CMS-HCC) [E43]   • HTN (hypertension), benign [I10]   • Hypoglycemia [E16.2]     1. ESRD - No acute need for HD  2. Seizure like episode   3. HIV infection  4 hyponatremia  5 Anemia  6 N/V  no acute need for HD, plan for tomorrow  Epo with HD  Renal dose all meds  Avoid nephrotoxins  Prognosis poor.        Labs reviewed and Medications reviewed

## 2017-06-05 NOTE — PROGRESS NOTES
[facility] Hospitalist Progress Note    Date of Service: 2017    Chief Complaint  47 y.o. female admitted 6/3/2017 with seizures. Hx of severe HIV and esrd on dialysis. Recent MAC bacteremia. Back on seizure meds. Being followed by nephro and ID.     Interval Problem Update  Did well over night. Vomiting this am and says not feeling well.     Consultants/Specialty  ID- Krasner    Disposition  Guarded.   Likely home once feeling improved. ?tomorrow. Will hold dc today given vomiting and allow her to get dialysis.     MRI:  1.  No evidence of acute territorial infarct, intracranial hemorrhage or mass lesion.  2.  Mild diffuse cerebral and cerebellar substance loss.  3.  Mild microangiopathic ischemic change versus demyelination or gliosis.      Review of Systems   Constitutional: Positive for malaise/fatigue. Negative for fever and chills.   HENT: Negative for sore throat.    Eyes: Negative for blurred vision and pain.   Respiratory: Negative for cough and shortness of breath.    Cardiovascular: Negative for chest pain and palpitations.   Gastrointestinal: Positive for vomiting. Negative for nausea and abdominal pain.   Genitourinary: Negative for dysuria and urgency.   Musculoskeletal: Negative for back pain and neck pain.   Skin: Negative for itching and rash.   Neurological: Negative for dizziness, tingling and headaches.   Psychiatric/Behavioral: Negative for depression. The patient does not have insomnia.    All other systems reviewed and are negative.     Physical Exam  Laboratory/Imaging   Hemodynamics  Temp (24hrs), Av.6 °C (97.9 °F), Min:36.3 °C (97.3 °F), Max:36.9 °C (98.4 °F)   Temperature: 36.6 °C (97.8 °F)  Pulse  Av.1  Min: 79  Max: 102    Blood Pressure: 138/83 mmHg      Respiratory      Respiration: 16, Pulse Oximetry: 98 %        RUL Breath Sounds: Diminished, RML Breath Sounds: Diminished, RLL Breath Sounds: Diminished, ZEN Breath Sounds: Diminished, LLL Breath Sounds:  Diminished    Fluids  No intake or output data in the 24 hours ending 06/05/17 0818    Nutrition  Orders Placed This Encounter   Procedures   • Diet Order     Standing Status: Standing      Number of Occurrences: 1      Standing Expiration Date:      Order Specific Question:  Diet:     Answer:  Regular [1]     Physical Exam   Constitutional: She is oriented to person, place, and time. She appears well-developed and well-nourished. No distress.   Thin and ill appearing.    HENT:   Nose: Nose normal.   Mouth/Throat: Oropharynx is clear and moist.   Eyes: Right eye exhibits no discharge. Left eye exhibits no discharge. No scleral icterus.   Neck: No JVD present.   Cardiovascular: Regular rhythm and normal heart sounds.    No murmur heard.  Pulmonary/Chest: Effort normal and breath sounds normal. No stridor. No respiratory distress. She has no rales.   Abdominal: Soft. Bowel sounds are normal. She exhibits no distension. There is no tenderness.   Genitourinary:   Perineal blisters.    Musculoskeletal: She exhibits no edema or tenderness.   Neurological: She is alert and oriented to person, place, and time.   Skin: Skin is warm and dry. She is not diaphoretic. No erythema.   Psychiatric: She has a normal mood and affect. Her behavior is normal.   Nursing note and vitals reviewed.      Recent Labs      06/03/17   1157  06/04/17   0352  06/05/17   0530   WBC  3.7*  3.8*  3.1*   RBC  2.80*  3.15*  2.55*   HEMOGLOBIN  7.7*  8.5*  7.0*   HEMATOCRIT  25.4*  27.5*  23.0*   MCV  90.7  87.3  90.2   MCH  27.5  27.0  27.5   MCHC  30.3*  30.9*  30.4*   RDW  52.0*  50.4*  53.5*   PLATELETCT  154*  144*  129*   MPV  10.6  10.0  10.8     Recent Labs      06/03/17   1157  06/04/17   0356  06/05/17   0208   SODIUM  132*  136  131*   POTASSIUM  3.9  4.1  4.5   CHLORIDE  97  100  99   CO2  30  27  27   GLUCOSE  73  59*  62*   BUN  26*  29*  38*   CREATININE  2.46*  2.43*  3.19*   CALCIUM  7.4*  7.4*  7.2*                       Assessment/Plan     ESRD (end stage renal disease) (CMS-HCC) (present on admission)  Assessment & Plan  Dialysis per nephrology    Acute renal failure (CMS-HCC) (present on admission)  Assessment & Plan  Dialysis per nephrology.     Seizure (CMS-HCC)  Assessment & Plan  Has seizure hx past. Mri normal. No mengitis sx. High risk for this. No LP per ID recs. Continue meds. Consider neuro.     HIV (human immunodeficiency virus infection) (CMS-HCC)  Assessment & Plan  cd4 was less than 10. Very high risk for OI and already has mac, hsv, fungal candidiasis. Poor prognosis. meds given to patient at last id visit and family will bring them in.     Esophageal candidiasis (CMS-HCC)  Assessment & Plan  Diflucan. Iv.     Bacteremia  Assessment & Plan  afb on last blood cx thought to be MAC per ID> will remove isolation. No pulmonary sx. abx per ID    Mycobacterial infection  Assessment & Plan  Blood cx x1 positive recently. abx per ID      Genital herpes  Assessment & Plan  P;ain control. Acyclovir.     Vomiting  Assessment & Plan  Normal abd exam. ?renal related?? Not clear. No change in BM. Will observe, hold dc for today.       EKG reviewed, Radiology images reviewed, Labs reviewed and Medications reviewed  Julien catheter: No Julien          Ulcer prophylaxis: Not indicated  Antibiotics: Treating active infection/contamination beyond 24 hours perioperative coverage  Assessed for rehab: Patient was assess for and/or received rehabilitation services during this hospitalization

## 2017-06-05 NOTE — DIETARY
Nutrition Services: Poor PO & Wound on Nutrition Admit Screen & Low BMI  47 year old female with admit dx of seizure  Past Pertinent Med Hx: ESRD, HIV infection, essential HTN, esophageal candidiasis, pulmonary nodule, gastroparesis    Attempted to visit pt, pt was busy with other discipline. Per chart pt PO varies 0-75%. RD will add snacks TID.     Ht: 167.6 cm   Wt 6/3: 49.5 kg via bed scale  BMI: 17.61  Diet: Regular  Pertinent Labs: Na 131, Glucose 62, BUN 38, Creatinine 3.19, Phosphorus 5.0, POC glucose 103  Pertinent Meds: zovirax, zithromax, tivicay, myambutol, diflucan, prozac, pericolace, sodium bicarbonate  Fluids: No IV fluids noted in MAR  Skin: wound other labia, wound other midline buttock - Wound team consult pending, will await wound staging to make recommendations.   GI: Last BM 6/4    PLAN/RECOMMEND -   1) Nutrition rep to see patient daily for meal and snack preferences.  2) Encourage PO  3) Weekly weights to monitor fluid and nutrition status  4) Obtain supplement order per RD as needed.  5) Please document PO intake for each meal as percentage of meals consumed    RD following

## 2017-06-06 PROBLEM — A60.00 GENITAL HERPES: Chronic | Status: ACTIVE | Noted: 2017-01-01

## 2017-06-06 PROBLEM — B20 HIV WASTING SYNDROME (HCC): Chronic | Status: ACTIVE | Noted: 2017-01-01

## 2017-06-06 PROBLEM — R11.2 NAUSEA AND VOMITING: Status: ACTIVE | Noted: 2017-01-01

## 2017-06-06 PROBLEM — E88.A HIV WASTING SYNDROME (HCC): Chronic | Status: ACTIVE | Noted: 2017-01-01

## 2017-06-06 PROBLEM — R11.10 VOMITING: Status: RESOLVED | Noted: 2017-01-01 | Resolved: 2017-01-01

## 2017-06-06 PROBLEM — A31.9 MYCOBACTERIAL INFECTION: Chronic | Status: ACTIVE | Noted: 2017-01-01

## 2017-06-06 PROBLEM — R56.9 SEIZURE (HCC): Status: RESOLVED | Noted: 2017-01-01 | Resolved: 2017-01-01

## 2017-06-06 PROBLEM — R56.9 SEIZURE (HCC): Status: ACTIVE | Noted: 2017-01-01

## 2017-06-06 NOTE — DISCHARGE PLANNING
Received message that pt is to [facility] and MD wished to verify HIV medication needs. Spoke with the [facility] who verified that the pt is established with them and has a follow up appt with Dr Morgan on Thursday 6/8/2017 with a 09:00 check in. No prior auth is required.   [facility] [place]  [address]  [place] [address]  [phone]

## 2017-06-06 NOTE — PROCEDURES
VIDEO ELECTROENCEPHALOGRAM REPORT      Referring MD: Dr. Oconnell.     DOS:  6/5/2017 (total recording for 27 minutes).     INDICATION:  Roger Mckeon 47 y.o. female presenting with seizure.      CURRENT ANTIEPILEPTIC REGIMEN: None.     TECHNIQUE: 30 channel video electroencephalogram (EEG) was performed in accordance with the international 10-20 system. The study was reviewed in bipolar and referential montages. The recording examined the patient during wakeful and drowsy/sleep state(s).     DESCRIPTION OF THE RECORD:  During the wakefulness, the background showed a symmetrical 8 Hz alpha activity posteriorly with amplitude of 70 mV.  There was reactivity to eye closure/opening.  A normal anterior-posterior gradient was noted with faster beta frequencies seen anteriorly.  During drowsiness, theta/delta frequencies were seen.    During the sleep state, background shows diffuse high-amplitude 4-5 Hz delta activity.  Symmetrical high-amplitude sleep spindles and vertex sharps were seen in the leads over the central regions.     ACTIVATION PROCEDURES:    Hyperventilation was performed by the patient for a total of 3 minutes. The technician performing the test noted poor effort. This technique produced electroencephalographic changes in keeping with the expected bilaterally synchronous, frontally predominant, high amplitude slow waves build up.     Intermittent Photic stimulation was performed in a stepwise fashion from 1 to 30 Hz and elicited a normal response (photic driving), most noticeable in the posterior leads.      ICTAL AND/OR INTERICTAL FINDINGS:    No focal or generalized epileptiform activity noted. No regional slowing was seen during this routine study.  No clinical events or seizures were reported or recorded during the study.      EKG: sampling of the EKG recording demonstrated sinus rhythm.        INTERPRETATION:  This is a normal video EEG recording in the awake, drowsy, and sleep state(s).  Clinical  correlation is recommended.    Note: A normal EEG does not rule out epilepsy.  If the clinical suspicion remains high for seizures, a prolonged recording to capture clinical or subclinical events may be helpful.        Juan Ramon Grissom MD  Medical Director, Epilepsy and Neurodiagnostics.    Clinical  of Neurology [facility].    Diplomate in Neurology, Epilepsy, and Electrodiagnostic Medicine.    Office: [phone]  Fax: [phone]        BABAK MARTINEZ    DD:  06/05/2017 14:00:00  DT:  06/05/2017 17:31:27    D#:  [id number]  Job#:  [id number]

## 2017-06-06 NOTE — DISCHARGE SUMMARY
Hospital Medicine Discharge Note     Patient ID:  Roger Mckeon  [id number]  47 y.o.female  1970    Admit Date:  6/3/2017       Discharge Date:   6/7/2017    Primary Care Provider: Caridad Stone M.D.    Admitting Physician: Marcelino Oconnell M.D.  Discharging Physician: Amy Linares MD    Chief Complaint: seizure      Discharge Diagnoses:     Seizure (CMS-[facility])    ESRD (end stage renal disease) (CMS-HCC)    HIV (human immunodeficiency virus infection) (CMS-HCC)    Esophageal candidiasis (CMS-HCC)    Mycobacterial infection    Genital herpes    Chronic nausea and vomiting- stable    Normocytic, normochromic anemia    Thrombocytopenia- chronic    Leukopenia- chronic    HIV wasting syndrome    Chronic Medical Problems:  Past Medical History   Diagnosis Date   • H/O: hysterectomy    • Hypertension    • Renal failure    • Diabetes      on tx since 1999, diet controlled   • Dental disorder      upper dentures   • Dialysis patient      3 x week   • Seizure disorder (CMS-HCC)        Code Status: DNR    Hospital Summary:       Please refer to H&P by Dr Oconnell on 6/3/2017 for full details.      In brief, Roger Mckeon is a 47 y.o. female who was admitted 6/3/2017 for seizure activity while at hemodialysis. She had a tonic-clonic seizure episode with post ictal confusion. Per family this has been her 4th episode in 2 months. She was admitted for evaluation. She was recently diagnosed with AIDS.    The patient underwent evaluation with MRI and EEG. She was evaluated by pharmacy, and found to be on bactrim for PCP prophylaxis which may induce seizures in ESRD. ID was consulted for her HIV medication management. She was taken off bactrim, without any further seizure activity. She was observed, and had a negative EEG for seizure activity. She was continued on treatment for concern of cryptococcus, with azithromycin and ethambutol from her OP regimen. ID recommended a dual drug regimen for her HIV treatment which will be continued  OP. Neprology was consulted, and the patient was given her dialysis. She has been set up to receive treatment from the [facility] who will provide her medication and follow-up. She has been treated for a genital herpes infection. ID has recommended bactrim be resumed for PCP prophylaxis given her low CD4 count, she was placed back on this medication overnight without event and has tolerated it.     The patient was observed, with no recurrent seizures, thus has not been placed on antiepileptic medications. She should be closely monitored, and if she continues to have seizure activity she may benefit from neurology consultation and further workup. Today, upon examination she is non-focal without neurologic deficit. She does complain of chronic nausea, without vomiting that continues. She has pills for PRN nausea at home. She should follow up with her PCP for this concern. She is tolerating a diet, and able to hydrate herself. Today, she feels improved and ready for discharge home.     Therefore, she is discharged in good and stable condition with close outpatient follow-up.    Consultants:      Dr Deal- nephrology  Dr Morgan- ID    Studies:    Imaging/ Testing:      MR-BRAIN-W/O   Final Result      1.  No evidence of acute territorial infarct, intracranial hemorrhage or mass lesion.   2.  Mild diffuse cerebral and cerebellar substance loss.   3.  Mild microangiopathic ischemic change versus demyelination or gliosis.      DX-CHEST-LIMITED (1 VIEW)   Final Result      No acute cardiac or pulmonary abnormality is identified.      CT-HEAD W/O   Final Result      1.  Cerebral atrophy.      2.  White matter lucencies most consistent with small vessel ischemic change versus demyelination or gliosis.      3.  Otherwise, Head CT without contrast with no acute findings. No evidence of acute cerebral infarction, hemorrhage or mass lesion.            Laboratory:   Recent Labs      06/04/17   0352  06/05/17   0505   "06/06/17   0651   WBC  3.8*  3.1*  3.6*   RBC  3.15*  2.55*  2.70*   HEMOGLOBIN  8.5*  7.0*  7.2*   HEMATOCRIT  27.5*  23.0*  24.4*   MCV  87.3  90.2  90.4   MCH  27.0  27.5  26.7*   MCHC  30.9*  30.4*  29.5*   RDW  50.4*  53.5*  53.1*   PLATELETCT  144*  129*  120*   MPV  10.0  10.8  9.6       Recent Labs      06/04/17   0356  06/05/17   0208  06/05/17   1446  06/06/17   0906   SODIUM  136  131*   --   133*   POTASSIUM  4.1  4.5  4.4  4.8   CHLORIDE  100  99   --   100   CO2  27  27   --   25   GLUCOSE  59*  62*   --   55*   BUN  29*  38*   --   45*   CREATININE  2.43*  3.19*   --   3.31*   CALCIUM  7.4*  7.2*   --   7.4*       Recent Labs      06/04/17 0356 06/05/17   0208  06/06/17   0906   ALTSGPT  6  <5   --    ASTSGOT  16  11*   --    ALKPHOSPHAT  104*  93   --    TBILIRUBIN  0.3  0.3   --    GLUCOSE  59*  62*  55*          Results     Procedure Component Value Units Date/Time    BLOOD CULTURE [[id number]] Collected:  06/04/17 1134    Order Status:  Completed Specimen Information:  Blood from Peripheral Updated:  06/05/17 0905     Significant Indicator NEG      Source BLD      Site PERIPHERAL      Blood Culture --      Result:        No Growth    Note: Blood cultures are incubated for 5 days and  are monitored continuously.Positive blood cultures  are called to the RN and reported as soon as  they are identified.      Narrative:      Contact  Per Hospital Policy: Only change Specimen Src: to \"Line\" if  specified by physician order.    BLOOD CULTURE [[id number]] Collected:  06/04/17 0931    Order Status:  Completed Specimen Information:  Blood from Peripheral Updated:  06/05/17 0905     Significant Indicator NEG      Source BLD      Site PERIPHERAL      Blood Culture --      Result:        No Growth    Note: Blood cultures are incubated for 5 days and  are monitored continuously.Positive blood cultures  are called to the RN and reported as soon as  they are identified.      Narrative:      Contact  Per " "Hospital Policy: Only change Specimen Src: to \"Line\" if  specified by physician order.    AFB CULTURE [[id number]]     Order Status:  No result Specimen Information:  CSF from Other Body Fluid     AFB CULTURE [[id number]]     Order Status:  No result Specimen Information:  Sputum from Sputum Induced     CSF Culture [[id number]]     Order Status:  No result Specimen Information:  CSF from Tap     ROSMERY INK [[id number]]     Order Status:  No result Specimen Information:  CSF from Other Body Fluid     CRYPTOCOCCAL ANTIGEN TITER [[id number]]     Order Status:  No result Specimen Information:  CSF from Other           BLOOD CULTURE   Date Value Ref Range Status   06/04/2017   Preliminary    No Growth    Note: Blood cultures are incubated for 5 days and  are monitored continuously.Positive blood cultures  are called to the RN and reported as soon as  they are identified.          Procedures/Surgeries:        None    Disposition: Discharge home    Condition:  Stable    Instructions:   Activity: As tolerated.  Diet: renal  Followup:   -[facility] clinic Dr Morgan- today for HIV treatment medications  -Nephrology for HD as scheduled  Instructions:  -Given instructions to return to the ER if any new or worsening symptoms, worsening condition, or failure to improve  -Call PCP for followup  -No smoking, no alcohol, no caffeine  -Encourage risk factor reduction with tobacco and alcohol abstinence, diet changes, weight loss, and exercise.     Discharge Medications:           Medication List      START taking these medications       Instructions    ondansetron 4 MG Tbdp   Last time this was given:  4 mg on 6/6/2017  2:23 AM   Commonly known as:  ZOFRAN ODT    Take 1 Tab by mouth every four hours as needed for Nausea/Vomiting (give PO if no IV route available).   Dose:  4 mg       sulfamethoxazole-trimethoprim 400-80 MG Tabs   Last time this was given:  1 Tab on 6/6/2017  5:53 PM   Commonly known as:  BACTRIM   Replaces:  " sulfamethoxazole-trimethoprim 800-160 MG tablet    Take 1 Tab by mouth every day.   Dose:  1 Tab         CONTINUE taking these medications       Instructions    acyclovir 800 MG Tabs   Last time this was given:  800 mg on 6/6/2017  1:28 PM   Commonly known as:  ZOVIRAX    Take 800 mg by mouth every day. Pt started a 7 day course on 6/2   Dose:  800 mg       azithromycin 250 MG Tabs   Last time this was given:  500 mg on 6/6/2017  1:27 PM   Commonly known as:  ZITHROMAX    Take 250 mg by mouth every day. Pt started a 10 day course on 5/14   Dose:  250 mg       diphenoxylate-atropine 2.5-0.025 MG Tabs   Commonly known as:  LOMOTIL    Take 2 Tabs by mouth 4 times a day as needed for Diarrhea. Indications: Diarrhea   Dose:  2 Tab       ethambutol 400 MG Tabs   Last time this was given:  800 mg on 6/6/2017  1:28 PM   Commonly known as:  MYAMBUTOL    Take 800 mg by mouth every day. Pt started a 14 day course on 6/2   Dose:  800 mg       fluconazole 200 MG Tabs   Last time this was given:  200 mg on 6/6/2017  5:15 PM   Commonly known as:  DIFLUCAN    Take 200 mg by mouth every day. Pt started a 10 day course on 5/14   Dose:  200 mg       fluoxetine 10 MG Caps   Last time this was given:  10 mg on 6/6/2017  1:28 PM   Commonly known as:  PROZAC    Take 10 mg by mouth every day. Indications: Depression   Dose:  10 mg       hydrOXYzine 25 MG Tabs   Commonly known as:  ATARAX    Take 25 mg by mouth 4 times a day.   Dose:  25 mg       K-PHOS 500 MG tablet   Generic drug:  potassium phosphate (monobasic)    Take 1 Tab by mouth every day. Indications: Supplement   Dose:  1 Tab       moxifloxacin 0.5 % Soln   Last time this was given:  1 Drop on 6/7/2017  5:33 AM   Commonly known as:  VIGAMOX    Place 1 Drop in left eye 4 times a day. Indications: EYE   Dose:  1 Drop       Non Formulary Request    Take 25 mg by mouth every day. Rilpivirine- take with food   Dose:  25 mg       nystatin 574825 UNIT/ML Susp   Commonly known as:   MYCOSTATIN    Take 500,000 Units by mouth 4 times a day. Indications: Candidiasis Fungal Infection of the Oropharynx   Dose:  726223 Units       prednisoLONE acetate 1 % Susp   Last time this was given:  1 Drop on 6/7/2017  5:33 AM   Commonly known as:  PRED FORTE    Place 1 Drop in left eye 4 times a day. Indications: post eye surgery   Dose:  1 Drop       sodium bicarbonate 650 MG Tabs   Last time this was given:  650 mg on 6/6/2017 10:05 PM   Commonly known as:  SODIUM BICARBONATE    Take 1 Tab by mouth 3 times a day.   Dose:  650 mg       TIVICAY 50 MG Tabs tablet   Last time this was given:  50 mg on 6/6/2017  1:28 PM   Generic drug:  dolutegravir    Take 50 mg by mouth every day.   Dose:  50 mg       XANAX 0.25 MG Tabs   Last time this was given:  0.25 mg on 6/4/2017 10:08 AM   Generic drug:  alprazolam    Take 0.25 mg by mouth 3 times a day as needed for Anxiety. Indications: Feeling Anxious   Dose:  0.25 mg         STOP taking these medications          sulfamethoxazole-trimethoprim 800-160 MG tablet   Commonly known as:  BACTRIM DS   Replaced by:  sulfamethoxazole-trimethoprim 400-80 MG Tabs           [facility] [place] [address]    Opioid prescription history checked: yes    Total time of the discharge process exceeds 38 minutes. This included face to face with the patient, medication reconciliation, care co ordination with nursing involved in patient care and discussion and co ordination with case management.     Please CC the above physicians    CHAVA Huang  6/6/2017  1:32 PM

## 2017-06-06 NOTE — CARE PLAN
Problem: Infection  Goal: Will remain free from infection  Outcome: PROGRESSING AS EXPECTED    Problem: Knowledge Deficit  Goal: Knowledge of disease process/condition, treatment plan, diagnostic tests, and medications will improve  Outcome: PROGRESSING AS EXPECTED

## 2017-06-06 NOTE — PROGRESS NOTES
Pt down to dialysis. Report given to Carmen JAMES. Requested she draw stat bmp as lab was unable to draw blood. Carmen agreed to draw lab.

## 2017-06-06 NOTE — PROGRESS NOTES
HEMODIALYSIS NOTES:     HD today x 3 hours per Dr. Najjar . Initiated at 0931 and ended at 1231. Patient tolerated treatment.  Patient stable, denies any pain or discomfort. Stat BMP lab sent prior start of treatment per Mattie JAMES request. See paper flowsheet for details.    UF Net:  1,000 mL    R subclavian catheter intact and patent with good flow during dialysis. No s/sx of infection, no redness nor bleeding noted on the CVC site. CVC dressing clean, dry and intact. Blood returned and CVC port flushed with NS and Heparin 1000 units/mL used  to lock catheter given per designated amount. CVC port clamped and capped. Report given to ROSEANNE Lemus RN.

## 2017-06-06 NOTE — PROGRESS NOTES
Hospital Medicine Progress Note, Adult, Complex               Author: Milan Nelsonjjar Date & Time created: 6/6/2017  1:16 PM     Interval History:  47 year old with TTS schedule, came in with possible seizure.  Holding HD, no acute indication, no SOB. Noted seen by ID.   She is currently DNR.  Seen and examined while getting HD.    Review of Systems:  Review of Systems   Constitutional: Positive for malaise/fatigue. Negative for fever and chills.   Respiratory: Negative for cough.    Cardiovascular: Negative for chest pain and leg swelling.   Gastrointestinal: Positive for nausea.   Genitourinary: Negative for dysuria and urgency.   Neurological: Positive for weakness.       Physical Exam:  Physical Exam   Constitutional: She is oriented to person, place, and time. She appears cachectic.   HENT:   Head: Normocephalic and atraumatic.   Nose: Nose normal.   Eyes: Right eye exhibits no discharge. Left eye exhibits no discharge. No scleral icterus.   Cardiovascular: Normal rate and regular rhythm.    No murmur heard.  Pulmonary/Chest: Effort normal and breath sounds normal.   Abdominal: Soft. She exhibits no distension. There is no tenderness.   Musculoskeletal: She exhibits no edema or tenderness.   Neurological: She is alert and oriented to person, place, and time.       Labs:        Invalid input(s): [id number]      Recent Labs      06/04/17   0356  06/05/17   0208  06/05/17   1446  06/06/17   0906   SODIUM  136  131*   --   133*   POTASSIUM  4.1  4.5  4.4  4.8   CHLORIDE  100  99   --   100   CO2  27  27   --   25   BUN  29*  38*   --   45*   CREATININE  2.43*  3.19*   --   3.31*   MAGNESIUM   --   2.0   --    --    PHOSPHORUS   --   5.0*   --    --    CALCIUM  7.4*  7.2*   --   7.4*     Recent Labs      06/04/17   0356  06/05/17   0208  06/06/17   0906   ALTSGPT  6  <5   --    ASTSGOT  16  11*   --    ALKPHOSPHAT  104*  93   --    TBILIRUBIN  0.3  0.3   --    GLUCOSE  59*  62*  55*     Recent Labs      06/04/17    0352  17   0530  17   0651   RBC  3.15*  2.55*  2.70*   HEMOGLOBIN  8.5*  7.0*  7.2*   HEMATOCRIT  27.5*  23.0*  24.4*   PLATELETCT  144*  129*  120*     Recent Labs      17   0352  17   0356  17   0208  17   0530  17   0651   WBC  3.8*   --    --   3.1*  3.6*   NEUTSPOLYS   --    --    --   93.60*   --    LYMPHOCYTES   --    --    --   0.00*   --    MONOCYTES   --    --    --   0.00   --    EOSINOPHILS   --    --    --   0.00   --    BASOPHILS   --    --    --   0.00   --    ASTSGOT   --   16  11*   --    --    ALTSGPT   --   6  <5   --    --    ALKPHOSPHAT   --   104*  93   --    --    TBILIRUBIN   --   0.3  0.3   --    --            Hemodynamics:  Temp (24hrs), Av.4 °C (97.6 °F), Min:35.9 °C (96.7 °F), Max:37.2 °C (99 °F)  Temperature: 36.3 °C (97.3 °F)  Pulse  Av.2  Min: 75  Max: 102   Blood Pressure: 155/85 mmHg     Respiratory:    Respiration: 17, Pulse Oximetry: 98 %        RUL Breath Sounds: Diminished, RML Breath Sounds: Diminished, RLL Breath Sounds: Diminished, ZEN Breath Sounds: Diminished, LLL Breath Sounds: Diminished  Fluids:    Intake/Output Summary (Last 24 hours) at 17 1316  Last data filed at 17 1232   Gross per 24 hour   Intake    500 ml   Output   1500 ml   Net  -1000 ml        GI/Nutrition:  Orders Placed This Encounter   Procedures   • Diet Order     Standing Status: Standing      Number of Occurrences: 1      Standing Expiration Date:      Order Specific Question:  Diet:     Answer:  Regular [1]     Medical Decision Making, by Problem:  Active Hospital Problems    Diagnosis   • ESRD (end stage renal disease) (CMS-HCC) [N18.6]   • Candidiasis of esophagus (CMS-[facility]) [B37.81]   • Seizure (CMS-[facility]) [R56.9]   • Severe protein-calorie malnutrition (CMS-HCC) [E43]   • HTN (hypertension), benign [I10]   • Hypoglycemia [E16.2]     1. ESRD - HD  2. Seizure like episode   3. HIV infection  4 hyponatremia  5 Anemia  6 N/V  Epo with HD  Renal  dose all meds  Avoid nephrotoxins  Prognosis poor.        Labs reviewed and Medications reviewed

## 2017-06-06 NOTE — WOUND TEAM
"[facility]  Inpatient Services  Initial Wound & Skin Care Evaluation    Admission Date:  6/3/2017   HPI, PMH, SH: Reviewed  Unit where seen by Wound Team: [id number]    WOUND CONSULT RELATED TO: labia, buttock wounds    SUBJECTIVE:   \"They burn when you clean them.\"    Self Report / Pain Level: c/o pain with cleaning with wipes      OBJECTIVE: on pressure redistribution mattress, rolls self when asked  WOUND TYPE, LOCATION, CHARACTERISTICS (Pressure ulcers: location, stage, POA or date identified)  Wound Other (partial thickness) Labia  Periwound Skin: Red  Drainage : Scant, Serosanguinous (creamy drainage in labial folds)  Tissue Type and %:    100% red  Wound Edges:    open    Odor:     None   Exposed structure(s):   No   Signs and Symptoms of Infection:slight edema    Wound Other (partial thickness) Buttock Midline  Periwound Skin: Discolored, Macerated  Drainage : None     Tissue Type and %:    100% red  Wound Edges:    open    Odor:     None   Exposed structure(s):   No   Signs and Symptoms of Infection:slight edema    Measurements:     Labia  buttock  Length (cm):  19  6  Width (cm):  6  5  Depth (cm):  0.1   0.1     Tracts/undermining:   None     INTERVENTIONS BY WOUND TEAM:cleaned periarea, assessed wounds. Supplies ordered for Tx. Discussion with RN and pt for care.   Dressing Options: Open to Air (stoma powder and zinc paste ordered)    Interdisciplinary consultation:   With Nursing;With Patient;With Physician    EVALUATION: pt has partial thickness wounds inside labial folds, perineum into gluteal fold. Etiology unknown.    Factors affecting wound healing: HIV, ESRD, HTN  Goals:decreased wound size 1% each week      NURSING PLAN OF CARE ORDERS (X):    Dressing changes: See Dressing Maintenance orders:   Skin care: See Skin Care orders: x  Rectal tube care: See Rectal Tube Care orders:   Other orders:    RSKIN: CURRENT (X) ORDERED (O)  Q shift Alli:  X  Q shift pressure point assessments:  " X  Pressure redistribution mattress        JENNY      Bariatric JENNY      Bariatric foam        Heel float boots       Heels floated on pillows      Barrier wipes   o   Barrier Cream      Barrier paste  Zinc with stoma powder    Sacral silicone dressing      Padded O2 tubing      Anchorfast      Trach with Optifoam split foam       Waffle cushion      Rectal tube or BMS      Antifungal tx    Turn q 2 hours x remind pt  Up to chair  Ambulate   PT/OT     Dietician      PO  x   TF   TPN     PVN    NPO   # days   Other       WOUND TEAM PLAN OF CARE (X):   NPWT change 3 x week:        Dressing changes by wound team:       Follow up as needed:   x    Other (explain):    Anticipated discharge plans (X):  SNF:           Home Care:           Outpatient Wound Center:            Self Care:            Other:     tbd

## 2017-06-07 PROBLEM — R56.9 SEIZURE (HCC): Status: RESOLVED | Noted: 2017-01-01 | Resolved: 2017-01-01

## 2017-06-07 NOTE — ASSESSMENT & PLAN NOTE
Chronic for patient, hx of gastroparesis  Pt claims to have vomited today- however not witnessed by nursing, and she is tolerating her diet quite well  Cont PRN zofran  Encourage PO hydration

## 2017-06-07 NOTE — DISCHARGE INSTRUCTIONS
[place] [facility] [place] [address]   Activity: As tolerated.   Diet: renal   Followup:   -[facility] clinic Dr Morgan- today for HIV treatment medications   -Nephrology for HD as scheduled   Instructions:   -Given instructions to return to the ER if any new or worsening symptoms, worsening condition, or failure to improve   -Call PCP for followup   -No smoking, no alcohol, no caffeine   -Encourage risk factor reduction with tobacco and alcohol abstinence, diet changes, weight loss, and exercise.    Discharge Instructions    Discharged to home by car with relative. Discharged via wheelchair, hospital escort: Yes.  Special equipment needed: Not Applicable    Be sure to schedule a follow-up appointment with your primary care doctor or any specialists as instructed.     Discharge Plan:   Influenza Vaccine Indication: Not indicated: Previously immunized this influenza season and > 8 years of age    I understand that a diet low in cholesterol, fat, and sodium is recommended for good health. Unless I have been given specific instructions below for another diet, I accept this instruction as my diet prescription.   Other diet: Regular    Special Instructions: None    · Is patient discharged on Warfarin / Coumadin?   No     · Is patient Post Blood Transfusion?  No    Depression / Suicide Risk    As you are discharged from this [facility] facility, it is important to learn how to keep safe from harming yourself.    Recognize the warning signs:  · Abrupt changes in personality, positive or negative- including increase in energy   · Giving away possessions  · Change in eating patterns- significant weight changes-  positive or negative  · Change in sleeping patterns- unable to sleep or sleeping all the time   · Unwillingness or inability to communicate  · Depression  · Unusual sadness, discouragement and loneliness  · Talk of wanting to die  · Neglect of personal  appearance   · Rebelliousness- reckless behavior  · Withdrawal from people/activities they love  · Confusion- inability to concentrate     If you or a loved one observes any of these behaviors or has concerns about self-harm, here's what you can do:  · Talk about it- your feelings and reasons for harming yourself  · Remove any means that you might use to hurt yourself (examples: pills, rope, extension cords, firearm)  · Get professional help from the community (Mental Health, Substance Abuse, psychological counseling)  · Do not be alone:Call your Safe Contact- someone whom you trust who will be there for you.  · Call your local CRISIS HOTLINE 207-2209 or 720-385-1047  · Call your local Children's Mobile Crisis Response Team [place] (021) 853-5616 or www.Facishare  · Call the toll free National Suicide Prevention Hotlines   · National Suicide Prevention Lifeline 153-018-CVNX (2172)  · National Hope Line Network 800-SUICIDE (534-7231)

## 2017-06-07 NOTE — PROGRESS NOTES
[facility] Hospitalist Progress Note    Date of Service: 2017    Chief Complaint  47 y.o. female admitted 6/3/2017 with seizures. Hx of severe HIV and esrd on dialysis. Recent MAC bacteremia. Back on seizure meds. Being followed by nephro and ID.     Interval Problem Update  Pt states she has felt nauseated, reports vomiting however upon my exam she has eaten 100% of her soup and 75% of her grilled cheese sandwich, tolerated breakfast. Nursing reports no vomiting witnessed. Denies abd pain. She does have chronic nausea, controlled with medication PRN. Denies fever/chills. No further sz activity.    Consultants/Specialty  ID- Cathy  Nephrology- [facility]    Disposition  Home once improved  ID reccs resuming bactrim for PCP prophylaxis- suspicion this may have been a potential cause of sz- will observe overnight on bactrim       Review of Systems   Constitutional: Positive for malaise/fatigue. Negative for fever and chills.   Respiratory: Negative for cough, sputum production and shortness of breath.    Cardiovascular: Negative for chest pain and palpitations.   Gastrointestinal: Positive for nausea and vomiting (reported per patient, unwitnessed per nursing). Negative for abdominal pain and constipation.   Genitourinary: Negative for dysuria.   Musculoskeletal: Negative for back pain.   Neurological: Positive for weakness. Negative for dizziness, sensory change and headaches.   All other systems reviewed and are negative.     Physical Exam  Laboratory/Imaging   Hemodynamics  Temp (24hrs), Av.2 °C (97.1 °F), Min:35.9 °C (96.7 °F), Max:36.3 °C (97.4 °F)   Temperature: 36.1 °C (97 °F)  Pulse  Av.8  Min: 75  Max: 102    Blood Pressure: 143/86 mmHg      Respiratory      Respiration: 18, Pulse Oximetry: 100 %        RUL Breath Sounds: Diminished, RML Breath Sounds: Diminished, RLL Breath Sounds: Diminished, ZEN Breath Sounds: Diminished, LLL Breath Sounds: Diminished    Fluids    Intake/Output Summary (Last 24  hours) at 06/06/17 2028  Last data filed at 06/06/17 1232   Gross per 24 hour   Intake    500 ml   Output   1500 ml   Net  -1000 ml       Nutrition  Orders Placed This Encounter   Procedures   • Diet Order     Standing Status: Standing      Number of Occurrences: 1      Standing Expiration Date:      Order Specific Question:  Diet:     Answer:  Regular [1]     Physical Exam   Constitutional: She is oriented to person, place, and time. She appears well-developed. No distress.   Thin and ill appearing, frail   HENT:   Mouth/Throat: Oropharynx is clear and moist.   Eyes: No scleral icterus.   Neck: Neck supple.   Cardiovascular: Regular rhythm and normal heart sounds.    No murmur heard.  Pulmonary/Chest: Effort normal and breath sounds normal. No respiratory distress. She has no wheezes.   Abdominal: Soft. Bowel sounds are normal. She exhibits no distension. There is no tenderness. There is no rebound.   Genitourinary:   Perineal blisters.    Musculoskeletal: She exhibits no edema.   Neurological: She is alert and oriented to person, place, and time.   Drowsy, withdrawn to conversation   Skin: Skin is warm and dry. No erythema.   Psychiatric: She has a normal mood and affect. Her behavior is normal.   Nursing note and vitals reviewed.      Recent Labs      06/04/17   0352  06/05/17   0530  06/06/17   0651   WBC  3.8*  3.1*  3.6*   RBC  3.15*  2.55*  2.70*   HEMOGLOBIN  8.5*  7.0*  7.2*   HEMATOCRIT  27.5*  23.0*  24.4*   MCV  87.3  90.2  90.4   MCH  27.0  27.5  26.7*   MCHC  30.9*  30.4*  29.5*   RDW  50.4*  53.5*  53.1*   PLATELETCT  144*  129*  120*   MPV  10.0  10.8  9.6     Recent Labs      06/04/17   0356  06/05/17   0208  06/05/17   1446  06/06/17   0906   SODIUM  136  131*   --   133*   POTASSIUM  4.1  4.5  4.4  4.8   CHLORIDE  100  99   --   100   CO2  27  27   --   25   GLUCOSE  59*  62*   --   55*   BUN  29*  38*   --   45*   CREATININE  2.43*  3.19*   --   3.31*   CALCIUM  7.4*  7.2*   --   7.4*                       Assessment/Plan     * HIV (human immunodeficiency virus infection) (CMS-HCC)  Assessment & Plan  CD4 was less than 10  Cont dolutegravir and rilpivirine per ID - await family to bring in rilpivirine as we do not carry this on formulary  Cont bactrim for PCP prophylaxis  Very high risk for OI and already has mac, hsv, fungal candidiasis  Poor prognosis- ID recommends hospice care   Meds given per [facility] clinic- pt is set to rec OP meds    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)  Assessment & Plan  Dietary consult  Supplements TID  Encourage PO intake with high caloric foods    Esophageal candidiasis (CMS-HCC)  Assessment & Plan  Improved, cont diflucan    Bacteremia  Assessment & Plan  afb on last blood cx thought to be MAC per ID, no pulmonary symptoms  abx per ID- cont azith and ethambutol    Nausea and vomiting  Assessment & Plan  Chronic for patient, hx of gastroparesis  Pt claims to have vomited today- however not witnessed by nursing, and she is tolerating her diet quite well  Cont PRN zofran  Encourage PO hydration    ESRD (end stage renal disease) (CMS-HCC) (present on admission)  Assessment & Plan  Dialysis per nephrology    Mycobacterial infection  Assessment & Plan  Blood cx x1 positive recently. abx per ID      Genital herpes  Assessment & Plan  Pain control with lidocaine jelly topical PRN. Cont Acyclovir.     Seizure (CMS-HCC)  Assessment & Plan  Unclear etiology  MRI brain neg; EEG neg for sz  No further sz activity observed      Labs reviewed and Medications reviewed  Julien catheter: No Julien      DVT Prophylaxis: Heparin    Ulcer prophylaxis: Not indicated  Antibiotics: Treating active infection/contamination beyond 24 hours perioperative coverage  Assessed for rehab: Patient was assess for and/or received rehabilitation services during this hospitalization

## 2017-06-07 NOTE — PROGRESS NOTES
Hospital Medicine Progress Note, Adult, Complex               Author: Milan Najjar Date & Time created: 6/7/2017  10:42 AM     Interval History:  47 year old with TTS schedule, came in with possible seizure.  Holding HD, no acute indication, no SOB. Noted seen by ID.   Doing better today    Review of Systems:  Review of Systems   Constitutional: Positive for malaise/fatigue. Negative for fever and chills.   Respiratory: Negative for cough.    Cardiovascular: Negative for chest pain and leg swelling.   Gastrointestinal: Positive for nausea.   Genitourinary: Negative for dysuria and urgency.   Neurological: Positive for weakness.       Physical Exam:  Physical Exam   Constitutional: She is oriented to person, place, and time. She appears cachectic.   HENT:   Head: Normocephalic and atraumatic.   Nose: Nose normal.   Eyes: Right eye exhibits no discharge. Left eye exhibits no discharge. No scleral icterus.   Cardiovascular: Normal rate and regular rhythm.    No murmur heard.  Pulmonary/Chest: Effort normal and breath sounds normal.   Abdominal: Soft. She exhibits no distension. There is no tenderness.   Musculoskeletal: She exhibits no edema or tenderness.   Neurological: She is alert and oriented to person, place, and time.       Labs:        Invalid input(s): [id number]      Recent Labs      06/05/17   0208  06/05/17   1446  06/06/17   0906  06/07/17   0231   SODIUM  131*   --   133*  136   POTASSIUM  4.5  4.4  4.8  4.1   CHLORIDE  99   --   100  101   CO2  27   --   25  31   BUN  38*   --   45*  20   CREATININE  3.19*   --   3.31*  2.07*   MAGNESIUM  2.0   --    --    --    PHOSPHORUS  5.0*   --    --    --    CALCIUM  7.2*   --   7.4*  7.4*     Recent Labs      06/05/17   0208  06/06/17   0906  06/07/17   0231   ALTSGPT  <5   --    --    ASTSGOT  11*   --    --    ALKPHOSPHAT  93   --    --    TBILIRUBIN  0.3   --    --    GLUCOSE  62*  55*  72     Recent Labs      06/05/17   0530  06/06/17   0651  06/07/17    0231   RBC  2.55*  2.70*  2.67*   HEMOGLOBIN  7.0*  7.2*  7.1*   HEMATOCRIT  23.0*  24.4*  24.0*   PLATELETCT  129*  120*  120*     Recent Labs      17   0208  17   0530  17   0651  17   0231   WBC   --   3.1*  3.6*  2.6*   NEUTSPOLYS   --   93.60*   --    --    LYMPHOCYTES   --   0.00*   --    --    MONOCYTES   --   0.00   --    --    EOSINOPHILS   --   0.00   --    --    BASOPHILS   --   0.00   --    --    ASTSGOT  11*   --    --    --    ALTSGPT  <5   --    --    --    ALKPHOSPHAT  93   --    --    --    TBILIRUBIN  0.3   --    --    --            Hemodynamics:  Temp (24hrs), Av.2 °C (97.1 °F), Min:36.1 °C (97 °F), Max:36.3 °C (97.3 °F)  Temperature: 36.2 °C (97.1 °F)  Pulse  Av.4  Min: 75  Max: 102   Blood Pressure: 126/78 mmHg     Respiratory:    Respiration: 17, Pulse Oximetry: 97 %        RUL Breath Sounds: Diminished, RML Breath Sounds: Diminished, RLL Breath Sounds: Diminished, ZEN Breath Sounds: Diminished, LLL Breath Sounds: Diminished  Fluids:    Intake/Output Summary (Last 24 hours) at 17 1042  Last data filed at 17 1232   Gross per 24 hour   Intake    500 ml   Output   1500 ml   Net  -1000 ml        GI/Nutrition:  Orders Placed This Encounter   Procedures   • Diet Order     Standing Status: Standing      Number of Occurrences: 1      Standing Expiration Date:      Order Specific Question:  Diet:     Answer:  Regular [1]     Medical Decision Making, by Problem:  Active Hospital Problems    Diagnosis   • ESRD (end stage renal disease) (CMS-HCC) [N18.6]   • Candidiasis of esophagus (CMS-HCC) [B37.81]   • Seizure (CMS-[facility]) [R56.9]   • Severe protein-calorie malnutrition (CMS-HCC) [E43]   • HTN (hypertension), benign [I10]   • Hypoglycemia [E16.2]     1. ESRD - HD TTS  2. Seizure like episode   3. HIV infection  4 hyponatremia  5 Anemia  6 N/V  Epo with HD  Renal dose all meds  Avoid nephrotoxins  Prognosis poor.  D/W Dr Linares      Labs reviewed and Medications  reviewed

## 2017-06-07 NOTE — PROGRESS NOTES
Received call from QASIM Engel concerning pt Rilpivirine and having family bring in med so she can receive her a.m. Dose tomorrow. Spoke with mother in person and she assured me that she will call [facility] in the morning to get pt med.

## 2017-06-08 NOTE — PROGRESS NOTES
"· Placed discharge outreach phone call to patient s/p hospital discharge 6/7/17.  Received recording stating \"the telephone number you have dialed is temporarily not in service.\"  Unable to reach patient at this time to complete discharge outreach phone call.  "

## 2017-06-10 PROBLEM — N17.9 ACUTE RENAL FAILURE (ARF) (HCC): Status: ACTIVE | Noted: 2017-01-01

## 2017-06-10 PROBLEM — A60.00 HERPES GENITALIS: Status: ACTIVE | Noted: 2017-01-01

## 2017-06-10 NOTE — ED NOTES
Pt c/o fever for the past two days. Denies cough, chest congestion, urinary symptoms. Is currently receiving treatment for HIV/AIDs. Pt also c/o left leg pain. Denies injury/trama. Denies back pain or other associated symptoms.

## 2017-06-10 NOTE — IP AVS SNAPSHOT
6/11/2017    Roger Mckeon  [address]  [place] [address]    Dear Roger:    [facility] wants to ensure your discharge home is safe and you or your loved ones have had all of your questions answered regarding your care after you leave the hospital.    Below is a list of resources and contact information should you have any questions regarding your hospital stay, follow-up instructions, or active medical symptoms.    Questions or Concerns Regarding… Contact   Medical Questions Related to Your Discharge  (7 days a week, 8am-5pm) Contact a Nurse Care Coordinator   [phone]   Medical Questions Not Related to Your Discharge  (24 hours a day / 7 days a week)  Contact the Nurse Health Line   [phone]    Medications or Discharge Instructions Refer to your discharge packet   or contact your [facility] Primary Care Provider   [phone]   Follow-up Appointment(s) Schedule your appointment via [employer]   or contact Scheduling [phone]   Billing Review your statement via [employer]  or contact Billing [phone]   Medical Records Review your records via [employer]   or contact Medical Records [phone]     You may receive a telephone call within two days of discharge. This call is to make certain you understand your discharge instructions and have the opportunity to have any questions answered. You can also easily access your medical information, test results and upcoming appointments via the [employer] free online health management tool. You can learn more and sign up at [email or web address]. For assistance setting up your [employer] account, please call [phone].    Once again, we want to ensure your discharge home is safe and that you have a clear understanding of any next steps in your care. If you have any questions or concerns, please do not hesitate to contact us, we are here for you. Thank you for choosing [facility] for your healthcare needs.    Sincerely,    Your [facility] Healthcare Team

## 2017-06-10 NOTE — IP AVS SNAPSHOT
" <p align=\"LEFT\"><IMG SRC=\"//[email or web address]\" alt=\"Image\" WIDTH=\"50%\" HEIGHT=\"200\" BORDER=\"\"></p>                   Name:Roger Mckeon  Medical Record Number:[id number]  CSN: [id number]    YOB: 1970   Age: 47 y.o.  Sex: female  HT:1.676 m (5' 6\") WT: 52.6 kg (115 lb 15.4 oz)          Admit Date: 6/10/2017     Discharge Date:   Today's Date: 6/11/2017  Attending Doctor:  Evin Echevarria M.D.                  Allergies:  Oxycodone          Follow-up Information     1. Schedule an appointment as soon as possible for a visit with Shonna Willoughby M.D..    Specialty:  Family Medicine    Contact information    [address] [place] [id number]  [phone]           Medication List      Take these Medications        Instructions    ethambutol 400 MG Tabs   Commonly known as:  MYAMBUTOL    Take 800 mg by mouth every day. Pt started a 14 day course on 6/2   Dose:  800 mg       fluoxetine 10 MG Caps   Commonly known as:  PROZAC    Take 10 mg by mouth every day. Indications: Depression   Dose:  10 mg       hydrOXYzine 25 MG Tabs   Commonly known as:  ATARAX    Take 25 mg by mouth 4 times a day.   Dose:  25 mg       K-PHOS 500 MG tablet   Generic drug:  potassium phosphate (monobasic)    Take 1 Tab by mouth every day. Indications: Supplement   Dose:  1 Tab       lisinopril 20 MG Tabs   Commonly known as:  PRINIVIL    Take 1 Tab by mouth every day.   Dose:  20 mg       moxifloxacin 0.5 % Soln   Commonly known as:  VIGAMOX    Place 1 Drop in left eye 4 times a day. Indications: EYE   Dose:  1 Drop       Non Formulary Request    Take 25 mg by mouth every day. Rilpivirine- take with food   Dose:  25 mg       nystatin 127600 UNIT/ML Susp   Commonly known as:  MYCOSTATIN    Take 500,000 Units by mouth 4 times a day. Indications: Candidiasis Fungal Infection of the Oropharynx   Dose:  202000 Units       ondansetron 4 MG Tbdp   Commonly known as:  ZOFRAN ODT    Take 1 Tab by mouth every four hours as needed " for Nausea/Vomiting (give PO if no IV route available).   Dose:  4 mg       prednisoLONE acetate 1 % Susp   Commonly known as:  PRED FORTE    Place 1 Drop in left eye 4 times a day. Indications: post eye surgery   Dose:  1 Drop       sodium bicarbonate 650 MG Tabs   Commonly known as:  SODIUM BICARBONATE    Take 1 Tab by mouth 3 times a day.   Dose:  650 mg       sulfamethoxazole-trimethoprim 400-80 MG Tabs   Commonly known as:  BACTRIM    Take 1 Tab by mouth every day.   Dose:  1 Tab       TIVICAY 50 MG Tabs tablet   Generic drug:  dolutegravir    Take 50 mg by mouth every day.   Dose:  50 mg       valacyclovir 500 MG Tabs   Commonly known as:  VALTREX    Take 1 Tab by mouth 2 Times a Day.   Dose:  500 mg       XANAX 0.25 MG Tabs   Generic drug:  alprazolam    Take 0.25 mg by mouth 3 times a day as needed for Anxiety. Indications: Feeling Anxious   Dose:  0.25 mg

## 2017-06-10 NOTE — IP AVS SNAPSHOT
" Home Care Instructions                                                                                                                  Name:Roger Mckeon  Medical Record Number:[id number]  CSN: [id number]    YOB: 1970   Age: 47 y.o.  Sex: female  HT:1.676 m (5' 6\") WT: 52.6 kg (115 lb 15.4 oz)          Admit Date: 6/10/2017     Discharge Date:   Today's Date: 6/11/2017  Attending Doctor:  Evin Echevarria M.D.                  Allergies:  Oxycodone            Discharge Instructions       Discharge Instructions    Discharged to home by car with relative. Discharged via wheelchair, hospital escort: Yes.  Special equipment needed: Not Applicable    Be sure to schedule a follow-up appointment with your primary care doctor or any specialists as instructed.     Discharge Plan:   Diet Plan: Discussed  Activity Level: Discussed  Confirmed Follow up Appointment: Patient to Call and Schedule Appointment  Confirmed Symptoms Management: Discussed  Medication Reconciliation Updated: Yes  Influenza Vaccine Indication: Patient Refuses    I understand that a diet low in cholesterol, fat, and sodium is recommended for good health. Unless I have been given specific instructions below for another diet, I accept this instruction as my diet prescription.   Other diet: diet as tolerated        · Is patient discharged on Warfarin / Coumadin?   No     Is patient Post Blood Transfusion?Acute Kidney Injury  Acute kidney injury is any condition in which there is sudden (acute) damage to the kidneys. Acute kidney injury was previously known as acute kidney failure or acute renal failure. The kidneys are two organs that lie on either side of the spine between the middle of the back and the front of the abdomen. The kidneys:  Remove wastes and extra water from the blood.    Produce important hormones. These help keep bones strong, regulate blood pressure, and help create red blood cells.    Balance the fluids and chemicals in the " blood and tissues.  A small amount of kidney damage may not cause problems, but a large amount of damage may make it difficult or impossible for the kidneys to work the way they should. Acute kidney injury may develop into long-lasting (chronic) kidney disease. It may also develop into a life-threatening disease called end-stage kidney disease. Acute kidney injury can get worse very quickly, so it should be treated right away. Early treatment may prevent other kidney diseases from developing.  CAUSES   A problem with blood flow to the kidneys. This may be caused by:    Blood loss.    Heart disease.    Severe burns.    Liver disease.  Direct damage to the kidneys. This may be caused by:  Some medicines.    A kidney infection.    Poisoning or consuming toxic substances.    A surgical wound.    A blow to the kidney area.    A problem with urine flow. This may be caused by:    Cancer.    Kidney stones.    An enlarged prostate.  SIGNS AND SYMPTOMS   Swelling (edema) of the legs, ankles, or feet.    Tiredness (lethargy).    Nausea or vomiting.    Confusion.    Problems with urination, such as:    Painful or burning feeling during urination.    Decreased urine production.    Frequent accidents in children who are potty trained.    Bloody urine.    Muscle twitches and cramps.    Shortness of breath.    Seizures.    Chest pain or pressure.  Sometimes, no symptoms are present.   DIAGNOSIS  Acute kidney injury may be detected and diagnosed by tests, including blood, urine, imaging, or kidney biopsy tests.   TREATMENT  Treatment of acute kidney injury varies depending on the cause and severity of the kidney damage. In mild cases, no treatment may be needed. The kidneys may heal on their own. If acute kidney injury is more severe, your health care provider will treat the cause of the kidney damage, help the kidneys heal, and prevent complications from occurring. Severe cases may require a procedure to remove toxic wastes from  the body (dialysis) or surgery to repair kidney damage. Surgery may involve:   Repair of a torn kidney.    Removal of an obstruction.  HOME CARE INSTRUCTIONS  Follow your prescribed diet.  Take medicines only as directed by your health care provider.   Do not take any new medicines (prescription, over-the-counter, or nutritional supplements) unless approved by your health care provider. Many medicines can worsen your kidney damage or may need to have the dose adjusted.    Keep all follow-up visits as directed by your health care provider. This is important.  Observe your condition to make sure you are healing as expected.  SEEK IMMEDIATE MEDICAL CARE IF:  You are feeling ill or have severe pain in the back or side.    Your symptoms return or you have new symptoms.  You have any symptoms of end-stage kidney disease. These include:    Persistent itchiness.    Loss of appetite.    Headaches.    Abnormally dark or light skin.  Numbness in the hands or feet.    Easy bruising.    Frequent hiccups.    Menstruation stops.    You have a fever.  You have increased urine production.  You have pain or bleeding when urinating.  MAKE SURE YOU:   Understand these instructions.  Will watch your condition.  Will get help right away if you are not doing well or get worse.     This information is not intended to replace advice given to you by your health care provider. Make sure you discuss any questions you have with your health care provider.     Document Released: 07/02/2012 Document Revised: 01/08/2016 Document Reviewed: 08/16/2013  [employer] Interactive Patient Education ©2016 [employer]  ·   Yes  POST BLOOD TRANSFUSION INFORMATION (ADULT)    The purpose of blood transfusion is to correct anemia, low levels of blood clotting factors or to correct acute blood loss.   Blood transfusion is very safe but occasionally unexpected adverse reactions do occur. Most adverse reactions occur during transfusion, within one to two days  following transfusion or one to two weeks following transfusion. Some adverse reactions can occur one to six months after transfusion and some even years later.             If any of the symptoms listed below happen to you during your transfusion,                                 please notify your nurse immediately.   · Fever or Chills  · Flushing of the Face  · Hives, rash or itching  · Difficulty in breathing or shortness of breath  · Pain or oozing of blood from the IV needle site  · Low back pain  · Nausea or vomiting  · Weakness or fainting  · Chest pain  · Blood in the urine  · Decreased frequency of urination    The above symptoms may also occur within 24 to 48 after transfusion; if so, notify your physician.     · Yellowing of the skin    This can happen one to six months after transfusion; if so, notify your physician    Depression / Suicide Risk    As you are discharged from this [facility] facility, it is important to learn how to keep safe from harming yourself.    Recognize the warning signs:  · Abrupt changes in personality, positive or negative- including increase in energy   · Giving away possessions  · Change in eating patterns- significant weight changes-  positive or negative  · Change in sleeping patterns- unable to sleep or sleeping all the time   · Unwillingness or inability to communicate  · Depression  · Unusual sadness, discouragement and loneliness  · Talk of wanting to die  · Neglect of personal appearance   · Rebelliousness- reckless behavior  · Withdrawal from people/activities they love  · Confusion- inability to concentrate     If you or a loved one observes any of these behaviors or has concerns about self-harm, here's what you can do:  · Talk about it- your feelings and reasons for harming yourself  · Remove any means that you might use to hurt yourself (examples: pills, rope, extension cords, firearm)  · Get professional help from the community (Mental Health, Substance Abuse,  psychological counseling)  · Do not be alone:Call your Safe Contact- someone whom you trust who will be there for you.  · Call your local CRISIS HOTLINE 790-7173 or 799-627-0897  · Call your local Children's Mobile Crisis Response Team [place] (466) 642-4062 or www.[employer]  · Call the toll free National Suicide Prevention Hotlines   · National Suicide Prevention Lifeline 093-044-ZVSI (0556)  · National Hope Line Network 800-SUICIDE (521-7250)        Follow-up Information     1. Schedule an appointment as soon as possible for a visit with Shonna Willoughby M.D..    Specialty:  Family Medicine    Contact information    [address] [place] [address] [address]  [address] [place] [address]  [phone]           Discharge Medication Instructions:    Below are the medications your physician expects you to take upon discharge:    Review all your home medications and newly ordered medications with your doctor and/or pharmacist. Follow medication instructions as directed by your doctor and/or pharmacist.    Please keep your medication list with you and share with your physician.               Medication List      START taking these medications        Instructions    Morning Afternoon Evening Bedtime    lisinopril 20 MG Tabs   Last time this was given:  20 mg on 6/11/2017 12:59 PM   Commonly known as:  PRINIVIL        Take 1 Tab by mouth every day.   Dose:  20 mg                        valacyclovir 500 MG Tabs   Last time this was given:  500 mg on 6/11/2017 12:57 PM   Commonly known as:  VALTREX        Take 1 Tab by mouth 2 Times a Day.   Dose:  500 mg                          CONTINUE taking these medications        Instructions    Morning Afternoon Evening Bedtime    ethambutol 400 MG Tabs   Last time this was given:  800 mg on 6/10/2017  1:01 PM   Commonly known as:  MYAMBUTOL        Take 800 mg by mouth every day. Pt started a 14 day course on 6/2   Dose:  800 mg                        fluoxetine 10 MG Caps   Last time this was given:  10  mg on 6/11/2017 12:57 PM   Commonly known as:  PROZAC        Take 10 mg by mouth every day. Indications: Depression   Dose:  10 mg                        hydrOXYzine 25 MG Tabs   Last time this was given:  25 mg on 6/11/2017 12:58 PM   Commonly known as:  ATARAX        Take 25 mg by mouth 4 times a day.   Dose:  25 mg                        K-PHOS 500 MG tablet   Generic drug:  potassium phosphate (monobasic)        Take 1 Tab by mouth every day. Indications: Supplement   Dose:  1 Tab                        moxifloxacin 0.5 % Soln   Last time this was given:  1 Drop on 6/10/2017  9:49 PM   Commonly known as:  VIGAMOX        Place 1 Drop in left eye 4 times a day. Indications: EYE   Dose:  1 Drop                        Non Formulary Request        Take 25 mg by mouth every day. Rilpivirine- take with food   Dose:  25 mg                        nystatin 400265 UNIT/ML Susp   Last time this was given:  500,000 Units on 6/10/2017  9:49 PM   Commonly known as:  MYCOSTATIN        Take 500,000 Units by mouth 4 times a day. Indications: Candidiasis Fungal Infection of the Oropharynx   Dose:  898928 Units                        ondansetron 4 MG Tbdp   Commonly known as:  ZOFRAN ODT        Take 1 Tab by mouth every four hours as needed for Nausea/Vomiting (give PO if no IV route available).   Dose:  4 mg                        prednisoLONE acetate 1 % Susp   Last time this was given:  1 Drop on 6/11/2017  6:07 AM   Commonly known as:  PRED FORTE        Place 1 Drop in left eye 4 times a day. Indications: post eye surgery   Dose:  1 Drop                        sodium bicarbonate 650 MG Tabs   Last time this was given:  650 mg on 6/11/2017 12:58 PM   Commonly known as:  SODIUM BICARBONATE        Take 1 Tab by mouth 3 times a day.   Dose:  650 mg                        sulfamethoxazole-trimethoprim 400-80 MG Tabs   Commonly known as:  BACTRIM        Take 1 Tab by mouth every day.   Dose:  1 Tab                        TIVICAY 50  MG Tabs tablet   Generic drug:  dolutegravir        Take 50 mg by mouth every day.   Dose:  50 mg                        XANAX 0.25 MG Tabs   Last time this was given:  0.25 mg on 6/10/2017  9:48 PM   Generic drug:  alprazolam        Take 0.25 mg by mouth 3 times a day as needed for Anxiety. Indications: Feeling Anxious   Dose:  0.25 mg                             Where to Get Your Medications      These medications were sent to [facility] - [place] - [address]  [address] [place] [address]     Phone:  [phone]    - lisinopril 20 MG Tabs  - valacyclovir 500 MG Tabs            Instructions           Diet / Nutrition:    Follow any diet instructions given to you by your doctor or the dietician, including how much salt (sodium) you are allowed each day.    If you are overweight, talk to your doctor about a weight reduction plan.    Activity:    Remain physically active following your doctor's instructions about exercise and activity.    Rest often.     Any time you become even a little tired or short of breath, SIT DOWN and rest.    Worsening Symptoms:    Report any of the following signs and symptoms to the doctor's office immediately:    *Pain of jaw, arm, or neck  *Chest pain not relieved by medication                               *Dizziness or loss of consciousness  *Difficulty breathing even when at rest   *More tired than usual                                       *Bleeding drainage or swelling of surgical site  *Swelling of feet, ankles, legs or stomach                 *Fever (>100ºF)  *Pink or blood tinged sputum  *Weight gain (3lbs/day or 5lbs /week)           *Shock from internal defibrillator (if applicable)  *Palpitations or irregular heartbeats                *Cool and/or numb extremities    Stroke Awareness    Common Risk Factors for Stroke include:    Age  Atrial Fibrillation  Carotid Artery Stenosis  Diabetes Mellitus  Excessive alcohol consumption  High blood  pressure  Overweight   Physical inactivity  Smoking    Warning signs and symptoms of a stroke include:    *Sudden numbness or weakness of the face, arm or leg (especially on one side of the body).  *Sudden confusion, trouble speaking or understanding.  *Sudden trouble seeing in one or both eyes.  *Sudden trouble walking, dizziness, loss of balance or coordination.Sudden severe headache with no known cause.    It is very important to get treatment quickly when a stroke occurs. If you experience any of the above warning signs, call 911 immediately.                   Disclaimer         Quit Smoking / Tobacco Use:    I understand the use of any tobacco products increases my chance of suffering from future heart disease or stroke and could cause other illnesses which may shorten my life. Quitting the use of tobacco products is the single most important thing I can do to improve my health. For further information on smoking / tobacco cessation call a Toll Free Quit Line at 1-813.827.6965 (*[employer]) or 1-625.542.1143 (American Lung Association) or you can access the web based program at www.[employer].org.    [place] Tobacco Users Help Line:  (824) 927-8652       Toll Free: 1-196.822.8668  Quit Tobacco Program [facility] (125)754-2617    Crisis Hotline:    [place] Crisis Hotline:  9-687-VLGNAYN or 1-527.803.6185    [place] Crisis Hotline:    1-631.719.6643 or 555-969-0731    Discharge Survey:   Thank you for choosing [facility]. We hope we did everything we could to make your hospital stay a pleasant one. You may be receiving a phone survey and we would appreciate your time and participation in answering the questions. Your input is very valuable to us in our efforts to improve our service to our patients and their families.        My signature on this form indicates that:    1. I have reviewed and understand the above information.  2. My questions regarding this information have  been answered to my satisfaction.  3. I have formulated a plan with my discharge nurse to obtain my prescribed medications for home.                  Disclaimer         __________________________________                     __________       ________                       Patient Signature                                                 Date                    Time

## 2017-06-10 NOTE — PROGRESS NOTES
2 RN skin assessment done; skin not WDL. See Wound flowsheet. Bilateral lower extremities have generalized scabbing and scars at various stages.  Open oozing sores to perineal area.

## 2017-06-10 NOTE — ED PROVIDER NOTES
ED Provider Note    CHIEF COMPLAINT  Chief Complaint   Patient presents with   • Fever   • Leg Pain     Left       HPI  Roger Mckeon is a 47 y.o. female who presents to the emergency room with a chief complaint of fever. The patient was recently admitted to the hospital and was discharged 3 days ago. Last night the patient started developing a fever. She complains of generalized body aches and generally not feeling well. She is a mild runny nose. No chest pain. No abdominal pain. No vomiting. She had some mild diarrhea 2 days ago. This seems to have resolved.    REVIEW OF SYSTEMS  See HPI for further details. All other systems are negative.     PAST MEDICAL HISTORY  Past Medical History   Diagnosis Date   • H/O: hysterectomy    • Hypertension    • Renal failure    • Diabetes      on tx since 1999, diet controlled   • Dental disorder      upper dentures   • Dialysis patient      3 x week   • Seizure disorder (CMS-HCC)    • HIV (human immunodeficiency virus infection) ([facility])        FAMILY HISTORY  Family History   Problem Relation Age of Onset   • Diabetes Mother    • Diabetes Father    • Hypertension Father    • Diabetes Sister        SOCIAL HISTORY  Social History     Social History   • Marital Status:      Spouse Name: N/A   • Number of Children: N/A   • Years of Education: N/A     Social History Main Topics   • Smoking status: Never Smoker    • Smokeless tobacco: None   • Alcohol Use: No   • Drug Use: No   • Sexual Activity: Not Asked     Other Topics Concern   • None     Social History Narrative       SURGICAL HISTORY  Past Surgical History   Procedure Laterality Date   • Hysterectomy, vaginal     • Other       back surgery 1999   • Incision and drainage general  4/21/2013     Performed by Conrado Prado M.D. at [facility]   • Gastroscopy with biopsy N/A 9/14/2016     Procedure: GASTROSCOPY WITH BIOPSY;  Surgeon: Rachid Salazar M.D.;  Location: [facility];  Service:   "  • Gyn surgery          • Av fistula creation Left 10/4/2016     Procedure: AV FISTULA GRAFT CREATION;  Surgeon: Travon Franco M.D.;  Location: SURGERY [facility];  Service:    • Gastroscopy N/A 5/10/2017     Procedure: GASTROSCOPY- Upper Endoscopy ;  Surgeon: Irene Jenkins M.D.;  Location: SURGERY [facility];  Service:        CURRENT MEDICATIONS  Home Medications     Reviewed by Dea Tucker PhT (Pharmacy Tech) on 06/10/17 at 0929  Med List Status: Complete    Medication Last Dose Status    alprazolam (XANAX) 0.25 MG Tab 2017 Active    dolutegravir (TIVICAY) 50 MG Tab tablet 2017 Active    ethambutol (MYAMBUTOL) 400 MG Tab 2017 Active    fluoxetine (PROZAC) 10 MG Cap 2017 Active    hydrOXYzine (ATARAX) 25 MG Tab 2017 Active    moxifloxacin (VIGAMOX) 0.5 % Solution 2017 Active    Non Formulary Request 2017 Active    nystatin (MYCOSTATIN) 758731 UNIT/ML Suspension 2017 Active    ondansetron (ZOFRAN ODT) 4 MG TABLET DISPERSIBLE 2017 Active    potassium phosphate, monobasic, (K-PHOS) 500 MG tablet 2017 Active    prednisoLONE acetate (PRED FORTE) 1 % Suspension 2017 Active    sodium bicarbonate (SODIUM BICARBONATE) 650 MG Tab 2017 Active    sulfamethoxazole-trimethoprim (BACTRIM) 400-80 MG Tab 2017 Active                ALLERGIES  Allergies   Allergen Reactions   • Oxycodone Itching       PHYSICAL EXAM  VITAL SIGNS: /100 mmHg  Pulse 114  Temp(Src) 38.2 °C (100.8 °F)  Resp 68  Ht 1.676 m (5' 6\")  Wt 51 kg (112 lb 7 oz)  BMI 18.16 kg/m2  SpO2 95%  LMP 2010  Constitutional: Chronically ill-appearing, appears older than stated age  HENT: Normocephalic, Atraumatic, Bilateral external ears normal, Oropharynx moist, No oral exudates, Nose normal.   Eyes: PERRLA, EOMI, Conjunctiva normal, No discharge.   Neck: Normal range of motion, No tenderness, Supple, No stridor.   Cardiovascular: Tachycardic, regular rhythm, no " audible murmur  Thorax & Lungs: Clear to auscultation bilaterally. No rales, rhonchi, or wheezing.  Abdomen: Bowel sounds normal, Soft, No tenderness, No masses, No pulsatile masses.   Skin: Warm, Dry, No erythema, No rash.   Back: No tenderness, No CVA tenderness.   Extremities: Intact distal pulses, No tenderness, No cyanosis, No clubbing. No edema. The patient notes  left leg pain. There is no evidence of infection. No edema or tenderness.  Neurologic: Alert & oriented x 3, Normal motor function, Normal sensory function, No focal deficits noted.     EKG      RADIOLOGY/PROCEDURES  DX-CHEST-PORTABLE (1 VIEW)   Final Result      No acute cardiopulmonary abnormality identified.        Results for orders placed or performed during the hospital encounter of 06/10/17   LACTIC ACID   Result Value Ref Range    Lactic Acid 2.37 (H) 0.50 - 2.00 mmol/L    Specimen Venous    CBC WITH DIFFERENTIAL   Result Value Ref Range    WBC 4.1 (L) 4.8 - 10.8 K/uL    RBC 2.68 (L) 4.20 - 5.40 M/uL    Hemoglobin 7.3 (L) 12.0 - 16.0 g/dL    Hematocrit 24.1 (L) 37.0 - 47.0 %    MCV 89.9 81.4 - 97.8 fL    MCH 27.2 27.0 - 33.0 pg    MCHC 30.3 (L) 33.6 - 35.0 g/dL    RDW 52.4 (H) 35.9 - 50.0 fL    Platelet Count 152 (L) 164 - 446 K/uL    MPV 9.8 9.0 - 12.9 fL    Neutrophils-Polys 87.30 (H) 44.00 - 72.00 %    Lymphocytes 6.10 (L) 22.00 - 41.00 %    Monocytes 4.90 0.00 - 13.40 %    Eosinophils 0.20 0.00 - 6.90 %    Basophils 0.50 0.00 - 1.80 %    Immature Granulocytes 1.00 (H) 0.00 - 0.90 %    Nucleated RBC 0.00 /100 WBC    Neutrophils (Absolute) 3.59 2.00 - 7.15 K/uL    Lymphs (Absolute) 0.25 (L) 1.00 - 4.80 K/uL    Monos (Absolute) 0.20 0.00 - 0.85 K/uL    Eos (Absolute) 0.01 0.00 - 0.51 K/uL    Baso (Absolute) 0.02 0.00 - 0.12 K/uL    Immature Granulocytes (abs) 0.04 0.00 - 0.11 K/uL    NRBC (Absolute) 0.00 K/uL   COMP METABOLIC PANEL   Result Value Ref Range    Sodium 133 (L) 135 - 145 mmol/L    Potassium 3.8 3.6 - 5.5 mmol/L    Chloride 98 96  - 112 mmol/L    Co2 26 20 - 33 mmol/L    Anion Gap 9.0 0.0 - 11.9    Glucose 66 65 - 99 mg/dL    Bun 15 8 - 22 mg/dL    Creatinine 2.46 (H) 0.50 - 1.40 mg/dL    Calcium 8.0 (L) 8.4 - 10.2 mg/dL    AST(SGOT) 22 12 - 45 U/L    ALT(SGPT) 11 2 - 50 U/L    Alkaline Phosphatase 126 (H) 30 - 99 U/L    Total Bilirubin 0.5 0.1 - 1.5 mg/dL    Albumin 1.8 (L) 3.2 - 4.9 g/dL    Total Protein 6.5 6.0 - 8.2 g/dL    Globulin 4.7 (H) 1.9 - 3.5 g/dL    A-G Ratio 0.4 g/dL   ESTIMATED GFR   Result Value Ref Range    GFR If African American 25 (A) >60 mL/min/1.73 m 2    GFR If Non African American 21 (A) >60 mL/min/1.73 m 2         COURSE & MEDICAL DECISION MAKING  Pertinent Labs & Imaging studies reviewed. (See chart for details)    The patient presents today with a fever. She has immunocompromise. She's recently been diagnosed with AIDS. She has been started on multiple medications. She could be having immune reconstitution syndrome or she may have an infection. She is febrile here. She's treated with antibiotics appropriate for sepsis of unclear etiology. She is due for dialysis today. I spoke with the patient's nephrologist Dr. Najjar who will arrange for inpatient dialysis. The patient will be admitted to the hospitalist service. I spoke with the on-call hospitalist Dr. Echevarria for admission.    CRITICAL CARE  I provided critical care services, which included medication orders, frequent reevaluations of the patient's condition and response to treatment, ordering and reviewing test results, and discussing the case with various consultants.  The critical care time associated with the care of the patient was 35 minutes. Review chart for interventions. This time is exclusive of any other billable procedures.        FINAL IMPRESSION  1. Sepsis, due to unspecified organism (CMS-[facility])    2. AIDS (acquired immune deficiency syndrome) (CMS-[facility])    3. ESRD needing dialysis (CMS-[facility])              Electronically signed by: Alexx Rai  6/10/2017 10:33 AM

## 2017-06-10 NOTE — CONSULTS
DATE OF SERVICE:  06/10/2017    DATE OF CONSULTATION:  06/10/2017    REQUESTING PHYSICIAN:  Dr. Alexx Rai from the emergency room service.    REASON FOR CONSULTATION:  Management of chronic kidney disease and assist in   the need for urgent dialysis.    The patient seen and examined, medical records were reviewed.    HISTORY OF PRESENT ILLNESS:  The patient is an unfortunate 47-year-old lady   who is very well known to our service.  She has end-stage renal disease,   undergoes hemodialysis on Tuesday, Thursday, Saturday at [facility],   recently diagnosed with HIV and she has been under infectious disease   speciality for treatment.  Her last dialysis was on 06/08/2017.  The patient   presented to the hospital with leg pain and fever.  The patient has been   admitted for possible sepsis and we are called to manage her kidney disease   and assist in the need for urgent dialysis.    The patient does feel weak, but she has no nausea, no vomiting.  She had mild   diarrhea couple days ago.    PAST MEDICAL HISTORY:  Significant for;  1.  End-stage renal disease.  2.  Hypertension.  3.  Gastroparesis.  4.  HIV.    SOCIAL HISTORY:  Patient has no smoking or alcohol use.  She lives with her   .    FAMILY HISTORY:  No known renal disease.    ALLERGIES:  The list was reviewed.    HOME MEDICATIONS:  Reviewed.    REVIEW OF SYSTEMS:  All systems were reviewed, total of 10 were negative   except outlined in the history of present illness.    PHYSICAL EXAMINATION:  GENERAL:  Patient looks cachectic, but no apparent distress.  VITAL SIGNS:  Showed blood pressure of 170/100, heart rate was 85.  HEENT:  Normocephalic.  Sclerae is anicteric.  NECK:  No lymphadenopathy.  CHEST:  Normal.  LUNGS:  Clear to auscultation.  HEART:  S1, S2.  ABDOMEN:  Soft, benign.  EXTREMITIES:  There is no lower extremity edema.  SKIN:  No skin rashes.  NEUROLOGIC:  Patient is alert, oriented x3.    LABORATORY DATA:  Recent labs were  reviewed.    ASSESSMENT:  1.  End-stage renal disease.  The patient has no acute need for renal   replacement therapy.  We will plan on dialysis tomorrow.  2.  Anemia.  3.  Uncontrolled hypertension.  4.  Hypoalbuminemia.    PLAN:  1.  There is no acute need for renal replacement therapy.  They will plan   tomorrow.  2.  Low sodium diet.  3.  ID evaluation.  4.  Nutrition evaluation.  5.  Renal dose all medications.  6.  Avoid nephrotoxins.  7.  Prognosis is guarded.    Plan discussed in detail with Dr. Rai as well as Dr. Echevarria.       ____________________________________     FADI NAJJAR, MD FN / JUAN    DD:  06/10/2017 12:48:29  DT:  06/10/2017 13:39:59    D#:  [id number]  Job#:  [id number]

## 2017-06-10 NOTE — ED NOTES
Med rec complete per prior visit, patient's ex  said he doesn't know what she takes but nothing has changed since last visit  Allergies reviewed    Pt completed 10 day course of Fluconazole & Azithromycin on 5/23  Completed a 14 day course of Bactrim on 5-27  Started 7 day course of Acyclovir on 6/2  Started 14 day course of Ethambutol on 6/2

## 2017-06-10 NOTE — IP AVS SNAPSHOT
FashionGuide Access Code: [email or web address]  Expires: 7/7/2017  8:36 AM    Your email address is not on file at [facility]  Email Addresses are required for you to sign up for FashionGuide, please contact [phone] to verify your personal information and to provide your email address prior to attempting to register for FashionGuide.    Roger Mckeon  [address]  [place] [address]    FashionGuide  A secure, online tool to manage your health information     [facility])’s FashionGuide® is a secure, online tool that connects you to your personalized health information from the privacy of your home -- day or night - making it very easy for you to manage your healthcare. Once the activation process is completed, you can even access your medical information using the FashionGuide raimundo, which is available for free in the Apple Raimundo store or Google Play store.     To learn more about FashionGuide, visit [email or web address]    There are two levels of access available (as shown below):   My Chart Features  [facility] Primary Care Doctor [facility]  Specialists [facility]  Urgent  Care Non-[facility] Primary Care Doctor   Email your healthcare team securely and privately 24/7 X X X    Manage appointments: schedule your next appointment; view details of past/upcoming appointments X      Request prescription refills. X      View recent personal medical records, including lab and immunizations X X X X   View health record, including health history, allergies, medications X X X X   Read reports about your outpatient visits, procedures, consult and ER notes X X X X   See your discharge summary, which is a recap of your hospital and/or ER visit that includes your diagnosis, lab results, and care plan X X  X     How to register for FashionGuide:  Once your e-mail address has been verified, follow the following steps to sign up for FashionGuide.     1. Go to  [email or web address]  2. Click on the Sign Up Now box, which takes you to the New Member Sign Up page. You will  need to provide the following information:  a. Enter your [employer] Access Code exactly as it appears at the top of this page. (You will not need to use this code after you’ve completed the sign-up process. If you do not sign up before the expiration date, you must request a new code.)   b. Enter your date of birth.   c. Enter your home email address.   d. Click Submit, and follow the next screen’s instructions.  3. Create a InfoRematet ID. This will be your [employer] login ID and cannot be changed, so think of one that is secure and easy to remember.  4. Create a [employer] password. You can change your password at any time.  5. Enter your Password Reset Question and Answer. This can be used at a later time if you forget your password.   6. Enter your e-mail address. This allows you to receive e-mail notifications when new information is available in [employer].  7. Click Sign Up. You can now view your health information.    For assistance activating your [employer] account, call [phone]

## 2017-06-11 PROBLEM — N39.0 UTI (URINARY TRACT INFECTION): Status: RESOLVED | Noted: 2017-01-01 | Resolved: 2017-01-01

## 2017-06-11 PROBLEM — N17.9 ACUTE RENAL FAILURE (ARF) (HCC): Status: RESOLVED | Noted: 2017-01-01 | Resolved: 2017-01-01

## 2017-06-11 PROBLEM — A60.00 HERPES GENITALIS: Status: RESOLVED | Noted: 2017-01-01 | Resolved: 2017-01-01

## 2017-06-11 NOTE — DISCHARGE INSTRUCTIONS
Discharge Instructions    Discharged to home by car with relative. Discharged via wheelchair, hospital escort: Yes.  Special equipment needed: Not Applicable    Be sure to schedule a follow-up appointment with your primary care doctor or any specialists as instructed.     Discharge Plan:   Diet Plan: Discussed  Activity Level: Discussed  Confirmed Follow up Appointment: Patient to Call and Schedule Appointment  Confirmed Symptoms Management: Discussed  Medication Reconciliation Updated: Yes  Influenza Vaccine Indication: Patient Refuses    I understand that a diet low in cholesterol, fat, and sodium is recommended for good health. Unless I have been given specific instructions below for another diet, I accept this instruction as my diet prescription.   Other diet: diet as tolerated        · Is patient discharged on Warfarin / Coumadin?   No     Is patient Post Blood Transfusion?Acute Kidney Injury  Acute kidney injury is any condition in which there is sudden (acute) damage to the kidneys. Acute kidney injury was previously known as acute kidney failure or acute renal failure. The kidneys are two organs that lie on either side of the spine between the middle of the back and the front of the abdomen. The kidneys:  Remove wastes and extra water from the blood.    Produce important hormones. These help keep bones strong, regulate blood pressure, and help create red blood cells.    Balance the fluids and chemicals in the blood and tissues.  A small amount of kidney damage may not cause problems, but a large amount of damage may make it difficult or impossible for the kidneys to work the way they should. Acute kidney injury may develop into long-lasting (chronic) kidney disease. It may also develop into a life-threatening disease called end-stage kidney disease. Acute kidney injury can get worse very quickly, so it should be treated right away. Early treatment may prevent other kidney diseases from developing.  CAUSES    A problem with blood flow to the kidneys. This may be caused by:    Blood loss.    Heart disease.    Severe burns.    Liver disease.  Direct damage to the kidneys. This may be caused by:  Some medicines.    A kidney infection.    Poisoning or consuming toxic substances.    A surgical wound.    A blow to the kidney area.    A problem with urine flow. This may be caused by:    Cancer.    Kidney stones.    An enlarged prostate.  SIGNS AND SYMPTOMS   Swelling (edema) of the legs, ankles, or feet.    Tiredness (lethargy).    Nausea or vomiting.    Confusion.    Problems with urination, such as:    Painful or burning feeling during urination.    Decreased urine production.    Frequent accidents in children who are potty trained.    Bloody urine.    Muscle twitches and cramps.    Shortness of breath.    Seizures.    Chest pain or pressure.  Sometimes, no symptoms are present.   DIAGNOSIS  Acute kidney injury may be detected and diagnosed by tests, including blood, urine, imaging, or kidney biopsy tests.   TREATMENT  Treatment of acute kidney injury varies depending on the cause and severity of the kidney damage. In mild cases, no treatment may be needed. The kidneys may heal on their own. If acute kidney injury is more severe, your health care provider will treat the cause of the kidney damage, help the kidneys heal, and prevent complications from occurring. Severe cases may require a procedure to remove toxic wastes from the body (dialysis) or surgery to repair kidney damage. Surgery may involve:   Repair of a torn kidney.    Removal of an obstruction.  HOME CARE INSTRUCTIONS  Follow your prescribed diet.  Take medicines only as directed by your health care provider.   Do not take any new medicines (prescription, over-the-counter, or nutritional supplements) unless approved by your health care provider. Many medicines can worsen your kidney damage or may need to have the dose adjusted.    Keep all follow-up visits as  directed by your health care provider. This is important.  Observe your condition to make sure you are healing as expected.  SEEK IMMEDIATE MEDICAL CARE IF:  You are feeling ill or have severe pain in the back or side.    Your symptoms return or you have new symptoms.  You have any symptoms of end-stage kidney disease. These include:    Persistent itchiness.    Loss of appetite.    Headaches.    Abnormally dark or light skin.  Numbness in the hands or feet.    Easy bruising.    Frequent hiccups.    Menstruation stops.    You have a fever.  You have increased urine production.  You have pain or bleeding when urinating.  MAKE SURE YOU:   Understand these instructions.  Will watch your condition.  Will get help right away if you are not doing well or get worse.     This information is not intended to replace advice given to you by your health care provider. Make sure you discuss any questions you have with your health care provider.     Document Released: 07/02/2012 Document Revised: 01/08/2016 Document Reviewed: 08/16/2013  [employer] Interactive Patient Education ©2016 Elsevier Inc.  ·   Yes  POST BLOOD TRANSFUSION INFORMATION (ADULT)    The purpose of blood transfusion is to correct anemia, low levels of blood clotting factors or to correct acute blood loss.   Blood transfusion is very safe but occasionally unexpected adverse reactions do occur. Most adverse reactions occur during transfusion, within one to two days following transfusion or one to two weeks following transfusion. Some adverse reactions can occur one to six months after transfusion and some even years later.             If any of the symptoms listed below happen to you during your transfusion,                                 please notify your nurse immediately.   · Fever or Chills  · Flushing of the Face  · Hives, rash or itching  · Difficulty in breathing or shortness of breath  · Pain or oozing of blood from the IV needle site  · Low back  pain  · Nausea or vomiting  · Weakness or fainting  · Chest pain  · Blood in the urine  · Decreased frequency of urination    The above symptoms may also occur within 24 to 48 after transfusion; if so, notify your physician.     · Yellowing of the skin    This can happen one to six months after transfusion; if so, notify your physician    Depression / Suicide Risk    As you are discharged from this [facility] facility, it is important to learn how to keep safe from harming yourself.    Recognize the warning signs:  · Abrupt changes in personality, positive or negative- including increase in energy   · Giving away possessions  · Change in eating patterns- significant weight changes-  positive or negative  · Change in sleeping patterns- unable to sleep or sleeping all the time   · Unwillingness or inability to communicate  · Depression  · Unusual sadness, discouragement and loneliness  · Talk of wanting to die  · Neglect of personal appearance   · Rebelliousness- reckless behavior  · Withdrawal from people/activities they love  · Confusion- inability to concentrate     If you or a loved one observes any of these behaviors or has concerns about self-harm, here's what you can do:  · Talk about it- your feelings and reasons for harming yourself  · Remove any means that you might use to hurt yourself (examples: pills, rope, extension cords, firearm)  · Get professional help from the community (Mental Health, Substance Abuse, psychological counseling)  · Do not be alone:Call your Safe Contact- someone whom you trust who will be there for you.  · Call your local CRISIS HOTLINE 599-4715 or 090-061-5401  · Call your local Children's Mobile Crisis Response Team [place] (922) 718-3776 or www.Qnekt  · Call the toll free National Suicide Prevention Hotlines   · National Suicide Prevention Lifeline 852-851-GEJT (5184)  · National Hope Line Network 800-SUICIDE (398-9999)

## 2017-06-11 NOTE — PROGRESS NOTES
Home Medication:  Patient has order to take her TIVICAY home med however the [facility] did not give her the med bottle.  They gave her the weekly pill minder w/ several different pills for each day.  I phoned the [facility] and they are not open until Monday.  I phoned the Inpatient Pharmacist at [facility] for guidance.  I was advised to bring the weekly pill minder to our pharmacy in the morning when it opens.  The Tivicay can be identified, put in a separate container and labeled for use here.  Explained plan to patient and family.  They are agreeable with plan.    Nicole TRIPP ([phone]) on call all weekend from [facility] returned my call about Tivicay.  She states that patient also takes RILPIVIRINE 25mg QD for HIV.  This is not yet ordered for her here but Nicole TRIPP states the Rilpivirine is also in the Weekly pill minder.  I will pass this on to day shift if I can not get it ordered before I leave.

## 2017-06-11 NOTE — PROGRESS NOTES
[facility] Medicine Progress Note, Adult, Complex               Author: Fadi Najjar Date & Time created: 2017  1:37 PM     Interval History:  Pt with ESRD, presented with fever, doing better  Seen and examined while getting HD.      Review of Systems:  Review of Systems   Constitutional: Positive for malaise/fatigue. Negative for fever and chills.   Gastrointestinal: Negative for nausea.   Neurological: Positive for weakness.       Physical Exam:  Physical Exam   Constitutional: She is oriented to person, place, and time. No distress.   HENT:   Nose: Nose normal.   Musculoskeletal: She exhibits no edema.   Neurological: She is alert and oriented to person, place, and time.   Vitals reviewed.      Labs:  Recent Labs      06/10/17   0950  06/10/17   1150  06/10/17   1628   ISTATSPEC  Venous  Venous  Venous         Recent Labs      06/10/17   0950  06/10/17   1315  17   0810   SODIUM  133*   --   132*   POTASSIUM  3.8  3.8  4.0   CHLORIDE  98   --   99   CO2  26   --   25   BUN  15   --   23*   CREATININE  2.46*   --   2.79*   CALCIUM  8.0*   --   7.3*     Recent Labs      06/10/17   0950  17   0810   ALTSGPT  11  12   ASTSGOT  22  19   ALKPHOSPHAT  126*  116*   TBILIRUBIN  0.5  0.6   GLUCOSE  66  61*     Recent Labs      06/10/17   0950  17   0711   RBC  2.68*  2.48*   HEMOGLOBIN  7.3*  6.7*   HEMATOCRIT  24.1*  24.3*   PLATELETCT  152*  118*     Recent Labs      06/10/17   0950  17   0711  17   0810   WBC  4.1*  4.6*   --    NEUTSPOLYS  87.30*  86.20*   --    LYMPHOCYTES  6.10*  3.50*   --    MONOCYTES  4.90  5.80   --    EOSINOPHILS  0.20  1.90   --    BASOPHILS  0.50  0.40   --    ASTSGOT  22   --   19   ALTSGPT  11   --   12   ALKPHOSPHAT  126*   --   116*   TBILIRUBIN  0.5   --   0.6           Hemodynamics:  Temp (24hrs), Av.1 °C (98.8 °F), Min:37 °C (98.6 °F), Max:37.4 °C (99.4 °F)  Temperature: 37 °C (98.6 °F)  Pulse  Av.1  Min: 86  Max: 118   Blood Pressure: (!)  174/99 mmHg     Respiratory:    Respiration: 19, Pulse Oximetry: 98 %, O2 Daily Delivery Respiratory : Room Air with O2 Available        RUL Breath Sounds: Clear, RML Breath Sounds: Clear, RLL Breath Sounds: Clear, ZEN Breath Sounds: Clear, LLL Breath Sounds: Clear  Fluids:    Intake/Output Summary (Last 24 hours) at 06/11/17 1337  Last data filed at 06/11/17 1241   Gross per 24 hour   Intake   1830 ml   Output   3000 ml   Net  -1170 ml     Weight: 52.6 kg (115 lb 15.4 oz)  GI/Nutrition:  Orders Placed This Encounter   Procedures   • DIET ORDER     Standing Status: Standing      Number of Occurrences: 1      Standing Expiration Date:      Order Specific Question:  Diet:     Answer:  Renal [8]     Medical Decision Making, by Problem:  Active Hospital Problems    Diagnosis   • Fever [R50.9]       Core Measures  1 ESRD: HD  2 HIV  no need for HD  Renal dose all meds  Avoid nephrotoxins  Prognosis poor.

## 2017-06-11 NOTE — PROGRESS NOTES
RN Night Shift Note:  A&O. VSS, afebrile, RA sats in mid and high 90s, lungs clear,  HR in 70s and regular, /74, 144/72.  Pain is 8/10 on buttocks and perineum w/ herpes lesions.  10mg Oxycodone given at HS with good results.  Patient also received alprazolam 25mg at HS.  Poor oral intake.  Weak.  No edema. Peripheral pulses faint but extremities warm w/ good cap refill.  BM on 6/9. Oliguric.         Family went to [facility] this evening to  HIV meds.  Please see previous note.  These will be sent to pharmacy in am for identification and barcoding for administration.

## 2017-06-11 NOTE — H&P
Pt seen and examined.    Full dictation to follow    Treating sepsis -- iv zosyn and iv vanc. Source is unknown.    Follow all cultures    Consulted dr jones of renal for dialysis    Cont hiv medications      Started valtrex for genital lesions that appear herpetic    Check am cbc, bmp

## 2017-06-11 NOTE — PROGRESS NOTES
1300Report received, plan of care reviewed and discussed, assessment complete, oriented to room, bed alarm on, nonskid socks applied, advised to call for assistance.   1600 Complaints of pain, medication administered per MAR, will continue to monitor.  Report given to next shift.

## 2017-06-11 NOTE — PROGRESS NOTES
Hd treatment ordered by Dr Najjar started at 0900 and ended at 1200 with net uf of 1620 ml as bp tolerated. Increased in BP and HR close to the end of treatment, primary RN notified and called hospitalist MD for order. Packed RBC was given with no adverse reaction noted. See flow sheet for details.

## 2017-06-11 NOTE — DISCHARGE PLANNING
Care Transition Team Assessment    Information Source  Orientation : Oriented x 4  Information Given By: Patient  Who is responsible for making decisions for patient? : Patient    Readmission Evaluation  Is this a readmission?: Yes - unplanned readmission  Why do you think you were readmitted?: still not feeling well    Elopement Risk  Legal Hold: No  Ambulatory or Self Mobile in Wheelchair: No-Not an Elopement Risk  Elopement Risk: Not at Risk for Elopement    Interdisciplinary Discharge Planning  Does Admitting Nurse Feel This Could be a Complex Discharge?: No  Primary Care Physician: jair  Lives with - Patient's Self Care Capacity: Unrelated Adult  Patient or legal guardian wants to designate a caregiver (see row info): No  Support Systems: Family Member(s)  Housing / Facility: 1 Story House  Do You Take your Prescribed Medications Regularly: Yes  Able to Return to Previous ADL's: Yes  Mobility Issues: Yes  Prior Services: Skilled Home Health Services  Patient Expects to be Discharged to:: home  Assistance Needed: No  Durable Medical Equipment: Walker, Other - Specify (cane)    Discharge Preparedness  What is your plan after discharge?: Home with help  Prior Functional Level: Independent with Activities of Daily Living    Functional Assesment  Prior Functional Level: Independent with Activities of Daily Living    Finances  Financial Barriers to Discharge: No  Prescription Coverage: Yes    Vision / Hearing Impairment  Vision Impairment : No  Hearing Impairment : No    Values / Beliefs / Concerns  Values / Beliefs Concerns : No    Advance Directive  Advance Directive?: None    Domestic Abuse  Have you ever been the victim of abuse or violence?: No  Physical Abuse or Sexual Abuse: No  Verbal Abuse or Emotional Abuse: No  Possible Abuse Reported to:: Not Applicable    Psychological Assessment  History of Substance Abuse: None    Discharge Risks or Barriers  Discharge risks or barriers?: No    Anticipated Discharge  Information  Anticipated discharge disposition: Home

## 2017-06-11 NOTE — DISCHARGE PLANNING
Patient discussed in morning rounds.  Patient reportedly lives at home and plans to return home when medically able. No current SS needs noted.

## 2017-06-11 NOTE — H&P
DATE OF SERVICE:  06/10/2017    PRIMARY CARE PHYSICIAN:  Nancy Perera MD    REASON FOR EVALUATION:  Fever.    CONSULTATION:  Dr. Morgan of infectious disease.    This patient comes to the hospital complaining of fever and she was admitted   with diagnosis of sepsis, unclear etiology.  Patient has known history of HIV,   recently discharged from the hospital.  Dr. Morgan was following her up at   the [facility].  Patient has a history of end-stage renal   disease, on dialysis and we also have consulted Dr. Najjar of nephrology.    Patient when I saw her was a very poor historian and other than the general   malaise, could not tell me about any complaints that may help localize the   actual infection.    REVIEW OF SYSTEMS:  Limited secondary to patient's lethargy.    ALLERGIES:  TO CODEINE, WHICH IS JUST ITCHING.    PAST MEDICAL HISTORY:  End-stage renal disease, HIV, essential hypertension,   esophageal  candidiasis, pulmonary nodule, gastroparesis.    PAST SURGICAL HISTORY:  Hysterectomy, lower back surgery and eye surgery.    SOCIAL HISTORY:  Does not smoke, does not drink.  Denies using drugs.    FAMILY HISTORY:  No family history of diabetes, hypertension or stroke in the   family.    OUTPATIENT MEDICATIONS:  Tivicay 50 mg daily, Vigamox eye drops to left eye 4   times a day, nystatin swish and swallow oral, Pred Forte 1 drop in the left   eye 4 times a day, hydroxyzine 25 mg 4 times a day, Bactrim single strength 1   tablet daily, ethambutol 800 mg daily, Prozac 10 mg daily, sodium bicarbonate   650 mg t.i.d.    PHYSICAL EXAMINATION:  VITAL SIGNS:  Blood pressure 170/100, pulse is 114, temperature 38.2.  GENERAL:  This is a  female, looks older than stated age, chronically   ill appearing.  HEENT:  Sclerae anicteric.  Oral mucosa is dry.  NECK:  Supple.  No adenopathy appreciated in cervical or axillary area.  HEART:  S1, S2 tachycardic, no murmurs appreciated.  LUNGS:  Clear.  No  rales or wheezing.  ABDOMEN:  Soft, nontender, positive bowel sounds appreciated.  EXTREMITIES:  No edema, cyanosis or clubbing.  GENITOURINARY:  Examination of genital area shows evidence of some vesicular   lesions on her labia.    LABORATORY DATA:  X-ray of chest is clear.  Lactic acid 2.37.  White cell   count is 4, hemoglobin is 7.3, hematocrit 24, platelet count is 152.  Sodium   132, potassium 3.8, glucose 66, BUN of 15, creatinine of 2.46.    IMPRESSION:  1.  Sepsis, unclear etiology.  2.  History of human immunodeficiency virus positive.  2.  Herpes of the genitalia.  3.  End-stage renal disease, requiring dialysis.    PLAN:  We have consulted Dr. Najjar of nephrology for dialysis.  Dr. Morgan   of infectious disease to evaluate the patient in the a.m. because of her   extensive infectious history.  Follow up on all cultures.  Patient is given   empiric antibiotics of Zosyn 3 g IV q. 12 hours and vancomycin per pharmacy.    Repeat labs in a.m., CBC, BMP.  Based on patient's presentation, we   anticipated 2-midnight stay for this patient.       ____________________________________     MD OLIMPIA RESENDEZ / JUAN    DD:  06/11/2017 10:11:25  DT:  06/11/2017 10:52:50    D#:  [id number]  Job#:  [id number]

## 2017-06-11 NOTE — PROGRESS NOTES
Seen at request of Dr. Echevarria  47 year old newly diagnosed  AIDS patient seen in f/u for fever.   Has multiple medical problems ;  CD4 - 2, HIV RNA  60,000   Has -AIDS wasting    MAC bacteremia   HSV perineal ulcerations- on oral acyclovir , has severe pain and Friday given Rx for dilaudid 2mg QID with f/u one week in clinic    Esophagitis - has been on fluconazole with resolution   Seizures- negative CT scan and non--focal exam   Admitted now with fever and foot pain, both have resolved   Renal failure    PE= wasted ,alert appearing   No thrush  Lungs - clear   abd - soft   - has shown multiple HSV ulcerations     EXt- wasted, scars ,   Site     BLOOD     Fungal Culture (Abnormal)     Acid fast bacilli detected by Bactec instrument.     Fungal Culture (Abnormal)     Mycobacterium avium-intracellulare Complex   By DNA probe   Negative for M. tb complex   Positive for M. avium complex   Negative for M. gordonae   Negative for M. kansasii   Testing performed at:      Assess: fever from MAC  Other problems under control         REC:   Stop zosyn/vanco  Continue dolutegravir/rilpivirine for HIV therapy  Bactrim DS for PCP prophylaxis  Fluconazole for esophagitis/crypto prophylaxis  Azithro/ethambutol for  MAC  Pain control- has dilaudid at home  Discharge today  Has f/u next Friday whit [facility]OPES

## 2017-06-11 NOTE — PROGRESS NOTES
Sl dc'd site intact pt given dc inst and will  her meds at pharmacy on file  Pt given home meds  States she would like to rest awhile before she goes home  Family at bedside

## 2017-06-20 PROBLEM — K20.90 ESOPHAGITIS: Status: ACTIVE | Noted: 2017-01-01

## 2017-06-20 NOTE — PROGRESS NOTES
Hemodialysis ordered by Dr. Najjar. Treatment started at 1229 and ended at 1529. Pt stable, vss, no c/o post tx. See flow sheets for details. Net UF 1.0 liter. Report to TYE Mock RN. Dr. Najjar notified IV on LT UE ( LT UA AVF). Dr. najjar wants primary RN to remove it and replace on none AVF arm. Primary, RN notified.

## 2017-06-20 NOTE — ED NOTES
Blood drawn by . Pt straight cathed, urine colleted and sent. Pt had small bowel movement, pt cleaned and repositioned. X-ray at bedside. Family updated on poc.

## 2017-06-20 NOTE — ED NOTES
Pt bib remsa from home. Per report pt has not been eating or drinking water adequately for the past 3 months. Pt was more altered today, fsbs was 48. Pt received oral glucose and  ml of D-50. Repeat fsbs 138. Pt is dialysis pt on Tuesday, Thursday, Saturday. Pt is able to respond appropriately, just slow to. Vss. Chart up for erp.

## 2017-06-20 NOTE — ED NOTES
"Med rec updated and complete  Allergies reviewed  Pts EX  states \"I get her medications at [facility] in a package, I will bring in her RILPIVIRINE 25MG\".   Pts EX  was not sure of some of the medication.  Called [facility] @ [phone] to verify all prescription medications.     "

## 2017-06-20 NOTE — IP AVS SNAPSHOT
[employer] Access Code: [email or web address]  Expires: 7/7/2017  8:36 AM    Your email address is not on file at [facility].  Email Addresses are required for you to sign up for [employer], please contact [phone] to verify your personal information and to provide your email address prior to attempting to register for [employer].    Roger Mckeon  [address]  [place] [address]    [employer]  A secure, online tool to manage your health information     [facility]’s [employer]® is a secure, online tool that connects you to your personalized health information from the privacy of your home -- day or night - making it very easy for you to manage your healthcare. Once the activation process is completed, you can even access your medical information using the [employer] arimundo, which is available for free in the Apple Raimundo store or Google Play store.     To learn more about [employer], visit [email or web address]    There are two levels of access available (as shown below):   My Chart Features  [facility] Primary Care Doctor [facility]  Specialists [facility]  Urgent  Care Non-[facility] Primary Care Doctor   Email your healthcare team securely and privately 24/7 X X X    Manage appointments: schedule your next appointment; view details of past/upcoming appointments X      Request prescription refills. X      View recent personal medical records, including lab and immunizations X X X X   View health record, including health history, allergies, medications X X X X   Read reports about your outpatient visits, procedures, consult and ER notes X X X X   See your discharge summary, which is a recap of your hospital and/or ER visit that includes your diagnosis, lab results, and care plan X X  X     How to register for [employer]:  Once your e-mail address has been verified, follow the following steps to sign up for [employer].     1. Go to  [email or web address]  2. Click on the Sign Up Now box, which takes you to the New Member Sign Up page. You will  need to provide the following information:  a. Enter your [employer] Access Code exactly as it appears at the top of this page. (You will not need to use this code after you’ve completed the sign-up process. If you do not sign up before the expiration date, you must request a new code.)   b. Enter your date of birth.   c. Enter your home email address.   d. Click Submit, and follow the next screen’s instructions.  3. Create a [employer] ID. This will be your [employer] login ID and cannot be changed, so think of one that is secure and easy to remember.  4. Create a [employer] password. You can change your password at any time.  5. Enter your Password Reset Question and Answer. This can be used at a later time if you forget your password.   6. Enter your e-mail address. This allows you to receive e-mail notifications when new information is available in [employer].  7. Click Sign Up. You can now view your health information.    For assistance activating your [employer] account, call [phone]

## 2017-06-20 NOTE — IP AVS SNAPSHOT
" Home Care Instructions                                                                                                                  Name:Roger Mckeon  Medical Record Number:[id number]  CSN: [id number]    YOB: 1970   Age: 47 y.o.  Sex: female  HT:1.676 m (5' 5.98\") WT: 54.4 kg (119 lb 14.9 oz)          Admit Date: 6/20/2017     Discharge Date:   Today's Date: 6/27/2017  Attending Doctor:  Leonora Okeefe M.D.                  Allergies:  Oxycodone            Discharge Instructions       Discharge Instructions    Discharged to home by car with relative. Discharged via wheelchair, hospital escort: Yes.  Special equipment needed: Wheelchair    Be sure to schedule a follow-up appointment with your primary care doctor or any specialists as instructed.     Discharge Plan: home with home health.    Diet Plan: Discussed  Activity Level: Discussed  Confirmed Follow up Appointment: Patient to Call and Schedule Appointment  Confirmed Symptoms Management: Discussed  Medication Reconciliation Updated: Yes  Influenza Vaccine Indication: Patient Refuses    I understand that a diet low in cholesterol, fat, and sodium is recommended for good health. Unless I have been given specific instructions below for another diet, I accept this instruction as my diet prescription.   Other diet:  Continue Renal diet.    Special Instructions: None    · Is patient discharged on Warfarin / Coumadin?   No     · Is patient Post Blood Transfusion?  No      Esofagitis   (Esophagitis)   La esofagitis es la inflamación del esófago. Puede angel inflamación y dolor. Verónica problema hacer que tragar sea difícil y doloroso.  CAUSAS   La mayoría de las causas que producen esofagitis no son graves. Diferentes factores pueden ocasionarla, entre ellos:  · Reflujo gastroesofágico. El reflujo gastroesofágico ocurre cuando el ácido del estómago pasa al esófago.  · Vómitos recurrentes.  · Reacciones alérgicas.  · Ciertos medicamentos, especialmente aquellos " que vienen en pastillas grandes.  · La ingestión de productos químicos nocivos, tales ottoniel productos de limpieza del hogar.  · Consumo excesivo de alcohol.  · Sha infección del esófago.  · Tratamiento de radiación para el cáncer.  · Ciertas enfermedades ottoniel la sarcoidosis, enfermedad de Crohn, y la esclerodermia. Estas enfermedades pueden causar esofagitis recurrente.  SÍNTOMAS   · Dificultad para tragar.  · Dolor al tragar.  · Dolor en el pecho.  · Dificultad para respirar.  · Náuseas.  · Vómitos.  · Dolor abdominal.  DIAGNÓSTICO   El médico le preguntará acerca de clarissa síntomas y le hará un examen físico. Dependiendo de lo que el médico encuentre, también podrá indicar ciertas pruebas, por ejemplo:   · Radiografía de bario. Le darán para beber sha solución que recubre el esófago, y se tomarán radiografías.  · Endoscopía. Se inserta un tubo con grace por el esófago para que el médico pueda examinar el área.  · Pruebas de alergia. A veces se pueden realizar en las visitas de control.  TRATAMIENTO   El tratamiento dependerá de la causa de la esofagitis. En algunos casos, le recetarán corticoides u otros medicamentos para ayudar a aliviar clarissa síntomas o tratar la causa subyacente del problema. Los medicamentos que le podrán recetar son:   · Lidocaína viscosa, para suavizar el esófago.  · Antiácidos.  · Reductores del ácido.  · Inhibidores de la bomba de protones.  · Antivirales para ciertas infecciones virales del esófago.  · Antimicóticos para ciertas infecciones fúngicas en el esófago.  · Antibióticos, dependiendo de la causa de la esofagitis.  INSTRUCCIONES PARA EL CUIDADO EN EL HOGAR   · Evite las comidas y bebidas que empeoran los problemas.  · Donato comidas pequeñas elza el día en lugar de 3 comidas abundantes.  · Evite comer elza las 3 horas antes de acostarse.  · Si tiene problemas para david pastillas, use un cortador de píldoras para disminuir el tamaño y la probabilidad de que la píldora se queda pegada  y lesione el fondo del esófago. Beber agua después de david sha píldora también ayuda.  · Si fuma, abandone el hábito.  · Mantenga un peso saludable.  · Use ropas sueltas. No use nada apretado alrededor de la cintura que cause presión en el estómago.  · Levante la cabecera de la cama 6 a 8 pulgadas (15 a 20 cm) con bloques de small. Usar almohadas extra no ayuda.  · [place] sólo medicamentos de venta bautista o recetados, según las indicaciones del médico.  SOLICITE ATENCIÓN MÉDICA DE INMEDIATO SI:   · Siente un dolor intenso en el pecho que se irradia hacia el jazmine, los brazos o la mandíbula.  · Se siente transpirado, mareado o sufre un desmayo.  · Le falta el aire.  · Vomita tyler.  · Tiene dificultad o dolor al tragar.  · La materia fecal es philip, de aspecto alquitranado.  · Tiene fiebre.  · Tiene sha sensación de ardor en el pecho más de 3 veces a la semana elza más de 2 semanas.  · No puede tragar, beber o comer.  · Babea porque no puede tragar la saliva.  ASEGÚRESE DE QUE:   · Comprende estas instrucciones.  · Controlará mercedes enfermedad.  · Solicitará ayuda de inmediato si no mejora o si empeora.     Esta información no tiene ottoniel fin reemplazar el consejo del médico. Asegúrese de hacerle al médico cualquier pregunta que tenga.     Document Released: 12/18/2006 Document Revised: 03/11/2013  Elsevier Interactive Patient Education ©2016 [employer]      Depression / Suicide Risk    As you are discharged from this [facility] facility, it is important to learn how to keep safe from harming yourself.    Recognize the warning signs:  · Abrupt changes in personality, positive or negative- including increase in energy   · Giving away possessions  · Change in eating patterns- significant weight changes-  positive or negative  · Change in sleeping patterns- unable to sleep or sleeping all the time   · Unwillingness or inability to communicate  · Depression  · Unusual sadness, discouragement and loneliness  · Talk  of wanting to die  · Neglect of personal appearance   · Rebelliousness- reckless behavior  · Withdrawal from people/activities they love  · Confusion- inability to concentrate     If you or a loved one observes any of these behaviors or has concerns about self-harm, here's what you can do:  · Talk about it- your feelings and reasons for harming yourself  · Remove any means that you might use to hurt yourself (examples: pills, rope, extension cords, firearm)  · Get professional help from the community (Mental Health, Substance Abuse, psychological counseling)  · Do not be alone:Call your Safe Contact- someone whom you trust who will be there for you.  · Call your local CRISIS HOTLINE 689-9031 or 403-573-5383  · Call your local Children's Mobile Crisis Response Team [place] (143) 206-8530 or www.[employer]  · Call the toll free National Suicide Prevention Hotlines   · National Suicide Prevention Lifeline 288-895-XYNT (8106)  · [employer] Hope Line Network 800-SUICIDE (498-3547)        Follow-up Information     1. Follow up with Shonna Willoughby M.D.. Schedule an appointment as soon as possible for a visit in 2 weeks.    Specialty:  Family Medicine    Contact information    [address] [place] [address] [place] [address]  [phone]           Discharge Medication Instructions:    Below are the medications your physician expects you to take upon discharge:    Review all your home medications and newly ordered medications with your doctor and/or pharmacist. Follow medication instructions as directed by your doctor and/or pharmacist.    Please keep your medication list with you and share with your physician.               Medication List      START taking these medications        Instructions    Morning Afternoon Evening Bedtime    fluconazole 100 MG Tabs   Last time this was given:  100 mg on 6/27/2017  7:47 AM   Commonly known as:  DIFLUCAN        Take 1 Tab by mouth every day.   Dose:  100 mg                             CONTINUE taking these medications        Instructions    Morning Afternoon Evening Bedtime    acyclovir 400 MG tablet   Commonly known as:  ZOVIRAX        Take 800 mg by mouth every day.   Dose:  800 mg                        diphenoxylate-atropine 2.5-0.025 MG Tabs   Commonly known as:  LOMOTIL        Take 1 Tab by mouth 4 times a day as needed for Diarrhea.   Dose:  1 Tab                        ethambutol 400 MG Tabs   Last time this was given:  800 mg on 6/26/2017  8:18 AM   Commonly known as:  MYAMBUTOL        Take 800 mg by mouth every day. Pt started a 14 day course on 6/2   Dose:  800 mg                        fluoxetine 10 MG Caps   Last time this was given:  10 mg on 6/27/2017  2:04 PM   Commonly known as:  PROZAC        Take 10 mg by mouth every day. Indications: Depression   Dose:  10 mg                        hydrocodone-acetaminophen 5-325 MG Tabs per tablet   Commonly known as:  NORCO        Take 1-2 Tabs by mouth every four hours as needed. Indications: Moderate to Moderately Severe Pain   Dose:  1-2 Tab                        hydrOXYzine 25 MG Tabs   Last time this was given:  25 mg on 6/27/2017  2:04 PM   Commonly known as:  ATARAX        Take 25 mg by mouth 4 times a day.   Dose:  25 mg                        lisinopril 20 MG Tabs   Last time this was given:  20 mg on 6/27/2017  2:04 PM   Commonly known as:  PRINIVIL        Take 1 Tab by mouth every day.   Dose:  20 mg                        moxifloxacin 0.5 % Soln   Last time this was given:  1 Drop on 6/27/2017  2:05 PM   Commonly known as:  VIGAMOX        Place 1 Drop in left eye 4 times a day. Indications: EYE   Dose:  1 Drop                        nystatin 692365 UNIT/ML Susp   Last time this was given:  500,000 Units on 6/27/2017  2:00 PM   Commonly known as:  MYCOSTATIN        Take 500,000 Units by mouth 4 times a day. Indications: Candidiasis Fungal Infection of the Oropharynx   Dose:  042769 Units                        ondansetron 4 MG  Tbdp   Commonly known as:  ZOFRAN ODT        Take 1 Tab by mouth every four hours as needed for Nausea/Vomiting (give PO if no IV route available).   Dose:  4 mg                        prednisoLONE acetate 1 % Susp   Last time this was given:  1 Drop on 6/27/2017  2:05 PM   Commonly known as:  PRED FORTE        Place 1 Drop in left eye 4 times a day. Indications: post eye surgery   Dose:  1 Drop                        Rilpivirine HCl 25 MG Tabs   Last time this was given:  25 mg on 6/27/2017  7:49 AM        Take 25 mg by mouth every day.   Dose:  25 mg                        sodium bicarbonate 650 MG Tabs   Last time this was given:  650 mg on 6/27/2017  2:04 PM   Commonly known as:  SODIUM BICARBONATE        Take 1 Tab by mouth 3 times a day.   Dose:  650 mg                        TIVICAY 50 MG Tabs tablet   Last time this was given:  50 mg on 6/27/2017  7:51 AM   Generic drug:  dolutegravir        Take 50 mg by mouth every day.   Dose:  50 mg                        XANAX 0.25 MG Tabs   Generic drug:  alprazolam        Take 0.25 mg by mouth 3 times a day as needed for Anxiety. Indications: Feeling Anxious   Dose:  0.25 mg                          STOP taking these medications     K-PHOS 500 MG tablet   Generic drug:  potassium phosphate (monobasic)               sulfamethoxazole-trimethoprim 400-80 MG Tabs   Commonly known as:  BACTRIM                    Where to Get Your Medications      These medications were sent to [facility] [place], [place] - [address] [address]     Phone:  [phone]    - fluconazole 100 MG Tabs            Orders for after discharge     REFERRAL TO HOME HEALTH    Complete by:  As directed    Home health will create and establish a plan of care       REFERRAL TO SKILLED NURSING FACILITY    Complete by:  As directed              Instructions           Diet / Nutrition:    Follow any diet instructions given to you by your doctor or the  dietician, including how much salt (sodium) you are allowed each day.    If you are overweight, talk to your doctor about a weight reduction plan.    Activity:    Remain physically active following your doctor's instructions about exercise and activity.    Rest often.     Any time you become even a little tired or short of breath, SIT DOWN and rest.    Worsening Symptoms:    Report any of the following signs and symptoms to the doctor's office immediately:    *Pain of jaw, arm, or neck  *Chest pain not relieved by medication                               *Dizziness or loss of consciousness  *Difficulty breathing even when at rest   *More tired than usual                                       *Bleeding drainage or swelling of surgical site  *Swelling of feet, ankles, legs or stomach                 *Fever (>100ºF)  *Pink or blood tinged sputum  *Weight gain (3lbs/day or 5lbs /week)           *Shock from internal defibrillator (if applicable)  *Palpitations or irregular heartbeats                *Cool and/or numb extremities    Stroke Awareness    Common Risk Factors for Stroke include:    Age  Atrial Fibrillation  Carotid Artery Stenosis  Diabetes Mellitus  Excessive alcohol consumption  High blood pressure  Overweight   Physical inactivity  Smoking    Warning signs and symptoms of a stroke include:    *Sudden numbness or weakness of the face, arm or leg (especially on one side of the body).  *Sudden confusion, trouble speaking or understanding.  *Sudden trouble seeing in one or both eyes.  *Sudden trouble walking, dizziness, loss of balance or coordination.Sudden severe headache with no known cause.    It is very important to get treatment quickly when a stroke occurs. If you experience any of the above warning signs, call 911 immediately.                   Disclaimer         Quit Smoking / Tobacco Use:    I understand the use of any tobacco products increases my chance of suffering from future heart disease or  stroke and could cause other illnesses which may shorten my life. Quitting the use of tobacco products is the single most important thing I can do to improve my health. For further information on smoking / tobacco cessation call a Toll Free Quit Line at 1-884.196.5461 (*[employer] [place]) or 1-430.780.5076 (American Lung Association) or you can access the web based program at www.lungusa.org.    [place] Tobacco Users Help Line:  (972) 319-8662       Toll Free: 1-746.715.2565  Quit Tobacco Program [facility] (356)662-5142    Crisis Hotline:    [place] Crisis Hotline:  4-011-CNSQVLI or 1-123.420.7160    [place] Crisis Hotline:    1-194.988.6635 or 900-267-2761    Discharge Survey:   Thank you for choosing [facility]. We hope we did everything we could to make your hospital stay a pleasant one. You may be receiving a phone survey and we would appreciate your time and participation in answering the questions. Your input is very valuable to us in our efforts to improve our service to our patients and their families.        My signature on this form indicates that:    1. I have reviewed and understand the above information.  2. My questions regarding this information have been answered to my satisfaction.  3. I have formulated a plan with my discharge nurse to obtain my prescribed medications for home.                  Disclaimer         __________________________________                     __________       ________                       Patient Signature                                                 Date                    Time

## 2017-06-20 NOTE — IP AVS SNAPSHOT
6/27/2017    Roger Mckeon  [address]  [place] [address]    Dear Roger:    [facility] wants to ensure your discharge home is safe and you or your loved ones have had all of your questions answered regarding your care after you leave the hospital.    Below is a list of resources and contact information should you have any questions regarding your hospital stay, follow-up instructions, or active medical symptoms.    Questions or Concerns Regarding… Contact   Medical Questions Related to Your Discharge  (7 days a week, 8am-5pm) Contact a Nurse Care Coordinator   [phone]   Medical Questions Not Related to Your Discharge  (24 hours a day / 7 days a week)  Contact the Nurse Health Line   [phone]    Medications or Discharge Instructions Refer to your discharge packet   or contact your [facility] Primary Care Provider   [phone]   Follow-up Appointment(s) Schedule your appointment via [employer]   or contact Scheduling [phone]   Billing Review your statement via [employer]  or contact Billing [phone]   Medical Records Review your records via [employer]   or contact Medical Records [phone]     You may receive a telephone call within two days of discharge. This call is to make certain you understand your discharge instructions and have the opportunity to have any questions answered. You can also easily access your medical information, test results and upcoming appointments via the [employer] free online health management tool. You can learn more and sign up at [employer]. For assistance setting up your [employer] account, please call [phone].    Once again, we want to ensure your discharge home is safe and that you have a clear understanding of any next steps in your care. If you have any questions or concerns, please do not hesitate to contact us, we are here for you. Thank you for choosing [facility] for your healthcare needs.    Sincerely,    Your [facility] Healthcare Team

## 2017-06-20 NOTE — IP AVS SNAPSHOT
" <p align=\"LEFT\"><IMG SRC=\"//[email or web address]\" alt=\"Image\" WIDTH=\"50%\" HEIGHT=\"200\" BORDER=\"\"></p>                   Name:Roger Mckeon  Medical Record Number:[id number]  CSN: [id number]    YOB: 1970   Age: 47 y.o.  Sex: female  HT:1.676 m (5' 5.98\") WT: 54.4 kg (119 lb 14.9 oz)          Admit Date: 6/20/2017     Discharge Date:   Today's Date: 6/27/2017  Attending Doctor:  Leonora Okeefe M.D.                  Allergies:  Oxycodone          Follow-up Information     1. Follow up with Shonna Willoughby M.D.. Schedule an appointment as soon as possible for a visit in 2 weeks.    Specialty:  Family Medicine    Contact information    [address] [place] [address]  [phone]           Medication List      Take these Medications        Instructions    acyclovir 400 MG tablet   Commonly known as:  ZOVIRAX    Take 800 mg by mouth every day.   Dose:  800 mg       diphenoxylate-atropine 2.5-0.025 MG Tabs   Commonly known as:  LOMOTIL    Take 1 Tab by mouth 4 times a day as needed for Diarrhea.   Dose:  1 Tab       ethambutol 400 MG Tabs   Commonly known as:  MYAMBUTOL    Take 800 mg by mouth every day. Pt started a 14 day course on 6/2   Dose:  800 mg       fluconazole 100 MG Tabs   Commonly known as:  DIFLUCAN    Take 1 Tab by mouth every day.   Dose:  100 mg       fluoxetine 10 MG Caps   Commonly known as:  PROZAC    Take 10 mg by mouth every day. Indications: Depression   Dose:  10 mg       hydrocodone-acetaminophen 5-325 MG Tabs per tablet   Commonly known as:  NORCO    Take 1-2 Tabs by mouth every four hours as needed. Indications: Moderate to Moderately Severe Pain   Dose:  1-2 Tab       hydrOXYzine 25 MG Tabs   Commonly known as:  ATARAX    Take 25 mg by mouth 4 times a day.   Dose:  25 mg       lisinopril 20 MG Tabs   Commonly known as:  PRINIVIL    Take 1 Tab by mouth every day.   Dose:  20 mg       moxifloxacin 0.5 % Soln   Commonly known as:  VIGAMOX    Place 1 Drop in left eye 4 times a " day. Indications: EYE   Dose:  1 Drop       nystatin 178729 UNIT/ML Susp   Commonly known as:  MYCOSTATIN    Take 500,000 Units by mouth 4 times a day. Indications: Candidiasis Fungal Infection of the Oropharynx   Dose:  804230 Units       ondansetron 4 MG Tbdp   Commonly known as:  ZOFRAN ODT    Take 1 Tab by mouth every four hours as needed for Nausea/Vomiting (give PO if no IV route available).   Dose:  4 mg       prednisoLONE acetate 1 % Susp   Commonly known as:  PRED FORTE    Place 1 Drop in left eye 4 times a day. Indications: post eye surgery   Dose:  1 Drop       Rilpivirine HCl 25 MG Tabs    Take 25 mg by mouth every day.   Dose:  25 mg       sodium bicarbonate 650 MG Tabs   Commonly known as:  SODIUM BICARBONATE    Take 1 Tab by mouth 3 times a day.   Dose:  650 mg       TIVICAY 50 MG Tabs tablet   Generic drug:  dolutegravir    Take 50 mg by mouth every day.   Dose:  50 mg       XANAX 0.25 MG Tabs   Generic drug:  alprazolam    Take 0.25 mg by mouth 3 times a day as needed for Anxiety. Indications: Feeling Anxious   Dose:  0.25 mg

## 2017-06-20 NOTE — ED PROVIDER NOTES
ED Provider Note    Scribed for Alexx Rai M.D. by Riaz Lima. 6/20/2017  7:43 AM    Primary care provider: Shonna Willoughby M.D.  Means of arrival: Ambulance  History obtained from: Patient  History limited by: None    CHIEF COMPLAINT  Chief Complaint   Patient presents with   • Mental Status Change   • Failure to Thrive       HPI  Roger Mckeon is a 47 y.o. female who was brought into the ED by ambulance for altered level of consciousness and increased fatigue. The patient has a history of HIV and for the past 3 months she's had increasing fatigue, causing her to have difficulty ambulating. Today, she was more altered. The patient has also been eating less and is also complaining of a sore throat. She says the sores on her vagina are improving and denies any fevers, chills, chest pain, or shortness of breath. She also has a history of renal failure, hypertension, and diabetes mellitus.    REVIEW OF SYSTEMS  Pertinent positives include altered level of consciousness, fatigue, sore throat.   Pertinent negatives include no fevers, chills, chest pain, shortness of breath.    All other systems reviewed and negative.      PAST MEDICAL HISTORY   has a past medical history of H/O: hysterectomy; Hypertension; Renal failure; Diabetes; Dental disorder; Dialysis patient; Seizure disorder (CMS-HCC); and HIV (human immunodeficiency virus infection) (CMS-HCC).    SURGICAL HISTORY   has past surgical history that includes hysterectomy, vaginal; other; incision and drainage general (4/21/2013); gastroscopy with biopsy (N/A, 9/14/2016); gyn surgery; av fistula creation (Left, 10/4/2016); and gastroscopy (N/A, 5/10/2017).    SOCIAL HISTORY  Social History   Substance Use Topics   • Smoking status: Never Smoker    • Smokeless tobacco: Never Used   • Alcohol Use: No      History   Drug Use No     FAMILY HISTORY  Family History   Problem Relation Age of Onset   • Diabetes Mother    • Diabetes Father    • Hypertension Father     • Diabetes Sister      CURRENT MEDICATIONS  No current facility-administered medications on file prior to encounter.     Current Outpatient Prescriptions on File Prior to Encounter   Medication Sig Dispense Refill   • valacyclovir (VALTREX) 500 MG Tab Take 1 Tab by mouth 2 Times a Day. 20 Tab 0   • lisinopril (PRINIVIL) 20 MG Tab Take 1 Tab by mouth every day. 30 Tab 1   • sulfamethoxazole-trimethoprim (BACTRIM) 400-80 MG Tab Take 1 Tab by mouth every day. 30 Tab 0   • ondansetron (ZOFRAN ODT) 4 MG TABLET DISPERSIBLE Take 1 Tab by mouth every four hours as needed for Nausea/Vomiting (give PO if no IV route available). 20 Tab 0   • Non Formulary Request Take 25 mg by mouth every day. Rilpivirine- take with food     • ethambutol (MYAMBUTOL) 400 MG Tab Take 800 mg by mouth every day. Pt started a 14 day course on 6/2     • dolutegravir (TIVICAY) 50 MG Tab tablet Take 50 mg by mouth every day.     • moxifloxacin (VIGAMOX) 0.5 % Solution Place 1 Drop in left eye 4 times a day. Indications: EYE     • nystatin (MYCOSTATIN) 963681 UNIT/ML Suspension Take 500,000 Units by mouth 4 times a day. Indications: Candidiasis Fungal Infection of the Oropharynx     • prednisoLONE acetate (PRED FORTE) 1 % Suspension Place 1 Drop in left eye 4 times a day. Indications: post eye surgery     • hydrOXYzine (ATARAX) 25 MG Tab Take 25 mg by mouth 4 times a day.     • alprazolam (XANAX) 0.25 MG Tab Take 0.25 mg by mouth 3 times a day as needed for Anxiety. Indications: Feeling Anxious     • fluoxetine (PROZAC) 10 MG Cap Take 10 mg by mouth every day. Indications: Depression     • potassium phosphate, monobasic, (K-PHOS) 500 MG tablet Take 1 Tab by mouth every day. Indications: Supplement     • sodium bicarbonate (SODIUM BICARBONATE) 650 MG Tab Take 1 Tab by mouth 3 times a day. 120 Tab 3     ALLERGIES  Allergies   Allergen Reactions   • Oxycodone Itching     PHYSICAL EXAM  VITAL SIGNS: /79 mmHg  Pulse 70  Temp(Src) 35.6 °C (96.1  "°F)  Resp 18  Ht 1.676 m (5' 6\")  Wt 52.6 kg (115 lb 15.4 oz)  BMI 18.73 kg/m2  LMP 01/01/2010    Nursing note and vitals reviewed.  Constitutional: Chronically ill appearing, appears older than stated age. Mild distress.   HENT: Head is normocephalic and atraumatic. Oropharynx has white exudate coating the tongue. Dry mucous membranes.   Eyes: Pupils are equal, round, and reactive to light. Conjunctiva are normal.   Cardiovascular: Normal rate and regular rhythm. No murmur heard. Normal radial pulses.  Pulmonary/Chest: Breath sounds normal. No wheezes or rales. Permacath in right upper chest wall which is benign.  Abdominal: Soft and non-tender. No distention    Musculoskeletal: Extremities exhibit normal range of motion without edema or tenderness. Left AV fistula.   Neurological: Awake, alert and oriented to person, place, and time. No focal deficits noted.  Skin: Skin is warm and dry. No rash.   Psychiatric: Normal mood and affect. Appropriate for clinical situation    DIAGNOSTIC STUDIES / PROCEDURES    EKG Interpretation  See labs below, interpreted by me    LABS  Results for orders placed or performed during the hospital encounter of 06/20/17   COMP METABOLIC PANEL   Result Value Ref Range    Sodium 124 (L) 135 - 145 mmol/L    Potassium 3.3 (L) 3.6 - 5.5 mmol/L    Chloride 91 (L) 96 - 112 mmol/L    Co2 23 20 - 33 mmol/L    Anion Gap 10.0 0.0 - 11.9    Glucose 74 65 - 99 mg/dL    Bun 45 (H) 8 - 22 mg/dL    Creatinine 3.62 (H) 0.50 - 1.40 mg/dL    Calcium 8.0 (L) 8.5 - 10.5 mg/dL    AST(SGOT) 25 12 - 45 U/L    ALT(SGPT) 12 2 - 50 U/L    Alkaline Phosphatase 248 (H) 30 - 99 U/L    Total Bilirubin 0.5 0.1 - 1.5 mg/dL    Albumin 2.1 (L) 3.2 - 4.9 g/dL    Total Protein 5.6 (L) 6.0 - 8.2 g/dL    Globulin 3.5 1.9 - 3.5 g/dL    A-G Ratio 0.6 g/dL   PROTHROMBIN TIME   Result Value Ref Range    PT 15.4 (H) 12.0 - 14.6 sec    INR 1.18 (H) 0.87 - 1.13   APTT   Result Value Ref Range    APTT 32.6 24.7 - 36.0 sec "   URINALYSIS CULTURE, IF INDICATED   Result Value Ref Range    Micro Urine Req Microscopic     Color Yellow     Character Sl Cloudy (A)     Specific Gravity 1.011 <1.035    Ph 6.5 5.0-8.0    Glucose Negative Negative mg/dL    Ketones Negative Negative mg/dL    Protein 200 (A) Negative mg/dL    Bilirubin Negative Negative    Nitrite Negative Negative    Leukocyte Esterase Negative Negative    Occult Blood Negative Negative    Culture Indicated Yes UA Culture   AMMONIA   Result Value Ref Range    Ammonia 26 11 - 45 umol/L   URINE MICROSCOPIC (W/UA)   Result Value Ref Range    WBC 5-10 (A) /hpf    RBC 2-5 (A) /hpf    Bacteria Many (A) None /hpf    Epithelial Cells Few /hpf    Amorphous Crystal Present /hpf    Hyaline Cast 3-5 (A) /lpf   ESTIMATED GFR   Result Value Ref Range    GFR If  16 (A) >60 mL/min/1.73 m 2    GFR If Non  13 (A) >60 mL/min/1.73 m 2   EKG (NOW)   Result Value Ref Range    Report       [facility] Emergency Dept.    Test Date:  2017  Pt Name:    KIM GALLARDO              Department: ER  MRN:        [id number]                      Room:       RD 11  Gender:     F                            Technician: [id number]  :        [id number]                   Requested By:ER TRIAGE PROTOCOL  Order #:    [id number]                    Reading MD:    Measurements  Intervals                                Axis  Rate:       69                           P:          76  IA:         172                          QRS:        60  QRSD:       106                          T:          46  QT:         472  QTc:        506    Interpretive Statements  SINUS RHYTHM  BORDERLINE INTRAVENTRICULAR CONDUCTION DELAY  LOW VOLTAGE WITH RIGHT AXIS DEVIATION  BORDERLINE PROLONGED QT INTERVAL  Compared to ECG 2017 11:26:00  Right-axis deviation now present     All labs reviewed by me.    RADIOLOGY  DX-CHEST-PORTABLE (1 VIEW)   Final Result         1. No acute cardiopulmonary  abnormalities are identified.      The radiologist's interpretation of all radiological studies have been reviewed by me.    COURSE & MEDICAL DECISION MAKING  Nursing notes, VS, PMSFHx reviewed in chart.     Review of past medical records shows the patient was seen in the ED by me and admitted to the hospital on 06/03/2017 and 06/10/2017.     7:43 AM - Patient seen and examined at bedside. Ordered chest x-ray, ammonia, CBC, CMP, INR, APTT, urinalysis, and EKG to evaluate her symptoms. The differential diagnoses include but are not limited to: pneumonia, UTI     Laboratory studies are remarkable for hyponatremia, end-stage renal disease, urinalysis is consistent with urinary tract infection. The patient's last urine culture was positive for Enterococcus faecalis. Physical examination is consistent with thrush.    9:59 AM - I reviewed the patient's above findings and am paging the hospitalist for admission. I am also paging the nephrologist, as the patient will need to have dialysis.     10:04 AM - I discussed the patient's case and the above findings with Dr. Lawrence (hospitalist) who will see and admit the patient. Care is transferred at this time.    10:10 AM - I discussed the patient's case and the above findings with Dr. Najjar (nephrology) who will consult on the patient.    DISPOSITION:  Patient will be admitted to Dr. Lawrence in critical condition.     CRITICAL CARE  I provided critical care services, which included medication orders, frequent reevaluations of the patient's condition and response to treatment, ordering and reviewing test results, and discussing the case with various consultants.  The critical care time associated with the care of the patient was 35 minutes. Review chart for interventions. This time is exclusive of any other billable procedures.      FINAL IMPRESSION  1. Thrush    2. Dehydration    3. Odynophagia    4. Hyponatremia    5. Anemia, unspecified type    6. ESRD on dialysis  (CMS-[facility])          I, Riaz Lima (Scribe), am scribing for, and in the presence of, Alexx Rai M.D..    Electronically signed by: Riaz Lima (Scribe), 6/20/2017    IAlexx M.D. personally performed the services described in this documentation, as scribed by Riaz Lima in my presence, and it is both accurate and complete.    The note accurately reflects work and decisions made by me.  Alexx Rai  6/20/2017  11:30 AM

## 2017-06-20 NOTE — ED NOTES
Pt to dialysis via transport. Report called to yesi on tele 7. Pt to 701 once dialysis is finished.

## 2017-06-20 NOTE — PROGRESS NOTES
Received report and care of pt, pt assisted to room 701 and transferred to hospital bed from ED JOHN hernández, Dialysis completed approx 1 L removed per dialysis RN. Call bell in reach, will continue to monitor.

## 2017-06-21 PROBLEM — B20 AIDS (ACQUIRED IMMUNE DEFICIENCY SYNDROME) (HCC): Status: ACTIVE | Noted: 2017-01-01

## 2017-06-21 PROBLEM — D70.9 NEUTROPENIA (HCC): Status: ACTIVE | Noted: 2017-01-01

## 2017-06-21 NOTE — PROGRESS NOTES
2 RN skin check completed.  Noted open sores to labia, pressure ulcer to coccyx, and old healed wounds to BLE.

## 2017-06-21 NOTE — PROGRESS NOTES
Shift report received and assessment completed. No family at bedside. Pt is lethargic, but indicates no distress. Call light placed within reach, bed in lowest position, and bed alarm is set. Will continue to monitor patient.

## 2017-06-21 NOTE — PROGRESS NOTES
Hospital Medicine Progress Note, Adult, Complex               Author: Milan Nelsonjjar Date & Time created: 6/21/2017  2:47 PM     Interval History:  Pt with ESRD, AIDS admit with esophagitis    Review of Systems:  Review of Systems   Constitutional: Positive for malaise/fatigue. Negative for fever and chills.   Cardiovascular: Negative for chest pain and leg swelling.   Gastrointestinal: Negative for nausea and vomiting.   Genitourinary: Negative for dysuria and urgency.   Neurological: Positive for weakness.   All other systems reviewed and are negative.      Physical Exam:  Physical Exam   Constitutional: She is oriented to person, place, and time. She appears cachectic.   HENT:   Head: Normocephalic and atraumatic.   Nose: Nose normal.   Eyes: Right eye exhibits no discharge. Left eye exhibits no discharge. No scleral icterus.   Pulmonary/Chest: Effort normal. She has no wheezes. She has no rales.   Musculoskeletal: She exhibits no edema.   Neurological: She is alert and oriented to person, place, and time.   Skin: Skin is warm and dry.   Nursing note and vitals reviewed.      Labs:        Invalid input(s): [id number]      Recent Labs      06/20/17   0828  06/20/17   0830  06/20/17   2014  06/21/17   0439  06/21/17   1216   SODIUM  124*   --   129*  130*  128*   POTASSIUM  3.3*   --    --   3.7   --    CHLORIDE  91*   --    --   95*   --    CO2  23   --    --   26   --    BUN  45*   --    --   26*   --    CREATININE  3.62*   --    --   2.53*   --    MAGNESIUM   --    --    --   1.9   --    PHOSPHORUS   --   3.8   --   2.9   --    CALCIUM  8.0*   --    --   7.8*   --      Recent Labs      06/20/17   0828  06/21/17   0439   ALTSGPT  12   --    ASTSGOT  25   --    ALKPHOSPHAT  248*   --    TBILIRUBIN  0.5   --    GLUCOSE  74  61*     Recent Labs      06/20/17   0828  06/20/17   1226  06/20/17   2130  06/21/17   0439   RBC   --   3.22*  3.41*  3.33*   HEMOGLOBIN   --   8.8*  9.3*  9.1*   HEMATOCRIT   --   27.0*   29.1*  29.0*   PLATELETCT   --   68*  75*  71*   PROTHROMBTM  15.4*   --    --    --    APTT  32.6   --    --    --    INR  1.18*   --    --    --      Recent Labs      17   0828  17   1226  17   2130  17   0439   WBC   --   1.0*  1.0*  0.8*   NEUTSPOLYS   --   82.10*   --   46.30   LYMPHOCYTES   --   5.40*   --   30.60   MONOCYTES   --   9.80   --   11.10   EOSINOPHILS   --   0.00   --   4.60   BASOPHILS   --   1.80   --   0.00   ASTSGOT  25   --    --    --    ALTSGPT  12   --    --    --    ALKPHOSPHAT  248*   --    --    --    TBILIRUBIN  0.5   --    --    --            Hemodynamics:  Temp (24hrs), Av.5 °C (97.7 °F), Min:35.8 °C (96.4 °F), Max:36.9 °C (98.5 °F)  Temperature: 36.4 °C (97.5 °F)  Pulse  Av.9  Min: 62  Max: 89   Blood Pressure: 122/72 mmHg     Respiratory:    Respiration: 18, Pulse Oximetry: 98 %           Fluids:    Intake/Output Summary (Last 24 hours) at 17 1447  Last data filed at 17 0900   Gross per 24 hour   Intake   1450 ml   Output   1500 ml   Net    -50 ml     Weight: 54.4 kg (119 lb 14.9 oz)  GI/Nutrition:  Orders Placed This Encounter   Procedures   • Diet Order     Standing Status: Standing      Number of Occurrences: 1      Standing Expiration Date:      Order Specific Question:  Diet:     Answer:  Full Liquid [11]     Order Specific Question:  Diet:     Answer:  Renal [8]     Medical Decision Making, by Problem:  Active Hospital Problems    Diagnosis   • Esophagitis [K20.9]       Labs reviewed and Medications reviewed                  1 ESRD.  2 Anemia  3 Hyponatremia  4 Hypoalbuminemia  Plan  no acute need for HD  Renal dose all meds  Avoid nephrotoxins  Prognosis poor.

## 2017-06-21 NOTE — ADDENDUM NOTE
Encounter addended by: Lizz Alcazar R.N. on: 6/21/2017  3:11 PM<BR>     Documentation filed: Inpatient Document Flowsheet

## 2017-06-21 NOTE — PROGRESS NOTES
Patient resting in bed, no distress noted, call light in reach, bed alarm on, will monitor.  Monitor summary: SR 79-86, 18/10/40.

## 2017-06-21 NOTE — DIETARY
Nutrition Services: Consult for Supplements  47 year old female with admit dx of esophagitis  Past Pertinent Med Hx: significant for AIDS with MAC bacteremia and genital herpes and candidal esophagitis, type 2 DM, ESRD, seizure disorder    Attempted to visit pt, pt was busy with other discipline. Will add supplements TID with meals. Per chart pt PO < 25% 2 meals recorded.     Ht: 167.6 cm  Wt 6/20: 54.4 kg via bed scale  BMI: 19.37  Diet: Full Liquid, Renal  Pertinent Labs: Na 128, Glucose 61, BUN 26, Creatinine 2.53, POC glucose 86, 119, 68  Pertinent Meds: zithromax, tivicay, myambutol, diflucan, prozac, atarax, humalog, prinivil, vigamox, rilpivirine HCl, pericolace, sodium bicarbonate, bactrim DS, valtrex  Fluids: NS infusion @ 50 ml/hr  Skin: Per wound team note 6/21 - partial thickness wounds to labia, perineum and rectum     GI: Last BM 6/21    PLAN/RECOMMEND -   1) Nutrition rep to see patient daily for meal and snack preferences.  2) Encourage PO  3) Weekly weights to monitor fluid and nutrition status  4) Please document PO intake for each meal as percentage of meals consumed    RD following

## 2017-06-21 NOTE — H&P
PRIMARY CARE PROVIDER:  [facility].    CHIEF COMPLAINT:  Dysphagia and generalized malaise.    HISTORY OF PRESENT ILLNESS:  The patient is a 47-year-old female who was   recently diagnosed with AIDS.  She was recently hospitalized and discharged on   the 12th.  She was diagnosed with MAC bacteremia and had HSV perineal   ulcerations along without esophagitis.  She is a very poor historian and her   ex-, who is currently her caregiver and is providing most of the   history.  She reports that over the past 2-3 days, she has been having very   poor intake and was complaining of difficult pain with swallowing.  She has   not had any diarrhea, melena, or rectal bleeding.  Her genital ulcers are   significantly improved.  She denies any chest pain or abdominal pain.  She has   an occasional cough, which is nonproductive.  She denies diarrhea.  No melena   or rectal bleeding.  She has been receiving hemodialysis on Tuesdays,   Thursdays, and Saturdays.  When the paramedics arrived today, she was   hypoglycemic with a fasting blood sugar of 48, received oral glucose and D50.    REVIEW OF SYSTEMS:  As above, otherwise reviewed and negative.    PAST MEDICAL HISTORY:    1.  Significant for AIDS with MAC bacteremia and genital herpes and candidal   esophagitis.  2.  Type 2 diabetes.  3.  End-stage renal disease.  4.  Seizure disorder.    PAST SURGICAL HISTORY:  Hysterectomy, incision and drainage, EGD with biopsy,   AV fistula creation, gastroscopy.    SOCIAL HISTORY:  She does not smoke, denies alcohol or illicit drug use.    FAMILY HISTORY:  Positive for diabetes and hypertension.    HOME MEDICATIONS:  Acyclovir 800 mg daily, Xanax 125 mg 3 times a day, Lomotil   4 times a day as needed, Tivicay 50 mg daily, Ethambutol 800 mg daily, Prozac   10 mg daily, Norco as needed, Atarax 25 mg 4 times a day, lisinopril 20 mg   daily, Vigamox 1 drop in left eye 4 times a day, nystatin 500,000 units 4   times a day, Zofran 4 mg  every 4 hours as needed, K phos 500 mg daily,   prednisolone 1 drop in left eye 4 times a day, rilpivirine 25 mg daily, sodium   bicarbonate 650 mg 3 times a day, Bactrim 400-80 1 tablet daily, azithromycin   500 mg daily.    PHYSICAL EXAMINATION:  GENERAL:  The patient is alert.  She is oriented x3.  She is cachectic looking   and chronically ill appearing.  HEAD AND NECK:  Pupils equal.  Supple neck.  No jugular venous distention.    Oropharynx is clear.  She has dry mucous membranes and oral thrush.  HEART:  Regular rate and rhythm.  Normal S1, S2.  No murmurs, rubs or gallops.  LUNGS:  Clear with symmetric air entry bilaterally.  No chest wall tenderness.  ABDOMEN:  Soft, nontender.  Bowel sounds are positive.  No hepatosplenomegaly.  EXTREMITIES:  No edema, no clubbing, no cyanosis.  NEUROLOGIC:  No focal deficits.  She has generalized weakness.  SKIN:  No rash.    DIAGNOSTICS:  White blood cell count is 1.0, hemoglobin 8.8, hematocrit 27,   and platelet count 68.  Sodium is 124, potassium 3.3, chloride 91, bicarbonate   23, glucose 74, BUN 45, creatinine 3.62, alkaline phosphatase 248, total   bilirubin 0.5, albumin 2.1, phosphorus 3.8.  Chest x-ray revealed no acute   cardiopulmonary abnormalities are identified.    ASSESSMENT AND PLAN:  1.  Odynophagia, likely related to her fungal esophagitis.  2.  AIDS with MAC bacteremia.  3.  End-stage renal disease, on hemodialysis.  4.  Dehydration.  5.  Hyponatremia, likely secondary to decreased intake.  6.  Pancytopenia, query secondary to her medications.  7.  Severe protein calorie malnutrition.  8.  Hypokalemia.  9.  Type 2 diabetes.  10.  Hypertension.    PLAN:  The patient will be admitted.  She will be started on IV Diflucan to   help with her esophagitis, we will keep her on liquid diet and advance as   tolerated.  We will start her on gentle IV fluids for hydration and monitor   her electrolytes.    I spoke with Dr. Morgan, who agreed with this plan of care  and recommended   continuing her current medications.  Otherwise, she is on Ethambutol and   azithromycin for her MAC bacteremia.  She is also on Bactrim for PCP   prophylaxis and on Valtrex for her genital herpes.    We will repeat her potassium and monitor her electrolytes.    Dr. Najjar has been consulted for resuming hemodialysis.    Overall prognosis is guarded given her thrombocytopenia, we will use SCDs for   DVT prophylaxis.    We will monitor her CBC and if not improved, consider further workup.    Overall prognosis is guarded.    CODE STATUS:  Full code.    Patient will likely require more than 2 midnights stay for treatment of her   medical condition.    We will ask for dietary to evaluate the patient and recommended supplements.       ____________________________________     MD IDANIA MANTILLA / JUAN    DD:  06/20/2017 14:24:49  DT:  06/20/2017 17:26:23    D#:  [id number]  Job#:  [id number]

## 2017-06-21 NOTE — PROGRESS NOTES
Pharmacy called to verify what overdue medications to give patient at this time.  Patient medicated per MAR, linens changed due to incontinence.

## 2017-06-21 NOTE — PROGRESS NOTES
Patient resting in bed, assessment complete, awake and alert but fatigued, denies pain.  Lab here for redraw.  Patient updated on plan of care, verbalized understanding.  Needs addressed, call light in reach, bed alarm on, will monitor.

## 2017-06-21 NOTE — WOUND TEAM
"[facility]  Inpatient Services  Initial Wound & Skin Care Evaluation    Admission Date: 6/20/17          HPI, PMH, SH: Reviewed  Unit where seen by Wound Team: R [id number]    WOUND CONSULT RELATED TO: wounds to rizwan area    SUBJECTIVE:  \"They feel a lot better since I was here last time.\"     Self Report / Pain Level: 2-3/10 with cleansing     OBJECTIVE:  Pt supine in bed, incontinent of loose stool     WOUND TYPE, LOCATION, CHARACTERISTICS (Pressure ulcers: location, stage, POA or date identified)    Wound Type/Location: partial thickness wounds to labia, perineum and rectum     Periwound: intact, hyperpigmentation      Drainage:  Scant sanguinous with cleansing      Tissue Type and %: 100% red     Wound Edges:  Open, attached     Odor:  none     Exposed structure(s):  none   S&S of Infection: none     Measurements: taken 6/21/17                              Several small scattered open wounds throughout perineum, all approximately 2 X 2 X .01         INTERVENTIONS BY WOUND TEAM:  Pt assisted into side lying, cleansed of incontinent loose stool with dimethicone wipes and patted dry.  Covered wounds with barrier paste.  Returned Pt to position of comfort and skin care orders written for NRSG to follow.     Dressing types:  barrier paste with zinc    Interdisciplinary Collaboration: RN, Pt    EVALUATION:  Pt is known to the wound team and presents with the same unknown etiology partial thickness wounds at her labia and rectal areas.  Wounds have greatly improved since last admission and will continue with POC which is working well including dimethicone wipes and barrier paste with zinc.      Factors affecting wound healing:  Decreased mobility, incontinence     Goals:  Wounds to decrease by 1%/week     NURSING PLAN OF CARE: (X)  Dressing changes: See Dressing Maintenance orders:   Skin care: See Skin Care orders: X  Rectal tube care: See Rectal Tube Care orders:   Other orders:    RSKIN: CURRENT (X) ORDERED " (O)  Q shift Alli:  X  Q shift pressure point assessments:  X  Atmosair   X     JENNY      Bariatric JENNY      Bariatric foam        Heel float boots       Heels floated on pillows  X    Barrier wipes      Barrier Cream      Barrier paste      Sacral silicone dressing      Silicone O2 tubing      Anchorfast      Trach with Optifoam split foam       Waffle cushion      Rectal tube or BMS      Antifungal tx    Turn q 2 hours X  Up to chair    Ambulate   PT/OT     Dietician    X  PO  X - full liquid   TF   TPN     PVN    NPO   # days   Other       WOUND TEAM PLAN OF CARE (X):   NPWT change 3 x week:        Dressing changes by wound team:       Follow up as needed: X      Other (explain):    Anticipated discharge plans (X):  SNF:           Home Care:           Outpatient Wound Center:            Self Care:            Other: TBD

## 2017-06-21 NOTE — CONSULTS
DATE OF SERVICE:  06/20/2017    REQUESTING PHYSICIAN:  Dr. Alexx Rai.    REASON FOR CONSULTATION:  Management of chronic kidney disease, assessing the   need for urgent dialysis.    The patient seen and examined, medical record reviewed.  Unfortunately, the   patient is a poor historian because of change in mental status.  Most of the   story was through reviewing the record and discussing the case with Dr. Rai.    HISTORY OF PRESENT ILLNESS:  Briefly, the patient is an unfortunate   47-year-old lady who is well known to our service.  She has end-stage renal   disease, undergoes hemodialysis Tuesday, Thursday, Saturday at [facility].  She has a history of HIV infection and AIDS, patient presented to the   hospital with failure to thrive, also decreased mental status.  The patient   has been diagnosed with thrush and failure to thrive.    Again, the patient has decreased level of consciousness and is a very poor   historian.    Past medical history, social history, family history, medication, review of   systems as well as allergy is unobtainable, please refer to the chart.    PHYSICAL EXAMINATION:  GENERAL:  The patient is very frail, cachectic lady, but in no apparent   distress.  VITAL SIGNS:  Show blood pressure of 140/80, heart rate was 72.  HEENT:  Normocephalic, atraumatic.  Sclerae is anicteric.  NECK:  No lymphadenopathy.  CHEST:  Normal.  LUNGS:  Clear to auscultation.  HEART:  S1, S2.  ABDOMEN:  Soft.  EXTREMITIES:  There is trace lower extremity edema.  SKIN:  No skin rashes.  NEUROLOGIC:  The patient is very lethargic, but arousable.    LABORATORY DATA:  Her labs were reviewed.  Also I reviewed chest x-ray showed   no pulmonary edema.    ASSESSMENT AND PLAN:  1.  End-stage renal disease.  2.  Severe hyponatremia.  3.  Hypokalemia.  4.  Pancytopenia.  5.  Hypoalbuminemia.    PLAN:  1.  We will plan on urgent dialysis to manage her uremia as possibility for   her change in mental  status.  2.  Await ID input for her AIDS management  3.  Prognosis is poor because of multisystem organ failure.    Plan discussed in detail with Dr. Rai as well as Dr. Kai Stinson.       ____________________________________     FADI NAJJAR, MD FN / JUAN    DD:  06/20/2017 16:00:40  DT:  06/20/2017 21:43:37    D#:  [id number]  Job#:  [id number]

## 2017-06-22 NOTE — ASSESSMENT & PLAN NOTE
Continued to drop after HD, if dilutional would have increased  Transfuse if hgb Less than 7.0  No evidence of bleeding

## 2017-06-22 NOTE — PROGRESS NOTES
[facility] Hospitalist Progress Note    Date of Service: 2017    Chief Complaint  47 y.o. female admitted 2017 with AIDs and odynophagia    Interval Problem Update  Swallowing-claims it still hurts to swallow and worsening neck pain, mostly on the back side. Any movement seems to make the pain worse. Consulted ID and Hem. Neutropenia is worse today.     Consultants/Specialty  ID-Cathy  Onc-Shields  Renal-Najjar    Disposition  Eventually home        Review of Systems   Constitutional: Negative for fever and chills.   HENT: Positive for sore throat.         Posterior neck pain   Eyes: Negative for blurred vision and double vision.   Respiratory: Negative for cough and shortness of breath.    Cardiovascular: Negative for chest pain and leg swelling.   Gastrointestinal: Positive for nausea. Negative for heartburn, vomiting and abdominal pain.        Hurts to swallow any substances   Genitourinary: Negative for dysuria.   Musculoskeletal: Negative for myalgias.   Skin: Negative for itching and rash.   Neurological: Negative for dizziness, focal weakness, loss of consciousness and headaches.   Endo/Heme/Allergies: Negative for environmental allergies.   Psychiatric/Behavioral: Negative for depression.      Physical Exam  Laboratory/Imaging   Hemodynamics  Temp (24hrs), Av.7 °C (98.1 °F), Min:36.4 °C (97.5 °F), Max:36.9 °C (98.5 °F)   Temperature: 36.4 °C (97.6 °F)  Pulse  Av.7  Min: 62  Max: 89    Blood Pressure: 120/67 mmHg      Respiratory      Respiration: 17, Pulse Oximetry: 95 %             Fluids    Intake/Output Summary (Last 24 hours) at 17 1814  Last data filed at 17 0900   Gross per 24 hour   Intake    950 ml   Output      0 ml   Net    950 ml       Nutrition  Orders Placed This Encounter   Procedures   • Diet Order     Standing Status: Standing      Number of Occurrences: 1      Standing Expiration Date:      Order Specific Question:  Diet:     Answer:  Full Liquid [11]     Order  Specific Question:  Diet:     Answer:  Renal [8]     Physical Exam   Constitutional: She is oriented to person, place, and time. She appears well-developed and well-nourished. No distress.   Thin appearing   HENT:   Head: Normocephalic and atraumatic.   Mouth/Throat: No oropharyngeal exudate.   Rhythmic movement of the tongue   Eyes: Pupils are equal, round, and reactive to light. No scleral icterus.   Neck: Normal range of motion. Neck supple. No thyromegaly present.   No obvious thrush     Cardiovascular: Normal rate, regular rhythm, normal heart sounds and intact distal pulses.    No murmur heard.  Pulmonary/Chest: Effort normal and breath sounds normal. No respiratory distress. She has no wheezes.   Abdominal: Soft. Bowel sounds are normal. There is no tenderness.   Musculoskeletal: Normal range of motion. She exhibits tenderness (Posterior B/L cervical region, could not appreciate LAD in this region or fluctuance/erythema). She exhibits no edema.   Neurological: She is alert and oriented to person, place, and time. No cranial nerve deficit.   Thin appearing   Skin: Skin is warm and dry. No rash noted.   Psychiatric: She has a normal mood and affect.   Nursing note and vitals reviewed.      Recent Labs      06/20/17   1226  06/20/17   2130 06/21/17   0439   WBC  1.0*  1.0*  0.8*   RBC  3.22*  3.41*  3.33*   HEMOGLOBIN  8.8*  9.3*  9.1*   HEMATOCRIT  27.0*  29.1*  29.0*   MCV  83.9  85.3  87.1   MCH  27.3  27.3  27.3   MCHC  32.6*  32.0*  31.4*   RDW  45.4  46.9  48.2   PLATELETCT  68*  75*  71*   MPV  10.8  11.4  10.5     Recent Labs      06/20/17   0828  06/20/17 2014 06/21/17   0439  06/21/17   1216   SODIUM  124*  129*  130*  128*   POTASSIUM  3.3*   --   3.7   --    CHLORIDE  91*   --   95*   --    CO2  23   --   26   --    GLUCOSE  74   --   61*   --    BUN  45*   --   26*   --    CREATININE  3.62*   --   2.53*   --    CALCIUM  8.0*   --   7.8*   --      Recent Labs      06/20/17   0828   APTT  32.6    INR  1.18*                  Assessment/Plan     * Esophagitis (present on admission)  Assessment & Plan  -continue fluconazole  -if worsens, might need GI consult  -ID is following    Esophageal candidiasis (CMS-HCC) (present on admission)  Assessment & Plan  -as above    HTN (hypertension), benign (present on admission)  Assessment & Plan  -well controlled, continue current meds    ESRD (end stage renal disease) (CMS-HCC) (present on admission)  Assessment & Plan  -renal consulted    Malnutrition (CMS-HCC) (present on admission)  Assessment & Plan  -2/2 AIDS, consider megace    Genital herpes (present on admission)  Assessment & Plan  -responding well to prior treatment    AIDS (acquired immune deficiency syndrome) (CMS-HCC) (present on admission)  Assessment & Plan  -recently diagnosed in 5/2017, last CD4 count was 6, monitor  -continue HAART, assistance by Dr Morgan is greatly appreciated, no e/o IRIS at this time    Neutropenia (CMS-HCC) (present on admission)  Assessment & Plan  -ID and oncology have been consulted  -most likely infectious and medication related, stop bactrim for px for now, consider dapsone?  -trend ANC, use precautions, no BM bx at this time    Type 2 diabetes mellitus with renal manifestations (CMS-HCC) (present on admission)  Assessment & Plan  -ISS, adjust PRN, decent control at this time      Labs reviewed and Medications reviewed  Julien catheter: No Julien      DVT Prophylaxis: Contraindicated - High bleeding risk  DVT prophylaxis - mechanical: SCDs      Assessed for rehab: Patient was assess for and/or received rehabilitation services during this hospitalization

## 2017-06-22 NOTE — CARE PLAN
Problem: Safety  Goal: Will remain free from injury  Outcome: PROGRESSING AS EXPECTED  Bed alarm on. Bed in low and locked position. Patient calls appropriately. Room near nurses station.     Problem: Infection  Goal: Will remain free from infection  Outcome: PROGRESSING AS EXPECTED  Protective precautions in place, maintained. Strict hand hygiene.     Problem: Venous Thromboembolism (VTW)/Deep Vein Thrombosis (DVT) Prevention:  Goal: Patient will participate in Venous Thrombosis (VTE)/Deep Vein Thrombosis (DVT)Prevention Measures  Outcome: PROGRESSING AS EXPECTED    Problem: Pain Management  Goal: Pain level will decrease to patient’s comfort goal  Outcome: PROGRESSING AS EXPECTED  Medicated per MAR. Will continue to work towards controlled pain.     Problem: Skin Integrity  Goal: Risk for impaired skin integrity will decrease  Outcome: PROGRESSING AS EXPECTED  Perineal wounds noted, likely genital herpes per noted. Wound care consulted, to follow their orders. Paste used. Strict Q2 turns. Heels floated.

## 2017-06-22 NOTE — DISCHARGE PLANNING
This 47 year old female admitted on 6/20 with esophagitis associated with AIDS.  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 I spoke with her at her bedside. She lives in an apartment in [place] and is cared for at home by her ex-, Linus Madera. She gets her prescriptions at the [facility] here in [place].  She has had home health in the past through [facility].  She gets hemodialysis every Tuesday, Thursday and Saturday at [facility] (at [facility]).  She said that she has been too weak to walk but that prior to becoming so weak she was able to ambulate. While I spoke with her she was getting hemodialysis in her room. She has Medicare and Medicaid for insurance.  She said that if she needs home health again she would like to use [facility] again. At this time there is no order for home health. She said that her ex- is able to take her to her appointments and her dialysis treatments 3 times a week.Care Transition Team Assessment    Information Source  Orientation : Oriented x 4  Information Given By: Patient  Who is responsible for making decisions for patient? : Patient    Readmission Evaluation  Is this a readmission?: Yes - unplanned readmission  Did you have enough support after your last discharge?: Yes    Elopement Risk  Legal Hold: No  Ambulatory or Self Mobile in Wheelchair: No-Not an Elopement Risk  Elopement Risk: Not at Risk for Elopement    Interdisciplinary Discharge Planning  Does Admitting Nurse Feel This Could be a Complex Discharge?: No  Primary Care Physician: Dr. Hoang  Lives with - Patient's Self Care Capacity: Unrelated Adult  Patient or legal guardian wants to designate a caregiver (see row info): No  Support Systems: Friends / Neighbors,  /   Housing / Facility: [address]  Do You Take your Prescribed Medications Regularly: Yes  Able to Return to Previous ADL's: Yes  Mobility Issues:  "Yes  Prior Services: Skilled Home Health Services  Patient Expects to be Discharged to:: home  Assistance Needed: Yes  Durable Medical Equipment: Walker    Discharge Preparedness  What is your plan after discharge?: Home with help  What are your discharge supports?: Other (comment) (Her ex- is her caregiver.)  Prior Functional Level: Needs Assist with Activities of Daily Living, Needs Assist with Medication Management, Other (Comments) (Has been too weak to ambulate.)    Functional Assesment  Prior Functional Level: Needs Assist with Activities of Daily Living, Needs Assist with Medication Management, Other (Comments) (Has been too weak to ambulate.)    Finances  Financial Barriers to Discharge: No  Prescription Coverage: Yes (Uses the [facility].)    Vision / Hearing Impairment  Vision Impairment : No  Hearing Impairment : No    Values / Beliefs / Concerns  Values / Beliefs Concerns : No    Advance Directive  Advance Directive?: None  Advance Directive offered?:  (She's \"working on it\" but refuses to talk to anyone about it)    Domestic Abuse  Have you ever been the victim of abuse or violence?: No  Physical Abuse or Sexual Abuse: No  Verbal Abuse or Emotional Abuse: No  Possible Abuse Reported to:: Not Applicable    Psychological Assessment  History of Substance Abuse:  (Denies substance abuse.)  History of Psychiatric Problems: No  Non-compliant with Treatment: No  Newly Diagnosed Illness: No    Discharge Risks or Barriers  Discharge risks or barriers?: No    Anticipated Discharge Information  Anticipated discharge disposition: Dialysis, Home  Discharge Address:  ([facility] [address], [place], [address])  Discharge Contact Phone Number:  ([phone])        "

## 2017-06-22 NOTE — PROGRESS NOTES
Assumed care of patient at 1900. Patient resting in bed, c/o pain in perineal area. Medicated per MAR. POC reviewed and discussed with patient. Patient agreeable. Safety measures in place. L arm restriction noted. Turn Q2, float heels. Neutropenic precautions in place. Call light within reach. Will continue to monitor.

## 2017-06-22 NOTE — PROGRESS NOTES
3:00 hour HD treatment complete without issue.  Right IJ catheter patent and intact.  Tegaderm dressing with biopatch intact.  Pt denies any pain or soreness at catheter site.  UF was reduced to 1000 ml due to drop in BP, BP stabilized with reduction of UF goal.  Net UF at 500 ml.  IJ arterial and venous lumens locked with 1.8 ml (red port) and 1.9 ml (blue port) heparin.  Red caps intact, ports clamped.  Report given to Angela Hankins RN.

## 2017-06-22 NOTE — PROGRESS NOTES
Hospital Medicine Progress Note, Adult, Complex               Author: Milan Najjar Date & Time created: 6/22/2017  12:39 PM     Interval History:  Pt with ESRD, AIDS admit with esophagitis  Seen and examined while getting HD.    Review of Systems:  Review of Systems   Constitutional: Positive for malaise/fatigue. Negative for fever and chills.   Cardiovascular: Negative for chest pain and leg swelling.   Gastrointestinal: Negative for nausea and vomiting.   Genitourinary: Negative for dysuria and urgency.   Neurological: Positive for weakness.       Physical Exam:  Physical Exam   Constitutional: She is oriented to person, place, and time. She appears cachectic.   HENT:   Head: Normocephalic and atraumatic.   Nose: Nose normal.   Eyes: Right eye exhibits no discharge. Left eye exhibits no discharge. No scleral icterus.   Pulmonary/Chest: Effort normal. She has no wheezes. She has no rales.   Musculoskeletal: She exhibits no edema.   Neurological: She is alert and oriented to person, place, and time.   Skin: Skin is warm and dry.   Nursing note and vitals reviewed.      Labs:        Invalid input(s): [id number]      Recent Labs      06/20/17 0828 06/20/17 0830   06/21/17 0439 06/21/17 2034 06/22/17 0341 06/22/17   0826   SODIUM  124*   --    < >  130*   < >  128*  128*  129*   POTASSIUM  3.3*   --    --   3.7   --    --    --   3.9   CHLORIDE  91*   --    --   95*   --    --    --   97   CO2  23   --    --   26   --    --    --   26   BUN  45*   --    --   26*   --    --    --   31*   CREATININE  3.62*   --    --   2.53*   --    --    --   2.93*   MAGNESIUM   --    --    --   1.9   --    --   1.9   --    PHOSPHORUS   --   3.8   --   2.9   --    --    --    --    CALCIUM  8.0*   --    --   7.8*   --    --    --   7.6*    < > = values in this interval not displayed.     Recent Labs      06/20/17   0828  06/21/17 0439  06/22/17   0826   ALTSGPT  12   --    --    ASTSGOT  25   --    --    ALKPHOSPHAT   248*   --    --    TBILIRUBIN  0.5   --    --    GLUCOSE  74  61*  60*     Recent Labs      17   0828   17   034   RBC   --    < >  3.41*  3.33*  2.64*   HEMOGLOBIN   --    < >  9.3*  9.1*  7.3*   HEMATOCRIT   --    < >  29.1*  29.0*  23.0*   PLATELETCT   --    < >  75*  71*  82*   PROTHROMBTM  15.4*   --    --    --    --    APTT  32.6   --    --    --    --    INR  1.18*   --    --    --    --     < > = values in this interval not displayed.     Recent Labs      17   12217   WBC   --    < >  1.0*  1.0*  0.8*  0.7*   NEUTSPOLYS   --    --   82.10*   --   46.30  58.40   LYMPHOCYTES   --    --   5.40*   --   30.60  11.70*   MONOCYTES   --    --   9.80   --   11.10  6.50   EOSINOPHILS   --    --   0.00   --   4.60  0.00   BASOPHILS   --    --   1.80   --   0.00  11.70*   ASTSGOT  25   --    --    --    --    --    ALTSGPT  12   --    --    --    --    --    ALKPHOSPHAT  248*   --    --    --    --    --    TBILIRUBIN  0.5   --    --    --    --    --     < > = values in this interval not displayed.           Hemodynamics:  Temp (24hrs), Av.4 °C (97.5 °F), Min:36.3 °C (97.3 °F), Max:36.6 °C (97.8 °F)  Temperature: 36.3 °C (97.4 °F)  Pulse  Av.8  Min: 62  Max: 89   Blood Pressure: 116/65 mmHg     Respiratory:    Respiration: 18, Pulse Oximetry: 94 %           Fluids:    Intake/Output Summary (Last 24 hours) at 17 1239  Last data filed at 17 1900   Gross per 24 hour   Intake    290 ml   Output      0 ml   Net    290 ml        GI/Nutrition:  Orders Placed This Encounter   Procedures   • Diet Order     Standing Status: Standing      Number of Occurrences: 1      Standing Expiration Date:      Order Specific Question:  Diet:     Answer:  Full Liquid [11]     Order Specific Question:  Diet:     Answer:  Renal [8]     Medical Decision Making, by Problem:  Active Hospital Problems     Diagnosis   • Esophagitis [K20.9]       Labs reviewed and Medications reviewed                  1 ESRD.  2 Anemia  3 Hyponatremia  4 Hypoalbuminemia  Plan  HD TTS  Renal dose all meds  Avoid nephrotoxins  Prognosis poor.

## 2017-06-22 NOTE — PROGRESS NOTES
Received pt via transfer from [place] [address] to [facility].  Pt is aaox4-lethargic and fatigued.  Reports neck pain - prn pain med with tolerable pain control.  Pt is incontinent of bowels x1 today.  Placed an ultrasound guided IV for CT and pt had CT with contrast - - I Called dr. najjar and confirmed pt will be having dialysis tomorrow.  No bp or sticks on left side with fistula.  Right IJ patent for dialysis - dressing cdi.  Turning pt q2 and prn.  Pt with open soars on labia and bottom area - wound rn in to see pt and states they are not pressure.  Barrier applied.  Bed alarm placed.  Call light in hand - pt makes needs known - will continue to round.

## 2017-06-22 NOTE — PROGRESS NOTES
47 year old dialysis patient with newly diagnosed  AIDS patient seen in f/u for vomiting and neutropenia   Has multiple medical problems ;  CD4 - 2, HIV RNA  60,000   Has -AIDS wasting . On dolutegravir/rilpiviirine and TIW bactrimDS for PCP prophylaxis    MAC bacteremia   HSV perineal ulcerations- on oral acyclovir - improving. Pain resolving   Esophagitis - has been on fluconazole with resolution   Seizures- negative CT scan and non--focal exam   Admitted now with  Vomiting- now resolved  Renal failure    PE= wasted ,alert appearing   No thrush  Lungs - clear   abd - soft   - has shown multiple HSV ulcerations that were resolving     EXt- wasted, scars ,    Assess: fever from MAC  Other problems under control     WBC- neutropenic     REC:   Continue dolutegravir/rilpivirine for HIV therapy  zofran for nausea   Stop Bactrim DS for PCP prophylaxis- will discontinue for now as likely cause of neutropenia  Fluconazole for esophagitis/crypto prophylaxis  Azithro/ethambutol for  MAC

## 2017-06-22 NOTE — PROGRESS NOTES
Assumed pt care at 0715 - bedside report received.  Pt is aaox4-lethargic and fatigued - no change from yesterday.  xhusband at bedside.  Reports neck and slight mouth pain - prn pain med with tolerable pain control.  Reports perineal pain at her labia with areas of open wounds - ordered derma blast for rizwan area.  Wipes/bazza cream applied.  Turning q2 and prn.  Pt was incontinent of bowels x4 at noc and none so fare today.  IV cdi/fluids.  No bp or sticks on left side with fistula.  Right IJ patent for dialysis - dressing cdi.   Bed alarm on.  Call light in hand - pt makes needs known - will continue to round.    Dialysis being done now.

## 2017-06-22 NOTE — CARE PLAN
Problem: Safety  Goal: Will remain free from falls  Intervention: Assess risk factors for falls  Fall precautions in place  Bed alarm on.      Problem: Venous Thromboembolism (VTW)/Deep Vein Thrombosis (DVT) Prevention:  Goal: Patient will participate in Venous Thrombosis (VTE)/Deep Vein Thrombosis (DVT)Prevention Measures  Intervention: Assess and monitor for anticoagulation complications  scd's on.      Problem: Knowledge Deficit  Goal: Knowledge of the prescribed therapeutic regimen will improve  Intervention: Discuss information regarding therpeutic regimen and document in education  Dialysis today.  Numbing spray for rizwan area      Problem: Skin Integrity  Goal: Risk for impaired skin integrity will decrease  Intervention: Assess risk factors for impaired skin integrity and/or pressure ulcers  Turning q2 and prn  Wipes/bazza cream  Propping with pillows  Waffle bed.      Problem: Mobility  Goal: Risk for activity intolerance will decrease  Intervention: Assess and monitor signs of activity intolerance  Pt's spouse states she has not ambulated for about 6 months

## 2017-06-22 NOTE — ASSESSMENT & PLAN NOTE
-recently diagnosed in 5/2017, last CD4 count was 6, monitor  -continue HAART, assistance by Dr Morgan is greatly appreciated, no e/o IRIS at this time

## 2017-06-22 NOTE — DISCHARGE PLANNING
Pt has OP hemodialysis at [facility] TTS 1445.  Clinic has been notified of hospitalization.

## 2017-06-22 NOTE — ASSESSMENT & PLAN NOTE
ANC climbing 830  most likely infectious and medication related, stop bactrim for px for now  D/w hematology, no official consultation, no need for bone marrow

## 2017-06-22 NOTE — PROGRESS NOTES
[facility] Hospitalist Progress Note    Date of Service: 2017    Chief Complaint  47 y.o. female admitted 2017 with weakness x 6 months, worsening confusion and failure to thrive with HIV/AIDS, ESRD on HD    Interval Problem Update  Patient transferred from tele floor for continued care and possible need for bone marrow biopsy and hematology consultation.  Discussed with Dr Mane and no suspicion of hematologic issue - more likely medication and AIDS derived neutropenia rather than bone marrow occupying disease.  ANC is trending up slightly to 450 today, patient feeling slightly stronger, still very sore throat from the esophagitis.   (?ex) at bedside states she is looking much better than when he brought her into the hospital 2 days ago, but also states she has been sedentary for at least six months only going from bed to couch and restroom/kitchen and her weakness is not new.    Consultants/Specialty  ID - Cathy  Nephrology - Najjar Disposition tbd.        Review of Systems   Constitutional: Positive for weight loss and malaise/fatigue. Negative for fever and chills.   HENT: Positive for sore throat. Negative for congestion and nosebleeds.    Eyes: Negative for blurred vision and photophobia.   Respiratory: Negative for cough, shortness of breath and stridor.    Cardiovascular: Negative for chest pain, claudication and leg swelling.   Gastrointestinal: Positive for nausea. Negative for heartburn, abdominal pain and diarrhea.   Genitourinary: Negative for dysuria and frequency.   Musculoskeletal: Negative for myalgias and joint pain.   Skin: Negative for itching and rash.   Neurological: Positive for weakness. Negative for dizziness, sensory change, speech change and headaches.   Psychiatric/Behavioral: Negative for depression. The patient is not nervous/anxious and does not have insomnia.       Physical Exam  Laboratory/Imaging   Hemodynamics  Temp (24hrs), Av.3 °C (97.4 °F), Min:36.1 °C  (97 °F), Max:36.6 °C (97.8 °F)   Temperature: 36.1 °C (97 °F)  Pulse  Av.3  Min: 62  Max: 92    Blood Pressure: 130/74 mmHg      Respiratory      Respiration: 18, Pulse Oximetry: 100 %             Fluids    Intake/Output Summary (Last 24 hours) at 17 1640  Last data filed at 17 1241   Gross per 24 hour   Intake   1030 ml   Output   1000 ml   Net     30 ml       Nutrition  Orders Placed This Encounter   Procedures   • Diet Order     Standing Status: Standing      Number of Occurrences: 1      Standing Expiration Date:      Order Specific Question:  Diet:     Answer:  Full Liquid [11]     Order Specific Question:  Diet:     Answer:  Renal [8]     Physical Exam   Constitutional: She is oriented to person, place, and time. She appears well-developed. No distress.   Cachexia     HENT:   Head: Normocephalic and atraumatic.   Eyes: Conjunctivae are normal. No scleral icterus.   Neck: Neck supple. No JVD present.   Cardiovascular: Normal rate and regular rhythm.  Exam reveals no gallop and no friction rub.    No murmur heard.  Pulmonary/Chest: Effort normal and breath sounds normal. No respiratory distress. She has no wheezes. She exhibits no tenderness.   Vas cath in place     Abdominal: Soft. Bowel sounds are normal. She exhibits no distension and no mass. There is no tenderness.   Musculoskeletal: She exhibits no edema or tenderness.   Neurological: She is alert and oriented to person, place, and time. No cranial nerve deficit.   Skin: Skin is warm and dry. She is not diaphoretic. No erythema. No pallor.   Psychiatric: She has a normal mood and affect. Her behavior is normal.   Nursing note and vitals reviewed.      Recent Labs      17   2130  17   0439  17   0341   WBC  1.0*  0.8*  0.7*   RBC  3.41*  3.33*  2.64*   HEMOGLOBIN  9.3*  9.1*  7.3*   HEMATOCRIT  29.1*  29.0*  23.0*   MCV  85.3  87.1  87.1   MCH  27.3  27.3  27.7   MCHC  32.0*  31.4*  31.7*   RDW  46.9  48.2  48.9    PLATELETCT  75*  71*  82*   MPV  11.4  10.5  10.8     Recent Labs      06/20/17   0828   06/21/17   0439   06/21/17   2034  06/22/17   0341  06/22/17   0826   SODIUM  124*   < >  130*   < >  128*  128*  129*   POTASSIUM  3.3*   --   3.7   --    --    --   3.9   CHLORIDE  91*   --   95*   --    --    --   97   CO2  23   --   26   --    --    --   26   GLUCOSE  74   --   61*   --    --    --   60*   BUN  45*   --   26*   --    --    --   31*   CREATININE  3.62*   --   2.53*   --    --    --   2.93*   CALCIUM  8.0*   --   7.8*   --    --    --   7.6*    < > = values in this interval not displayed.     Recent Labs      06/20/17   0828   APTT  32.6   INR  1.18*                  Assessment/Plan     * Esophagitis (present on admission)  Assessment & Plan  Fluconazole   Improving pain  Candidiasis      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)  Assessment & Plan  Continued nutrition support, supplements as tolerated.      Esophageal candidiasis (CMS-HCC) (present on admission)  Assessment & Plan  -as above    HTN (hypertension), benign (present on admission)  Assessment & Plan  -well controlled, continue current meds    ESRD (end stage renal disease) (CMS-HCC) (present on admission)  Assessment & Plan  Dr Najjar following  HD t/t/s      Malnutrition (CMS-HCC) (present on admission)  Assessment & Plan  -2/2 AIDS, consider megace    Genital herpes (present on admission)  Assessment & Plan  Significant perineal ulcerations  Continue valtrex  Dermablast PRN from obstetrics floor      AIDS (acquired immune deficiency syndrome) (CMS-HCC) (present on admission)  Assessment & Plan  -recently diagnosed in 5/2017, last CD4 count was 6, monitor  -continue HAART, assistance by Dr Morgan is greatly appreciated, no e/o IRIS at this time    Neutropenia (CMS-HCC) (present on admission)  Assessment & Plan  -most likely infectious and medication related, stop bactrim for px for now  D/w hematology, no official consultation, no  need for bone marrow      Anemia (present on admission)  Assessment & Plan  Significant drop from yesterday 9.1 to 7.3  Watch for change after dialysis to see if dilutional  No evidence of bleeding      Type 2 diabetes mellitus with renal manifestations (CMS-HCC) (present on admission)  Assessment & Plan  -ISS, adjust PRN, decent control at this time    Labs reviewed, Medications reviewed and Radiology images reviewed  Julien catheter: No Julien      DVT Prophylaxis: Contraindicated - High bleeding risk  DVT prophylaxis - mechanical: SCDs    Antibiotics: Treating active infection/contamination beyond 24 hours perioperative coverage

## 2017-06-23 NOTE — PROGRESS NOTES
Received report and assumed patient care at change of shift. Patient is resting in bed, A/O x4. Patient is lethargic and has a flat affect. Patient reports 3/10 pain at this time, medications provided per MAR.    Perineal abscesses noted consistent with herpes, cream applied.    Plan of care discussed, questions answered. Bed is in the lowest position and locked, call light within reach, non-skid socks in place, hourly rounding. Patient reports no further needs and this time.

## 2017-06-23 NOTE — PROGRESS NOTES
[facility] Hospitalist Progress Note    Date of Service: 6/23/2017    Chief Complaint  47 y.o. female admitted 6/20/2017 with weakness x 6 months, worsening confusion and failure to thrive with HIV/AIDS, ESRD on HD    Interval Problem Update  6/22 Patient transferred from tele floor for continued care and possible need for bone marrow biopsy and hematology consultation.  Discussed with Dr Mane and no suspicion of hematologic issue - more likely medication and AIDS derived neutropenia rather than bone marrow occupying disease.  ANC is trending up slightly to 450 today, patient feeling slightly stronger, still very sore throat from the esophagitis.   (?ex) at bedside states she is looking much better than when he brought her into the hospital 2 days ago, but also states she has been sedentary for at least six months only going from bed to couch and restroom/kitchen and her weakness is not new.  6/23 Patient fatigued but wakes and answers questions appropriately.  She started having frequent diarrhea yesterday, c diff collected and results pending.  Drop in h/h past 2 days, receiving transfusion today, not her first transfusion on this admission.  FOBT would be positive with frequent stooling and low platelet level so not ordered.  Stool is not black and even if it were, given her neutropenia, egd/colonoscopy intervention is contraindicated.      Consultants/Specialty  ID - Cathy  Nephrology - Najjar    Disposition  tbafsaneh.        Review of Systems   Constitutional: Positive for weight loss and malaise/fatigue. Negative for fever and chills.   HENT: Positive for sore throat. Negative for congestion and nosebleeds.    Eyes: Negative for blurred vision and photophobia.   Respiratory: Negative for cough, shortness of breath and stridor.    Cardiovascular: Negative for chest pain, claudication and leg swelling.   Gastrointestinal: Positive for nausea and diarrhea. Negative for heartburn, abdominal pain and  constipation.   Genitourinary: Negative for dysuria and frequency.   Musculoskeletal: Negative for myalgias and joint pain.   Skin: Negative for itching and rash.   Neurological: Positive for weakness. Negative for dizziness, sensory change, speech change and headaches.   Psychiatric/Behavioral: Negative for depression. The patient is not nervous/anxious and does not have insomnia.       Physical Exam  Laboratory/Imaging   Hemodynamics  Temp (24hrs), Av.7 °C (98 °F), Min:36.1 °C (97 °F), Max:37.1 °C (98.8 °F)   Temperature: 36.3 °C (97.4 °F)  Pulse  Av.8  Min: 62  Max: 92    Blood Pressure: 144/77 mmHg      Respiratory      Respiration: 18, Pulse Oximetry: 100 %             Fluids    Intake/Output Summary (Last 24 hours) at 17 1423  Last data filed at 17 0517   Gross per 24 hour   Intake    470 ml   Output      0 ml   Net    470 ml       Nutrition  Orders Placed This Encounter   Procedures   • Diet Order     Standing Status: Standing      Number of Occurrences: 1      Standing Expiration Date:      Order Specific Question:  Diet:     Answer:  Renal [8]     Order Specific Question:  Diet:     Answer:  Low Fiber(GI Soft) [2]     Physical Exam   Constitutional: She is oriented to person, place, and time. She appears well-developed. No distress.   Cachexia     HENT:   Head: Normocephalic and atraumatic.   Eyes: Conjunctivae are normal. No scleral icterus.   Neck: Neck supple. No JVD present.   Cardiovascular: Normal rate, regular rhythm and normal heart sounds.  Exam reveals no gallop and no friction rub.    No murmur heard.  Pulmonary/Chest: Effort normal and breath sounds normal. No respiratory distress. She has no wheezes. She exhibits no tenderness.   Vas cath in place     Abdominal: Soft. Bowel sounds are normal. She exhibits no distension and no mass. There is no tenderness.   Musculoskeletal: She exhibits no edema or tenderness.   Neurological: She is alert and oriented to person, place, and  time. No cranial nerve deficit.   Skin: Skin is warm and dry. She is not diaphoretic. No erythema. No pallor.   Psychiatric: She has a normal mood and affect. Her behavior is normal.   Nursing note and vitals reviewed.      Recent Labs      06/21/17 0439 06/22/17 0341 06/23/17   0153   WBC  0.8*  0.7*  0.7*   RBC  3.33*  2.64*  2.44*   HEMOGLOBIN  9.1*  7.3*  6.7*   HEMATOCRIT  29.0*  23.0*  21.2*   MCV  87.1  87.1  86.9   MCH  27.3  27.7  27.5   MCHC  31.4*  31.7*  31.6*   RDW  48.2  48.9  49.4   PLATELETCT  71*  82*  78*   MPV  10.5  10.8  9.2     Recent Labs      06/21/17 0439 06/22/17 0341 06/22/17   0826 06/23/17   0153   SODIUM  130*   < >  128*  129*  130*   POTASSIUM  3.7   --    --   3.9  3.4*   CHLORIDE  95*   --    --   97  100   CO2  26   --    --   26  26   GLUCOSE  61*   --    --   60*  96   BUN  26*   --    --   31*  16   CREATININE  2.53*   --    --   2.93*  1.89*   CALCIUM  7.8*   --    --   7.6*  7.1*    < > = values in this interval not displayed.                      Assessment/Plan     * Esophagitis (present on admission)  Assessment & Plan  Fluconazole   Improving pain  Candidiasis      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)  Assessment & Plan  Continued nutrition support, supplements as tolerated.      Esophageal candidiasis (CMS-HCC) (present on admission)  Assessment & Plan  -as above    HTN (hypertension), benign (present on admission)  Assessment & Plan  -well controlled, continue current meds    ESRD (end stage renal disease) (CMS-HCC) (present on admission)  Assessment & Plan  Dr Najjar following  HD t/t/s      Malnutrition (CMS-HCC) (present on admission)  Assessment & Plan  -2/2 AIDS, consider megace    Genital herpes (present on admission)  Assessment & Plan  Significant perineal ulcerations  Continue valtrex  Dermablast PRN from obstetrics floor      AIDS (acquired immune deficiency syndrome) (CMS-HCC) (present on admission)  Assessment & Plan  -recently  diagnosed in 5/2017, last CD4 count was 6, monitor  -continue HAART, assistance by Dr Morgan is greatly appreciated, no e/o IRIS at this time    Neutropenia (CMS-HCC) (present on admission)  Assessment & Plan  -most likely infectious and medication related, stop bactrim for px for now  D/w hematology, no official consultation, no need for bone marrow      Anemia (present on admission)  Assessment & Plan  Continued to drop after HD, if dilutional would have increased  Transfuse if hgb Less than 7.0  No evidence of bleeding      Type 2 diabetes mellitus with renal manifestations (CMS-HCC) (present on admission)  Assessment & Plan  -ISS, adjust PRN, decent control at this time      Labs reviewed, Medications reviewed and Radiology images reviewed  Julien catheter: No Julien      DVT Prophylaxis: Contraindicated - High bleeding risk and Contraindicated - Anemia requiring blood transfusion  DVT prophylaxis - mechanical: SCDs    Antibiotics: Treating active infection/contamination beyond 24 hours perioperative coverage

## 2017-06-23 NOTE — DOCUMENTATION QUERY
DOCUMENTATION QUERY    PROVIDERS: Please select “Cosign w/ note”to reply to query.    To better represent the severity of illness of your patient, please review the following information and exercise your independent professional judgment in responding to this query.     Malnutrition is documented in the Progress Notes. Based upon the clinical findings, risk factors, and treatment, can this diagnosis be further specified?    • Mild Protein Calorie Malnutrition  • Moderate Protein Calorie Malnutrition  • Severe Protein Calorie Malnutrition  • Findings of no clinical significance  • Other explanation of clinical findings  • Unable to determine    The medical record reflects the following:   Clinical Findings Per Progress Notes 6/23:   Malnutrition (CMS-HCC) (present on admission)  Assessment & Plan  -2/2 AIDS, consider megace    Per Dietitian Notes:   BMI: 19.37  Will add supplements TID with meals. Per chart pt PO < 25% 2 meals recorded.     Results Review:   Albumin 2.1/1.8   Treatment Dietitian Consultation & monitor I&O   Risk Factors HIV wasting syndrome, esophageal candidiasis, ESRD, malnutrition, anemia, & DM2 with renal manifestations    Location within medical record History & Physical, Progress Notes, & Dietitian Notes, & Lab Results      Thank you,   Krystyna Anderson RN  Clinical   Phone [phone]

## 2017-06-23 NOTE — CARE PLAN
Problem: Nutritional:  Goal: Achieve adequate nutritional intake  Patient will consume >50% of meals   Outcome: PROGRESSING AS EXPECTED  Intervention: Monitor PO intake, weights, and laboratory values  Pt was sleeping upon visit. Pt consumed 50-75% of documented meals. Pt is receiving Boost Glucose Control TID with meals. RD will continue to monitor PO intake.

## 2017-06-23 NOTE — PROGRESS NOTES
Leigh from Lab called with critical result of WBC: 0.7 at 0204. Critical lab result read back to Leigh.  This critical lab result is within parameters established by [facility] for this patient

## 2017-06-23 NOTE — PROGRESS NOTES
[facility] Medicine Progress Note, Adult, Complex               Author: Fadi Najjar Date & Time created: 6/23/2017  1:55 PM     Interval History:  Pt with ESRD, AIDS admit with esophagitis    Review of Systems:  Review of Systems   Constitutional: Positive for malaise/fatigue. Negative for fever and chills.   Cardiovascular: Negative for chest pain and leg swelling.   Gastrointestinal: Negative for nausea and vomiting.   Genitourinary: Negative for dysuria and urgency.   Neurological: Positive for weakness.       Physical Exam:  Physical Exam   Constitutional: She is oriented to person, place, and time. She appears cachectic.   HENT:   Head: Normocephalic and atraumatic.   Nose: Nose normal.   Eyes: Right eye exhibits no discharge. Left eye exhibits no discharge. No scleral icterus.   Pulmonary/Chest: Effort normal. She has no wheezes. She has no rales.   Musculoskeletal: She exhibits no edema.   Neurological: She is alert and oriented to person, place, and time.   Skin: Skin is warm and dry.   Nursing note and vitals reviewed.      Labs:        Invalid input(s): [id number]      Recent Labs      06/21/17 0439 06/22/17 0341 06/22/17 0826 06/23/17   0153   SODIUM  130*   < >  128*  129*  130*   POTASSIUM  3.7   --    --   3.9  3.4*   CHLORIDE  95*   --    --   97  100   CO2  26   --    --   26  26   BUN  26*   --    --   31*  16   CREATININE  2.53*   --    --   2.93*  1.89*   MAGNESIUM  1.9   --   1.9   --    --    PHOSPHORUS  2.9   --    --    --    --    CALCIUM  7.8*   --    --   7.6*  7.1*    < > = values in this interval not displayed.     Recent Labs      06/21/17 0439 06/22/17 0826 06/23/17   0153   ALTSGPT   --    --   13   ASTSGOT   --    --   18   ALKPHOSPHAT   --    --   223*   TBILIRUBIN   --    --   0.4   GLUCOSE  61*  60*  96     Recent Labs      06/21/17 0439 06/22/17 0341 06/23/17   0153   RBC  3.33*  2.64*  2.44*   HEMOGLOBIN  9.1*  7.3*  6.7*   HEMATOCRIT  29.0*  23.0*  21.2*    PLATELETCT  71*  82*  78*     Recent Labs      17   0439  17   0341  17   0153   WBC  0.8*  0.7*  0.7*   NEUTSPOLYS  46.30  58.40  62.90   LYMPHOCYTES  30.60  11.70*  16.10*   MONOCYTES  11.10  6.50  4.80   EOSINOPHILS  4.60  0.00  3.20   BASOPHILS  0.00  11.70*  4.90*   ASTSGOT   --    --   18   ALTSGPT   --    --   13   ALKPHOSPHAT   --    --   223*   TBILIRUBIN   --    --   0.4           Hemodynamics:  Temp (24hrs), Av.7 °C (98 °F), Min:36.1 °C (97 °F), Max:37.1 °C (98.8 °F)  Temperature: 36.3 °C (97.4 °F)  Pulse  Av.8  Min: 62  Max: 92   Blood Pressure: 144/77 mmHg     Respiratory:    Respiration: 18, Pulse Oximetry: 100 %           Fluids:    Intake/Output Summary (Last 24 hours) at 17 1355  Last data filed at 17 0517   Gross per 24 hour   Intake    470 ml   Output      0 ml   Net    470 ml        GI/Nutrition:  Orders Placed This Encounter   Procedures   • Diet Order     Standing Status: Standing      Number of Occurrences: 1      Standing Expiration Date:      Order Specific Question:  Diet:     Answer:  Renal [8]     Order Specific Question:  Diet:     Answer:  Low Fiber(GI Soft) [2]     Medical Decision Making, by Problem:  Active Hospital Problems    Diagnosis   • Esophagitis [K20.9]       Labs reviewed and Medications reviewed                  1 ESRD.  2 Anemia  3 Hyponatremia  4 Hypoalbuminemia  Plan  HD TTS  No need for HD today  Renal dose all meds  Avoid nephrotoxins  Prognosis poor.

## 2017-06-23 NOTE — THERAPY
"Physical Therapy Evaluation completed.   Bed Mobility:  Supine to Sit: Moderate Assist  Transfers: Sit to Stand: Refused  Gait: Level Of Assist: Unable to Participate with Will Continue to Assess for Equipment Needs       Plan of Care: Will benefit from Physical Therapy 3 times per week  Discharge Recommendations: Equipment: Will Continue to Assess for Equipment Needs. Post-acute therapy Discharge to a transitional care facility for continued skilled therapy services.    Pt presents to acute PT s/p medical dx with debility, weakness, impaired functional mobility/endurance. Pt will benefit from skilled acute PT to address impairments and assist with d/c planning.    See \"Rehab Therapy-Acute\" Patient Summary Report for complete documentation.     "

## 2017-06-23 NOTE — THERAPY
"Occupational Therapy Evaluation completed.   Functional Status:  Pt incontinent of bowel at start of session.  Min A to roll in bed, total assist for clean up.  Mod A supine to sit.  Pt able to sit EOB to eat lunch.  Pt required assist to cut meat and open packages.  Pt washed face seated EOB.  Pt declined standing or any other OOB activity.  Mod A to return to supine.  Plan of Care: Will benefit from Occupational Therapy 2 times per week  Discharge Recommendations:  Equipment: Will Continue to Assess for Equipment Needs. Post-acute therapy Discharge to a transitional care facility for continued skilled therapy services.    See \"Rehab Therapy-Acute\" Patient Summary Report for complete documentation.    "

## 2017-06-24 NOTE — PROGRESS NOTES
[facility] Hospitalist Progress Note    Date of Service: 6/24/2017    Chief Complaint  47 y.o. female admitted 6/20/2017 with weakness x 6 months, worsening confusion and failure to thrive with HIV/AIDS, ESRD on HD    Interval Problem Update  6/22 Patient transferred from tele floor for continued care and possible need for bone marrow biopsy and hematology consultation.  Discussed with Dr Mane and no suspicion of hematologic issue - more likely medication and AIDS derived neutropenia rather than bone marrow occupying disease.  ANC is trending up slightly to 450 today, patient feeling slightly stronger, still very sore throat from the esophagitis.   (?ex) at bedside states she is looking much better than when he brought her into the hospital 2 days ago, but also states she has been sedentary for at least six months only going from bed to couch and restroom/kitchen and her weakness is not new.  6/23 Patient fatigued but wakes and answers questions appropriately.  She started having frequent diarrhea yesterday, c diff collected and results pending.  Drop in h/h past 2 days, receiving transfusion today, not her first transfusion on this admission.  FOBT would be positive with frequent stooling and low platelet level so not ordered.  Stool is not black and even if it were, given her neutropenia, egd/colonoscopy intervention is contraindicated.    6/24 Patient feeling stronger today, better appetite.  She did well with HD today without issue.  C diff negative so contact isolation discontinued.  ANC up to 620.    Consultants/Specialty  ID - Cathy  Nephrology - Najjar    Disposition  tbd.        Review of Systems   Constitutional: Positive for weight loss and malaise/fatigue. Negative for fever and chills.   HENT: Positive for sore throat (better). Negative for congestion and nosebleeds.    Eyes: Negative for blurred vision and photophobia.   Respiratory: Negative for cough, shortness of breath and stridor.     Cardiovascular: Negative for chest pain, claudication and leg swelling.   Gastrointestinal: Positive for nausea and diarrhea. Negative for heartburn, abdominal pain and constipation.   Genitourinary: Negative for dysuria and frequency.   Musculoskeletal: Negative for myalgias and joint pain.   Skin: Negative for itching and rash.   Neurological: Positive for weakness. Negative for dizziness, sensory change, speech change and headaches.   Psychiatric/Behavioral: Negative for depression. The patient is not nervous/anxious and does not have insomnia.       Physical Exam  Laboratory/Imaging   Hemodynamics  Temp (24hrs), Av.4 °C (97.6 °F), Min:36.1 °C (97 °F), Max:36.8 °C (98.2 °F)   Temperature: 36.2 °C (97.1 °F)  Pulse  Av  Min: 62  Max: 92    Blood Pressure: 158/77 mmHg      Respiratory      Respiration: 18, Pulse Oximetry: 100 %             Fluids    Intake/Output Summary (Last 24 hours) at 17 1554  Last data filed at 17 1203   Gross per 24 hour   Intake   1530 ml   Output   3000 ml   Net  -1470 ml       Nutrition  Orders Placed This Encounter   Procedures   • Diet Order     Standing Status: Standing      Number of Occurrences: 1      Standing Expiration Date:      Order Specific Question:  Diet:     Answer:  Renal [8]     Order Specific Question:  Diet:     Answer:  Low Fiber(GI Soft) [2]     Physical Exam   Constitutional: She is oriented to person, place, and time. She appears well-developed. No distress.   Cachexia     HENT:   Head: Normocephalic and atraumatic.   Eyes: Conjunctivae are normal. No scleral icterus.   Neck: Neck supple. No JVD present.   Cardiovascular: Normal rate, regular rhythm and normal heart sounds.  Exam reveals no gallop and no friction rub.    No murmur heard.  Pulmonary/Chest: Effort normal and breath sounds normal. No respiratory distress. She has no wheezes. She exhibits no tenderness.   Vas cath in place     Abdominal: Soft. Bowel sounds are normal. She exhibits  no distension and no mass. There is no tenderness.   Musculoskeletal: She exhibits no edema or tenderness.   Neurological: She is alert and oriented to person, place, and time. No cranial nerve deficit.   Skin: Skin is warm and dry. She is not diaphoretic. No erythema. No pallor.   Psychiatric: She has a normal mood and affect. Her behavior is normal.   Nursing note and vitals reviewed.      Recent Labs      06/22/17   0341  06/23/17   0153  06/24/17   0352   WBC  0.7*  0.7*  1.1*   RBC  2.64*  2.44*  2.89*   HEMOGLOBIN  7.3*  6.7*  8.1*   HEMATOCRIT  23.0*  21.2*  25.6*   MCV  87.1  86.9  88.6   MCH  27.7  27.5  28.0   MCHC  31.7*  31.6*  31.6*   RDW  48.9  49.4  48.3   PLATELETCT  82*  78*  68*   MPV  10.8  9.2  9.4     Recent Labs      06/22/17   0826  06/23/17   0153  06/24/17   0352   SODIUM  129*  130*  135   POTASSIUM  3.9  3.4*  3.4*   CHLORIDE  97  100  110   CO2  26  26  21   GLUCOSE  60*  96  51*   BUN  31*  16  18   CREATININE  2.93*  1.89*  1.91*   CALCIUM  7.6*  7.1*  6.0*                      Assessment/Plan     * Esophagitis (present on admission)  Assessment & Plan  Fluconazole   Improving pain  Candidiasis      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)  Assessment & Plan  Continued nutrition support, supplements as tolerated.      Esophageal candidiasis (CMS-HCC) (present on admission)  Assessment & Plan  -as above    HTN (hypertension), benign (present on admission)  Assessment & Plan  -well controlled, continue current meds    ESRD (end stage renal disease) (CMS-HCC) (present on admission)  Assessment & Plan  Dr Najjar following  HD t/t/s      Malnutrition (CMS-HCC) (present on admission)  Assessment & Plan  -2/2 AIDS, consider megace    Genital herpes (present on admission)  Assessment & Plan  Significant perineal ulcerations  Continue valtrex  Dermablast PRN from obstetrics floor      AIDS (acquired immune deficiency syndrome) (CMS-HCC) (present on admission)  Assessment &  Plan  -recently diagnosed in 5/2017, last CD4 count was 6, monitor  -continue HAART, assistance by Dr Morgan is greatly appreciated, no e/o IRIS at this time    Neutropenia (CMS-HCC) (present on admission)  Assessment & Plan  -most likely infectious and medication related, stop bactrim for px for now  D/w hematology, no official consultation, no need for bone marrow      Anemia (present on admission)  Assessment & Plan  Continued to drop after HD, if dilutional would have increased  Transfuse if hgb Less than 7.0  No evidence of bleeding      Type 2 diabetes mellitus with renal manifestations (CMS-HCC) (present on admission)  Assessment & Plan  -ISS, adjust PRN, decent control at this time      Labs reviewed, Medications reviewed and Radiology images reviewed  Julien catheter: No Julien      DVT Prophylaxis: Contraindicated - High bleeding risk and Contraindicated - Anemia requiring blood transfusion  DVT prophylaxis - mechanical: SCDs    Antibiotics: Treating active infection/contamination beyond 24 hours perioperative coverage

## 2017-06-24 NOTE — PROGRESS NOTES
Assumed pt care at 0715 - bedside report received.  Pt is aaox4-lethargic and fatigued - no change from yesterday.  Reports neck and slight mouth pain 1/10 - denies need for pain medication. Reports perineal pain at her labia with areas of open wounds when being cleaned from incontinent episodes - no spary available - tucks in use and barrier wipes/bazza cream.  cdiff sample sent to lab.  Pt  Pt was incontinent of bowels x3.  IV cdi/fluids.  No bp or sticks on left side with fistula.  Right IJ patent for dialysis - dressing cdi.   Bed alarm on.  Call light in hand - pt makes needs known - will continue to round.

## 2017-06-24 NOTE — CARE PLAN
Problem: Safety  Goal: Will remain free from falls  Intervention: Assess risk factors for falls  Fall precautions in place  Bed alarm on.      Problem: Venous Thromboembolism (VTW)/Deep Vein Thrombosis (DVT) Prevention:  Goal: Patient will participate in Venous Thrombosis (VTE)/Deep Vein Thrombosis (DVT)Prevention Measures  Intervention: Assess and monitor for anticoagulation complications  scd's on.      Problem: Knowledge Deficit  Goal: Knowledge of the prescribed therapeutic regimen will improve  Intervention: Discuss information regarding therpeutic regimen and document in education  Dialysis today.  tucks      Problem: Skin Integrity  Goal: Risk for impaired skin integrity will decrease  Intervention: Assess risk factors for impaired skin integrity and/or pressure ulcers  Turning q2 and prn  Wipes/bazza cream  Propping with pillows  Waffle bed.      Problem: Mobility  Goal: Risk for activity intolerance will decrease  Intervention: Assess and monitor signs of activity intolerance  Pt's spouse states she has not ambulated for about 6 months  PT and OT in to see pt today and sitting at edge of bed

## 2017-06-24 NOTE — PROGRESS NOTES
[facility] Services Progress Note  Hemodialysis treatment ordered today per Dr. Najjar x 3 hours. Treatment initiated at 0902, ended at 1203.     Patient tolerated tx; see paper flow sheet for details.     Net UF 2500 mL.     Post tx, CVC flushed with saline then locked with heparin 1000 units/mL per designated amount in each wing then clamped and capped. Aspirate heparin prior to next CVC use.    Report given to Primary RN.

## 2017-06-24 NOTE — CARE PLAN
Problem: Safety  Goal: Will remain free from injury  Outcome: PROGRESSING AS EXPECTED  Patient educated about the importance of calling for assistance. Patient verbalizes understanding and calls for assistance appropriately. Safety precautions in place including patient call light within reach, bed in low position and locked, personal possessions close by, patient rounding [id number], bed alarm on, frequent toileting offered, and non-skid socks in place. Patient remains free of injury since start of shift.           Problem: Pain Management  Goal: Pain level will decrease to patient’s comfort goal  Outcome: PROGRESSING AS EXPECTED  Reports pain only when being cleaned up from an incontinent episode. Denies need for medication intervention. PRN dermaplast available.

## 2017-06-24 NOTE — PROGRESS NOTES
Assumed care of pt at 1900. Bedside report received. AAOx4, VSS, lethargic. Denies nausea or pain at this time. Reports pain in rizwan area when being cleaned, Dermaplast spray ordered per Dr. Yadav. Special Contact Isolation discontinued as Cdiff was negative. Neutropenic precautions in place. PIV patent, IJ for dialysis. POC discussed, call light in reach, pt calls for assistance, all needs met at this time.

## 2017-06-24 NOTE — PROGRESS NOTES
Danya from Lab called with critical result of WBC 1.1 at 0450. Critical lab result read back to Danya.   This critical lab result is within parameters established by [facility] policy for this patient. Protective Isolation in place.

## 2017-06-24 NOTE — PROGRESS NOTES
Manisha from Lab called with critical result of Calcium 6.0 at 0445. Critical lab result read back to Manisha.   This critical lab result is within parameters established by [facility] Policy for this patient. Corrected Calcium is 8.0.

## 2017-06-24 NOTE — PROGRESS NOTES
[facility] Medicine Progress Note, Adult, Complex               Author: Fadi Najjar Date & Time created: 6/24/2017  12:30 PM     Interval History:  Pt with ESRD, AIDS admit with esophagitis  Seen and examined while getting HD.    Review of Systems:  Review of Systems   Constitutional: Positive for malaise/fatigue. Negative for fever and chills.   Cardiovascular: Negative for chest pain and leg swelling.   Gastrointestinal: Negative for nausea and vomiting.   Genitourinary: Negative for dysuria and urgency.   Neurological: Positive for weakness.       Physical Exam:  Physical Exam   Constitutional: She is oriented to person, place, and time. She appears cachectic.   HENT:   Head: Normocephalic and atraumatic.   Nose: Nose normal.   Eyes: Right eye exhibits no discharge. Left eye exhibits no discharge. No scleral icterus.   Pulmonary/Chest: Effort normal. She has no wheezes. She has no rales.   Musculoskeletal: She exhibits no edema.   Neurological: She is alert and oriented to person, place, and time.   Skin: Skin is warm and dry.   Nursing note and vitals reviewed.      Labs:        Invalid input(s): [id number]      Recent Labs      06/22/17 0341 06/22/17   0826 06/23/17 0153  06/24/17   0352   SODIUM  128*  129*  130*  135   POTASSIUM   --   3.9  3.4*  3.4*   CHLORIDE   --   97  100  110   CO2   --   26  26  21   BUN   --   31*  16  18   CREATININE   --   2.93*  1.89*  1.91*   MAGNESIUM  1.9   --    --    --    CALCIUM   --   7.6*  7.1*  6.0*     Recent Labs      06/22/17   0826 06/23/17   0153  06/24/17   0352   ALTSGPT   --   13  8   ASTSGOT   --   18  15   ALKPHOSPHAT   --   223*  184*   TBILIRUBIN   --   0.4  0.4   GLUCOSE  60*  96  51*     Recent Labs      06/22/17 0341 06/23/17 0153 06/24/17   0352   RBC  2.64*  2.44*  2.89*   HEMOGLOBIN  7.3*  6.7*  8.1*   HEMATOCRIT  23.0*  21.2*  25.6*   PLATELETCT  82*  78*  68*     Recent Labs      06/22/17 0341 06/23/17 0153 06/24/17   0352   WBC   0.7*  0.7*  1.1*   NEUTSPOLYS  58.40  62.90  49.10   LYMPHOCYTES  11.70*  16.10*  17.90*   MONOCYTES  6.50  4.80  8.00   EOSINOPHILS  0.00  3.20  14.30*   BASOPHILS  11.70*  4.90*  1.80   ASTSGOT   --   18  15   ALTSGPT   --   13  8   ALKPHOSPHAT   --   223*  184*   TBILIRUBIN   --   0.4  0.4           Hemodynamics:  Temp (24hrs), Av.6 °C (97.8 °F), Min:36.1 °C (97 °F), Max:36.8 °C (98.2 °F)  Temperature: 36.8 °C (98.2 °F)  Pulse  Av.5  Min: 62  Max: 92   Blood Pressure: 151/79 mmHg     Respiratory:    Respiration: 18, Pulse Oximetry: 100 %           Fluids:    Intake/Output Summary (Last 24 hours) at 17 1230  Last data filed at 17 1203   Gross per 24 hour   Intake   1530 ml   Output   3000 ml   Net  -1470 ml        GI/Nutrition:  Orders Placed This Encounter   Procedures   • Diet Order     Standing Status: Standing      Number of Occurrences: 1      Standing Expiration Date:      Order Specific Question:  Diet:     Answer:  Renal [8]     Order Specific Question:  Diet:     Answer:  Low Fiber(GI Soft) [2]     Medical Decision Making, by Problem:  Active Hospital Problems    Diagnosis   • Esophagitis [K20.9]       Labs reviewed and Medications reviewed                  1 ESRD.  2 Anemia  3 Hyponatremia  4 Hypoalbuminemia  Plan  HD today  Renal dose all meds  Avoid nephrotoxins  Prognosis poor.

## 2017-06-24 NOTE — CARE PLAN
Problem: Safety  Goal: Will remain free from injury  Outcome: PROGRESSING AS EXPECTED  Patient requires turning, can turn herself at times. Patient has call light within reach.     Problem: Pain Management  Goal: Pain level will decrease to patient’s comfort goal  Outcome: PROGRESSING AS EXPECTED  Patient reported neck pain, medicated according to MAR.

## 2017-06-24 NOTE — PROGRESS NOTES
Received report from NOC RN, assumed care of patient at 0700. Patient sleeping and denies pain this AM. Patient scheduled to go for dialysis. Patient oriented x 4. Plan of care discussed and patient agreeable.

## 2017-06-24 NOTE — PROGRESS NOTES
Patient arrived back to unit from dialysis, blood sugar was 57. Patient had not eaten this AM so gave her some juice. Patient reports pain in her neck, medicated according to MAR.

## 2017-06-25 NOTE — PROGRESS NOTES
Received report from NOC RN, assumed care of patient at 0700. Patient is awake alert and oriented x 4. Patient cannot get out of bed. Patient turns well on her own. Patient prompted to turn in bed by RN. Patient updated on plan of care. Call light within reach. Bed in low and locked position.

## 2017-06-25 NOTE — CARE PLAN
Problem: Pain Management  Goal: Pain level will decrease to patient’s comfort goal  Will medicate for pain PRN see MAR    Problem: Skin Integrity  Goal: Risk for impaired skin integrity will decrease  Pt remains on every 2 hour turning schedule, hourly rounding in effect with frequent evaluation of incontinence.

## 2017-06-25 NOTE — PROGRESS NOTES
Assumed care of patient, received report from day RN.  Communication board updated and reviewed with pt. Bed alarm on.  Pt denies pain at this time. Assessment complete. No other needs at this time. Hourly rounding in place. Will continue to monitor.

## 2017-06-25 NOTE — CARE PLAN
Problem: Safety  Goal: Will remain free from injury  Outcome: PROGRESSING AS EXPECTED  Bed in low and locked position, call light within reach.     Problem: Mobility  Goal: Risk for activity intolerance will decrease  Outcome: PROGRESSING SLOWER THAN EXPECTED  Patient cannot get out of bed extremely frail. PT OT on board.

## 2017-06-25 NOTE — PROGRESS NOTES
[facility] Hospitalist Progress Note    Date of Service: 6/25/2017    Chief Complaint  47 y.o. female admitted 6/20/2017 with weakness x 6 months, worsening confusion and failure to thrive with HIV/AIDS, ESRD on HD    Interval Problem Update  6/22 Patient transferred from tele floor for continued care and possible need for bone marrow biopsy and hematology consultation.  Discussed with Dr Mane and no suspicion of hematologic issue - more likely medication and AIDS derived neutropenia rather than bone marrow occupying disease.  ANC is trending up slightly to 450 today, patient feeling slightly stronger, still very sore throat from the esophagitis.   (?ex) at bedside states she is looking much better than when he brought her into the hospital 2 days ago, but also states she has been sedentary for at least six months only going from bed to couch and restroom/kitchen and her weakness is not new.  6/23 Patient fatigued but wakes and answers questions appropriately.  She started having frequent diarrhea yesterday, c diff collected and results pending.  Drop in h/h past 2 days, receiving transfusion today, not her first transfusion on this admission.  FOBT would be positive with frequent stooling and low platelet level so not ordered.  Stool is not black and even if it were, given her neutropenia, egd/colonoscopy intervention is contraindicated.    6/24 Patient feeling stronger today, better appetite.  She did well with HD today without issue.  C diff negative so contact isolation discontinued.  ANC up to 620.  6/25 Patient without acute complaint.  , awaiting further pt/ot sessions to evaluate appropriate discharge placement - likely SNF appropriate.    Consultants/Specialty  ID - Cathy  Nephrology - Najjar    Disposition  tbd.        Review of Systems   Constitutional: Positive for weight loss and malaise/fatigue. Negative for fever and chills.   HENT: Positive for sore throat (very mild). Negative for  congestion and nosebleeds.    Eyes: Negative for blurred vision and photophobia.   Respiratory: Negative for cough, shortness of breath and stridor.    Cardiovascular: Negative for chest pain, claudication and leg swelling.   Gastrointestinal: Positive for nausea and diarrhea. Negative for heartburn, abdominal pain and constipation.   Genitourinary: Negative for dysuria and frequency.   Musculoskeletal: Negative for myalgias and joint pain.   Skin: Negative for itching and rash.   Neurological: Positive for weakness. Negative for dizziness, sensory change, speech change and headaches.   Psychiatric/Behavioral: Negative for depression. The patient is not nervous/anxious and does not have insomnia.       Physical Exam  Laboratory/Imaging   Hemodynamics  Temp (24hrs), Av.5 °C (97.7 °F), Min:36.3 °C (97.3 °F), Max:36.7 °C (98 °F)   Temperature: 36.3 °C (97.4 °F)  Pulse  Av.4  Min: 62  Max: 96    Blood Pressure: 148/77 mmHg      Respiratory      Respiration: 16, Pulse Oximetry: 100 %             Fluids    Intake/Output Summary (Last 24 hours) at 17 1404  Last data filed at 17 0454   Gross per 24 hour   Intake    240 ml   Output      0 ml   Net    240 ml       Nutrition  Orders Placed This Encounter   Procedures   • Diet Order     Standing Status: Standing      Number of Occurrences: 1      Standing Expiration Date:      Order Specific Question:  Diet:     Answer:  Renal [8]     Order Specific Question:  Diet:     Answer:  Low Fiber(GI Soft) [2]     Physical Exam   Constitutional: She is oriented to person, place, and time. She appears well-developed. No distress.   Cachexia     HENT:   Head: Normocephalic and atraumatic.   Slight throat erythema     Eyes: Conjunctivae are normal. No scleral icterus.   Neck: Neck supple. No JVD present.   Cardiovascular: Normal rate, regular rhythm and normal heart sounds.  Exam reveals no gallop and no friction rub.    No murmur heard.  Pulmonary/Chest: Effort normal  and breath sounds normal. No respiratory distress. She has no wheezes. She exhibits no tenderness.   Vas cath in place     Abdominal: Soft. Bowel sounds are normal. She exhibits no distension and no mass. There is no tenderness.   Musculoskeletal: She exhibits no edema or tenderness.   Neurological: She is alert and oriented to person, place, and time. No cranial nerve deficit.   Skin: Skin is warm and dry. She is not diaphoretic. No erythema. No pallor.   Psychiatric: She has a normal mood and affect. Her behavior is normal.   Nursing note and vitals reviewed.      Recent Labs      06/23/17   0153  06/24/17   0352  06/24/17   2352   WBC  0.7*  1.1*  1.4*   RBC  2.44*  2.89*  3.62*   HEMOGLOBIN  6.7*  8.1*  10.1*   HEMATOCRIT  21.2*  25.6*  31.8*   MCV  86.9  88.6  87.8   MCH  27.5  28.0  27.9   MCHC  31.6*  31.6*  31.8*   RDW  49.4  48.3  49.0   PLATELETCT  78*  68*  88*   MPV  9.2  9.4  9.9     Recent Labs      06/23/17   0153  06/24/17   0352  06/24/17   2352   SODIUM  130*  135  133*   POTASSIUM  3.4*  3.4*  4.0   CHLORIDE  100  110  101   CO2  26  21  26   GLUCOSE  96  51*  78   BUN  16  18  13   CREATININE  1.89*  1.91*  1.70*   CALCIUM  7.1*  6.0*  7.5*                      Assessment/Plan     * Esophagitis (present on admission)  Assessment & Plan  Fluconazole   Improving pain  Candidiasis      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)  Assessment & Plan  Continued nutrition support, supplements as tolerated.      Esophageal candidiasis (CMS-HCC) (present on admission)  Assessment & Plan  -as above    HTN (hypertension), benign (present on admission)  Assessment & Plan  -well controlled, continue current meds    ESRD (end stage renal disease) (CMS-HCC) (present on admission)  Assessment & Plan  Dr Najjar following  HD t/t/s      Malnutrition (CMS-HCC) (present on admission)  Assessment & Plan  -2/2 AIDS, consider megace    Genital herpes (present on admission)  Assessment & Plan  Significant  perineal ulcerations  Continue valtrex  Dermablast PRN from obstetrics floor      AIDS (acquired immune deficiency syndrome) (CMS-HCC) (present on admission)  Assessment & Plan  -recently diagnosed in 5/2017, last CD4 count was 6, monitor  -continue HAART, assistance by Dr Morgan is greatly appreciated, no e/o IRIS at this time    Neutropenia (CMS-HCC) (present on admission)  Assessment & Plan  -most likely infectious and medication related, stop bactrim for px for now  D/w hematology, no official consultation, no need for bone marrow      Anemia (present on admission)  Assessment & Plan  Continued to drop after HD, if dilutional would have increased  Transfuse if hgb Less than 7.0  No evidence of bleeding      Type 2 diabetes mellitus with renal manifestations (CMS-HCC) (present on admission)  Assessment & Plan  -ISS, adjust PRN, decent control at this time      Labs reviewed, Medications reviewed and Radiology images reviewed  Julien catheter: No Julien      DVT Prophylaxis: Contraindicated - High bleeding risk and Contraindicated - Anemia requiring blood transfusion  DVT prophylaxis - mechanical: SCDs    Antibiotics: Treating active infection/contamination beyond 24 hours perioperative coverage

## 2017-06-25 NOTE — PROGRESS NOTES
Danya from Lab called with critical result of WBC 1.4 at 0122. Critical lab result read back to Danya.   This critical lab result is within parameters established by [facility] policy for this patient

## 2017-06-25 NOTE — PROGRESS NOTES
Hospital Medicine Progress Note, Adult, Complex               Author: Fadi Najjar Date & Time created: 6/25/2017  1:21 PM     Interval History:  Pt with ESRD, AIDS admit with esophagitis    Review of Systems:  Review of Systems   Constitutional: Positive for malaise/fatigue. Negative for fever and chills.   Cardiovascular: Negative for chest pain and leg swelling.   Gastrointestinal: Negative for nausea and vomiting.   Genitourinary: Negative for dysuria and urgency.   Neurological: Positive for weakness.       Physical Exam:  Physical Exam   Constitutional: She is oriented to person, place, and time. She appears cachectic.   HENT:   Head: Normocephalic and atraumatic.   Nose: Nose normal.   Eyes: Right eye exhibits no discharge. Left eye exhibits no discharge. No scleral icterus.   Pulmonary/Chest: Effort normal. She has no wheezes. She has no rales.   Musculoskeletal: She exhibits no edema.   Neurological: She is alert and oriented to person, place, and time.   Skin: Skin is warm and dry.   Nursing note and vitals reviewed.      Labs:        Invalid input(s): [id number]      Recent Labs      06/23/17   0153  06/24/17   0352  06/24/17   2352   SODIUM  130*  135  133*   POTASSIUM  3.4*  3.4*  4.0   CHLORIDE  100  110  101   CO2  26  21  26   BUN  16  18  13   CREATININE  1.89*  1.91*  1.70*   CALCIUM  7.1*  6.0*  7.5*     Recent Labs      06/23/17   0153  06/24/17   0352  06/24/17   2352   ALTSGPT  13  8  10   ASTSGOT  18  15  18   ALKPHOSPHAT  223*  184*  231*   TBILIRUBIN  0.4  0.4  0.5   GLUCOSE  96  51*  78     Recent Labs      06/23/17   0153  06/24/17   0352  06/24/17   2352   RBC  2.44*  2.89*  3.62*   HEMOGLOBIN  6.7*  8.1*  10.1*   HEMATOCRIT  21.2*  25.6*  31.8*   PLATELETCT  78*  68*  88*     Recent Labs      06/23/17   0153  06/24/17   0352  06/24/17   2352   WBC  0.7*  1.1*  1.4*   NEUTSPOLYS  62.90  49.10  56.50   LYMPHOCYTES  16.10*  17.90*  17.40*   MONOCYTES  4.80  8.00  8.70   EOSINOPHILS  3.20   14.30*  2.90   BASOPHILS  4.90*  1.80  8.70*   ASTSGOT  18  15  18   ALTSGPT  13  8  10   ALKPHOSPHAT  223*  184*  231*   TBILIRUBIN  0.4  0.4  0.5           Hemodynamics:  Temp (24hrs), Av.5 °C (97.7 °F), Min:36.3 °C (97.3 °F), Max:36.7 °C (98 °F)  Temperature: 36.3 °C (97.4 °F)  Pulse  Av.4  Min: 62  Max: 96   Blood Pressure: 148/77 mmHg     Respiratory:    Respiration: 16, Pulse Oximetry: 100 %           Fluids:    Intake/Output Summary (Last 24 hours) at 17 1321  Last data filed at 17 0454   Gross per 24 hour   Intake    240 ml   Output      0 ml   Net    240 ml        GI/Nutrition:  Orders Placed This Encounter   Procedures   • Diet Order     Standing Status: Standing      Number of Occurrences: 1      Standing Expiration Date:      Order Specific Question:  Diet:     Answer:  Renal [8]     Order Specific Question:  Diet:     Answer:  Low Fiber(GI Soft) [2]     Medical Decision Making, by Problem:  Active Hospital Problems    Diagnosis   • Esophagitis [K20.9]       Labs reviewed and Medications reviewed                  1 ESRD.  2 Anemia  3 Hyponatremia  4 Hypoalbuminemia  Plan  No need for HD today  Renal dose all meds  Avoid nephrotoxins  Prognosis poor.

## 2017-06-26 NOTE — PROGRESS NOTES
Mother Marie present at hospital, she states she can take patient home she just wants to make sure patient is getting physical therapy once she discharges to get stronger.

## 2017-06-26 NOTE — PROGRESS NOTES
[facility] Hospitalist Progress Note    Date of Service: 6/26/2017    Chief Complaint  47 y.o. female admitted 6/20/2017 with weakness x 6 months, worsening confusion and failure to thrive with HIV/AIDS, ESRD on HD    Interval Problem Update  6/22 Patient transferred from tele floor for continued care and possible need for bone marrow biopsy and hematology consultation.  Discussed with Dr Mane and no suspicion of hematologic issue - more likely medication and AIDS derived neutropenia rather than bone marrow occupying disease.  ANC is trending up slightly to 450 today, patient feeling slightly stronger, still very sore throat from the esophagitis.   (?ex) at bedside states she is looking much better than when he brought her into the hospital 2 days ago, but also states she has been sedentary for at least six months only going from bed to couch and restroom/kitchen and her weakness is not new.  6/23 Patient fatigued but wakes and answers questions appropriately.  She started having frequent diarrhea yesterday, c diff collected and results pending.  Drop in h/h past 2 days, receiving transfusion today, not her first transfusion on this admission.  FOBT would be positive with frequent stooling and low platelet level so not ordered.  Stool is not black and even if it were, given her neutropenia, egd/colonoscopy intervention is contraindicated.    6/24 Patient feeling stronger today, better appetite.  She did well with HD today without issue.  C diff negative so contact isolation discontinued.  ANC up to 620.  6/25 Patient without acute complaint.  , awaiting further pt/ot sessions to evaluate appropriate discharge placement - likely SNF appropriate.  6/26 Patient contining to improve, throat not sore any longer, she is eating well and is only on oral medications for anti-infectives.  I did discuss SNF with her and she had been at [facility] in the [facility] and initially agreed that would be a good place to  go but after further consideration, wants to return home with mother who is providing 24 hour care.  She will receive HD here tomorrow and then dc home afterward.  Home health ordered for continue pt/ot to work with patient.    Consultants/Specialty  ID - Cathy  Nephrology - Najjar    Disposition  Home with home health tomorrow after HD.        Review of Systems   Constitutional: Positive for weight loss and malaise/fatigue. Negative for fever and chills.   HENT: Negative for congestion, nosebleeds and sore throat.    Eyes: Negative for blurred vision and photophobia.   Respiratory: Negative for cough, shortness of breath and stridor.    Cardiovascular: Negative for chest pain, claudication and leg swelling.   Gastrointestinal: Negative for heartburn, nausea, abdominal pain and constipation.   Genitourinary: Negative for dysuria and frequency.   Musculoskeletal: Negative for myalgias and joint pain.   Skin: Negative for itching and rash.   Neurological: Positive for weakness. Negative for dizziness, sensory change, speech change and headaches.   Psychiatric/Behavioral: Negative for depression. The patient is not nervous/anxious and does not have insomnia.       Physical Exam  Laboratory/Imaging   Hemodynamics  Temp (24hrs), Av.4 °C (97.6 °F), Min:36.2 °C (97.1 °F), Max:36.8 °C (98.3 °F)   Temperature: 36.2 °C (97.1 °F)  Pulse  Av.6  Min: 62  Max: 96    Blood Pressure: 149/80 mmHg      Respiratory      Respiration: 16, Pulse Oximetry: 100 %             Fluids    Intake/Output Summary (Last 24 hours) at 17 1505  Last data filed at 17 2000   Gross per 24 hour   Intake    240 ml   Output      0 ml   Net    240 ml       Nutrition  Orders Placed This Encounter   Procedures   • Diet Order     Standing Status: Standing      Number of Occurrences: 1      Standing Expiration Date:      Order Specific Question:  Diet:     Answer:  Renal [8]     Order Specific Question:  Diet:     Answer:  Low Fiber(GI  Soft) [2]     Physical Exam   Constitutional: She is oriented to person, place, and time. She appears well-developed. No distress.   Cachexia     HENT:   Head: Normocephalic and atraumatic.   Slight throat erythema     Eyes: Conjunctivae are normal. No scleral icterus.   Neck: Neck supple. No JVD present.   Cardiovascular: Normal rate, regular rhythm and normal heart sounds.  Exam reveals no gallop and no friction rub.    No murmur heard.  Pulmonary/Chest: Effort normal and breath sounds normal. No respiratory distress. She has no wheezes. She exhibits no tenderness.   Vas cath in place     Abdominal: Soft. Bowel sounds are normal. She exhibits no distension and no mass. There is no tenderness.   Musculoskeletal: She exhibits no edema or tenderness.   Neurological: She is alert and oriented to person, place, and time. No cranial nerve deficit.   Skin: Skin is warm and dry. She is not diaphoretic. No erythema. No pallor.   Psychiatric: She has a normal mood and affect. Her behavior is normal.   Nursing note and vitals reviewed.      Recent Labs      06/24/17 0352 06/24/17 2352 06/26/17 0038   WBC  1.1*  1.4*  1.6*   RBC  2.89*  3.62*  3.24*   HEMOGLOBIN  8.1*  10.1*  9.0*   HEMATOCRIT  25.6*  31.8*  28.7*   MCV  88.6  87.8  88.6   MCH  28.0  27.9  27.8   MCHC  31.6*  31.8*  31.4*   RDW  48.3  49.0  49.7   PLATELETCT  68*  88*  79*   MPV  9.4  9.9  10.1     Recent Labs      06/24/17 0352  06/24/17 2352 06/26/17 0038   SODIUM  135  133*  130*   POTASSIUM  3.4*  4.0  4.0   CHLORIDE  110  101  101   CO2  21  26  26   GLUCOSE  51*  78  72   BUN  18  13  19   CREATININE  1.91*  1.70*  2.22*   CALCIUM  6.0*  7.5*  7.5*                      Assessment/Plan     * Esophagitis (present on admission)  Assessment & Plan  Fluconazole   Improving pain  Candidiasis      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)  Assessment & Plan  Continued nutrition support, supplements as tolerated.      Esophageal  candidiasis (CMS-[facility]) (present on admission)  Assessment & Plan  -as above    HTN (hypertension), benign (present on admission)  Assessment & Plan  -well controlled, continue current meds    ESRD (end stage renal disease) (CMS-HCC) (present on admission)  Assessment & Plan  Dr Najjar following  HD t/t/s      Malnutrition (CMS-HCC) (present on admission)  Assessment & Plan  -2/2 AIDS, consider megace    Genital herpes (present on admission)  Assessment & Plan  Significant perineal ulcerations  Continue valtrex  Dermablast PRN from obstetrics floor      AIDS (acquired immune deficiency syndrome) (CMS-HCC) (present on admission)  Assessment & Plan  -recently diagnosed in 5/2017, last CD4 count was 6, monitor  -continue HAART, assistance by Dr Morgan is greatly appreciated, no e/o IRIS at this time    Neutropenia (CMS-HCC) (present on admission)  Assessment & Plan  ANC climbing 830  most likely infectious and medication related, stop bactrim for px for now  D/w hematology, no official consultation, no need for bone marrow      Anemia (present on admission)  Assessment & Plan  Continued to drop after HD, if dilutional would have increased  Transfuse if hgb Less than 7.0  No evidence of bleeding      Type 2 diabetes mellitus with renal manifestations (CMS-HCC) (present on admission)  Assessment & Plan  -ISS, adjust PRN, decent control at this time      Labs reviewed, Medications reviewed and Radiology images reviewed  Julien catheter: No Julien      DVT Prophylaxis: Contraindicated - High bleeding risk and Contraindicated - Anemia requiring blood transfusion  DVT prophylaxis - mechanical: SCDs    Antibiotics: Treating active infection/contamination beyond 24 hours perioperative coverage

## 2017-06-26 NOTE — DISCHARGE PLANNING
Medical Social Work    MD put in a order for SNF. SW went to pt at bedside requesting a SNF choice. Pt refused and stated she would like to go home and that she had 24/7 care at home with her mother. SW asked pt permission to call her mother to explain that pt was going to be discharged to day or tomorrow and explain the importance of the pt going to her dialysis appointment Tuesday, Thursday and Saturday. Pt gave SW her mothers name Marie #[phone]. Marie stated she and the family would be taking care of the pt when discharged home and that they would get her to her appointments. Marie also had questions regarding outpatient physical therapy to get the pt walking and stronger.     Plan: SW will get more information about therapy and meet with pt and Marie at bedside.

## 2017-06-26 NOTE — PROGRESS NOTES
Received report and assumed patient care at change of shift. Patient is resting in bed, A/O x4, flat affect. Patient reports 5/10 pain at this time to the perineum, medications provided per MAR.    PIV assessed, patent, no s/s of infection or infiltration noted at this time. IJ in place for dialysis, fistula not in use at this time.    Plan of care discussed, questions answered. Bed is in the lowest position and locked, call light within reach, non-skid socks in place, hourly rounding. Patient reports no further needs and this time.

## 2017-06-26 NOTE — FACE TO FACE
Face to Face Supporting Documentation - Home Health    The encounter with this patient was in whole or in part the primary reason for home health admission.    Date of encounter:   Patient:                    MRN:                       YOB: 2018  Roger Mckeon  [id number]  1970     Home health to see patient for:  Skilled Nursing care for assessment, interventions & education, Physical Therapy evaluation and treatment and Occupational therapy evaluation and treatment    Skilled need for:  Exacerbation of Chronic Disease State AIDS, severe immune impairment.    Skilled nursing interventions to include:  Wound Care    Homebound status evidenced by:  Needs the assistance of another person in order to leave the home. Leaving home requires a considerable and taxing effort. There is a normal inability to leave the home.    Community Physician to provide follow up care: Shonna Willoughby M.D.     Optional Interventions? No      I certify the face to face encounter for this home health care referral meets the CMS requirements and the encounter/clinical assessment with the patient was, in whole, or in part, for the medical condition(s) listed above, which is the primary reason for home health care. Based on my clinical findings: the service(s) are medically necessary, support the need for home health care, and the homebound criteria are met.  I certify that this patient has had a face to face encounter by myself.  Krystyna Yadav D.O. - NPI: [id number]

## 2017-06-26 NOTE — PROGRESS NOTES
Americo from Lab called with critical result of WBC: 1.6 at 0248. Critical lab result read back to Americo.   This critical lab result is within parameters established by [facility] for this patient

## 2017-06-26 NOTE — CARE PLAN
Problem: Bowel/Gastric:  Goal: Normal bowel function is maintained or improved  Outcome: PROGRESSING AS EXPECTED  Patient continuing to have loose stools, Cdiff negative.

## 2017-06-26 NOTE — DISCHARGE PLANNING
Notified per CARO Gutierrez that pt will be DCing home tomorrow after dialysis.  TC to Isi at [facility] to notify; pt can resume OP dialysis tx's on Thursday.

## 2017-06-27 PROBLEM — B37.81 ESOPHAGEAL CANDIDIASIS (HCC): Status: RESOLVED | Noted: 2017-01-01 | Resolved: 2017-01-01

## 2017-06-27 PROBLEM — K20.90 ESOPHAGITIS: Status: RESOLVED | Noted: 2017-01-01 | Resolved: 2017-01-01

## 2017-06-27 NOTE — DISCHARGE PLANNING
Medical SW    Referral: Sw to acquire HH for pt who will d/c home once 24 hour care and transportation resource are verified.     Intervention: Sw met w/ pt and her sister Regine at bedside. Pt reports her brother lives w/ her and transfers her as well as transports her to all appointments including dialysis. PT knows she will return to her dialysis chair Tuesday, Thursday and Saturday. Pt will have her mom taking care of her full time in AM and her exhusband taking care of her full time in PM. She has a w/c at home and her  will transport her to her mom's house in the mornings.    Regine can support pt for transport if her exhusband and their brother are not available. Pt advises she will be able to stand on her feet during transfers and her brother can safely transfer her and has always done this in the past.    [facility] indicates they will resume care w/ pt and meet her this Friday AM between 9-10. Sw provided written information w/ HH contact number and appointment information.           Plan: Sw to assist w/ d/c planning as needed.

## 2017-06-27 NOTE — PROGRESS NOTES
Hospital Medicine Progress Note, Adult, Complex               Author: Manuel Deal Date & Time created: 6/27/2017  11:52 AM     Interval History:  Pt with ESRD, AIDS admit with esophagitis, FTT  Seen on dialysis, tolerating tx    Review of Systems:  Review of Systems   Constitutional: Positive for malaise/fatigue.   Neurological: Positive for weakness.       Physical Exam:  Physical Exam   Constitutional: She is oriented to person, place, and time. She appears cachectic.   HENT:   Head: Normocephalic and atraumatic.   Nose: Nose normal.   Eyes: Right eye exhibits no discharge. Left eye exhibits no discharge. No scleral icterus.   Pulmonary/Chest: Effort normal. She has no wheezes. She has no rales.   Musculoskeletal: She exhibits no edema.   Neurological: She is alert and oriented to person, place, and time.   Skin: Skin is warm and dry.   Nursing note and vitals reviewed.      Labs:        Invalid input(s): [id number]      Recent Labs      06/24/17 2352 06/26/17 0038  06/26/17 2348   SODIUM  133*  130*  129*   POTASSIUM  4.0  4.0  4.5   CHLORIDE  101  101  101   CO2  26  26  25   BUN  13  19  23*   CREATININE  1.70*  2.22*  2.78*   CALCIUM  7.5*  7.5*  7.4*     Recent Labs      06/24/17 2352 06/26/17   0038  06/26/17 2348   ALTSGPT  10  9  11   ASTSGOT  18  20  20   ALKPHOSPHAT  231*  216*  213*   TBILIRUBIN  0.5  0.5  0.4   GLUCOSE  78  72  75     Recent Labs      06/24/17 2352 06/26/17   0038  06/26/17 2348   RBC  3.62*  3.24*  3.29*   HEMOGLOBIN  10.1*  9.0*  9.2*   HEMATOCRIT  31.8*  28.7*  29.1*   PLATELETCT  88*  79*  70*     Recent Labs      06/24/17 2352 06/26/17   0038  06/26/17 2348   WBC  1.4*  1.6*  1.6*   NEUTSPOLYS  56.50  51.90  86.70*   LYMPHOCYTES  17.40*  27.30  0.90*   MONOCYTES  8.70  2.60  3.50   EOSINOPHILS  2.90  2.60  0.90   BASOPHILS  8.70*  13.00*  5.30*   ASTSGOT  18  20  20   ALTSGPT  10  9  11   ALKPHOSPHAT  231*  216*  213*   TBILIRUBIN  0.5  0.5  0.4            Hemodynamics:  Temp (24hrs), Av.5 °C (97.7 °F), Min:36.1 °C (97 °F), Max:37 °C (98.6 °F)  Temperature: 36.6 °C (97.9 °F)  Pulse  Av.4  Min: 62  Max: 96   Blood Pressure: 151/84 mmHg     Respiratory:    Respiration: 16, Pulse Oximetry: 98 %           Fluids:    Intake/Output Summary (Last 24 hours) at 17 1152  Last data filed at 17 0800   Gross per 24 hour   Intake    640 ml   Output      0 ml   Net    640 ml        GI/Nutrition:  Orders Placed This Encounter   Procedures   • Diet Order     Standing Status: Standing      Number of Occurrences: 1      Standing Expiration Date:      Order Specific Question:  Diet:     Answer:  Renal [8]     Order Specific Question:  Diet:     Answer:  Low Fiber(GI Soft) [2]     Medical Decision Making, by Problem:  Active Hospital Problems    Diagnosis   • Esophagitis [K20.9]       Labs reviewed and Medications reviewed                  1 ESRD, HD TTS, monitoring tx  2 Anemia, add epogen  3 Hyponatremia  4 Hypoalbuminemia  Plan  HD TTS

## 2017-06-27 NOTE — DISCHARGE SUMMARY
Hospital Medicine Discharge Note     Admit Date:  6/20/2017       Discharge Date:   6/27/2017    Attending Physician:  Leonora Okeefe     Diagnoses (includes active and resolved):   Principal Problem (Resolved):    Esophagitis POA: Yes  Active Problems:    HIV wasting syndrome (CMS-HCC) (Chronic) POA: Yes    Anemia POA: Yes    Type 2 diabetes mellitus with renal manifestations (CMS-HCC) POA: Yes    HTN (hypertension), benign POA: Yes    ESRD (end stage renal disease) (CMS-HCC) (Chronic) POA: Yes    FTT (failure to thrive) in adult POA: Yes    Malnutrition (CMS-HCC) POA: Yes    Genital herpes (Chronic) POA: Yes    AIDS (acquired immune deficiency syndrome) (CMS-HCC) POA: Yes    Neutropenia (CMS-HCC) POA: Yes  Resolved Problems:    Esophageal candidiasis (CMS-HCC) POA: Yes    Chief Complain  Dysphagia and generalized malaise     Hosiery of Present Illness  The patient is a 47-year-old female who was    recently diagnosed with AIDS.  She was recently hospitalized and discharged on   the 12th.  She was diagnosed with MAC bacteremia and had HSV perineal    ulcerations along without esophagitis.    Detailed info pls see H&P by Dr. Quinn [facility] Hospital Summary (Brief Narrative):       Patient was admitted for HIV and weakness x 6 months, worsening confusion and failure to thrive   with HIV/AIDS, ESRD on HD. She was found to have Esophageal candidiasis. She was also seen   by nephro and ID for her HIV and neutropenia. She was started on her ome dose HAART and she   symptoms significantly improved. She also received HD during hospital stay. She is currently stable   and her ANC back > 1000. She will be DC home with her home medication and will follow up with ID   as outpatient and also PT as outpatient.          * Esophagitis (present on admission)  Assessment & Plan  Fluconazole    Improving pain  Candidiasis      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)  Assessment & Plan  Continued nutrition support, supplements  as tolerated.      Esophageal candidiasis (CMS-HCC) (present on admission)  Assessment & Plan  -as above    HTN (hypertension), benign (present on admission)  Assessment & Plan  -well controlled, continue current meds    ESRD (end stage renal disease) (CMS-HCC) (present on admission)  Assessment & Plan  Dr Najjar following  HD t/t/s      Malnutrition (CMS-HCC) (present on admission)  Assessment & Plan  -2/2 AIDS, consider megace    Genital herpes (present on admission)  Assessment & Plan  Significant perineal ulcerations  Continue valtrex  Dermablast PRN from obstetrics floor      AIDS (acquired immune deficiency syndrome) (CMS-HCC) (present on admission)  Assessment & Plan  -recently diagnosed in 5/2017, last CD4 count was 6, monitor  -continue HAART, assistance by Dr Morgan is greatly appreciated, no e/o IRIS at this time    Neutropenia (CMS-HCC) (present on admission)  Assessment & Plan  ANC climbing 830  most likely infectious and medication related, stop bactrim for px for now  D/w hematology, no official consultation, no need for bone marrow      Anemia (present on admission)  Assessment & Plan  Continued to drop after HD, if dilutional would have increased  Transfuse if hgb Less than 7.0  No evidence of bleeding      Type 2 diabetes mellitus with renal manifestations (CMS-HCC) (present on admission)  Assessment & Plan  -ISS, adjust PRN, decent control at this time    Consultants:      ID  Nephro    Procedures:        none    Discharge Medications:        (x)  Medication Reconciliation Completed       Medication List      START taking these medications       Instructions    fluconazole 100 MG Tabs   Last time this was given:  100 mg on 6/27/2017  7:47 AM   Commonly known as:  DIFLUCAN    Take 1 Tab by mouth every day.   Dose:  100 mg         CONTINUE taking these medications       Instructions    acyclovir 400 MG tablet   Commonly known as:  ZOVIRAX    Take 800 mg by mouth every day.   Dose:  800 mg        diphenoxylate-atropine 2.5-0.025 MG Tabs   Commonly known as:  LOMOTIL    Take 1 Tab by mouth 4 times a day as needed for Diarrhea.   Dose:  1 Tab       ethambutol 400 MG Tabs   Last time this was given:  800 mg on 6/26/2017  8:18 AM   Commonly known as:  MYAMBUTOL    Take 800 mg by mouth every day. Pt started a 14 day course on 6/2   Dose:  800 mg       fluoxetine 10 MG Caps   Last time this was given:  10 mg on 6/26/2017 11:53 AM   Commonly known as:  PROZAC    Take 10 mg by mouth every day. Indications: Depression   Dose:  10 mg       hydrocodone-acetaminophen 5-325 MG Tabs per tablet   Commonly known as:  NORCO    Take 1-2 Tabs by mouth every four hours as needed. Indications: Moderate to Moderately Severe Pain   Dose:  1-2 Tab       hydrOXYzine 25 MG Tabs   Last time this was given:  25 mg on 6/26/2017  9:17 PM   Commonly known as:  ATARAX    Take 25 mg by mouth 4 times a day.   Dose:  25 mg       lisinopril 20 MG Tabs   Last time this was given:  20 mg on 6/26/2017 11:53 AM   Commonly known as:  PRINIVIL    Take 1 Tab by mouth every day.   Dose:  20 mg       moxifloxacin 0.5 % Soln   Last time this was given:  1 Drop on 6/27/2017  5:07 AM   Commonly known as:  VIGAMOX    Place 1 Drop in left eye 4 times a day. Indications: EYE   Dose:  1 Drop       nystatin 312160 UNIT/ML Susp   Last time this was given:  500,000 Units on 6/27/2017  5:07 AM   Commonly known as:  MYCOSTATIN    Take 500,000 Units by mouth 4 times a day. Indications: Candidiasis Fungal Infection of the Oropharynx   Dose:  [id number] Units       ondansetron 4 MG Tbdp   Commonly known as:  ZOFRAN ODT    Take 1 Tab by mouth every four hours as needed for Nausea/Vomiting (give PO if no IV route available).   Dose:  4 mg       prednisoLONE acetate 1 % Susp   Last time this was given:  1 Drop on 6/27/2017  5:07 AM   Commonly known as:  PRED FORTE    Place 1 Drop in left eye 4 times a day. Indications: post eye surgery   Dose:  1 Drop       Rilpivirine HCl  25 MG Tabs   Last time this was given:  25 mg on 6/27/2017  7:49 AM    Take 25 mg by mouth every day.   Dose:  25 mg       sodium bicarbonate 650 MG Tabs   Last time this was given:  650 mg on 6/27/2017  7:50 AM   Commonly known as:  SODIUM BICARBONATE    Take 1 Tab by mouth 3 times a day.   Dose:  650 mg       TIVICAY 50 MG Tabs tablet   Last time this was given:  50 mg on 6/27/2017  7:51 AM   Generic drug:  dolutegravir    Take 50 mg by mouth every day.   Dose:  50 mg       XANAX 0.25 MG Tabs   Generic drug:  alprazolam    Take 0.25 mg by mouth 3 times a day as needed for Anxiety. Indications: Feeling Anxious   Dose:  0.25 mg         STOP taking these medications          K-PHOS 500 MG tablet   Generic drug:  potassium phosphate (monobasic)       sulfamethoxazole-trimethoprim 400-80 MG Tabs   Commonly known as:  BACTRIM             Disposition:   Discharge home    Diet:   Diabetic    Activity:   As tolerated    Code status:   Full code    Primary Care Provider:    Shonna Willoughby M.D.    Follow up appointment details :      PCP in 2 weeks  No follow-up provider specified.  No future appointments.    Pending Studies:        None    Time spent on discharge day patient visit: >35 minutes    #################################################    Interval history/exam for day of discharge:    Filed Vitals:    06/27/17 0000 06/27/17 0400 06/27/17 0722 06/27/17 1200   BP: 155/88 148/81 151/84 156/74   Pulse: 87 88 87 94   Temp: 37 °C (98.6 °F) 36.6 °C (97.9 °F) 36.6 °C (97.9 °F) 36.8 °C (98.2 °F)   Resp: 16 16 16 18   Height:       Weight:       SpO2: 100% 99% 98% 100%     Weight/BMI: Body mass index is 19.37 kg/(m^2).  Pulse Oximetry: 100 %, O2 (LPM): 0, O2 Delivery: None (Room Air)    Gen: AAOx3, NAD  Eyes: PELLA  Neck: no JVD, no lymphadenopathy  Cardia: RRR, no mrg  Lungs: CTAB, no rales, rhonci or wheezing  Abd: NABS, soft, non extended, no mass  EXT: No C/C/E, peripheral pulse 2+ b/l  Neuro: CN II-XII intact, non  focal, reflex 2+ symmetrical  Skin: Intact, no lesion, warm  Psych: Appropriate.    Most Recent Labs:    Lab Results   Component Value Date/Time    WBC 1.6* 06/26/2017 11:48 PM    RBC 3.29* 06/26/2017 11:48 PM    HEMOGLOBIN 9.2* 06/26/2017 11:48 PM    HEMATOCRIT 29.1* 06/26/2017 11:48 PM    MCV 88.4 06/26/2017 11:48 PM    MCH 28.0 06/26/2017 11:48 PM    MCHC 31.6* 06/26/2017 11:48 PM    MPV 9.7 06/26/2017 11:48 PM    NEUTROPHILS-POLYS 86.70* 06/26/2017 11:48 PM    LYMPHOCYTES 0.90* 06/26/2017 11:48 PM    MONOCYTES 3.50 06/26/2017 11:48 PM    EOSINOPHILS 0.90 06/26/2017 11:48 PM    BASOPHILS 5.30* 06/26/2017 11:48 PM    HYPOCHROMIA 1+ 06/22/2017 03:41 AM    ANISOCYTOSIS 1+ 06/26/2017 11:48 PM      Lab Results   Component Value Date/Time    SODIUM 129* 06/26/2017 11:48 PM    POTASSIUM 4.5 06/26/2017 11:48 PM    CHLORIDE 101 06/26/2017 11:48 PM    CO2 25 06/26/2017 11:48 PM    GLUCOSE 75 06/26/2017 11:48 PM    BUN 23* 06/26/2017 11:48 PM    CREATININE 2.78* 06/26/2017 11:48 PM      Lab Results   Component Value Date/Time    ALT(SGPT) 11 06/26/2017 11:48 PM    AST(SGOT) 20 06/26/2017 11:48 PM    ALKALINE PHOSPHATASE 213* 06/26/2017 11:48 PM    TOTAL BILIRUBIN 0.4 06/26/2017 11:48 PM    LIPASE 18 04/28/2017 11:52 AM    ALBUMIN 1.8* 06/26/2017 11:48 PM    GLOBULIN 3.4 06/26/2017 11:48 PM    INR 1.18* 06/20/2017 08:28 AM    MACROCYTOSIS 1+ 06/26/2017 11:48 PM     Lab Results   Component Value Date/Time    PT 15.4* 06/20/2017 08:28 AM    INR 1.18* 06/20/2017 08:28 AM        Imaging/ Testing:      CT-SOFT TISSUE NECK WITH   Final Result      1.  No abscess is identified.      2.  Nonspecific left supraclavicular adenopathy.      3.  Ill-defined centrilobular nodules are increased from the prior chest CT in the lung apices. Findings are nonspecific but likely represent a bronchiole infectious or inflammatory process. Small left pleural effusion.      4.  Small bilateral thyroid nodules.      DX-CHEST-PORTABLE (1 VIEW)   Final  Result         1. No acute cardiopulmonary abnormalities are identified.          Instructions:      The patient was instructed to return to the ER in the event of worsening symptoms. I have counseled the patient on the importance of compliance and the patient has agreed to proceed with all medical recommendations and follow up plan indicated above.   The patient understands that all medications come with benefits and risks. Risks may include permanent injury or death and these risks can be minimized with close reassessment and monitoring.

## 2017-06-27 NOTE — CARE PLAN
Problem: Safety  Goal: Will remain free from falls  Intervention: Assess risk factors for falls  Fall precautions in place  Bed alarm on.      Problem: Venous Thromboembolism (VTW)/Deep Vein Thrombosis (DVT) Prevention:  Goal: Patient will participate in Venous Thrombosis (VTE)/Deep Vein Thrombosis (DVT)Prevention Measures  Intervention: Assess and monitor for anticoagulation complications  scd's on.      Problem: Knowledge Deficit  Goal: Knowledge of the prescribed therapeutic regimen will improve  Intervention: Discuss information regarding therpeutic regimen and document in education  Dialysis today.  Blood sugar66- juice and rechecked sugar at 100.      Problem: Skin Integrity  Goal: Risk for impaired skin integrity will decrease  Intervention: Assess risk factors for impaired skin integrity and/or pressure ulcers  Turning q2 and prn  Wipes/bazza cream/antiFunglecream  Propping with pillows  Waffle bed.

## 2017-06-27 NOTE — PROGRESS NOTES
Assumed pt care at 0700 - bedside report received.  Pt is aaox4- fatigued, but much improved. Denies pain.  Pt was incontinent of urine/bowels - wipes/barrier/antiFungle in use- skin much improved.   IV cdi/fluids.  No bp or sticks on left side with fistula.  Right IJ patent for dialysis - dressing cdi.  Turning q2 and prn.  Pt has not been out of bed - states she has not ambulated in months.  Bed alarm on.  Call light in hand - pt makes needs known - will continue to round.    POC: dialysis today.   Home with family.  Spoke with sw and asked to clarify transportation to home and home/health/PT.

## 2017-06-27 NOTE — PROGRESS NOTES
Jasmine from Lab called with critical result of WBC: 1.6 at 0036. Critical lab result read back to Jasmine.   This critical lab result is within parameters established by [facility] for this patient.

## 2017-06-27 NOTE — PROGRESS NOTES
"Spoke with sister and patient.  Sister states she feels comfortable transporting patient home.  Will need assistance with load up in truck - family will meet patient at home and brother normally lifts patient to her own wheelchair.  They stated they feel very comfortable with transfers and it is a \"normal' for them.   "

## 2017-06-27 NOTE — DISCHARGE INSTRUCTIONS
Discharge Instructions    Discharged to home by car with relative. Discharged via wheelchair, hospital escort: Yes.  Special equipment needed: Wheelchair    Be sure to schedule a follow-up appointment with your primary care doctor or any specialists as instructed.     Discharge Plan: home with home health.    Diet Plan: Discussed  Activity Level: Discussed  Confirmed Follow up Appointment: Patient to Call and Schedule Appointment  Confirmed Symptoms Management: Discussed  Medication Reconciliation Updated: Yes  Influenza Vaccine Indication: Patient Refuses    I understand that a diet low in cholesterol, fat, and sodium is recommended for good health. Unless I have been given specific instructions below for another diet, I accept this instruction as my diet prescription.   Other diet:  Continue Renal diet.    Special Instructions: None    · Is patient discharged on Warfarin / Coumadin?   No     · Is patient Post Blood Transfusion?  No      Esofagitis   (Esophagitis)   La esofagitis es la inflamación del esófago. Puede angel inflamación y dolor. Verónica problema hacer que tragar sea difícil y doloroso.  CAUSAS   La mayoría de las causas que producen esofagitis no son graves. Diferentes factores pueden ocasionarla, entre ellos:  · Reflujo gastroesofágico. El reflujo gastroesofágico ocurre cuando el ácido del estómago pasa al esófago.  · Vómitos recurrentes.  · Reacciones alérgicas.  · Ciertos medicamentos, especialmente aquellos que vienen en pastillas grandes.  · La ingestión de productos químicos nocivos, tales ottoniel productos de limpieza del hogar.  · Consumo excesivo de alcohol.  · Reny infección del esófago.  · Tratamiento de radiación para el cáncer.  · Ciertas enfermedades ottoniel la sarcoidosis, enfermedad de Crohn, y la esclerodermia. Estas enfermedades pueden causar esofagitis recurrente.  SÍNTOMAS   · Dificultad para tragar.  · Dolor al tragar.  · Dolor en el pecho.  · Dificultad para  respirar.  · Náuseas.  · Vómitos.  · Dolor abdominal.  DIAGNÓSTICO   El médico le preguntará acerca de clarissa síntomas y le hará un examen físico. Dependiendo de lo que el médico encuentre, también podrá indicar ciertas pruebas, por ejemplo:   · Radiografía de bario. Le darán para beber sha solución que recubre el esófago, y se tomarán radiografías.  · Endoscopía. Se inserta un tubo con grace por el esófago para que el médico pueda examinar el área.  · Pruebas de alergia. A veces se pueden realizar en las visitas de control.  TRATAMIENTO   El tratamiento dependerá de la causa de la esofagitis. En algunos casos, le recetarán corticoides u otros medicamentos para ayudar a aliviar clarissa síntomas o tratar la causa subyacente del problema. Los medicamentos que le podrán recetar son:   · Lidocaína viscosa, para suavizar el esófago.  · Antiácidos.  · Reductores del ácido.  · Inhibidores de la bomba de protones.  · Antivirales para ciertas infecciones virales del esófago.  · Antimicóticos para ciertas infecciones fúngicas en el esófago.  · Antibióticos, dependiendo de la causa de la esofagitis.  INSTRUCCIONES PARA EL CUIDADO EN EL HOGAR   · Evite las comidas y bebidas que empeoran los problemas.  · Donato comidas pequeñas elza el día en lugar de 3 comidas abundantes.  · Evite comer elza las 3 horas antes de acostarse.  · Si tiene problemas para david pastillas, use un cortador de píldoras para disminuir el tamaño y la probabilidad de que la píldora se queda pegada y lesione el fondo del esófago. Beber agua después de david sha píldora también ayuda.  · Si fuma, abandone el hábito.  · Mantenga un peso saludable.  · Use ropas sueltas. No use nada apretado alrededor de la cintura que cause presión en el estómago.  · Levante la cabecera de la cama 6 a 8 pulgadas (15 a 20 cm) con bloques de small. Usar almohadas extra no ayuda.  · [place] sólo medicamentos de venta bautista o recetados, según las indicaciones del médico.  SOLICITE  ATENCIÓN MÉDICA DE INMEDIATO SI:   · Siente un dolor intenso en el pecho que se irradia hacia el jazmine, los brazos o la mandíbula.  · Se siente transpirado, mareado o sufre un desmayo.  · Le falta el aire.  · Vomita tyler.  · Tiene dificultad o dolor al tragar.  · La materia fecal es philip, de aspecto alquitranado.  · Tiene fiebre.  · Tiene sha sensación de ardor en el pecho más de 3 veces a la semana elza más de 2 semanas.  · No puede tragar, beber o comer.  · Babea porque no puede tragar la saliva.  ASEGÚRESE DE QUE:   · Comprende estas instrucciones.  · Controlará mercedes enfermedad.  · Solicitará ayuda de inmediato si no mejora o si empeora.     Esta información no tiene ottoniel fin reemplazar el consejo del médico. Asegúrese de hacerle al médico cualquier pregunta que tenga.     Document Released: 12/18/2006 Document Revised: 03/11/2013  [employer] Interactive Patient Education ©2016 [employer]      Depression / Suicide Risk    As you are discharged from this [facility] facility, it is important to learn how to keep safe from harming yourself.    Recognize the warning signs:  · Abrupt changes in personality, positive or negative- including increase in energy   · Giving away possessions  · Change in eating patterns- significant weight changes-  positive or negative  · Change in sleeping patterns- unable to sleep or sleeping all the time   · Unwillingness or inability to communicate  · Depression  · Unusual sadness, discouragement and loneliness  · Talk of wanting to die  · Neglect of personal appearance   · Rebelliousness- reckless behavior  · Withdrawal from people/activities they love  · Confusion- inability to concentrate     If you or a loved one observes any of these behaviors or has concerns about self-harm, here's what you can do:  · Talk about it- your feelings and reasons for harming yourself  · Remove any means that you might use to hurt yourself (examples: pills, rope, extension cords,  firearm)  · Get professional help from the community (Mental Health, Substance Abuse, psychological counseling)  · Do not be alone:Call your Safe Contact- someone whom you trust who will be there for you.  · Call your local CRISIS HOTLINE 554-7707 or 842-019-2894  · Call your local Children's Mobile Crisis Response Team [place] (261) 589-3221 or www.TYMR  · Call the toll free National Suicide Prevention Hotlines   · National Suicide Prevention Lifeline 411-460-TZTA (1822)  · National Hope Line Network 800-SUICIDE (499-5369)

## 2017-06-27 NOTE — PROGRESS NOTES
Received report and assumed patient care at change of shift. Patient is resting in bed, A/O x4. Patient reports 3/10 pain at this time, medications provided per MAR.    PIV is patent with no s/s of infection or infiltration noted at this time. RIJ in place for dialysis and fistula not in use at this time but bruit and thrill noted.    Patient reports perineal pain at this time with open sores, using anesthetic spray which patient reports is adequate for pain management.    Plan of care discussed, questions answered. Bed is in the lowest position and locked, call light within reach, non-skid socks in place, hourly rounding. Patient reports no further needs and this time.

## 2017-06-27 NOTE — DIETARY
Nutrition Services: Update  Pt is on a renal, low fiber (GI soft) diet and receiving Boost Glucose Control TID with meals. Attempted to visit pt, pt was not in room. Per chart pt PO 25-75%. No new wt since 6/20 - 54.4 kg via bed scale    Pertinent Labs 6/26: Na 129, BUN 23, Creatinine 2.78, POC glucose 100, 102, 88  Pertinent Meds: zithromax, tivicay, myambutol, diflucan, prozac, atarax, humalog, prinivil, vigamox, rilpivirine, pericolace, sodium bicarbonate, valtrex  Fluids: NS infusion @ 50 ml/hr  Skin: wound POA other perineum;labia;rectum  GI: Last BM 6/26    PLAN/RECOMMEND -   1) Nutrition rep to see patient daily for meal and snack preferences.  2) Encourage PO  3) Weekly weights to monitor fluid and nutrition status  4) Please document PO intake for each meal as percentage of meals consumed    RD following

## 2017-06-27 NOTE — PROGRESS NOTES
Patient initial blood glucose this AM was 66, orange juice given- retested and 100 blood glucose. Sent pt to dialysis - breakfast ordered to be delivered at dialysis.

## 2017-06-27 NOTE — PROGRESS NOTES
HD treatment were ordered by Dr. Deal, pt was able to tolerate HD treatment without any difficulty, pt was able to pull 2.5 liters of fluids. Gave report to Angela De La Torre, pt CVC dressing were dry and intact, no swelling or redness were noted post tx. Access were locked with heparin 1000 units/ml, 1.8 ml arterial port and 1.9 ml venous venous port. Pt was stable post tx, denies any complaint of pain and SOB, no fever or any distress were noted post tx. Treatment started at 0815 and ended at 1116, see flow sheets for further details.

## 2017-06-30 NOTE — IP AVS SNAPSHOT
" <p align=\"LEFT\"><IMG SRC=\"//EMRWB/blob$/Images/[facility].jpg\" alt=\"Image\" WIDTH=\"50%\" HEIGHT=\"200\" BORDER=\"\"></p>                   Name:Roger Mckeon  Medical Record Number:[id number]  CSN: [id number]    YOB: 1970   Age: 47 y.o.  Sex: female  HT:1.676 m (5' 5.98\") WT: 49.442 kg (109 lb)          Admit Date: 5/4/2017     Discharge Date:   Today's Date: 5/6/2017  Attending Doctor:  Isaac Faith M.D.                  Allergies:  Oxycodone and Percocet          Your appointments     May 08, 2017 To Be Determined   SN-HH-HOME VIS+LPN SUP+HHA SUP with Simone Brink R.N.   [facility] (--)    [address]  [place] [address]   [phone]            May 09, 2017 To Be Determined   MSW-HH-HOME VISIT with Eryn Mena [facility] (--)    [address]  [place] [address]   [phone]            May 09, 2017  1:30 PM   AIDE-HH-HOME VISIT with Chelsi Carrero C.N.A.   [facility] (--)    [address]  [place] [address]   [phone]            May 10, 2017 12:30 PM   OT-HH-HOME VISIT with Evens Cottrell O.T.   [facility] (--)    [address]  [place] [address]   [phone]            May 11, 2017 To Be Determined   SN-HH-HOME VIS+LPN SUP+HHA SUP with Elana Baker R.N.   [facility] (--)    [address]  [place] [address]   [phone]            May 12, 2017  5:00 AM   AIDE-HH-HOME VISIT with Christiano Giron C.N.A.   [facility] (--)    [facility]  [facility] [address]   [phone]            May 12, 2017 12:30 PM   QE-AR-UHYKNSEWPVIV with Evens Cottrell O.T.   [facility] (--)    [facility]  [facility] [address]   [phone]            May 16, 2017 To Be Determined   AIDE-HH-HOME VISIT with Chelsi Carrero C.N.A.   [facility] (--)    [facility]  [facility] [address]   [phone]            May 17, 2017 To Be Determined   SN-HH-HOME VISIT + HHA SUP with Elana Baker R.N.   [facility]" All OK  Good fm  Ad growth  1 week   Care (--)    [facility] [address] [facility]  [facility] [address]   [phone]            May 19, 2017 To Be Determined   AIDE-HH-HOME VISIT with Chelsi Carrero C.N.A.   [facility] (--)    [facility] [address] [facility] [address]   [phone]            May 23, 2017 To Be Determined   AIDE-HH-HOME VISIT with Chelsi Carrero C.N.A.   [facility] (--)    [facility] [address] [facility] [address]   [phone]            May 25, 2017 To Be Determined   SN-HH-HOME VISIT with Simone Brink R.N.   [facility] (--)    [facility] [address] [facility] [address]   [phone]            May 26, 2017 To Be Determined   AIDE-HH-HOME VISIT with Chelsi Carrero C.N.A.   [facility] (--)    [facility] [address] [facility] [address]   [phone]            May 30, 2017 To Be Determined   AIDE-HH-HOME VISIT with Chelsi Carrero C.N.A.   [facility] (--)    [facility] [address] [facility] [address]   [phone]            Jun 02, 2017 To Be Determined   AIDE-HH-HOME VISIT with Chelsi Carrero C.N.A.   [facility] (--)    [facility] [address] [facility] [address]   [phone]            Jun 02, 2017 To Be Determined   SN-HH-OASIS RECERT+LPN/HHA SUP with Elana Baker R.N.   [facility] (--)    [facility] [id number] [phone]              Follow-up Information     1. Follow up with Caridad Stone M.D..    Specialty:  Family Medicine    Contact information    [address] [facility] [address]  [facility] [id number] [phone]           Medication List      Take these Medications        Instructions    amlodipine 5 MG Tabs   Commonly known as:  NORVASC    Take 5 mg by mouth every evening.   Dose:  5 mg       carvedilol 6.25 MG Tabs   Commonly known as:  COREG    Take 1 Tab by mouth 2 times a day, with meals.   Dose:  6.25 mg       diphenoxylate-atropine 2.5-0.025 MG Tabs   Commonly known as:  LOMOTIL    Take 2 Tabs by mouth 3 times a day as needed.   Dose:  2 Tab       fluoxetine 10 MG Caps      Commonly known as:  PROZAC    Take 10 mg by mouth every day.   Dose:  10 mg       furosemide 40 MG Tabs   Commonly known as:  LASIX    Take 1 Tab by mouth 2 Times a Day.   Dose:  40 mg       hydrOXYzine 25 MG Tabs   Commonly known as:  ATARAX    Take 25 mg by mouth 4 times a day.   Dose:  25 mg       K-PHOS 500 MG tablet   Generic drug:  potassium phosphate (monobasic)    Take 1 Tab by mouth every day at 6 PM.   Dose:  1 Tab       methocarbamol 750 MG Tabs   Commonly known as:  ROBAXIN    Take 750 mg by mouth every 6 hours as needed (pain, muscle spasm).   Dose:  750 mg       metoclopramide 10 MG Tabs   Commonly known as:  REGLAN    Take 1 Tab by mouth 4 Times a Day,Before Meals and at Bedtime.   Dose:  10 mg       ondansetron 4 MG Tabs tablet   Commonly known as:  ZOFRAN    Take 4 mg by mouth every 6 hours as needed for Nausea/Vomiting.   Dose:  4 mg       promethazine 25 MG Tabs   Commonly known as:  PHENERGAN    Take 25 mg by mouth every four hours as needed for Nausea/Vomiting. may take every 4 to 6 hours   Dose:  25 mg       sodium bicarbonate 650 MG Tabs   Commonly known as:  SODIUM BICARBONATE    Take 1 Tab by mouth 3 times a day.   Dose:  650 mg       trazodone 50 MG Tabs   Commonly known as:  DESYREL    Take 50 mg by mouth at bedtime as needed for Sleep.   Dose:  50 mg       XANAX 0.25 MG Tabs   Generic drug:  alprazolam    Take 0.25 mg by mouth 3 times a day as needed.   Dose:  0.25 mg

## 2017-07-30 PROBLEM — J18.9 PNEUMONIA: Status: ACTIVE | Noted: 2017-01-01

## 2017-07-30 NOTE — IP AVS SNAPSHOT
8/7/2017    Roger Mckeon  [address]  [place] [address]    Dear Roger:    [facility] wants to ensure your discharge home is safe and you or your loved ones have had all of your questions answered regarding your care after you leave the hospital.    Below is a list of resources and contact information should you have any questions regarding your hospital stay, follow-up instructions, or active medical symptoms.    Questions or Concerns Regarding… Contact   Medical Questions Related to Your Discharge  (7 days a week, 8am-5pm) Contact a Nurse Care Coordinator   [phone]   Medical Questions Not Related to Your Discharge  (24 hours a day / 7 days a week)  Contact the Nurse Health Line   [phone]    Medications or Discharge Instructions Refer to your discharge packet   or contact your [facility] Primary Care Provider   [phone]   Follow-up Appointment(s) Schedule your appointment via [employer]   or contact Scheduling [phone]   Billing Review your statement via [employer]  or contact Billing [phone]   Medical Records Review your records via [employer]   or contact Medical Records [phone]     You may receive a telephone call within two days of discharge. This call is to make certain you understand your discharge instructions and have the opportunity to have any questions answered. You can also easily access your medical information, test results and upcoming appointments via the [employer] free online health management tool. You can learn more and sign up at [employer]. For assistance setting up your [employer] account, please call [phone].    Once again, we want to ensure your discharge home is safe and that you have a clear understanding of any next steps in your care. If you have any questions or concerns, please do not hesitate to contact us, we are here for you. Thank you for choosing [facility] for your healthcare needs.    Sincerely,    Your [facility] Healthcare Team

## 2017-07-30 NOTE — IP AVS SNAPSHOT
" <p align=\"LEFT\"><IMG SRC=\"//EMRWB/blob$/Images/[facility].jpg\" alt=\"Image\" WIDTH=\"50%\" HEIGHT=\"200\" BORDER=\"\"></p>                   Name:Roger Mckeon  Medical Record Number:[id number]  CSN: [id number]    YOB: 1970   Age: 47 y.o.  Sex: female  HT:1.676 m (5' 6\") WT: 54.5 kg (120 lb 2.4 oz)          Admit Date: 7/30/2017     Discharge Date:   Today's Date: 8/7/2017  Attending Doctor:  Grazyna Dixon M.D.                  Allergies:  Oxycodone          Your appointments     Aug 10, 2017 11:00 AM   CARE MANAGER TELEPHONE VISIT with CARE MANAGER   [address]  [place] [id number] [phone]           ***IMPORTANT**** This is a phone visit only. Do not come into the office. The Care Manager will call you at the scheduled time for Care Manager Telephone Visit.            Aug 11, 2017  8:40 AM   Established Patient with CHAVA Mcgarry   [address]  [place] [id number] [phone]           You will be receiving a confirmation call a few days before your appointment from our automated call confirmation system.              Follow-up Information     1. Follow up with Shonna Willoughby M.D.. Schedule an appointment as soon as possible for a visit in 2 weeks.    Specialty:  Family Medicine    Why:  Hospital follow-up appointment with PCP    Contact information    [address]  [address]  [place] [address]  [phone]          2. Follow up with Manuel Deal M.D. In 1 day.    Specialty:  Nephrology    Why:  for dialysis    Contact information    [address]  [place] [id number] [phone]           Medication List      Take these Medications        Instructions    acyclovir 400 MG tablet   Commonly known as:  ZOVIRAX    Take 800 mg by mouth every day.   Dose:  800 mg       artificial tears 1.4 % Soln    Place 1 Drop in both eyes as needed (dry eyes). Indications: dry eyes   Dose:  1 Drop      " diphenoxylate-atropine 2.5-0.025 MG Tabs   Commonly known as:  LOMOTIL    Take 1 Tab by mouth 4 times a day as needed for Diarrhea.   Dose:  1 Tab       EDURANT 25 MG Tabs   Generic drug:  Rilpivirine HCl    Take 1 Tab by mouth every day at 6 PM. Indications: HIV Disease   Dose:  1 Tab       ethambutol 400 MG Tabs   Commonly known as:  MYAMBUTOL    Take 800 mg by mouth every day. Pt started a 14 day course on 6/2   Dose:  800 mg       fluconazole 100 MG Tabs   Commonly known as:  DIFLUCAN    Take 1 Tab by mouth every day.   Dose:  100 mg       fluoxetine 10 MG Caps   Commonly known as:  PROZAC    Take 10 mg by mouth every day. Indications: Depression   Dose:  10 mg       hydrocodone-acetaminophen 5-325 MG Tabs per tablet   Commonly known as:  NORCO    Take 1-2 Tabs by mouth every four hours as needed. Indications: Moderate to Moderately Severe Pain   Dose:  1-2 Tab       hydrOXYzine 25 MG Tabs   Commonly known as:  ATARAX    Take 25 mg by mouth 4 times a day.   Dose:  25 mg       lisinopril 20 MG Tabs   Commonly known as:  PRINIVIL    Take 1 Tab by mouth every day.   Dose:  20 mg       moxifloxacin 0.5 % Soln   Commonly known as:  VIGAMOX    Place 1 Drop in left eye 4 times a day. Indications: EYE   Dose:  1 Drop       nystatin 647139 UNIT/ML Susp   Commonly known as:  MYCOSTATIN    Take 500,000 Units by mouth 4 times a day. Indications: Candidiasis Fungal Infection of the Oropharynx   Dose:  337559 Units       ondansetron 4 MG Tbdp   Commonly known as:  ZOFRAN ODT    Take 1 Tab by mouth every four hours as needed for Nausea/Vomiting (give PO if no IV route available).   Dose:  4 mg       prednisoLONE acetate 1 % Susp   Commonly known as:  PRED FORTE    Place 1 Drop in left eye 4 times a day. Indications: post eye surgery   Dose:  1 Drop       sodium bicarbonate 650 MG Tabs   Commonly known as:  SODIUM BICARBONATE    Take 1 Tab by mouth 3 times a day.   Dose:  650 mg       TIVICAY 50 MG Tabs tablet   Generic drug:   dolutegravir    Take 50 mg by mouth every day. Indications: HIV Disease   Dose:  50 mg       valacyclovir 500 MG Tabs   Commonly known as:  VALTREX    Take 500 mg by mouth 2 times a day. Indications: Herpes Simplex Infection, Genital Herpes   Dose:  500 mg       XANAX 0.25 MG Tabs   Generic drug:  alprazolam    Take 0.25 mg by mouth 3 times a day as needed for Anxiety. Indications: Feeling Anxious   Dose:  0.25 mg

## 2017-07-30 NOTE — IP AVS SNAPSHOT
[employer] Access Code: [email or web address]  Expires: 9/6/2017  3:23 PM    Your email address is not on file at [facility].  Email Addresses are required for you to sign up for [employer], please contact [phone] to verify your personal information and to provide your email address prior to attempting to register for [employer].    Roger Mckeon  [address]  [place] [address]    [employer]  A secure, online tool to manage your health information     [facility]’s [employer]® is a secure, online tool that connects you to your personalized health information from the privacy of your home -- day or night - making it very easy for you to manage your healthcare. Once the activation process is completed, you can even access your medical information using the [employer] raimundo, which is available for free in the Apple Raimundo store or Google Play store.     To learn more about [employer], visit [email or web address]    There are two levels of access available (as shown below):   My Chart Features  [facility] Primary Care Doctor [facility]  Specialists [facility]  Urgent  Care Non-[facility] Primary Care Doctor   Email your healthcare team securely and privately 24/7 X X X    Manage appointments: schedule your next appointment; view details of past/upcoming appointments X      Request prescription refills. X      View recent personal medical records, including lab and immunizations X X X X   View health record, including health history, allergies, medications X X X X   Read reports about your outpatient visits, procedures, consult and ER notes X X X X   See your discharge summary, which is a recap of your hospital and/or ER visit that includes your diagnosis, lab results, and care plan X X  X     How to register for [employer]:  Once your e-mail address has been verified, follow the following steps to sign up for [employer].     1. Go to  [email or web address]  2. Click on the Sign Up Now box, which takes you to the New Member Sign Up page. You will  need to provide the following information:  a. Enter your [employer] Access Code exactly as it appears at the top of this page. (You will not need to use this code after you’ve completed the sign-up process. If you do not sign up before the expiration date, you must request a new code.)   b. Enter your date of birth.   c. Enter your home email address.   d. Click Submit, and follow the next screen’s instructions.  3. Create a [employer] ID. This will be your [employer] login ID and cannot be changed, so think of one that is secure and easy to remember.  4. Create a [employer] password. You can change your password at any time.  5. Enter your Password Reset Question and Answer. This can be used at a later time if you forget your password.   6. Enter your e-mail address. This allows you to receive e-mail notifications when new information is available in [employer].  7. Click Sign Up. You can now view your health information.    For assistance activating your [employer] account, call [phone]

## 2017-07-30 NOTE — IP AVS SNAPSHOT
" Home Care Instructions                                                                                                                  Name:Roger Mckeon  Medical Record Number:[id number]  CSN: [id number]    YOB: 1970   Age: 47 y.o.  Sex: female  HT:1.676 m (5' 6\") WT: 54.5 kg (120 lb 2.4 oz)          Admit Date: 7/30/2017     Discharge Date:   Today's Date: 8/7/2017  Attending Doctor:  Grazyna Dixon M.D.                  Allergies:  Oxycodone            Discharge Instructions       Discharge Instructions    Discharged to home by car with relative. Discharged via wheelchair, hospital escort: Yes.  Special equipment needed: Wheelchair from home    Be sure to schedule a follow-up appointment with your primary care doctor or any specialists as instructed.     Discharge Plan:   Diet Plan: Discussed  Activity Level: Discussed  Confirmed Follow up Appointment: Appointment Scheduled  Confirmed Symptoms Management: Discussed  Medication Reconciliation Updated: Yes  Influenza Vaccine Indication: Patient Refuses    I understand that a diet low in cholesterol, fat, and sodium is recommended for good health. Unless I have been given specific instructions below for another diet, I accept this instruction as my diet prescription.   Other diet: Renal    Special Instructions: None    · Is patient discharged on Warfarin / Coumadin?   No     · Is patient Post Blood Transfusion?  No    Depression / Suicide Risk    As you are discharged from this [facility] facility, it is important to learn how to keep safe from harming yourself.    Recognize the warning signs:  · Abrupt changes in personality, positive or negative- including increase in energy   · Giving away possessions  · Change in eating patterns- significant weight changes-  positive or negative  · Change in sleeping patterns- unable to sleep or sleeping all the time   · Unwillingness or inability to communicate  · Depression  · Unusual sadness, discouragement and " loneliness  · Talk of wanting to die  · Neglect of personal appearance   · Rebelliousness- reckless behavior  · Withdrawal from people/activities they love  · Confusion- inability to concentrate     If you or a loved one observes any of these behaviors or has concerns about self-harm, here's what you can do:  · Talk about it- your feelings and reasons for harming yourself  · Remove any means that you might use to hurt yourself (examples: pills, rope, extension cords, firearm)  · Get professional help from the community (Mental Health, Substance Abuse, psychological counseling)  · Do not be alone:Call your Safe Contact- someone whom you trust who will be there for you.  · Call your local CRISIS HOTLINE 847-5235 or 495-375-1906  · Call your local Children's Mobile Crisis Response Team [place] (995) 907-0749 or www.Affashion  · Call the toll free National Suicide Prevention Hotlines   · National Suicide Prevention Lifeline 524-467-FFZW (9633)  · [facility] 800-SUICIDE (890-9084)      Pneumonia, Adult  Pneumonia is an infection of the lungs.   CAUSES  Pneumonia may be caused by bacteria or a virus. Usually, the infection is caused by breathing in droplets from an infected person's cough or sneeze.   SYMPTOMS   Symptoms of pneumonia include:  · Cough.  · Fever.  · Chest pain.  · Rapid breathing.  · Shortness of breath.  · Shaking chills.  · Mucus production.  DIAGNOSIS   If you have the common symptoms of pneumonia, often your health care provider will confirm the diagnosis with a chest X-ray. The X-ray will show an abnormality in the lung if you have pneumonia. Other tests may be done on your blood, urine, or mucus (sputum) to find the specific cause of your pneumonia. A blood gas test or pulse oximetry test may be needed to check how well your lungs are working.  TREATMENT   Your treatment will depend on whether your pneumonia is caused by bacteria or a virus.   · Bacterial pneumonia is  treated with antibiotic medicine.  · Pneumonia that is caused by the influenza virus may be treated with an antiviral medicine.  · Pneumonia that is caused by a virus other than influenza will not respond to antibiotic medicine. This type of pneumonia will have to run its course.   HOME CARE INSTRUCTIONS   · Cough suppressants may be used if you are losing too much rest from coughing at night. However, you should try to avoid taking cough suppresants. This is because coughing helps to remove mucus from your lungs.  · Sleep in a semi-upright position at night. Try sleeping in a reclining chair, or place a few pillows under your head.  · Try using a cold steam vaporizer or humidifier in your home or bedroom. This may help loosen your mucus.  · If you were prescribed an antibiotic medicine, finish all of it even if you start to feel better.  · If you were prescribed an expectorant, take it as directed by your health care provider. This medicine loosens the mucus so you can cough it up.  · Take medicines only as directed by your health care provider.  · Do not smoke. If you are a smoker and continue to smoke, your cough may last several weeks after your pneumonia has cleared.  · Get rest when you feel tired, or as needed.  PREVENTION  A pneumococcal shot (vaccine) is available to prevent a common bacterial cause of pneumonia. This is usually suggested for:  · People over 65 years old.  · People on chemotherapy.  · People with chronic lung problems, such as bronchitis or emphysema.  · People with immune system problems.  If you are over 65 years old or have a high risk condition, you may receive the pneumococcal vaccine if you have not received it before. In some countries, a routine influenza vaccine is also recommended. This vaccine can help prevent some cases of pneumonia. You may be offered the influenza vaccine as part of your care.  If you are a smoker, it is time to quit in order to prevent pneumonia in the  "future. You may receive instructions on how to stop smoking. Your health care provider can provide medicines and counseling to help you quit.  SEEK MEDICAL CARE IF:  · You have a fever.  · You cannot control your cough with suppressants at night, and you keep losing sleep.  SEEK IMMEDIATE MEDICAL CARE IF:   · You have worsening shortness of breath.  · You have increased chest pain.  · Your sickness becomes worse, especially if you are an older adult or have a weakened immune system.  · You cough up blood.  · You have pain that is getting worse or is not controlled with medicines.  · Your symptoms are getting worse rather than better.     This information is not intended to replace advice given to you by your health care provider. Make sure you discuss any questions you have with your health care provider.     Document Released: 12/18/2006 Document Revised: 01/08/2016 Document Reviewed: 04/13/2016  [employer] Interactive Patient Education ©2016 [employer]      Fever   Fever is a higher-than-normal body temperature. A normal temperature varies with:  · Age.   · How it is measured (mouth, underarm, rectal, or ear).   · Time of day.   In an adult, an oral temperature around 98.6° Fahrenheit (F) or 37° Celsius (C) is considered normal. A rise in temperature of about 1.8° F or 1° C is generally considered a fever (100.4° F or 38° C). In an infant age 28 days or less, a rectal temperature of 100.4° F (38° C) generally is regarded as fever. Fever is not a disease but can be a symptom of illness.  CAUSES   · Fever is most commonly caused by infection.   · Some non-infectious problems can cause fever. For example:   · Some arthritis problems.   · Problems with the thyroid or adrenal glands.   · Immune system problems.   · Some kinds of cancer.   · A reaction to certain medicines.   · Occasionally, the source of a fever cannot be determined. This is sometimes called a \"Fever of Unknown Origin\" (FUO).   · Some situations may " "lead to a temporary rise in body temperature that may go away on its own. Examples are:   · Childbirth.   · Surgery.   · Some situations may cause a rise in body temperature but these are not considered \"true fever\". Examples are:   · Intense exercise.   · Dehydration.   · Exposure to high outside or room temperatures.   SYMPTOMS   · Feeling warm or hot.   · Fatigue or feeling exhausted.   · Aching all over.   · Chills.   · Shivering.   · Sweats.   DIAGNOSIS   A fever can be suspected by your caregiver feeling that your skin is unusually warm. The fever is confirmed by taking a temperature with a thermometer. Temperatures can be taken different ways. Some methods are accurate and some are not:  With adults, adolescents, and children:   · An oral temperature is used most commonly.   · An ear thermometer will only be accurate if it is positioned as recommended by the .   · Under the arm temperatures are not accurate and not recommended.   · Most electronic thermometers are fast and accurate.   Infants and Toddlers:  · Rectal temperatures are recommended and most accurate.   · Ear temperatures are not accurate in this age group and are not recommended.   · Skin thermometers are not accurate.   RISKS AND COMPLICATIONS   · During a fever, the body uses more oxygen, so a person with a fever may develop rapid breathing or shortness of breath. This can be dangerous especially in people with heart or lung disease.   · The sweats that occur following a fever can cause dehydration.   · High fever can cause seizures in infants and children.   · Older persons can develop confusion during a fever.   TREATMENT   · Medications may be used to control temperature.   · Do not give aspirin to children with fevers. There is an association with Reye's syndrome. Reye's syndrome is a rare but potentially deadly disease.   · If an infection is present and medications have been prescribed, take them as directed. Finish the full " course of medications until they are gone.   · Sponging or bathing with room-temperature water may help reduce body temperature. Do not use ice water or alcohol sponge baths.   · Do not over-bundle children in blankets or heavy clothes.   · Drinking adequate fluids during an illness with fever is important to prevent dehydration.   HOME CARE INSTRUCTIONS   · For adults, rest and adequate fluid intake are important. Dress according to how you feel, but do not over-bundle.   · Drink enough water and/or fluids to keep your urine clear or pale yellow.   · For infants over 3 months and children, giving medication as directed by your caregiver to control fever can help with comfort. The amount to be given is based on the child's weight. Do NOT give more than is recommended.   SEEK MEDICAL CARE IF:   · You or your child are unable to keep fluids down.   · Vomiting or diarrhea develops.   · You develop a skin rash.   · An oral temperature above 102° F (38.9° C) develops, or a fever which persists for over 3 days.   · You develop excessive weakness, dizziness, fainting or extreme thirst.   · Fevers keep coming back after 3 days.   SEEK IMMEDIATE MEDICAL CARE IF:   · Shortness of breath or trouble breathing develops   · You pass out.   · You feel you are making little or no urine.   · New pain develops that was not there before (such as in the head, neck, chest, back, or abdomen).   · You cannot hold down fluids.   · Vomiting and diarrhea persist for more than a day or two.   · You develop a stiff neck and/or your eyes become sensitive to light.   · An unexplained temperature above 102° F (38.9° C) develops.   Document Released: 12/18/2006 Document Revised: 03/11/2013 Document Reviewed: 12/03/2009  [employer]® Patient Information ©2013 [employer], [employer] buy tea.          Your appointments     Aug 10, 2017 11:00 AM   CARE MANAGER TELEPHONE VISIT with CARE MANAGER   [facility]    [address] [facility] [facility] [address]  [place]  [address]   [phone]           ***IMPORTANT**** This is a phone visit only. Do not come into the office. The Care Manager will call you at the scheduled time for Care Manager Telephone Visit.            Aug 11, 2017  8:40 AM   Established Patient with CHAVA Mcgarry   [facility] ([facility])    [address] [facility] [address]  [place] [address]   [phone]           You will be receiving a confirmation call a few days before your appointment from our automated call confirmation system.              Follow-up Information     1. Follow up with Shonna Willoughby M.D.. Schedule an appointment as soon as possible for a visit in 2 weeks.    Specialty:  Family Medicine    Why:  Hospital follow-up appointment with PCP    Contact information    [address]  [address]  [place] [address]  [phone]          2. Follow up with Manuel Deal M.D. In 1 day.    Specialty:  Nephrology    Why:  for dialysis    Contact information    [address]  [address]  [place] [address] [phone]           Discharge Medication Instructions:    Below are the medications your physician expects you to take upon discharge:    Review all your home medications and newly ordered medications with your doctor and/or pharmacist. Follow medication instructions as directed by your doctor and/or pharmacist.    Please keep your medication list with you and share with your physician.               Medication List      CONTINUE taking these medications        Instructions    Morning Afternoon Evening Bedtime    acyclovir 400 MG tablet   Commonly known as:  ZOVIRAX        Take 800 mg by mouth every day.   Dose:  800 mg                        artificial tears 1.4 % Soln        Place 1 Drop in both eyes as needed (dry eyes). Indications: dry eyes   Dose:  1 Drop                        diphenoxylate-atropine 2.5-0.025 MG Tabs   Last time this was given:  1 Tab on 8/4/2017  9:14 PM   Commonly known as:  LOMOTIL        Take 1 Tab by mouth 4  times a day as needed for Diarrhea.   Dose:  1 Tab                        EDURANT 25 MG Tabs   Last time this was given:  1 Tab on 8/6/2017  6:00 PM   Generic drug:  Rilpivirine HCl        Take 1 Tab by mouth every day at 6 PM. Indications: HIV Disease   Dose:  1 Tab                        ethambutol 400 MG Tabs   Last time this was given:  800 mg on 7/31/2017  8:30 AM   Commonly known as:  MYAMBUTOL        Take 800 mg by mouth every day. Pt started a 14 day course on 6/2   Dose:  800 mg                        fluconazole 100 MG Tabs   Last time this was given:  100 mg on 8/7/2017 10:00 AM   Commonly known as:  DIFLUCAN        Take 1 Tab by mouth every day.   Dose:  100 mg                        fluoxetine 10 MG Caps   Last time this was given:  10 mg on 8/7/2017 10:00 AM   Commonly known as:  PROZAC        Take 10 mg by mouth every day. Indications: Depression   Dose:  10 mg                        hydrocodone-acetaminophen 5-325 MG Tabs per tablet   Commonly known as:  NORCO        Take 1-2 Tabs by mouth every four hours as needed. Indications: Moderate to Moderately Severe Pain   Dose:  1-2 Tab                        hydrOXYzine 25 MG Tabs   Last time this was given:  25 mg on 8/7/2017  1:52 PM   Commonly known as:  ATARAX        Take 25 mg by mouth 4 times a day.   Dose:  25 mg                        lisinopril 20 MG Tabs   Last time this was given:  20 mg on 8/7/2017  9:59 AM   Commonly known as:  PRINIVIL        Take 1 Tab by mouth every day.   Dose:  20 mg                        moxifloxacin 0.5 % Soln   Last time this was given:  1 Drop on 8/7/2017  1:00 PM   Commonly known as:  VIGAMOX        Place 1 Drop in left eye 4 times a day. Indications: EYE   Dose:  1 Drop                        nystatin [id number] UNIT/ML Susp   Last time this was given:  500,000 Units on 8/7/2017  1:52 PM   Commonly known as:  MYCOSTATIN        Take 500,000 Units by mouth 4 times a day. Indications: Candidiasis Fungal Infection of the  Oropharynx   Dose:  [id number] Units                        ondansetron 4 MG Tbdp   Commonly known as:  ZOFRAN ODT        Take 1 Tab by mouth every four hours as needed for Nausea/Vomiting (give PO if no IV route available).   Dose:  4 mg                        prednisoLONE acetate 1 % Susp   Commonly known as:  PRED FORTE        Place 1 Drop in left eye 4 times a day. Indications: post eye surgery   Dose:  1 Drop                        sodium bicarbonate 650 MG Tabs   Last time this was given:  650 mg on 8/7/2017  1:52 PM   Commonly known as:  SODIUM BICARBONATE        Take 1 Tab by mouth 3 times a day.   Dose:  650 mg                        TIVICAY 50 MG Tabs tablet   Last time this was given:  50 mg on 8/7/2017  9:00 AM   Generic drug:  dolutegravir        Take 50 mg by mouth every day. Indications: HIV Disease   Dose:  50 mg                        valacyclovir 500 MG Tabs   Commonly known as:  VALTREX        Take 500 mg by mouth 2 times a day. Indications: Herpes Simplex Infection, Genital Herpes   Dose:  500 mg                        XANAX 0.25 MG Tabs   Last time this was given:  0.25 mg on 8/6/2017  8:09 PM   Generic drug:  alprazolam        Take 0.25 mg by mouth 3 times a day as needed for Anxiety. Indications: Feeling Anxious   Dose:  0.25 mg                                Instructions           Diet / Nutrition:    Follow any diet instructions given to you by your doctor or the dietician, including how much salt (sodium) you are allowed each day.    If you are overweight, talk to your doctor about a weight reduction plan.    Activity:    Remain physically active following your doctor's instructions about exercise and activity.    Rest often.     Any time you become even a little tired or short of breath, SIT DOWN and rest.    Worsening Symptoms:    Report any of the following signs and symptoms to the doctor's office immediately:    *Pain of jaw, arm, or neck  *Chest pain not relieved by medication                                *Dizziness or loss of consciousness  *Difficulty breathing even when at rest   *More tired than usual                                       *Bleeding drainage or swelling of surgical site  *Swelling of feet, ankles, legs or stomach                 *Fever (>100ºF)  *Pink or blood tinged sputum  *Weight gain (3lbs/day or 5lbs /week)           *Shock from internal defibrillator (if applicable)  *Palpitations or irregular heartbeats                *Cool and/or numb extremities    Stroke Awareness    Common Risk Factors for Stroke include:    Age  Atrial Fibrillation  Carotid Artery Stenosis  Diabetes Mellitus  Excessive alcohol consumption  High blood pressure  Overweight   Physical inactivity  Smoking    Warning signs and symptoms of a stroke include:    *Sudden numbness or weakness of the face, arm or leg (especially on one side of the body).  *Sudden confusion, trouble speaking or understanding.  *Sudden trouble seeing in one or both eyes.  *Sudden trouble walking, dizziness, loss of balance or coordination.Sudden severe headache with no known cause.    It is very important to get treatment quickly when a stroke occurs. If you experience any of the above warning signs, call 911 immediately.                   Disclaimer         Quit Smoking / Tobacco Use:    I understand the use of any tobacco products increases my chance of suffering from future heart disease or stroke and could cause other illnesses which may shorten my life. Quitting the use of tobacco products is the single most important thing I can do to improve my health. For further information on smoking / tobacco cessation call a Toll Free Quit Line at 1-613.230.9262 (*[employer] [place]) or 1-844.533.8327 (American Lung Association) or you can access the web based program at www.lungusa.org.    [place] Tobacco Users Help Line:  (679) 283-2343       Toll Free: 1-951.982.2262  Quit Tobacco Program [facility]  (424) 800-6095    Crisis Hotline:    [place] Crisis Hotline:  1-786-SWCECHS or 1-289.745.9222    [place] Crisis Hotline:    1-922.545.8556 or 741-530-5154    Discharge Survey:   Thank you for choosing [facility]. We hope we did everything we could to make your hospital stay a pleasant one. You may be receiving a phone survey and we would appreciate your time and participation in answering the questions. Your input is very valuable to us in our efforts to improve our service to our patients and their families.        My signature on this form indicates that:    1. I have reviewed and understand the above information.  2. My questions regarding this information have been answered to my satisfaction.  3. I have formulated a plan with my discharge nurse to obtain my prescribed medications for home.                  Disclaimer         __________________________________                     __________       ________                       Patient Signature                                                 Date                    Time

## 2017-07-31 NOTE — PROGRESS NOTES
Danya from Lab called with critical result of gram + cocci possible staff at 5am. Critical lab result read back to Danya.   Dr. Barba notified of critical lab result at 5.15am.  Critical lab result read back by Dr. Barba, new order was made.

## 2017-07-31 NOTE — DISCHARGE PLANNING
Patient was discussed in morning rounds.  Patient reportedly lives with her ex- and the discharge plan currently is for her to return home when medically stable.  No current SS needs noted.

## 2017-07-31 NOTE — ED NOTES
Presents accompanied by family.  Ex spouse relates that Pt has missed her dialysis appointment this week and appears confused, complaining of N/V and anorexia.  Pt is febrile and somnolent, meeting the sepsis protocol measures.

## 2017-07-31 NOTE — DISCHARGE PLANNING
Care Transition Team Assessment    Information Source  Orientation : Oriented x 4  Information Given By: Patient  Who is responsible for making decisions for patient? : Patient    Readmission Evaluation  Is this a readmission?: No    Elopement Risk  Legal Hold: No  Ambulatory or Self Mobile in Wheelchair: No-Not an Elopement Risk  Elopement Risk: Not at Risk for Elopement    Interdisciplinary Discharge Planning  Does Admitting Nurse Feel This Could be a Complex Discharge?: No  Primary Care Physician:  Dr Tesfaye  Lives with - Patient's Self Care Capacity: Other (Comments) (Lives with ex )  Patient or legal guardian wants to designate a caregiver (see row info): No  Support Systems: Family Member(s)  Housing / Facility: [address]  Do You Take your Prescribed Medications Regularly: Yes  Able to Return to Previous ADL's: Future Time w/Therapy  Mobility Issues: Yes  Prior Services: Continuous (24 Hour) Care Giving Family  Patient Expects to be Discharged to:: Home  Assistance Needed: Unknown at this Time  Durable Medical Equipment: Other - Specify (wheelchair)    Discharge Preparedness  What is your plan after discharge?: Home with help         Finances  Financial Barriers to Discharge: No  Prescription Coverage: Yes    Vision / Hearing Impairment  Vision Impairment : No  Hearing Impairment : No    Values / Beliefs / Concerns  Values / Beliefs Concerns : No    Advance Directive  Advance Directive?: None    Domestic Abuse  Have you ever been the victim of abuse or violence?: No    Psychological Assessment  History of Substance Abuse: None  History of Psychiatric Problems: No    Discharge Risks or Barriers  Discharge risks or barriers?: Complex medical needs    Anticipated Discharge Information  Anticipated discharge disposition: Home

## 2017-07-31 NOTE — PROGRESS NOTES
Bedside report given to  Adele JAMES.Pt  Resting in the bed.Safety precautions in place and all the lines remain patent.

## 2017-07-31 NOTE — CARE PLAN
Problem: Safety  Goal: Will remain free from injury  Outcome: PROGRESSING AS EXPECTED  Standard precaution in place. Educated  And encouraged regarding proper hand washing techniques.    Problem: Skin Integrity  Goal: Risk for impaired skin integrity will decrease  Outcome: PROGRESSING AS EXPECTED  Pt instructed to turn & reposition frequently,kept dry and clean. place pillows on bony prominences to prevent skin breakdown.

## 2017-07-31 NOTE — PROGRESS NOTES
Bedside report received from [facility] nurseJose Alfredo RN. Assumed care. Pt resting in bed. Safety precautions in place.

## 2017-07-31 NOTE — PROGRESS NOTES
[facility] Hospitalist Progress Note    Date of Service: 2017    Chief Complaint  47 y.o. female admitted 2017 with fever, ams. She has a     diagnosis of HIV/AIDS (AIDS defining illness was MAC infection) in the recent past.  She also has a history of end-stage renal disease, on hemodialysis and type 2  diabetes mellitus.  Patient had skipped her dialysis approximately 1 time this past  week due to lack of transportation.  She states her  came in and found  her confused, fevers and brought her into the hospital.  Here, in the emergency    room, she has had a fever with a workup concerning of questionable pneumonia  on chest x-ray.  Patient is awake but mildly lethargic. Currently a fair-to-poor historian  at best.  She is not forthcoming with much of her history, although she is able to  answer questions when asked directly.  Patient denies any current chest pain, no  shortness of breath.  She denies any cough.  She denies any lack of appetite.     She states she has been taking her medications.    Interval Problem Update  Currently getting dialyzed.     Consultants/Specialty  Renal    Disposition  On abx's for cap. Recommend serial cxrs. Being dialyzed today, plan for home dc tomorrow.         Review of Systems   Constitutional: Negative for fever.   Eyes: Negative for blurred vision.   Respiratory: Negative for cough.    Cardiovascular: Negative for chest pain.   Gastrointestinal: Negative for heartburn.   Genitourinary: Negative for dysuria.   Musculoskeletal: Negative for myalgias.   Skin: Negative for rash.   Neurological: Positive for weakness. Negative for dizziness and headaches.   Psychiatric/Behavioral: Negative for depression.      Physical Exam  Laboratory/Imaging   Hemodynamics  Temp (24hrs), Av.7 °C (99.8 °F), Min:36.9 °C (98.4 °F), Max:39.1 °C (102.3 °F)   Temperature: 37.3 °C (99.2 °F) (rn aware)  Pulse  Av.6  Min: 93  Max: 116 Heart Rate (Monitored): (!) 115  Blood Pressure:  105/61 mmHg, NIBP: 120/78 mmHg      Respiratory      Respiration: (!) 22 (rn aware), Pulse Oximetry: 96 %        RLL Breath Sounds: Diminished, LLL Breath Sounds: Crackles    Fluids    Intake/Output Summary (Last 24 hours) at 07/31/17 1633  Last data filed at 07/31/17 0827   Gross per 24 hour   Intake    118 ml   Output      0 ml   Net    118 ml       Nutrition  Orders Placed This Encounter   Procedures   • Diet Order     Standing Status: Standing      Number of Occurrences: 1      Standing Expiration Date:      Order Specific Question:  Diet:     Answer:  Regular [1]     Physical Exam   Constitutional: She is oriented to person, place, and time. No distress.   Eyes: Pupils are equal, round, and reactive to light.   Neck: Normal range of motion.   Cardiovascular: Normal rate, regular rhythm, normal heart sounds and intact distal pulses.    Pulmonary/Chest: Effort normal and breath sounds normal.   (+) rhonchi   Abdominal: Soft. Bowel sounds are normal. She exhibits no distension. There is no tenderness.   Neurological: She is alert and oriented to person, place, and time.   Skin: Skin is warm and dry.   Psychiatric: Her behavior is normal.       Recent Labs      07/30/17   1832  07/31/17   0503   WBC  5.0  4.6*   RBC  3.04*  3.01*   HEMOGLOBIN  9.1*  9.1*   HEMATOCRIT  28.6*  28.6*   MCV  94.1  95.0   MCH  29.9  30.2   MCHC  31.8*  31.8*   RDW  64.9*  65.3*   PLATELETCT  132*  115*   MPV  8.7*  10.1     Recent Labs      07/30/17   1832  07/31/17   0503   SODIUM  128*  130*   POTASSIUM  4.4  4.1   CHLORIDE  94*  97   CO2  25  21   GLUCOSE  82  69   BUN  44*  53*   CREATININE  3.66*  3.71*   CALCIUM  7.9*  7.6*                      Assessment/Plan     Hyponatremia (present on admission)  Assessment & Plan  2/2 fluid overload state  Dialysis today    Fever  Assessment & Plan  Resolved  stable    ESRD (end stage renal disease) (CMS-HCC) (present on admission)  Assessment & Plan  Currently being  dialyzed    Pneumonia  Assessment & Plan  cxr showed: Possible patchy left basilar opacity may represent atelectasis or pneumonitis. Lateral view would be beneficial for further evaluation.  On roceparthur armijo  - patient has appropriate haart, hiv prophylaxis  - f/u cxr tomorrow      Labs reviewed, Medications reviewed and Radiology images reviewed  Julien catheter: No Julien      DVT Prophylaxis: Heparin    Ulcer prophylaxis: No

## 2017-07-31 NOTE — DIETARY
"Nutrition services.  Pt with admit triggers for poor po intake and unplanned wt loss. 47 year old female with pneumonia, fever, altered mental status, missed dialysis appointment.  PMH:  HIV/AIDS, ESRD/dialyis pt, DM II, HTN  Diet:  Regular  Labs and medications reviewed.   Ht:  5'6\"  Wt:  54.5 kg (120 lbs)  Stated UBW:  116 lbs   BMI:  19.4  Patient's wt up 4 lbs from her stated usual body wt.  Note missed dialysis.  Dialysis pt who states she is on a regular diet at home, does not follow diabetic diet.  Intake at breakfast <25%.  States she avoids apples or apple products.  No stated chewing difficulties.    Plan:  RD to honor food preferences, and monitor PO intake progress.  Consider addition of between meal snacks if indicated.   "

## 2017-07-31 NOTE — PROGRESS NOTES
"Pharmacy Kinetics 47 y.o. female on vancomycin day # 1 2017    Currently on Vancomycin 1400 mg iv given 0637 today    Indication for Treatment: Strep bacteremia    Pertinent history per medical record: Admitted on 2017 for SIRS, bacteremia, possible pneumonia, anemia, hyponatremia.  PMH: HIV/AIDS, ESRD on HD (no HD x 1 week), DMII    Other antibiotics: Ceftriaxone, Azithromycin, ethambutol, fluconazole, nystatin    Allergies: Oxycodone     List concerns for renal function : ESRD on HD    Pertinent cultures to date:    BC periph x 2 Possible Strep    Recent Labs      17   1832  17   0503   WBC  5.0  4.6*   NEUTSPOLYS  88.00*  87.00*   BANDSSTABS  1.00  2.00     Recent Labs      17   1832  17   0503   BUN  44*  53*   CREATININE  3.66*  3.71*   ALBUMIN  2.0*  2.0*     No results for input(s): VANCOTROUGH, VANCOPEAK, VANCORANDOM in the last 72 hours.  Intake/Output Summary (Last 24 hours) at 17 1007  Last data filed at 17 0827   Gross per 24 hour   Intake    118 ml   Output      0 ml   Net    118 ml      Blood pressure 124/73, pulse 110, temperature 37.1 °C (98.8 °F), resp. rate 22, height 1.676 m (5' 6\"), weight 54.5 kg (120 lb 2.4 oz), last menstrual period 2010, SpO2 91 %. Temp (24hrs), Av.7 °C (99.9 °F), Min:36.9 °C (98.4 °F), Max:39.1 °C (102.3 °F)      A/P   1. Vancomycin dose given this am.  No subsequent doses ordered at this time.  2. Next vancomycin level: 8/ 0900  3. Goal trough: 12 - 16 mcg/ml for bacteremia  4. Comments: Anticipate pulse dosing based on hemodialysis schedule.  Plan to check level tomorrow.  Will monitor daily per protocol.    LEDY AllenD      "

## 2017-07-31 NOTE — PROGRESS NOTES
Assessment completed--see doc flowsheet. A&Ox4; subdued and fatigued. Meds provided. Repositioned for comfort. POC discussed, verbalized understanding. Call light and personal possessions within reach, bed in low position, bed alarm on, encouraged to call for assistance.

## 2017-07-31 NOTE — ED NOTES
Extremely weak, skinny woman. She does answer questions like, when did ou see the doctor last? and what have you been doing at home? Who brought you here? She looks around like she has a hard time focusing. Side rails up. She verbalized that she could use the call light. Saline lock is inserted and labs are sent on assumption of care

## 2017-07-31 NOTE — ASSESSMENT & PLAN NOTE
Infiltrates on imaging due to edema and pneumonia  Patient with no hypoxia or shortness of breath  Bacteremia is clearing on antibiotics

## 2017-07-31 NOTE — CARE PLAN
Problem: Safety  Goal: Will remain free from injury  Outcome: PROGRESSING AS EXPECTED  Hourly rounding. Call light and personal possessions within reach. Pt encouraged to call for assistance. Bed in low position, bed alarm in use. Treaded slipper socks on pt. Appropriate signs in place.    Problem: Infection  Goal: Will remain free from infection  Outcome: PROGRESSING AS EXPECTED  Standard precautions in place. IV antibiotic therapy in use. Monitor VS and for s/s of infection. Educated and encouraged re: proper handwashing techniques.

## 2017-07-31 NOTE — ED PROVIDER NOTES
ED Provider Note    CHIEF COMPLAINT  Chief Complaint   Patient presents with   • N/V       HPI  Roger Mckeon is a 47 y.o. female who presents for evaluation of extreme weakness. The patient is brought in by family stating she has missed her dialysis this week and she has been confused and complaining of nausea vomiting. The patient is an extremely poor historian and I cannot obtain any history from her. The patient has a history of HIV illness renal failure and diabetes    REVIEW OF SYSTEMS  See HPI for further details. Unobtainable due to the condition    PAST MEDICAL HISTORY  Past Medical History   Diagnosis Date   • H/O: hysterectomy    • Hypertension    • Renal failure    • Diabetes      on tx since , diet controlled   • Dental disorder      upper dentures   • Dialysis patient      3 x week   • Seizure disorder (CMS-HCC)    • HIV (human immunodeficiency virus infection) ([facility])        FAMILY HISTORY  Family History   Problem Relation Age of Onset   • Diabetes Mother    • Diabetes Father    • Hypertension Father    • Diabetes Sister        SOCIAL HISTORY  Social History     Social History   • Marital Status:      Spouse Name: N/A   • Number of Children: N/A   • Years of Education: N/A     Social History Main Topics   • Smoking status: Never Smoker    • Smokeless tobacco: Never Used   • Alcohol Use: No   • Drug Use: No   • Sexual Activity: Not Asked     Other Topics Concern   • None     Social History Narrative       SURGICAL HISTORY  Past Surgical History   Procedure Laterality Date   • Hysterectomy, vaginal     • Other       back surgery    • Incision and drainage general  2013     Performed by Conrado Prado M.D. at [facility]   • Gastroscopy with biopsy N/A 2016     Procedure: GASTROSCOPY WITH BIOPSY;  Surgeon: Rachid Salazar M.D.;  Location: [facility];  Service:    • Gyn surgery          • Av fistula creation Left 10/4/2016     Procedure:  "AV FISTULA GRAFT CREATION;  Surgeon: Travon Franco M.D.;  Location: SURGERY [facility];  Service:    • Gastroscopy N/A 5/10/2017     Procedure: GASTROSCOPY- Upper Endoscopy ;  Surgeon: Irene Jenkins M.D.;  Location: SURGERY [facility];  Service:        CURRENT MEDICATIONS  Home Medications     Reviewed by Roro Noe R.N. (Registered Nurse) on 07/30/17 at 2217  Med List Status: Unable to Obtain    Medication Last Dose Status    acyclovir (ZOVIRAX) 400 MG tablet 7/30/2017 Active    alprazolam (XANAX) 0.25 MG Tab 7/29/2017 Active    artificial tears 1.4 % Solution 7/29/2017 Active    diphenoxylate-atropine (LOMOTIL) 2.5-0.025 MG Tab Unknown Active    dolutegravir (TIVICAY) 50 MG Tab tablet 6/19/2017 Active    ethambutol (MYAMBUTOL) 400 MG Tab 6/15/2017 Active    fluconazole (DIFLUCAN) 100 MG Tab  Active    fluoxetine (PROZAC) 10 MG Cap 6/19/2017 Active    hydrocodone-acetaminophen (NORCO) 5-325 MG Tab per tablet Unknown Active    hydrOXYzine (ATARAX) 25 MG Tab 6/19/2017 Active    lisinopril (PRINIVIL) 20 MG Tab 6/19/2017 Active    moxifloxacin (VIGAMOX) 0.5 % Solution 6/19/2017 Active    nystatin (MYCOSTATIN) 967723 UNIT/ML Suspension 6/19/2017 Active    ondansetron (ZOFRAN ODT) 4 MG TABLET DISPERSIBLE 6/19/2017 Active    prednisoLONE acetate (PRED FORTE) 1 % Suspension 6/19/2017 Active    Rilpivirine HCl (EDURANT) 25 MG Tab  Active    sodium bicarbonate (SODIUM BICARBONATE) 650 MG Tab 6/19/2017 Active    valacyclovir (VALTREX) 500 MG Tab  Active                 ALLERGIES  Allergies   Allergen Reactions   • Oxycodone Itching and Nausea       PHYSICAL EXAM  VITAL SIGNS: /94 mmHg  Pulse 116  Temp(Src) 37.6 °C (99.6 °F)  Resp 14  Ht 1.676 m (5' 6\")  Wt 54 kg (119 lb 0.8 oz)  BMI 19.22 kg/m2  SpO2 97%  LMP 01/01/2010  Constitutional :  Well developed, thin chronically ill and malnourished, appears somewhat ill  HENT: Head is atraumatic extremely dry mouth is noted  Eyes: " Normal-appearing nonicteric  Neck: Normal range of motion, No tenderness, Supple, No stridor.   Lymphatic: No cervical adenopathy.   Cardiovascular: The patient is tachycardic Normal rhythm, No murmurs, No rubs, No gallops.   Thorax & Lungs: Decreased breath sounds bilaterally crackles heard bilaterally  Skin: Warm, Dry, No erythema, No rash.   Abdomen is soft no tenderness elicited, no rebound  Extremities no cyanosis or edema  Neurologically patient appears confused she has no focal neurological deficit but is globally somnolent    DX-CHEST-PORTABLE (1 VIEW)   Final Result      Possible patchy left basilar opacity may represent atelectasis or pneumonitis. Lateral view would be beneficial for further evaluation.           Results for orders placed or performed during the hospital encounter of 07/30/17   Lactic acid (lactate)   Result Value Ref Range    Lactic Acid 1.89 0.50 - 2.00 mmol/L    Specimen Venous    CBC WITH DIFFERENTIAL   Result Value Ref Range    RBC 3.04 (L) 4.20 - 5.40 M/uL    Hemoglobin 9.1 (L) 12.0 - 16.0 g/dL    Hematocrit 28.6 (L) 37.0 - 47.0 %    MCV 94.1 81.4 - 97.8 fL    MCH 29.9 27.0 - 33.0 pg    MCHC 31.8 (L) 33.6 - 35.0 g/dL    RDW 64.9 (H) 35.9 - 50.0 fL    Platelet Count 132 (L) 164 - 446 K/uL    MPV 8.7 (L) 9.0 - 12.9 fL    Nucleated RBC 0.00 /100 WBC    NRBC (Absolute) 0.00 K/uL    WBC 5.0 4.8 - 10.8 K/uL    Neutrophils-Polys 88.00 (H) 44.00 - 72.00 %    Lymphocytes 11.00 (L) 22.00 - 41.00 %    Monocytes 0.00 0.00 - 13.40 %    Eosinophils 0.00 0.00 - 6.90 %    Basophils 0.00 0.00 - 1.80 %    Neutrophils (Absolute) 4.45 2.00 - 7.15 K/uL    Lymphs (Absolute) 0.55 (L) 1.00 - 4.80 K/uL    Monos (Absolute) 0.00 0.00 - 0.85 K/uL    Eos (Absolute) 0.00 0.00 - 0.51 K/uL    Baso (Absolute) 0.00 0.00 - 0.12 K/uL   COMP METABOLIC PANEL   Result Value Ref Range    Sodium 128 (L) 135 - 145 mmol/L    Potassium 4.4 3.6 - 5.5 mmol/L    Chloride 94 (L) 96 - 112 mmol/L    Co2 25 20 - 33 mmol/L    Anion  Gap 9.0 0.0 - 11.9    Glucose 82 65 - 99 mg/dL    Bun 44 (H) 8 - 22 mg/dL    Creatinine 3.66 (H) 0.50 - 1.40 mg/dL    Calcium 7.9 (L) 8.4 - 10.2 mg/dL    AST(SGOT) 66 (H) 12 - 45 U/L    ALT(SGPT) 40 2 - 50 U/L    Alkaline Phosphatase 169 (H) 30 - 99 U/L    Total Bilirubin 0.7 0.1 - 1.5 mg/dL    Albumin 2.0 (L) 3.2 - 4.9 g/dL    Total Protein 6.9 6.0 - 8.2 g/dL    Globulin 4.9 (H) 1.9 - 3.5 g/dL    A-G Ratio 0.4 g/dL   ESTIMATED GFR   Result Value Ref Range    GFR If  16 (A) >60 mL/min/1.73 m 2    GFR If Non  13 (A) >60 mL/min/1.73 m 2   DIFFERENTIAL MANUAL   Result Value Ref Range    Bands-Stabs 1.00 0.00 - 10.00 %    Manual Diff Status PERFORMED      COURSE & MEDICAL DECISION MAKING  Pertinent Labs & Imaging studies reviewed. (See chart for details)  The patient is presenting with a history of fever is found have pneumonia on x-ray she does not have a lactate elevation and given history of renal failure does not require IV fluids beyond maintenance. Patient's fever has been treated with acetaminophen. Blood cultures have been obtained nephrology consultation has been obtained with Dr. durbin regarding her need for dialysis. The findings have been discussed with hospitalist will be admitting the patient for further management.    FINAL IMPRESSION  1. Fever  2. Pneumonia  3. Renal failure      Electronically signed by: Boy Young, 7/30/2017

## 2017-07-31 NOTE — H&P
DATE OF ADMISSION:  07/30/2017    OUTPATIENT HIV SPECIALIST:  Maximiliano Morgan MD  PCP: [facility]      OUTPATIENT NEPHROLOGIST:  Manuel Deal MD and Fadi Najjar, MD    CHIEF COMPLAINT:  Patient was brought in by her  for concern of fever   and altered mental status.    HISTORY OF PRESENT ILLNESS:  This is a 47-year-old female.  She has a    diagnosis of HIV/AIDS (AIDS defining illness was MAC infection) in the recent past.  She also has a history of end-stage renal disease, on hemodialysis and type 2  diabetes mellitus.  Patient had skipped her dialysis approximately 1 time this past  week due to lack of transportation.  She states her  came in and found  her confused, fevers and brought her into the hospital.  Here, in the emergency   room, she has had a fever with a workup concerning of questionable pneumonia  on chest x-ray.  Patient is awake but mildly lethargic. Currently a fair-to-poor historian  at best.  She is not forthcoming with much of her history, although she is able to  answer questions when asked directly.  Patient denies any current chest pain, no  shortness of breath.  She denies any cough.  She denies any lack of appetite.    She states she has been taking her medications.    REVIEW OF SYSTEMS:  As stated above, otherwise unremarkable per CMS/AMA   criteria.    PAST MEDICAL HISTORY:  1.  History of HIV with AIDS defining illness of mycoplasma bacteremia and   genital herpes and Candida esophagitis in the past.  2.  Type 2 diabetes mellitus.  3.  End-stage renal disease, requiring hemodialysis.  4.  History of seizure disorder.    PAST SURGICAL HISTORY:  She has had a hysterectomy, I and D's.  She has had   EGD with biopsy, AV fistula creation of left upper arm, gastroscopies.    SOCIAL HISTORY:  Nonsmoker, no alcohol, denies illicit drug use.    FAMILY HISTORY:  Positive for diabetes and hypertension.    CURRENT HOME MEDICATIONS:  1.  Acyclovir 800 mg daily.  2.  Xanax 0.25 mg  3 times a day as needed for anxiety.  3.  Artificial tears 1 drop in the eyes as needed for dry eyes.  4.  Lomotil 1 tablet 4 times a day as needed for diarrhea, unknown when last   time this was used.  5.  Tivicay 50 mg daily for HIV.  6.  Ethambutol 800 mg.  She is on a 14-day course, finished on June 15th.  7.  Diflucan 100 mg daily, unknown when this was last used.  8.  Prozac 10 mg daily.  9.  Norco 5/325 one to two tablets every 4 hours as needed for pain.  10.  Atarax 25 mg 4 times a day.  11.  Lisinopril 20 mg daily.  12.  Moxifloxacin eyedrops 0.5% solution 1 drop in the left eye 4 times a day,   last dose was June 19th, per reports.  13.  Nystatin 50,000 units 4 times a day for Candida fungal in oropharynx.  14.  Zofran 4 mg every 4 hours as needed for nausea and vomiting.  15.  Prednisolone 1% suspension, left eye 4 times a day, status post eye   surgery, last dose was on June 19th.  16.  Rilpivirine 25 mg 1 tablet daily at 6:00 p.m. for HIV.  17.  Sodium bicarbonate 1 tablet 650 mg 3 times a day.    PHYSICAL EXAMINATION:  VITAL SIGNS:  Temperature is 101.9, heart rate of 114, respiratory rate is 20,   blood pressure 146/94, oxygen 93% on room air.  GENERAL:  This is a cachectic-appearing female.  She appears older than her   stated age.  She appears to be in poor condition, weak, debilitated, and   frail.  HEENT:  NCAT, EOMI.  Sclerae anicteric.  She has facial wasting.  Fat pads   around her temporal regions, sunken eyes.  She has moist mucosa.  No oral   plaques noted.  Poor visualization of the oropharynx.  NECK:  Trachea is midline.  I did not appreciate any adenopathy, no stridor on   auscultation, no carotid bruits on auscultation.  No C-spine tenderness.  LUNGS:  Diminished.  I did not appreciate any crackles.  Superior breath   sounds bilaterally.  No accessory muscle use.  CARDIOVASCULAR:  She is tachycardic.  No murmurs audible.  ABDOMEN:  Soft, nontender, nondistended.  EXTREMITIES:  She has  atrophy of musculature in upper and lower extremities.    No lower extremity edema.  She does have chronic swelling of the lower   extremities.  She has some ongoing wound healing in the lower extremities   bilaterally.  She has 2+ right radial artery pulse.  She has a palpable thrill   in the left upper extremity.  Feet are warm.  NEUROLOGIC:  Cranial nerves II-XII appear to be grossly intact.  PSYCHIATRIC:  Lethargic to response, but otherwise appears to be intact.    Alert x3.    LABORATORY DATA:  CBC shows white blood cell count of 5, hemoglobin of 9.1,   hematocrit 28.6, platelet count is 132.  Comprehensive metabolic panel shows   sodium of 128, potassium 4.4, chloride 94, CO2 25, BUN is 44, creatinine 3.66,   calcium 7.9, AST of 66, ALT of 40, alkaline phosphatase of 169.  Albumin is   2, lactic acid is 1.89.    IMAGING STUDIES:  Chest x-ray on July 30th shows possible patchy left basilar   opacity, may represent atelectasis or pneumonitis.    ASSESSMENT AND PLAN:  1.  Sepsis with systemic inflammatory response syndrome criteria with a potential source   of pneumonia.  Patient has been given ceftriaxone, azithromycin, which   she will continue.  I will go ahead and initiate sepsis protocol.  She has   fair capillary refills.  She is getting IV fluids, but with her end-stage   renal disease, I do not want to overtly give her fluid boluses for fear of   volume overload.  Also, given her low albumin, I feel that she is at a high   risk of complications from volume overload as well.  I will make sure that   blood cultures have been obtained as dialysis catheter could be source of infection too  If needed, we will have Dr. Morgan consulted for infectious disease.    We will continue her human immunodeficiency virus medications as well.  2.  Anemia of chronic disease.  Continue to monitor CBC.  3.  Hyponatremia, likely hypovolemia, possible hypervolemia given her   end-stage renal disease.  We will monitor.  4.   End-stage renal disease.  Dr. Deal and Dr. Najjar will be following   up.  5.  Elevated liver enzymes, question if this is hepatic congestion.  We will   monitor her CMP.  6.  Human immunodeficiency virus.  Continue outpatient and antiviral   medications.       ____________________________________     DO CARLOS LYLES / JUAN    DD:  07/30/2017 23:23:21  DT:  07/31/2017 00:03:16    D#:  [id number]  Job#:  [id number]

## 2017-07-31 NOTE — PROGRESS NOTES
[facility] Services Progress Note    Hemodialysis treatment ordered today per Dr. Betancourt x 3 hours. Treatment initiated at 1330, ended at 1630.     Patient tolerated tx; see paper flow sheet for details.     Net UF 2000 mL.     Post tx, CVC flushed with saline then locked with heparin 1000 units/mL per designated amount in each wing then clamped and capped. Aspirate heparin prior to next CVC use.    Report given to Primary RN.

## 2017-07-31 NOTE — PROGRESS NOTES
Dialysis being performed at bedside. Scheduled meds provided per MAR. Warm blankets provided for comfort.

## 2017-07-31 NOTE — ASSESSMENT & PLAN NOTE
Dialysis per nephrology, Dr. Deal  Discussed with Dr. Deal setting up outpt antibiotics with dialysis for vancomycin and gentamicin   Should be set up for discharge home tomorrow  epogen for anemia

## 2017-07-31 NOTE — PROGRESS NOTES
Admit profile completed, med rec and assessment completed.oriented to room, bed alarm on, nonskid socks applied, advised to call for assistance

## 2017-08-01 PROBLEM — B95.2 BACTEREMIA DUE TO ENTEROCOCCUS: Status: ACTIVE | Noted: 2017-01-01

## 2017-08-01 PROBLEM — R78.81 BACTEREMIA DUE TO ENTEROCOCCUS: Status: ACTIVE | Noted: 2017-01-01

## 2017-08-01 NOTE — PROGRESS NOTES
Report given to NOC nurse, Meg JAMES. Pt remains calm and cooperative. All lines remain patent. Safety precautions remain in place. POC discussed with pt and RN at the bedside.

## 2017-08-01 NOTE — CONSULTS
DATE OF SERVICE:  07/31/2017    REQUESTING PHYSICIAN:  Boy Young MD    REASON FOR CONSULTATION:  To evaluate and provide dialysis for patient with   end-stage renal disease.    HISTORY OF PRESENT ILLNESS:  The patient is a 47-year-old female with   end-stage renal disease, on hemodialysis, who was admitted to the hospital   with complaints of fever and altered mental status, brought by her family.    The patient missed her dialysis on Saturday.  Her regular days are Tuesday,   Thursday, Saturday; diagnosed with pneumonia, currently patient is doing   better, alert, oriented, following commands and scheduled for additional   dialysis today as she missed her dialysis on Tuesday.  Denies any headache or   dizziness.  No fever or chills.  No chest pain or shortness of breath.  No   abdominal pain, no nausea or vomiting.    REVIEW OF SYSTEMS:  All 12 points reviewed and all negative except history of   present illness per CMS/AMA criteria.    PAST MEDICAL HISTORY:  End-stage renal disease on hemodialysis on Tuesday,   Thursday, and Saturday; history of HIV with AIDS, diabetes mellitus type 2;   seizure disorder.    PAST SURGICAL HISTORY:  Hysterectomy, EGD, AV fistula creation, tunneled   dialysis catheter placement.    SOCIAL HISTORY:  No alcohol, no tobacco or drug use.  Lives with family.    FAMILY HISTORY:  Positive for diabetes mellitus type 2, hypertension.    ALLERGIES:  ALLERGIC TO OXYCODONE.    OUTPATIENT MEDICATIONS:  Reviewed in the chart.    PHYSICAL EXAMINATION:  VITAL SIGNS:  Blood pressure 105/61, temperature 37.3 degree Celsius.  GENERAL APPEARANCE:  Well-developed, thin female, in no acute distress.  HEENT:  Normocephalic, atraumatic.  Pupils equal, round, reactive to light.    Extraocular movements are intact.  Nares patent.  Oropharynx is clear.  Moist   mucosa, no erythema or exudate.  NECK:  Supple, no lymphadenopathy, no thyromegaly appreciated.  Tunneled   dialysis catheter in place.  No  signs of infection.  LUNGS:  Clear to auscultation bilaterally.  No rales, wheezes, no rhonchi.  HEART:  Regular rate.  No rub or gallop.  ABDOMEN:  Soft, nontender, and nondistended.  Bowel sounds present.  No   palpable masses.  EXTREMITIES:  No edema, no cyanosis.  NEUROLOGIC:  Alert and oriented x3.  No focal deficit.    LABORATORY RESULTS:  Reviewed revealed a hemoglobin level of 9.1.  Sodium 130,   potassium 4.1, BUN 53, and creatinine 3.71.    ASSESSMENT AND PLAN:  The patient is a 47-year-old female with end-stage renal   disease, on hemodialysis, who missed her last dialysis treatment, admitted   with fever, pneumonia, altered mental status.    PROBLEMS:  1.  End-stage renal disease, to complete dialysis today and continue Tuesday,   Thursday and Saturday.  2.  Electrolytes revealed mild hyponatremia, to avoid hypotonic solutions.  3.  Anemia, to add Epogen with dialysis.  4.  Volume, well controlled.  5.  Pneumonia, adjust antibiotics to renal doses.    RECOMMENDATIONS:  Diet:  Renal.  Hemodialysis today, then Tuesday, Thursday   and Saturday.  Daily monitoring of basic metabolic panel, hemoglobin level.    All medications adjust to renal doses.    Thank you for the consult.       ____________________________________     MD GURDEEP MONTILLA / JUAN    DD:  07/31/2017 19:27:17  DT:  07/31/2017 19:56:20    D#:  [id number]  Job#:  [id number]

## 2017-08-01 NOTE — PROGRESS NOTES
[facility] Hospitalist Progress Note    Date of Service: 2017    Chief Complaint  47 y.o. female admitted 2017 with fever and confusion after missing dialysis.    Interval Problem Update  Today confusion is resolved after dialysis two days in a row.  She is afebrile but blood cultures are positive    Consultants/Specialty  Nephrology Dr. Betancourt  ID Dr. Hoang    Disposition  TBD        Review of Systems   Constitutional: Negative for fever, chills, malaise/fatigue and diaphoresis.   HENT: Negative for congestion.    Respiratory: Negative for cough, shortness of breath and wheezing.    Cardiovascular: Negative for chest pain and leg swelling.   Gastrointestinal: Negative for nausea, vomiting, abdominal pain and diarrhea.   Genitourinary: Negative for dysuria.   Musculoskeletal: Negative for myalgias.   Skin: Negative for itching and rash.   Neurological: Positive for weakness. Negative for dizziness, tremors and headaches.      Physical Exam  Laboratory/Imaging   Hemodynamics  Temp (24hrs), Av °C (98.6 °F), Min:36.6 °C (97.9 °F), Max:37.4 °C (99.3 °F)   Temperature: 36.6 °C (97.9 °F)  Pulse  Av  Min: 84  Max: 116   Blood Pressure: 126/74 mmHg      Respiratory      Respiration: 18, Pulse Oximetry: 99 %        RUL Breath Sounds: Clear;Diminished, RML Breath Sounds: Clear;Diminished, RLL Breath Sounds: Diminished;Clear, ZEN Breath Sounds: Clear;Diminished, LLL Breath Sounds: Diminished;Clear    Fluids    Intake/Output Summary (Last 24 hours) at 17 1528  Last data filed at 17 1300   Gross per 24 hour   Intake   2990 ml   Output   5000 ml   Net  -2010 ml       Nutrition  Orders Placed This Encounter   Procedures   • Diet Order     Standing Status: Standing      Number of Occurrences: 1      Standing Expiration Date:      Order Specific Question:  Diet:     Answer:  Regular [1]     Physical Exam   Constitutional: She is oriented to person, place, and time. She appears cachectic. No distress.    Eyes: Conjunctivae are normal. No scleral icterus.   Neck: Neck supple.   Cardiovascular: Normal rate, regular rhythm and intact distal pulses.    Pulmonary/Chest: Effort normal and breath sounds normal.   Abdominal: Soft. Bowel sounds are normal. She exhibits no distension.   Musculoskeletal: She exhibits no edema.   Neurological: She is alert and oriented to person, place, and time.   Skin: Skin is warm and dry. She is not diaphoretic.   Psychiatric: Her behavior is normal.   Nursing note and vitals reviewed.      Recent Labs      07/30/17 1832 07/31/17   0503  08/01/17   0507   WBC  5.0  4.6*  2.8*   RBC  3.04*  3.01*  2.62*   HEMOGLOBIN  9.1*  9.1*  7.9*   HEMATOCRIT  28.6*  28.6*  25.2*   MCV  94.1  95.0  96.2   MCH  29.9  30.2  30.2   MCHC  31.8*  31.8*  31.3*   RDW  64.9*  65.3*  67.2*   PLATELETCT  132*  115*  107*   MPV  8.7*  10.1  9.7     Recent Labs      07/30/17 1832 07/31/17   0503  08/01/17   0507   SODIUM  128*  130*  131*   POTASSIUM  4.4  4.1  3.7   CHLORIDE  94*  97  99   CO2  25  21  23   GLUCOSE  82  69  56*   BUN  44*  53*  28*   CREATININE  3.66*  3.71*  2.65*   CALCIUM  7.9*  7.6*  7.3*                      Assessment/Plan     Hyponatremia (present on admission)  Assessment & Plan  Mild  Adjusted with dialysis    HIV wasting syndrome (CMS-[facility]) (present on admission)  Assessment & Plan  Patient to have family bring in HIV meds from home  Encourage oral nutritional intake    ESRD (end stage renal disease) (CMS-[facility]) (present on admission)  Assessment & Plan  Dialysis again today per nephrology    Pneumonia  Assessment & Plan  More likely due to edema  Antibiotics per ID recommendations given her bacteremia    Bacteremia due to Enterococcus  Assessment & Plan  ID Dr. Hoang consulted  Ok to remove dialysis catheter per nephrology Dr. Betancourt  Will need repeat blood cultures in the future to ensure clearing of her bacteremia    Labs reviewed and Medications reviewed  Julien catheter: No  Julien      DVT Prophylaxis: Heparin    Ulcer prophylaxis: Not indicated  Antibiotics: Treating active infection/contamination beyond 24 hours perioperative coverage  Assessed for rehab: Patient returned to prior level of function, rehabilitation not indicated at this time

## 2017-08-01 NOTE — PROGRESS NOTES
Report received from day shift RN. Assumed care of patient at 1915. Patient resting in bed with family at bedside, denies any needs at this time. Discussed plan of care with patient, verbalizes understanding. Call light within reach, bed alarm on, bed locked and in lowest position and hourly rounding measures discussed and implemented.

## 2017-08-01 NOTE — PROGRESS NOTES
Nephrology Progress Note, Adult, Complex               Author: Yaquelin Asia Date & Time created: 8/1/2017  1:16 PM     Interval History:  48 y/o female multiple medical problems, ESRD/HD admitted with AMS , fever  Blood culture (+) for Enterococcus   Doing well , no complaints  No F/C  Seen and examined during dialysis -tolerates well.    Review of Systems:  Review of Systems   Constitutional: Positive for malaise/fatigue. Negative for fever and chills.   HENT: Negative for congestion and sore throat.    Eyes: Negative.    Respiratory: Negative.  Negative for cough, shortness of breath and wheezing.    Cardiovascular: Negative.  Negative for chest pain, palpitations, orthopnea and leg swelling.   Gastrointestinal: Negative.  Negative for heartburn, nausea, vomiting, abdominal pain and diarrhea.   Genitourinary: Negative.  Negative for dysuria.   Skin: Negative.  Negative for itching and rash.   Neurological: Negative for headaches.   All other systems reviewed and are negative.      Physical Exam:  Physical Exam   Constitutional: She is oriented to person, place, and time. She appears well-developed. No distress.   Very thin female   HENT:   Head: Normocephalic and atraumatic.   Nose: Nose normal.   Mouth/Throat: Oropharynx is clear and moist.   Eyes: Conjunctivae and EOM are normal. Pupils are equal, round, and reactive to light. No scleral icterus.   Neck: Normal range of motion. Neck supple. No thyromegaly present.   Cardiovascular: Normal rate and regular rhythm.  Exam reveals no gallop and no friction rub.    Pulmonary/Chest: Effort normal and breath sounds normal. No respiratory distress. She has no wheezes. She has no rales.   Abdominal: Soft. Bowel sounds are normal.   Musculoskeletal: She exhibits no edema.   Lymphadenopathy:     She has no cervical adenopathy.   Neurological: She is alert and oriented to person, place, and time. No cranial nerve deficit.   Skin: Skin is warm. No rash noted. No erythema.    Nursing note and vitals reviewed.      Labs:  Recent Labs      17   0503   ISTATSPEC  Venous  Venous         Recent Labs      17   0503  17   0507   SODIUM  128*  130*  131*   POTASSIUM  4.4  4.1  3.7   CHLORIDE  94*  97  99   CO2  25  21  23   BUN  44*  53*  28*   CREATININE  3.66*  3.71*  2.65*   CALCIUM  7.9*  7.6*  7.3*     Recent Labs      173  17   0507   ALTSGPT  40  31   --    ASTSGOT  66*  43   --    ALKPHOSPHAT  169*  140*   --    TBILIRUBIN  0.7  0.8   --    GLUCOSE  82  69  56*     Recent Labs      173  17   0507   RBC  3.04*  3.01*  2.62*   HEMOGLOBIN  9.1*  9.1*  7.9*   HEMATOCRIT  28.6*  28.6*  25.2*   PLATELETCT  132*  115*  107*     Recent Labs      17   0503  17   0507   WBC  5.0  4.6*  2.8*   NEUTSPOLYS  88.00*  87.00*   --    LYMPHOCYTES  11.00*  9.00*   --    MONOCYTES  0.00  2.00   --    EOSINOPHILS  0.00  0.00   --    BASOPHILS  0.00  0.00   --    ASTSGOT  66*  43   --    ALTSGPT  40  31   --    ALKPHOSPHAT  169*  140*   --    TBILIRUBIN  0.7  0.8   --            Hemodynamics:  Temp (24hrs), Av.1 °C (98.8 °F), Min:36.7 °C (98 °F), Max:37.4 °C (99.3 °F)  Temperature: 36.7 °C (98 °F)  Pulse  Av.5  Min: 84  Max: 116  Blood Pressure: 120/75 mmHg     Respiratory:    Respiration: 18, Pulse Oximetry: 100 %        RUL Breath Sounds: Clear;Diminished, RML Breath Sounds: Clear;Diminished, RLL Breath Sounds: Diminished;Clear, ZEN Breath Sounds: Clear;Diminished, LLL Breath Sounds: Diminished;Clear  Fluids:    Intake/Output Summary (Last 24 hours) at 17 1316  Last data filed at 17 1207   Gross per 24 hour   Intake   2870 ml   Output   5000 ml   Net  -2130 ml        GI/Nutrition:  Orders Placed This Encounter   Procedures   • Diet Order     Standing Status: Standing      Number of Occurrences: 1      Standing Expiration Date:       Order Specific Question:  Diet:     Answer:  Regular [1]     Medical Decision Making, by Problem:  Active Hospital Problems    Diagnosis   • Hyponatremia [E87.1]   • Pneumonia [J18.9]   • ESRD (end stage renal disease) (CMS-HCC) [N18.6]   • Fever [R50.9]       Labs reviewed and Medications reviewed                      Assessment and Plan    1.ESRD/HD  -seen and examined during HD - please see dialysis flow sheet for details  2.HTN: BP well controlled  3.Electrolytes: hyponatremia -improving  4.Anemia: low Hb level -Epogen with HD  5.Enterococcal bacteriemia -ID consulted, OK to d/c HD catheter  6.Volume: well controlled      Recs: diet renal, all meds to renal doses,              Epogen 10 000 units with HD

## 2017-08-01 NOTE — CONSULTS
INFECTIOUS DISEASES INPATIENT CONSULT NOTE     Date of Service: 08/01/17    Consult Requested By: Grazyna Dixon M.D.    Reason for Consultation: Enterococcal sepsis    History of Present Illness:   Roger Mckeon is a 47 y.o. Woman with a history of DM2, ESRD on HD and recently diagnosed HIV in June on ART who follows with Dr. Morgan admitted 7/30/2017 for fevers and vomiting. She is a poor historian and history is obtained primarily from the medical records as no family is at bedside. She is dialyzed via a permacath however she did not go to dialysis for one week due to lack of transportation. Her ex- has noted increasing confusion and lethargy. Patient endorses fevers and vomiting prior to admission but no abdominal pain, or diarrhea. Denies any tenderness or erythema around her catheter site. No cough or shortness of breath.  On presentation, she was febrile to 102.3 with no leukocytosis. CXR revealed possible patchy left basilar opacity concerning for possible atelectasis or pneumonitis. She was started on ceftriaxone and azithromycin for possible pneumonia. Blood cultures are now growing group D enterococcus. Plan for catheter removal today. ID service was consulted for further recommendations.     Review Of Systems:  ROS are negative except as noted above in the HPI.    PMH:   Past Medical History   Diagnosis Date   • H/O: hysterectomy    • Hypertension    • Renal failure    • Diabetes      on tx since 1999, diet controlled   • Dental disorder      upper dentures   • Dialysis patient      3 x week   • Seizure disorder (CMS-[facility])    • HIV (human immunodeficiency virus infection) (CMS-HCC)          PSH:  Past Surgical History   Procedure Laterality Date   • Hysterectomy, vaginal     • Other       back surgery 1999   • Incision and drainage general  4/21/2013     Performed by Conrado Prado M.D. at [facility] ORS   • Gastroscopy with biopsy N/A 9/14/2016     Procedure: GASTROSCOPY WITH  BIOPSY;  Surgeon: Rachid Salazar M.D.;  Location: SURGERY [facility];  Service:    • Gyn surgery          • Av fistula creation Left 10/4/2016     Procedure: AV FISTULA GRAFT CREATION;  Surgeon: Travon Franco M.D.;  Location: SURGERY [facility];  Service:    • Gastroscopy N/A 5/10/2017     Procedure: GASTROSCOPY- Upper Endoscopy ;  Surgeon: Irene Jenkins M.D.;  Location: SURGERY [facility];  Service:        FAMILY HX:  Family History   Problem Relation Age of Onset   • Diabetes Mother    • Diabetes Father    • Hypertension Father    • Diabetes Sister        SOCIAL HX:  Social History     Social History   • Marital Status:      Spouse Name: N/A   • Number of Children: N/A   • Years of Education: N/A     Occupational History   • Not on file.     Social History Main Topics   • Smoking status: Never Smoker    • Smokeless tobacco: Never Used   • Alcohol Use: No   • Drug Use: No   • Sexual Activity: Not on file     Other Topics Concern   • Not on file     Social History Narrative     History   Smoking status   • Never Smoker    Smokeless tobacco   • Never Used     History   Alcohol Use No       Allergies/Intolerances:  Allergies   Allergen Reactions   • Oxycodone Itching and Nausea       History reviewed with the patient    Other Current Medications:    Current facility-administered medications:   •  vancomycin 1,000 mg in  mL IVPB, 18 mg/kg, Intravenous, Once, Grazyna Dixon M.D., Stopped at 17 1100  •  MD ALERT... vancomycin per pharmacy protocol, , Other, pharmacy to dose, Danya Barba M.D.  •  dolutegravir (TIVICAY) tablet 50 mg, 50 mg, Oral, DAILY, Eugenio Santos D.O., Stopped at 17 0833  •  heparin 1000 units/mL injection 2,000 Units, 2,000 Units, Intravenous, DIALYSIS PRN, Yaquelin Betancourt M.D., 2,000 Units at 17 0855  •  heparin 1000 units/mL injection 3,700 Units, 3,700 Units, Intravenous, DIALYSIS PRN, Yaquelin Betancourt M.D., 3,700 Units at  08/01/17 0808  •  acyclovir (ZOVIRAX) capsule 800 mg, 800 mg, Oral, DAILY, TYE TannerO., 800 mg at 08/01/17 0802  •  alprazolam (XANAX) tablet 0.25 mg, 0.25 mg, Oral, TID PRN, TYE TannerO.  •  artificial tears 1.4 % ophthalmic solution 1 Drop, 1 Drop, Both Eyes, PRN, TYE TannerO.  •  diphenoxylate-atropine (LOMOTIL) 2.5-0.025 MG per tablet 1 Tab, 1 Tab, Oral, 4X/DAY PRN, TYE TannerO.  •  fluconazole (DIFLUCAN) tablet 100 mg, 100 mg, Oral, DAILY, TYE TannerO., 100 mg at 08/01/17 0803  •  fluoxetine (PROZAC) capsule 10 mg, 10 mg, Oral, DAILY, TYE TannerOLen, 10 mg at 08/01/17 0804  •  hydrOXYzine (ATARAX) tablet 25 mg, 25 mg, Oral, 4X/DAY, TYE TannerO., Stopped at 08/01/17 0900  •  lisinopril (PRINIVIL) tablet 20 mg, 20 mg, Oral, DAILY, TYE TannerO., 20 mg at 08/01/17 0804  •  moxifloxacin (VIGAMOX) 0.5 % ophthalmic solution 1 Drop, 1 Drop, Left Eye, 4X/DAY, TYE TannerOLen, 1 Drop at 08/01/17 1300  •  nystatin (MYCOSTATIN) 293443 UNIT/ML suspension 500,000 Units, 5 mL, Oral, 4X/DAY, TYE TannerO., 500,000 Units at 08/01/17 0804  •  Rilpivirine HCl TABS 1 Tab, 1 Tab, Oral, DAILY AT 1800, TYE TannerO., Stopped at 07/31/17 1746  •  sodium bicarbonate (SODIUM BICARBONATE) tablet 650 mg, 650 mg, Oral, TID, TYE TannerO., 650 mg at 08/01/17 0803  •  NS infusion, , Intravenous, Continuous, Eugenio Santos D.O., Last Rate: 75 mL/hr at 08/01/17 0446  •  heparin injection 5,000 Units, 5,000 Units, Subcutaneous, Q8HRS, Eugenio Santos D.O., 5,000 Units at 08/01/17 0545  •  ondansetron (ZOFRAN) syringe/vial injection 4 mg, 4 mg, Intravenous, Q4HRS PRN, Eugenio Santos D.O.  •  ondansetron (ZOFRAN ODT) dispertab 4 mg, 4 mg, Oral, Q4HRS PRN, Eugenio Santos D.O.  •  promethazine (PHENERGAN) tablet 12.5-25 mg, 12.5-25 mg, Oral, Q4HRS PRN, Eugenio Santos D.O.  •  promethazine (PHENERGAN) suppository 12.5-25 mg, 12.5-25 mg, Rectal, Q4HRS PRN, Eugenio Santos,  "PRIMO.O.  •  prochlorperazine (COMPAZINE) injection 5-10 mg, 5-10 mg, Intravenous, Q4HRS PRN, Eugenio Santos D.O.  •  enalaprilat (VASOTEC) injection 1.25 mg, 1.25 mg, Intravenous, Q6HRS PRN, Eugenio Santos D.O.  •  acetaminophen (TYLENOL) tablet 650 mg, 650 mg, Oral, Q6HRS PRN, Eugenio Santos D.O.  •  cefTRIAXone (ROCEPHIN) 2 g in  mL IVPB, 2 g, Intravenous, Q24HRS, Eugenio Santos D.O., Last Rate: 200 mL/hr at 17 1235, 2 g at 17 1235  •  azithromycin (ZITHROMAX) 500 mg in D5W 250 mL IVPB, 500 mg, Intravenous, Q24HRS, Eugenio Santos D.O.  •  NS infusion 1,635 mL, 30 mL/kg, Intravenous, Once PRN, Eugenio Santos D.O.  •  NS (BOLUS) infusion 500 mL, 500 mL, Intravenous, Once PRN, Eugenio Santos D.O.  [unfilled]    Most Recent Vital Signs:  /74 mmHg  Pulse 96  Temp(Src) 36.6 °C (97.9 °F)  Resp 18  Ht 1.676 m (5' 6\")  Wt 54.5 kg (120 lb 2.4 oz)  BMI 19.40 kg/m2  SpO2 99%  LMP 2010  Temp  Av.4 °C (99.4 °F)  Min: 36.6 °C (97.9 °F)  Max: 39.1 °C (102.3 °F)    Physical Exam:  General: thin but well-appearing, no acute distress  HEENT: sclera anicteric, PERRL, EOMI, MMM, no oral lesions  Neck: supple, no lymphadenopathy  Chest: CTAB, no r/r/w, normal work of breathing. Right upper chest catheter - nontender, no erythema  Cardiac: tachycardic, normal S1 S2, no m/r/g   Abdomen: + bowel sounds, soft, non-tender, non-distended, no HSM  Extremities: WWP, no edema, 2+ pulses  Skin: diffuse scars on bilateral lower extremities  Neuro: Alert and oriented times 3, non-focal exam    Pertinent Lab Results:  Recent Labs      17   0503  17   0507   WBC  5.0  4.6*  2.8*      Recent Labs      17   0503  17   0507   HEMOGLOBIN  9.1*  9.1*  7.9*   HEMATOCRIT  28.6*  28.6*  25.2*   MCV  94.1  95.0  96.2   MCH  29.9  30.2  30.2   PLATELETCT  132*  115*  107*         Recent Labs      17   0503  17   0507   SODIUM " " 128*  130*  131*   POTASSIUM  4.4  4.1  3.7   CHLORIDE  94*  97  99   CO2  25  21  23   CREATININE  3.66*  3.71*  2.65*        Recent Labs      07/30/17 1832 07/31/17   0503   ALBUMIN  2.0*  2.0*        Pertinent Micro:  Results     Procedure Component Value Units Date/Time    BLOOD CULTURE [[id number]]  (Abnormal) Collected:  07/30/17 1832    Order Status:  Completed Specimen Information:  Blood from Peripheral Updated:  08/01/17 1127     Significant Indicator POS (POS)      Source BLD      Site PERIPHERAL      Blood Culture        Result:        Growth detected by Bactec instrument. 07/31/2017  05:48 (A)     Blood Culture -- (A)      Result:        Group D Enterococcus species  Combination therapy with ampicillin, penicillin, or  vancomycin (for susceptible strains) plus an aminoglycoside  is usually indicated for serious enterococcal infections,  such as endocarditis unless high-level resistance to both  gentamicin and streptomycin is documented; such combinations  are predicted to result in synergistic killing of the  Enterococcus.      Narrative:      CALL  [facility]  MED tel. [phone],  CALLED  MED tel. [phone] 07/31/2017, 04:58, RB PERF. RESULTS CALLED  TO:[phone] RN  Per Hospital Policy: Only change Specimen Src: to \"Line\" if  specified by physician order.    BLOOD CULTURE [[id number]]  (Abnormal) Collected:  07/30/17 1839    Order Status:  Completed Specimen Information:  Blood from Peripheral Updated:  08/01/17 1126     Significant Indicator POS (POS)      Source BLD      Site PERIPHERAL      Blood Culture        Result:        Growth detected by Bactec instrument. 07/31/2017  05:50 (A)     Blood Culture -- (A)      Result:        Group D Enterococcus species  Combination therapy with ampicillin, penicillin, or  vancomycin (for susceptible strains) plus an aminoglycoside  is usually indicated for serious enterococcal infections,  such as endocarditis unless high-level resistance to both  gentamicin and " "streptomycin is documented; such combinations  are predicted to result in synergistic killing of the  Enterococcus.      Narrative:      CALL  [facility]  MED tel. [phone],  CALLED  MED tel. [phone] 07/31/2017, 05:50, RB PERF. RESULTS CALLED  TO:[phone] RN  Per Hospital Policy: Only change Specimen Src: to \"Line\" if  specified by physician order.    Blood Culture [[id number]] Collected:  07/31/17 0509    Order Status:  Completed Specimen Information:  Blood from Peripheral Updated:  08/01/17 0733     Significant Indicator NEG      Source BLD      Site PERIPHERAL      Blood Culture --      Result:        No Growth    Note: Blood cultures are incubated for 5 days and  are monitored continuously.Positive blood cultures  are called to the RN and reported as soon as  they are identified.  Blood culture testing and Gram stain, if indicated, are  performed at [address].  Positive blood cultures are  sent to [facility], [address] [place] [address], for organism identification and  susceptibility testing.      Narrative:      From different peripheral sites, if not done within the last  24 hours (Per Hospital Policy: Only change specimen source to  \"Line\" if specified by physician order)    Blood Culture [[id number]]     Order Status:  Sent Specimen Information:  Blood from Peripheral     Culture Respiratory W/ GRM STN [[id number]]     Order Status:  No result Specimen Information:  Respirate from Sputum     Culture Respiratory without Gram Stain [[id number]]     Order Status:  Sent Specimen Information:  Respirate from Sputum     URINALYSIS [[id number]]     Order Status:  Sent Specimen Information:  Urine     URINE CULTURE(NEW) [[id number]]     Order Status:  Sent Specimen Information:  Urine         BLOOD CULTURE   Date Value Ref Range Status   07/31/2017   Preliminary    No Growth    Note: Blood cultures are incubated for 5 days and  are " monitored continuously.Positive blood cultures  are called to the RN and reported as soon as  they are identified.  Blood culture testing and Gram stain, if indicated, are  performed at [facility], [address] [place] [address].  Positive blood cultures are  sent to [facility], [address] [place] [address], for organism identification and  susceptibility testing.          Studies: CXR reviewed with results noted in the HPI    IMPRESSION:   1. Enterococcal sepsis  2. Possible pneumonia  3. ESRD on HD  4. HIV      PLAN:   Roger Mckeon is a 47 y.o. woman with a history of DM2, ESRD on HD and HIV on ART follows with Dr. Morgan admitted for fevers and altered mental status found to have enterococcal sepsis and possible pneumonia. Agree with current abx for now pending further susceptibilities. Repeat blood cultures on 7/31 are negative to date. Plan for catheter removal today. ECHO ordered to rule out endocarditis. Please have her family bring in her ARV meds as they are not available at the hospital. Further recommendations per clinical course and culture results.      Plan of care discussed with BRI Dixon M.D.. Will continue to follow    Rosmery Hoang M.D.

## 2017-08-01 NOTE — CARE PLAN
Problem: Safety  Goal: Will remain free from injury  Outcome: PROGRESSING AS EXPECTED  Patient currently on bedrest activity, turned and repositioned q2h. Educated on use of call light, verbalizes understanding. Bed alarm remains on.    Problem: Knowledge Deficit  Goal: Knowledge of disease process/condition, treatment plan, diagnostic tests, and medications will improve  Outcome: PROGRESSING AS EXPECTED  Plan of care discussed with patient and family, verbalize understanding.    Problem: Skin Integrity  Goal: Risk for impaired skin integrity will decrease  Outcome: PROGRESSING AS EXPECTED  Patient repositioned q2h to promote skin integrity. Waffle mattress overlay in place and barrier wipes used as well.

## 2017-08-01 NOTE — PROGRESS NOTES
3 hr HD and 2000 ml net UF is completed at 1148. She tolerated her hd treatment. Right IJ was used, no issues. Dressing changed post HD. Left UE AVF has a good B/T. Patient is alert, no complaint. No distress noted. Report given to her primary RN.

## 2017-08-01 NOTE — DISCHARGE PLANNING
Medical Social Work    Referral: Pt discussed at IDT rounds this AM.    Intervention: Per flowsheet, pt lives with ex-spouse and expects to d.c home.  Pt is a dialysis pt who skipped last dialysis prior to admission.  No SS needs identified nor any requests for CARO Abel during rounds.      Plan: SW available for any assistance with d.c planning.

## 2017-08-02 NOTE — CARE PLAN
Problem: Safety  Goal: Will remain free from falls  Outcome: PROGRESSING AS EXPECTED  Intervention: Implement fall precautions    08/01/17 2109   OTHER   Environmental Precautions Treaded Slipper Socks on Patient;Personal Belongings, Wastebasket, Call Bell etc. in Easy Reach;Transferred to Stronger Side;Report Given to Other Health Care Providers Regarding Fall Risk;Bed in Low Position;Communication Sign for Patients & Families;Mobility Assessed & Appropriate Sign Placed   IV Pole on Same Side of Bed as Bathroom Yes   Bedrails Bedrails Closest to Bathroom Down   Chair/Bed Strip Alarm Yes - Alarm On           Problem: Bowel/Gastric:  Goal: Normal bowel function is maintained or improved  Outcome: PROGRESSING AS EXPECTED    08/01/17 2000   OTHER   Last BM 08/01/17   Number of Times Stooled 1

## 2017-08-02 NOTE — DISCHARGE PLANNING
Medical Social Work    Referral: Pt discussed at IDT rounds this AM.    Intervention: Per flowsheet, pt lives with ex-spouse and expects to d.c home.  Pt possible d.c home today and to receive vanco during dialysis.  No SS needs identified nor any requests for CARO Abel during rounds.      Plan: SW available for any assistance with d.c planning.

## 2017-08-02 NOTE — CARE PLAN
Problem: Safety  Goal: Will remain free from injury  Outcome: PROGRESSING AS EXPECTED  Outcome : Progressing as expected.                     Safety Precautions in place. Bed in low position. Call light within reach. Reminded patient to call for assist. Hourly rounds in effect.    Problem: Knowledge Deficit  Goal: Knowledge of disease process/condition, treatment plan, diagnostic tests, and medications will improve  Outcome: PROGRESSING AS EXPECTED  Discussed Plan of care. Questions answered. Verbalized understanding.

## 2017-08-02 NOTE — PROGRESS NOTES
"Pharmacy Kinetics 47 y.o. female on vancomycin day # 3 2017    Currently on Vancomycin 1000 mg IV given at 0856 hours on 8-  Indication for Treatment:  Enterococcal Bacteremia    Pertinent history per medical record: Admitted on 2017 for possible pneumonia, anemia, PMH HIV/AIDS, ESRD on HD, DMII    Other antibiotics: Ceftriaxone 2 Gm IV every 24 hours.    Allergies: Oxycodone   List concerns for renal function: ESRD on HD  Pertinent cultures to date:   Bcx  - grp D enterococcus  Bcx  - NGTD    Recent Labs      17   1832  17   0503  17   0507   WBC  5.0  4.6*  2.8*   NEUTSPOLYS  88.00*  87.00*   --    BANDSSTABS  1.00  2.00   --      Recent Labs      17   1832  17   0503  17   0507   BUN  44*  53*  28*   CREATININE  3.66*  3.71*  2.65*   ALBUMIN  2.0*  2.0*   --      Recent Labs      17   0856  17   1530   VANCOTROUGH   --   11.1   VANCORANDOM  13.2   --      Intake/Output Summary (Last 24 hours) at 17 1640  Last data filed at 17 0600   Gross per 24 hour   Intake   2660 ml   Output      0 ml   Net   2660 ml      Blood pressure 139/81, pulse 90, temperature 36.6 °C (97.8 °F), resp. rate 24, height 1.676 m (5' 6\"), weight 54.5 kg (120 lb 2.4 oz), last menstrual period 2010, SpO2 100 %. Temp (24hrs), Av.6 °C (97.9 °F), Min:36.6 °C (97.8 °F), Max:36.7 °C (98 °F)      A/P   1. Vancomycin dose change: Vancomycin 1200 mg IV x 1 (random level today of 11.1mcg/ml at 1530 hours.  2. Next vancomycin level: tomorrow at 1630 hours  3. Goal trough: 12-16 mcg/ml  4. Comments: Will follow and await final sensitivities.     Santos Loyd [facility].      "

## 2017-08-02 NOTE — PROGRESS NOTES
POC discussed with pt. Denies pain. No N/V/D. No numbness/tingling. On waffle mattress, Q2 turns. Meds given per MAR. Refuses heparin, education provided on risks as well as ankle/foot rotation while in bed. Warm blankets provided. No other needs at this time. Safety precautions in place.

## 2017-08-02 NOTE — PROGRESS NOTES
0935 Patient's sitting up in a cardiac chair. No c/o's at this time. No distress noted. Fall protocol in effect.

## 2017-08-02 NOTE — PROGRESS NOTES
1530 Patient's sitting up in a cardiac chair, no c/o's at this time. No distress noted. Fall protocol in effect.

## 2017-08-02 NOTE — PROGRESS NOTES
0800 Dr Hoang visited, new orders received & acknowledged.    0835 Patient's sitting up in a cardiac chair, having Breakfast. IVF patent & infusing. No c/o's at this time. Mother visiting. Fall protocol in effect. Call light within reach. Reminded patient to call for assist. Assessment completed. No distress noted.

## 2017-08-02 NOTE — PROGRESS NOTES
1320 Patient's sitting up in a cardiac chair, no c/o's at this time. No distress noted. Fall Protocol in effect.

## 2017-08-02 NOTE — PROGRESS NOTES
Infectious Disease Progress Note    Author: Rosmery Hoang M.D. Date & Time of service: 2017  8:11 AM    Chief Complaint:  FU enterococcal sepsis    Interval History:   AF, no CBC, pt up in chair eating breakfast, denies any fevers overnight, states she needs to go home because she does not have her HIV meds here, mental status improving  Labs Reviewed, Medications Reviewed and Radiology Reviewed.    Review of Systems:  Review of Systems   Constitutional: Negative for fever and chills.   Respiratory: Negative for cough and shortness of breath.    Cardiovascular: Negative for chest pain.   Gastrointestinal: Negative for nausea, vomiting, abdominal pain and diarrhea.   Neurological: Negative for headaches.       Hemodynamics:  Temp (24hrs), Av.7 °C (98 °F), Min:36.6 °C (97.9 °F), Max:36.7 °C (98 °F)  Temperature: 36.7 °C (98 °F)  Pulse  Av.2  Min: 83  Max: 116  Blood Pressure: 139/83 mmHg       Physical Exam:  Physical Exam   Constitutional: She appears well-developed.   Thin woman  Older than stated age   HENT:   Head: Normocephalic.   Eyes: EOM are normal. Pupils are equal, round, and reactive to light.   Neck: Neck supple.   Cardiovascular:   Murmur heard.  Tachycardic   Pulmonary/Chest: Effort normal and breath sounds normal.   R upper chest permacath - nontender, no surrounding erythema   Abdominal: Soft. There is no tenderness.   Musculoskeletal: She exhibits no edema.   Neurological: She is alert.   Skin: No erythema.   Scattered circular scars on bilateral lower extremities       Meds:    Current facility-administered medications:   •  MD ALERT... vancomycin  •  dolutegravir  •  heparin 1000 units/mL  •  heparin 1000 units/mL  •  acyclovir  •  alprazolam  •  artificial tears  •  diphenoxylate-atropine  •  fluconazole  •  fluoxetine  •  hydrOXYzine  •  lisinopril  •  moxifloxacin  •  nystatin  •  Rilpivirine HCl  •  sodium bicarbonate  •  NS  •  heparin  •  ondansetron  •  ondansetron  •   promethazine  •  promethazine  •  prochlorperazine  •  enalaprilat  •  acetaminophen  •  cefTRIAXone (ROCEPHIN) IVPB  •  azithromycin (ZITHROMAX) IVPB in 250 mL  •  NS  •  NS    Labs:  Recent Labs      07/30/17 1832  07/31/17   0503  08/01/17   0507   WBC  5.0  4.6*  2.8*   RBC  3.04*  3.01*  2.62*   HEMOGLOBIN  9.1*  9.1*  7.9*   HEMATOCRIT  28.6*  28.6*  25.2*   MCV  94.1  95.0  96.2   MCH  29.9  30.2  30.2   RDW  64.9*  65.3*  67.2*   PLATELETCT  132*  115*  107*   MPV  8.7*  10.1  9.7   NEUTSPOLYS  88.00*  87.00*   --    LYMPHOCYTES  11.00*  9.00*   --    MONOCYTES  0.00  2.00   --    EOSINOPHILS  0.00  0.00   --    BASOPHILS  0.00  0.00   --      Recent Labs      07/30/17 1832 07/31/17   0503  08/01/17   0507   SODIUM  128*  130*  131*   POTASSIUM  4.4  4.1  3.7   CHLORIDE  94*  97  99   CO2  25  21  23   GLUCOSE  82  69  56*   BUN  44*  53*  28*     Recent Labs      07/30/17 1832 07/31/17   0503  08/01/17   0507   ALBUMIN  2.0*  2.0*   --    TBILIRUBIN  0.7  0.8   --    ALKPHOSPHAT  169*  140*   --    TOTPROTEIN  6.9  6.0   --    ALTSGPT  40  31   --    ASTSGOT  66*  43   --    CREATININE  3.66*  3.71*  2.65*       Imaging:  Dx-chest-portable (1 View)    7/31/2017 7/31/2017 4:35 PM HISTORY/REASON FOR EXAM:  Shortness of Breath. TECHNIQUE/EXAM DESCRIPTION AND NUMBER OF VIEWS: Single portable view of the chest. COMPARISON: 7/30/2017 FINDINGS: Dialysis catheter projects over the right atrium. The cardiomediastinal silhouette is stable. Left basilar opacity may represent atelectasis or pneumonitis. No pleural effusion or pneumothorax is seen. There is a rounded left basilar opacity measuring 1.8 cm. Calcific density projects adjacent to the left humeral head which can be seen in calcific tendinopathy.     7/31/2017  Left basilar opacity may represent atelectasis or pneumonitis. 1.8 cm nodular opacity at the left lung base.    Dx-chest-portable (1 View)    7/30/2017 7/30/2017 6:27 PM HISTORY/REASON FOR  EXAM:  Possible sepsis. TECHNIQUE/EXAM DESCRIPTION AND NUMBER OF VIEWS: Single portable view of the chest. COMPARISON: 2017 FINDINGS: Dialysis catheter projects over the right atrium. The cardiomediastinal silhouette is stable. Possible patchy left basilar opacity represent atelectasis or pneumonitis. No pleural effusion or pneumothorax is seen. There is a skinfold on the left. Calcific density adjacent to the humeral head on the left can be seen in calcific tendinopathy.     2017  Possible patchy left basilar opacity may represent atelectasis or pneumonitis. Lateral view would be beneficial for further evaluation.     Echocardiogram-comp W/ Cont    2017  Transthoracic Echo Report Echocardiography Laboratory CONCLUSIONS Prior echo on 16; compared to the images of the study done - there has been reduction of LVSF. No obvious intracardiac mass noted. Moderately reduced left ventricular systolic function with global hypokinesis. Left ventricular ejection fraction is visually estimated to be 30%. Diastolic function is difficult to assess. Mild mitral regurgitation. Mild tricuspid regurgitation. Estimated right ventricular systolic pressure is 30 mmHg. KIM GALLARDO Exam Date:         2017                    15:51 Exam Location:     Inpatient Priority:          Routine Ordering Physician:        CODY ZAMORA Referring Physician:       [id number] Rodriguez Sonographer:               Tracy Omer RVT Age:    47     Gender:    F MRN:    [id number] :    [id number] BSA:    1.61   Ht (in):    66     Wt (lb):    120 Exam Type:     Complete Indications:     Endocarditis ICD Codes:       421 CPT Codes:       27452 BP:   126    /   74     HR: Technical Quality:       Good MEASUREMENTS  (Male / Female) Normal Values 2D ECHO LV Diastolic Diameter PLAX        4.4 cm                4.2 - 5.9 / 3.9 - 5.3 cm LV Systolic Diameter PLAX         3.5 cm                2.1 - 4.0 cm  IVS Diastolic Thickness           1.2 cm                LVPW Diastolic Thickness          1.1 cm                LVOT Diameter                     1.9 cm                Estimated LV Ejection Fraction    30 %                  LV Ejection Fraction MOD BP       25.8 %                >= 55  % LV Ejection Fraction MOD 4C       35.5 %                LV Ejection Fraction MOD 2C       14 %                  IVC Diameter                      2 cm                  M-MODE Aortic Root Diameter MM           2.6 cm                DOPPLER AV Peak Velocity                  1.1 m/s               AV Peak Gradient                  4.6 mmHg              AV Mean Gradient                  3.3 mmHg              LVOT Peak Velocity                0.74 m/s              AV Area Cont Eq vti               2 cm²                 MR ERO PISA                       0.065 cm²             MR Regurgitant Volume PISA        9.6 cm³               TR Peak Velocity                  263 cm/s              PV Peak Velocity                  0.58 m/s              PV Peak Gradient                  1.3 mmHg              RVOT Peak Velocity                0.45 m/s              * Indicates values subject to auto-interpretation LV EF:  30    % FINDINGS Left Ventricle The left ventricle is dilated. Mild concentric left ventricular hypertrophy. Moderately reduced left ventricular systolic function with global hypokinesis. Left ventricular ejection fraction is visually estimated to be 30%. Diastolic function is difficult to assess. Right Ventricle Moderately dilated right ventricle. Reduced right ventricular systolic function. Right Atrium Enlarged right atrium. Normal inferior vena cava size without inspiratory collapse. Left Atrium The left atrium is normal in size.  Left atrial volume index is 25  mL/sq m. Mitral Valve Thickened mitral valve leaflets. Mild mitral regurgitation. No mitral stenosis. Aortic Valve Aortic sclerosis without stenosis. Trace aortic  "insufficiency. Tricuspid Valve Structurally normal tricuspid valve without significant stenosis. Mild tricuspid regurgitation. Estimated right ventricular systolic pressure is 30 mmHg. Pulmonic Valve Structurally normal pulmonic valve without significant stenosis. Trace pulmonic insufficiency. Pericardium Normal pericardium without effusion. Aorta The aortic root is normal. Federico Person M.D. (Electronically Signed) Final Date:     01 August 2017                 17:57      Micro:  Results     Procedure Component Value Units Date/Time    BLOOD CULTURE [[id number]]  (Abnormal) Collected:  07/30/17 1832    Order Status:  Completed Specimen Information:  Blood from Peripheral Updated:  08/01/17 1127     Significant Indicator POS (POS)      Source BLD      Site PERIPHERAL      Blood Culture        Result:        Growth detected by Bactec instrument. 07/31/2017  05:48 (A)     Blood Culture -- (A)      Result:        Group D Enterococcus species  Combination therapy with ampicillin, penicillin, or  vancomycin (for susceptible strains) plus an aminoglycoside  is usually indicated for serious enterococcal infections,  such as endocarditis unless high-level resistance to both  gentamicin and streptomycin is documented; such combinations  are predicted to result in synergistic killing of the  Enterococcus.      Narrative:      CALL  [facility]  MED tel. [phone],  CALLED  MED tel. [phone] 07/31/2017, 04:58, RB PERF. RESULTS CALLED  TO:[phone] RN  Per Hospital Policy: Only change Specimen Src: to \"Line\" if  specified by physician order.    BLOOD CULTURE [[id number]]  (Abnormal) Collected:  07/30/17 1839    Order Status:  Completed Specimen Information:  Blood from Peripheral Updated:  08/01/17 1126     Significant Indicator POS (POS)      Source BLD      Site PERIPHERAL      Blood Culture        Result:        Growth detected by Bactec instrument. 07/31/2017  05:50 (A)     Blood Culture -- (A)      Result:        Group D " "Enterococcus species  Combination therapy with ampicillin, penicillin, or  vancomycin (for susceptible strains) plus an aminoglycoside  is usually indicated for serious enterococcal infections,  such as endocarditis unless high-level resistance to both  gentamicin and streptomycin is documented; such combinations  are predicted to result in synergistic killing of the  Enterococcus.      Narrative:      CALL  [facility]  MED tel. [phone],  CALLED  MED tel. [phone] 07/31/2017, 05:50, RB PERF. RESULTS CALLED  TO:[phone] RN  Per Hospital Policy: Only change Specimen Src: to \"Line\" if  specified by physician order.    Blood Culture [[id number]] Collected:  07/31/17 0509    Order Status:  Completed Specimen Information:  Blood from Peripheral Updated:  08/01/17 0733     Significant Indicator NEG      Source BLD      Site PERIPHERAL      Blood Culture --      Result:        No Growth    Note: Blood cultures are incubated for 5 days and  are monitored continuously.Positive blood cultures  are called to the RN and reported as soon as  they are identified.  Blood culture testing and Gram stain, if indicated, are  performed at [address].  Positive blood cultures are  sent to [facility], [address] [place] [address], for organism identification and  susceptibility testing.      Narrative:      From different peripheral sites, if not done within the last  24 hours (Per Hospital Policy: Only change specimen source to  \"Line\" if specified by physician order)    Blood Culture [[id number]]     Order Status:  Sent Specimen Information:  Blood from Peripheral     Culture Respiratory W/ GRM STN [[id number]]     Order Status:  No result Specimen Information:  Respirate from Sputum     Culture Respiratory without Gram Stain [[id number]]     Order Status:  Sent Specimen Information:  Respirate from Sputum     URINALYSIS [[id number]]     Order Status:  Sent " Specimen Information:  Urine     URINE CULTURE(NEW) [[id number]]     Order Status:  Sent Specimen Information:  Urine           Assessment:  Active Hospital Problems    Diagnosis   • 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 Stop date 08/14/17    HIV  On ART at home  Home regimen not available at   Need to have family bring in meds  Follows [facility] Dr. Morgan at the [facility]    ESRD on HD    Discuss with internal medicine/ Dr. Dixon

## 2017-08-02 NOTE — PROGRESS NOTES
[facility] Hospitalist Progress Note    Date of Service: 2017    Chief Complaint  47 y.o. female admitted 2017 with fever and confusion after missing dialysis.    Interval Problem Update  Today confusion is resolved after dialysis two days in a row.  She is afebrile but two blood cultures are positive  Repeat blood culture drawn  negative today  The patient is breathing easier today    Consultants/Specialty  Nephrology Dr. Betancourt  ID Dr. Hoang    Disposition  TBD        Review of Systems   Constitutional: Negative for fever, chills, malaise/fatigue and diaphoresis.   HENT: Negative for congestion.    Respiratory: Negative for cough, shortness of breath and wheezing.    Cardiovascular: Negative for chest pain and leg swelling.   Gastrointestinal: Negative for nausea, vomiting, abdominal pain and diarrhea.   Genitourinary: Negative for dysuria.   Musculoskeletal: Negative for myalgias.   Skin: Negative for itching and rash.   Neurological: Positive for weakness. Negative for dizziness, tremors and headaches.      Physical Exam  Laboratory/Imaging   Hemodynamics  Temp (24hrs), Av.6 °C (97.9 °F), Min:36.6 °C (97.8 °F), Max:36.7 °C (98 °F)   Temperature: 36.6 °C (97.8 °F)  Pulse  Av.6  Min: 83  Max: 116   Blood Pressure: 139/81 mmHg      Respiratory      Respiration: (!) 24, Pulse Oximetry: 100 %             Fluids    Intake/Output Summary (Last 24 hours) at 17 1622  Last data filed at 17 0600   Gross per 24 hour   Intake   2660 ml   Output      0 ml   Net   2660 ml       Nutrition  Orders Placed This Encounter   Procedures   • DIET ORDER     Standing Status: Standing      Number of Occurrences: 1      Standing Expiration Date:      Order Specific Question:  Diet:     Answer:  Renal [8]     Physical Exam   Constitutional: She is oriented to person, place, and time. She appears cachectic.   HENT:   Mouth/Throat: Oropharynx is clear and moist.   Eyes: No scleral icterus.   Neck: Neck supple.    Cardiovascular: Normal rate, regular rhythm and intact distal pulses.    Pulmonary/Chest: Effort normal and breath sounds normal.   Abdominal: Soft. She exhibits no distension. There is no tenderness.   Musculoskeletal: She exhibits no edema.   Neurological: She is alert and oriented to person, place, and time. Coordination normal.   Skin: Skin is warm and dry. No rash noted. She is not diaphoretic.   Psychiatric: Her behavior is normal.   Nursing note and vitals reviewed.      Recent Labs      07/30/17 1832 07/31/17   0503  08/01/17   0507   WBC  5.0  4.6*  2.8*   RBC  3.04*  3.01*  2.62*   HEMOGLOBIN  9.1*  9.1*  7.9*   HEMATOCRIT  28.6*  28.6*  25.2*   MCV  94.1  95.0  96.2   MCH  29.9  30.2  30.2   MCHC  31.8*  31.8*  31.3*   RDW  64.9*  65.3*  67.2*   PLATELETCT  132*  115*  107*   MPV  8.7*  10.1  9.7     Recent Labs      07/30/17 1832 07/31/17   0503  08/01/17   0507   SODIUM  128*  130*  131*   POTASSIUM  4.4  4.1  3.7   CHLORIDE  94*  97  99   CO2  25  21  23   GLUCOSE  82  69  56*   BUN  44*  53*  28*   CREATININE  3.66*  3.71*  2.65*   CALCIUM  7.9*  7.6*  7.3*                      Assessment/Plan     Hyponatremia (present on admission)  Assessment & Plan  Mild  Adjusted with dialysis  Stop iv fluids    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)  Assessment & Plan  Patient to have family bring in HIV meds from home  Diet preferences provided    ESRD (end stage renal disease) (CMS-HCC) (present on admission)  Assessment & Plan  Dialysis per nephrology    Pneumonia  Assessment & Plan  More likely due to edema  Antibiotics per ID   Enterococcus in blood cultures    Bacteremia due to Enterococcus  Assessment & Plan  ID Dr. Hoang consulted  Will treat with dialysis catheter to remain in place per Dr. Hoang  Sensitivities pending      Labs reviewed and Medications reviewed  Julien catheter: No Julien      DVT Prophylaxis: Heparin    Ulcer prophylaxis: Not indicated  Antibiotics: Treating  active infection/contamination beyond 24 hours perioperative coverage  Assessed for rehab: Patient returned to prior level of function, rehabilitation not indicated at this time

## 2017-08-02 NOTE — PROGRESS NOTES
0715 Bedside report received from NOC RN (Bharati) at the beginning of the shift. Patient's siting up in bed. No distress noted. Fall protocol in effect. Call light within reach.

## 2017-08-02 NOTE — DOCUMENTATION QUERY
DOCUMENTATION QUERY    PROVIDERS: Please select “Cosign w/ note”to reply to query.    To better represent the severity of illness of your patient, please review the following information and exercise your independent professional judgment in responding to this query.     Altered mental status (AMS) is documented in the Progress Notes. Based upon the clinical findings, risk factors, and treatment, can this diagnosis be further specified?    • Acute encephalopathy (if so, please specify type [metabolic, hepatic, toxic, alcoholic, etc.])  • Psychosis (if so, please specify type [acute hysterical, alcoholic, etc.])  • Delirium (if so, please specify underlying cause [alcohol intoxication, alcohol withdrawal, multiple etiologies, unknown etiology])   • Other explanation of clinical findings  • Unable to determine      The medical record reflects the following:   Clinical Findings  Progress note- Nylk 8/1 & Progress note- Rachel 7/31  -Admitted with AMS    Progress note- Destiny 8/1  - Admitted with fever and confusion     ED Provider  - neurologically patient appears confused she has no focal neurological deficit but is globally somnolent    Labs on admission  Sodium 128   Treatment  abx, Dialysis, IV fluid   Risk Factors Sepsis, noncompliance with dialysis, possible PN   Location within medical record  Progress Notes, Lab Results  and ED provider     Thank you,   Nati Dumont MSN, RN, CNL, CNML  Clinical   [email or web address]

## 2017-08-02 NOTE — PROGRESS NOTES
Nephrology Progress Note, Adult, Complex               Author: Manuel Deal   Date & Time created: 8/2/2017  2:26 PM     Interval History:  46 y/o female multiple medical problems, ESRD/HD admitted with AMS , fever  Blood culture (+) for Enterococcus   Doing well , no complaints  No F/C  Has AVF that has been used in HD unit    Review of Systems:  Review of Systems   Constitutional: Positive for malaise/fatigue. Negative for fever and chills.       Physical Exam:  Physical Exam   Constitutional: She is oriented to person, place, and time. She appears well-developed. No distress.   Very thin female   HENT:   Head: Normocephalic and atraumatic.   Nose: Nose normal.   Mouth/Throat: Oropharynx is clear and moist.   Eyes: Conjunctivae and EOM are normal. Pupils are equal, round, and reactive to light. No scleral icterus.   Neck: Normal range of motion. Neck supple. No thyromegaly present.   Cardiovascular: Normal rate and regular rhythm.  Exam reveals no gallop and no friction rub.    Pulmonary/Chest: Effort normal and breath sounds normal. No respiratory distress. She has no wheezes. She has no rales.   Abdominal: Soft. Bowel sounds are normal.   Musculoskeletal: She exhibits no edema.   Lymphadenopathy:     She has no cervical adenopathy.   Neurological: She is alert and oriented to person, place, and time. No cranial nerve deficit.   Skin: Skin is warm. No rash noted. No erythema.   Nursing note and vitals reviewed.      Labs:  Recent Labs      07/30/17 1832 07/31/17   0503   ISTATSPEC  Venous  Venous         Recent Labs      07/30/17 1832 07/31/17   0503  08/01/17   0507   SODIUM  128*  130*  131*   POTASSIUM  4.4  4.1  3.7   CHLORIDE  94*  97  99   CO2  25  21  23   BUN  44*  53*  28*   CREATININE  3.66*  3.71*  2.65*   CALCIUM  7.9*  7.6*  7.3*     Recent Labs      07/30/17   1832  07/31/17   0503  08/01/17   0507   ALTSGPT  40  31   --    ASTSGOT  66*  43   --    ALKPHOSPHAT  169*  140*   --    TBILIRUBIN   0.7  0.8   --    GLUCOSE  82  69  56*     Recent Labs      17   1832  17   0503  17   0507   RBC  3.04*  3.01*  2.62*   HEMOGLOBIN  9.1*  9.1*  7.9*   HEMATOCRIT  28.6*  28.6*  25.2*   PLATELETCT  132*  115*  107*     Recent Labs      17   1832  17   0503  17   0507   WBC  5.0  4.6*  2.8*   NEUTSPOLYS  88.00*  87.00*   --    LYMPHOCYTES  11.00*  9.00*   --    MONOCYTES  0.00  2.00   --    EOSINOPHILS  0.00  0.00   --    BASOPHILS  0.00  0.00   --    ASTSGOT  66*  43   --    ALTSGPT  40  31   --    ALKPHOSPHAT  169*  140*   --    TBILIRUBIN  0.7  0.8   --            Hemodynamics:  Temp (24hrs), Av.7 °C (98 °F), Min:36.6 °C (97.9 °F), Max:36.7 °C (98 °F)  Temperature: 36.7 °C (98 °F)  Pulse  Av.2  Min: 83  Max: 116  Blood Pressure: 139/83 mmHg     Respiratory:    Respiration: 18, Pulse Oximetry: 98 %           Fluids:    Intake/Output Summary (Last 24 hours) at 17 1426  Last data filed at 17 0600   Gross per 24 hour   Intake   2660 ml   Output      0 ml   Net   2660 ml        GI/Nutrition:  Orders Placed This Encounter   Procedures   • DIET ORDER     Standing Status: Standing      Number of Occurrences: 1      Standing Expiration Date:      Order Specific Question:  Diet:     Answer:  Renal [8]     Medical Decision Making, by Problem:  Active Hospital Problems    Diagnosis   • Hyponatremia [E87.1]   • Pneumonia [J18.9]   • ESRD (end stage renal disease) (CMS-HCC) [N18.6]   • Fever [R50.9]       Labs reviewed and Medications reviewed                      Assessment and Plan    1.ESRD/HD  - HD TIW  2.HTN: BP well controlled  3.Electrolytes: hyponatremia -improving  4.Anemia: low Hb level -Epogen with HD  5.Enterococcal bacteriemia -ID consulted   6.Volume: well controlled      -Has permcath, IR not available here to d/c catheter  -Will review with HD unit to ensure no further catheter access is needed  -If so, will arrange for d/c HD catheter

## 2017-08-02 NOTE — PROGRESS NOTES
1155 Patient's sitting up in a cardiac chair, having lunch. No distress noted. Fall protocol in effect.    1205 Dr Dixon visited, new order received  & acknowledged.

## 2017-08-02 NOTE — PROGRESS NOTES
Report received from JACOB Beverly. Pt denies any needs at this time. Lines patent. Safety precautions in place, call light within reach. Will continue to monitor.

## 2017-08-03 NOTE — PROGRESS NOTES
Infectious Disease Progress Note    Author: Rosmery Hoang M.D. Date & Time of service: 8/3/2017  9:06 AM    Chief Complaint:  FU enterococcal sepsis    Interval History:   AF, no CBC, pt up in chair eating breakfast, denies any fevers overnight, states she needs to go home because she does not have her HIV meds here, mental status improving  8/3 Tmax 100, WBC 2.4, feels better overall, states she will not go to a SNF if needed  Labs Reviewed, Medications Reviewed and Radiology Reviewed.    Review of Systems:  Review of Systems   Constitutional: Negative for fever and chills.   Respiratory: Negative for cough and shortness of breath.    Cardiovascular: Negative for chest pain.   Gastrointestinal: Negative for nausea, vomiting, abdominal pain and diarrhea.   Neurological: Negative for headaches.       Hemodynamics:  Temp (24hrs), Av.9 °C (98.5 °F), Min:36.6 °C (97.8 °F), Max:37.8 °C (100 °F)  Temperature: 36.8 °C (98.2 °F)  Pulse  Av  Min: 83  Max: 116  Blood Pressure: 141/73 mmHg       Physical Exam:  Physical Exam   Constitutional: She appears well-developed.   Thin woman  Older than stated age   HENT:   Head: Normocephalic.   Eyes: EOM are normal. Pupils are equal, round, and reactive to light.   Neck: Neck supple.   Cardiovascular:   Murmur heard.  Tachycardic   Pulmonary/Chest: Effort normal and breath sounds normal.   R upper chest permacath - nontender, no surrounding erythema   Abdominal: Soft. There is no tenderness.   Musculoskeletal: She exhibits no edema.   Neurological: She is alert.   Skin: No erythema.   Scattered circular scars on bilateral lower extremities       Meds:    Current facility-administered medications:   •  dolutegravir  •  Rilpivirine HCl  •  MD ALERT... vancomycin  •  heparin 1000 units/mL  •  heparin 1000 units/mL  •  acyclovir  •  alprazolam  •  artificial tears  •  diphenoxylate-atropine  •  fluconazole  •  fluoxetine  •  hydrOXYzine  •  lisinopril  •  moxifloxacin  •   nystatin  •  sodium bicarbonate  •  heparin  •  ondansetron  •  ondansetron  •  promethazine  •  promethazine  •  prochlorperazine  •  enalaprilat  •  acetaminophen  •  NS  •  NS    Labs:  Recent Labs      08/01/17   0507 08/03/17   0503   WBC  2.8*  2.4*   RBC  2.62*  2.90*   HEMOGLOBIN  7.9*  8.7*   HEMATOCRIT  25.2*  28.4*   MCV  96.2  97.9*   MCH  30.2  30.0   RDW  67.2*  66.7*   PLATELETCT  107*  120*   MPV  9.7  9.8   NEUTSPOLYS   --   66.00   LYMPHOCYTES   --   11.00*   MONOCYTES   --   4.00   EOSINOPHILS   --   7.00*   BASOPHILS   --   0.00   RBCMORPHOLO   --   Present     Recent Labs      08/01/17   0507  08/03/17   0503   SODIUM  131*  133*   POTASSIUM  3.7  3.8   CHLORIDE  99  101   CO2  23  24   GLUCOSE  56*  78   BUN  28*  23*     Recent Labs      08/01/17   0507  08/03/17   0503   CREATININE  2.65*  2.40*       Imaging:  Dx-chest-portable (1 View)    7/31/2017 7/31/2017 4:35 PM HISTORY/REASON FOR EXAM:  Shortness of Breath. TECHNIQUE/EXAM DESCRIPTION AND NUMBER OF VIEWS: Single portable view of the chest. COMPARISON: 7/30/2017 FINDINGS: Dialysis catheter projects over the right atrium. The cardiomediastinal silhouette is stable. Left basilar opacity may represent atelectasis or pneumonitis. No pleural effusion or pneumothorax is seen. There is a rounded left basilar opacity measuring 1.8 cm. Calcific density projects adjacent to the left humeral head which can be seen in calcific tendinopathy.     7/31/2017  Left basilar opacity may represent atelectasis or pneumonitis. 1.8 cm nodular opacity at the left lung base.    Dx-chest-portable (1 View)    7/30/2017 7/30/2017 6:27 PM HISTORY/REASON FOR EXAM:  Possible sepsis. TECHNIQUE/EXAM DESCRIPTION AND NUMBER OF VIEWS: Single portable view of the chest. COMPARISON: 6/20/2017 FINDINGS: Dialysis catheter projects over the right atrium. The cardiomediastinal silhouette is stable. Possible patchy left basilar opacity represent atelectasis or pneumonitis. No  pleural effusion or pneumothorax is seen. There is a skinfold on the left. Calcific density adjacent to the humeral head on the left can be seen in calcific tendinopathy.     2017  Possible patchy left basilar opacity may represent atelectasis or pneumonitis. Lateral view would be beneficial for further evaluation.     Echocardiogram-comp W/ Cont    2017  Transthoracic Echo Report Echocardiography Laboratory CONCLUSIONS Prior echo on 16; compared to the images of the study done - there has been reduction of LVSF. No obvious intracardiac mass noted. Moderately reduced left ventricular systolic function with global hypokinesis. Left ventricular ejection fraction is visually estimated to be 30%. Diastolic function is difficult to assess. Mild mitral regurgitation. Mild tricuspid regurgitation. Estimated right ventricular systolic pressure is 30 mmHg. KIM GALLARDO Exam Date:         2017                    15:51 Exam Location:     Inpatient Priority:          Routine Ordering Physician:        CODY ZAMORA Referring Physician:       [id number] Rodriguez Sonographer:               Tracy Omer RVT Age:    47     Gender:    F MRN:    [id number] :    [id number] BSA:    1.61   Ht (in):    66     Wt (lb):    120 Exam Type:     Complete Indications:     Endocarditis ICD Codes:       421 CPT Codes:       86666 BP:   126    /   74     HR: Technical Quality:       Good MEASUREMENTS  (Male / Female) Normal Values 2D ECHO LV Diastolic Diameter PLAX        4.4 cm                4.2 - 5.9 / 3.9 - 5.3 cm LV Systolic Diameter PLAX         3.5 cm                2.1 - 4.0 cm IVS Diastolic Thickness           1.2 cm                LVPW Diastolic Thickness          1.1 cm                LVOT Diameter                     1.9 cm                Estimated LV Ejection Fraction    30 %                  LV Ejection Fraction MOD BP       25.8 %                >= 55  % LV Ejection Fraction  MOD 4C       35.5 %                LV Ejection Fraction MOD 2C       14 %                  IVC Diameter                      2 cm                  M-MODE Aortic Root Diameter MM           2.6 cm                DOPPLER AV Peak Velocity                  1.1 m/s               AV Peak Gradient                  4.6 mmHg              AV Mean Gradient                  3.3 mmHg              LVOT Peak Velocity                0.74 m/s              AV Area Cont Eq vti               2 cm²                 MR ERO PISA                       0.065 cm²             MR Regurgitant Volume PISA        9.6 cm³               TR Peak Velocity                  263 cm/s              PV Peak Velocity                  0.58 m/s              PV Peak Gradient                  1.3 mmHg              RVOT Peak Velocity                0.45 m/s              * Indicates values subject to auto-interpretation LV EF:  30    % FINDINGS Left Ventricle The left ventricle is dilated. Mild concentric left ventricular hypertrophy. Moderately reduced left ventricular systolic function with global hypokinesis. Left ventricular ejection fraction is visually estimated to be 30%. Diastolic function is difficult to assess. Right Ventricle Moderately dilated right ventricle. Reduced right ventricular systolic function. Right Atrium Enlarged right atrium. Normal inferior vena cava size without inspiratory collapse. Left Atrium The left atrium is normal in size.  Left atrial volume index is 25  mL/sq m. Mitral Valve Thickened mitral valve leaflets. Mild mitral regurgitation. No mitral stenosis. Aortic Valve Aortic sclerosis without stenosis. Trace aortic insufficiency. Tricuspid Valve Structurally normal tricuspid valve without significant stenosis. Mild tricuspid regurgitation. Estimated right ventricular systolic pressure is 30 mmHg. Pulmonic Valve Structurally normal pulmonic valve without significant stenosis. Trace pulmonic insufficiency. Pericardium Normal  "pericardium without effusion. Aorta The aortic root is normal. Federico Person M.D. (Electronically Signed) Final Date:     01 August 2017                 17:57      Micro:  Results     Procedure Component Value Units Date/Time    BLOOD CULTURE [[id number]]  (Abnormal) Collected:  07/30/17 1832    Order Status:  Completed Specimen Information:  Blood from Peripheral Updated:  08/02/17 1231     Significant Indicator POS (POS)      Source BLD      Site PERIPHERAL      Blood Culture        Result:        Growth detected by Bactec instrument. 07/31/2017  05:48 (A)     Blood Culture -- (A)      Result:        Enterococcus faecalis  Combination therapy with ampicillin, penicillin, or  vancomycin (for susceptible strains) plus an aminoglycoside  is usually indicated for serious enterococcal infections,  such as endocarditis unless high-level resistance to both  gentamicin and streptomycin is documented; such combinations  are predicted to result in synergistic killing of the  Enterococcus.  See previous culture for sensitivity report.      Narrative:      CALL  [facility]  MED tel. [phone],  CALLED  MED tel. [phone] 07/31/2017, 04:58, RB PERF. RESULTS CALLED  TO:[id number] RN  Per Hospital Policy: Only change Specimen Src: to \"Line\" if  specified by physician order.    BLOOD CULTURE [[id number]]  (Abnormal)  (Susceptibility) Collected:  07/30/17 1839    Order Status:  Completed Specimen Information:  Blood from Peripheral Updated:  08/02/17 1230     Significant Indicator POS (POS)      Source BLD      Site PERIPHERAL      Blood Culture        Result:        Growth detected by Bactec instrument. 07/31/2017  05:50 (A)     Blood Culture -- (A)      Result:        Enterococcus faecalis  Combination therapy with ampicillin, penicillin, or  vancomycin (for susceptible strains) plus an aminoglycoside  is usually indicated for serious enterococcal infections,  such as endocarditis unless high-level resistance to both  gentamicin and " "streptomycin is documented; such combinations  are predicted to result in synergistic killing of the  Enterococcus.  The susceptibility profile for this organism indicates that  Streptomycin would not be an effective component of  combination therapy.      Narrative:      CALL  [facility]  MED tel. [phone],  CALLED  MED tel. [phone] 07/31/2017, 05:50, RB PERF. RESULTS CALLED  TO:[phone] RN  Per Hospital Policy: Only change Specimen Src: to \"Line\" if  specified by physician order.    Culture & Susceptibility     ENTEROCOCCUS FAECALIS     Antibiotic Sensitivity Microscan Unit Status    Ampicillin Sensitive <=2 mcg/mL Final    Daptomycin Sensitive 1 mcg/mL Final    Gent Synergy Sensitive <=500 mcg/mL Final    Penicillin Sensitive 2 mcg/mL Final    Vancomycin Sensitive 2 mcg/mL Final                       Blood Culture [[id number]] Collected:  07/31/17 0509    Order Status:  Completed Specimen Information:  Blood from Peripheral Updated:  08/01/17 0733     Significant Indicator NEG      Source BLD      Site PERIPHERAL      Blood Culture --      Result:        No Growth    Note: Blood cultures are incubated for 5 days and  are monitored continuously.Positive blood cultures  are called to the RN and reported as soon as  they are identified.  Blood culture testing and Gram stain, if indicated, are  performed at [facility], [address] [place] [address].  Positive blood cultures are  sent to [facility], [address] [place] [address], for organism identification and  susceptibility testing.      Narrative:      From different peripheral sites, if not done within the last  24 hours (Per Hospital Policy: Only change specimen source to  \"Line\" if specified by physician order)    Culture Respiratory W/ GRM STN [[id number]]     Order Status:  No result Specimen Information:  Respirate from Sputum     URINALYSIS [[id number]] Collected:  07/30/17 0000    Order Status:  Canceled " "Specimen Information:  Urine     Narrative:      TEST Urinalysis WAS CANCELLED, 08/03/17 01:05 Test autocancelled, not  collected or received  Indication for culture:->Emergency Room Patient    URINE CULTURE(NEW) [[id number]] Collected:  07/30/17 0000    Order Status:  Canceled Specimen Information:  Urine     Narrative:      TEST Urine Culture WAS CANCELLED, 08/03/17 01:05 Test autocancelled, not  collected or received  Indication for culture:->Emergency Room Patient    Culture Respiratory without Gram Stain [[id number]] Collected:  07/30/17 0000    Order Status:  Canceled Specimen Information:  Sputum from Sputum     Narrative:      TEST Respiratory Culture WAS CANCELLED, 08/03/17 01:05 Test autocancelled,  not collected or received    Blood Culture [[id number]] Collected:  07/30/17 0000    Order Status:  Canceled Specimen Information:  Other from Peripheral     Narrative:      TEST Blood Culture WAS CANCELLED, 08/03/17 01:05 Test autocancelled, not  collected or received  From different peripheral sites, if not done within the last  24 hours (Per Hospital Policy: Only change specimen source to  \"Line\" if specified by physician order)          Assessment:  Active Hospital Problems    Diagnosis   • 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 Stop date 08/14/17  Can do daptomycin as outpatient as well or at HD if abx can be obtained  Pt refusing to go to a SNF for IV abx    HIV  On ART at home  Home regimen not available at   Need to have family bring in meds  Follows with Dr. Morgan at the [facility]    ESRD on HD    Discuss with internal medicine/ Dr. Dixon

## 2017-08-03 NOTE — PROGRESS NOTES
Pt dialyzed for 3 hrs today from 1106 to 1406.  Pt tolerated tx well.  Net UF 2.0 L. CVC packed w/ heparin per protocol.  DSG observed/intact. See paper dialysis flow sheet for details.  Report given to VICTOR MANUEL Boyd RN

## 2017-08-03 NOTE — DOCUMENTATION QUERY
DOCUMENTATION QUERY    PROVIDERS: Please select “Cosign w/ note”to reply to query.    To better represent the severity of illness of your patient, please review the following information and exercise your independent professional judgment in responding to this query.     Sepsis and Bacteremia is documented in the Progress Notes. Based upon the clinical findings, risk factors, and treatment, can this diagnosis be further specified?    Please select all that apply:   • Sepsis present on admission (specify causative organism if known and any associated organ dysfunction if known)  • Sepsis developed after admission  • Sepsis has been ruled out  • Bacteremia present on admission  • Bactermia developed after admission   • Other explanation of clinical findings  • Findings of no clinical significance  • Unable to determine      The medical record reflects the following:   Clinical Findings  H&P  Sepsis with systemic inflammatory response syndrome criteria with a potential source of pneumonia.    Nephrology Progress Note  Enterococcal bacteremia -ID consulted    ID Consult  Enterococcal sepsis    Hospitalist Progress note  Bacteremia due to enterococcus    Labs  Blood culture (+) for Enterococcus      Treatment  sepsis protocol   Risk Factors  HIV, possible pn or infective dialysis cath, ESRD, Type 2 DM   Location within medical record  History and Physical, Progress Notes and Lab Results      Thank you,   Nati Dumont MSN, RN, CNL, CNML  Clinical   [email or web address]

## 2017-08-03 NOTE — DISCHARGE PLANNING
Medical Social Work    Referral: Pt discussed at IDT rounds this AM.    Intervention: Per flowsheet, pt lives with spouse and expects to d.c home.  ID following and will set up vanco to be administered with dialysis.  No SS needs identified nor any requests for CARO Abel during rounds.      Plan: SW available for any assistance with d.c planning.

## 2017-08-03 NOTE — PROGRESS NOTES
1740 Patient's sitting up in bed, having Dinner. IV ABX patent & infusing. No c/o's at this time. Fall Protocol in effect. No distress noted.

## 2017-08-03 NOTE — PROGRESS NOTES
[facility] Hospitalist Progress Note    Date of Service: 8/3/2017    Chief Complaint  47 y.o. female admitted 2017 with fever and confusion after missing dialysis.    Interval Problem Update   confusion is resolved after dialysis two days in a row.  She is afebrile but two blood cultures are positive  Repeat blood culture drawn  negative     The patient declines SNF placement for iv antibiotics    Consultants/Specialty  Nephrology Dr. Betancourt  ID Dr. Hoang    Disposition  TBD        Review of Systems   Constitutional: Negative for fever.   HENT: Negative for congestion.    Respiratory: Negative for cough and shortness of breath.    Cardiovascular: Negative for chest pain and leg swelling.   Gastrointestinal: Negative for nausea, abdominal pain, diarrhea and constipation.   Genitourinary: Negative for urgency and frequency.   Musculoskeletal: Negative for myalgias.   Skin: Negative for rash.   Neurological: Negative for dizziness, tremors, weakness and headaches.      Physical Exam  Laboratory/Imaging   Hemodynamics  Temp (24hrs), Av.9 °C (98.5 °F), Min:36.6 °C (97.8 °F), Max:37.8 °C (100 °F)   Temperature: 36.8 °C (98.2 °F)  Pulse  Av  Min: 83  Max: 116   Blood Pressure: 141/73 mmHg      Respiratory      Respiration: 18, Pulse Oximetry: 100 %        RUL Breath Sounds: Clear, RML Breath Sounds: Clear, RLL Breath Sounds: Diminished, ZEN Breath Sounds: Clear, LLL Breath Sounds: Diminished    Fluids    Intake/Output Summary (Last 24 hours) at 17 1337  Last data filed at 17 0900   Gross per 24 hour   Intake   1020 ml   Output      0 ml   Net   1020 ml       Nutrition  Orders Placed This Encounter   Procedures   • DIET ORDER     Standing Status: Standing      Number of Occurrences: 1      Standing Expiration Date:      Order Specific Question:  Diet:     Answer:  Renal [8]     Physical Exam   Constitutional: She is oriented to person, place, and time. She appears cachectic. No distress.   Eyes:  No scleral icterus.   Neck: Neck supple. No JVD present.   Cardiovascular: Normal rate, regular rhythm and intact distal pulses.    Pulmonary/Chest: Effort normal and breath sounds normal.   Abdominal: Soft. Bowel sounds are normal. There is no tenderness.   Musculoskeletal: She exhibits no edema.   Neurological: She is alert and oriented to person, place, and time. Coordination normal.   Skin: Skin is warm. No rash noted. She is not diaphoretic.   Psychiatric: Her behavior is normal.   Nursing note and vitals reviewed.      Recent Labs      08/01/17   0507  08/03/17   0503   WBC  2.8*  2.4*   RBC  2.62*  2.90*   HEMOGLOBIN  7.9*  8.7*   HEMATOCRIT  25.2*  28.4*   MCV  96.2  97.9*   MCH  30.2  30.0   MCHC  31.3*  30.6*   RDW  67.2*  66.7*   PLATELETCT  107*  120*   MPV  9.7  9.8     Recent Labs      08/01/17   0507  08/03/17   0503   SODIUM  131*  133*   POTASSIUM  3.7  3.8   CHLORIDE  99  101   CO2  23  24   GLUCOSE  56*  78   BUN  28*  23*   CREATININE  2.65*  2.40*   CALCIUM  7.3*  7.8*                      Assessment/Plan     Hyponatremia (present on admission)  Assessment & Plan    Adjusted with dialysis      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)  Assessment & Plan  Will give HIV meds from home  Diet preferences offered    ESRD (end stage renal disease) (CMS-HCC) (present on admission)  Assessment & Plan  Dialysis per nephrology, Dr. Deal  Unable to give outpatient daptomycin with dialysis due to cost issues    Pneumonia  Assessment & Plan  Infiltrates on imaging partly due to edema  Antibiotics per ID   Enterococcus in blood cultures    Bacteremia due to Enterococcus  Assessment & Plan  ID Dr. Hoang consulted  Will treat with dialysis catheter to remain in place per Dr. Hoang  Sensitivities done but will need coverage with ampicillin or daptomycin  Patient declines SNF placement for antibiotics      Labs reviewed and Medications reviewed  Julien catheter: No Julien      DVT Prophylaxis:  Heparin    Ulcer prophylaxis: Not indicated  Antibiotics: Treating active infection/contamination beyond 24 hours perioperative coverage  Assessed for rehab: Patient returned to prior level of function, rehabilitation not indicated at this time

## 2017-08-03 NOTE — PROGRESS NOTES
Late entry    Report received. Pt sitting up in bed, no complaints or signs of distress. Assessment completed and medications administered. Pt reports ongoing diarrhea, lomotil provided. Pt tested negative for c diff on 6/23, will monitor for loose BMs. Pt repositioned. Needs met at this time.

## 2017-08-03 NOTE — DIETARY
Nutrition services follow-up for pt with poor po intake.  47 yr old female with fever, confusion, and missed dialysis.   Diet order: 8/2 Renal   Pt states she is feeling much better since she has been receiving dialysis.  States she is eating much better.  PO intake today at breakfast %.    RD continues to encourage po intake and monitoring of po intake progress.

## 2017-08-04 NOTE — CARE PLAN
Problem: Safety  Goal: Will remain free from injury  Outcome: PROGRESSING AS EXPECTED  Hourly monitoring, personal belongings and call light within reach.     Problem: Skin Integrity  Goal: Risk for impaired skin integrity will decrease  Outcome: PROGRESSING AS EXPECTED  Hourly monitoring, Q2 turns, skin assessment. Regular toileting.

## 2017-08-04 NOTE — PROGRESS NOTES
Infectious Disease Progress Note    Author: Rosmery Hoang M.D. Date & Time of service: 2017  8:32 AM    Chief Complaint:  FU enterococcal sepsis    Interval History:   AF, no CBC, pt up in chair eating breakfast, denies any fevers overnight, states she needs to go home because she does not have her HIV meds here, mental status improving  8/3 Tmax 100, WBC 2.4, feels better overall, states she will not go to a SNF if needed   Tmax 100.2, no CBC, denies any fevers or chills o/n, had HD yesterday, feels well overall without any new complaints, tolerating abx without side effects  Labs Reviewed, Medications Reviewed and Radiology Reviewed.    Review of Systems:  Review of Systems   Constitutional: Negative for fever and chills.   Respiratory: Negative for cough and shortness of breath.    Cardiovascular: Negative for chest pain.   Gastrointestinal: Negative for nausea, vomiting, abdominal pain and diarrhea.   Neurological: Negative for headaches.       Hemodynamics:  Temp (24hrs), Av.9 °C (98.5 °F), Min:36.4 °C (97.5 °F), Max:37.9 °C (100.2 °F)  Temperature: 36.9 °C (98.4 °F)  Pulse  Av.1  Min: 83  Max: 116  Blood Pressure: 142/78 mmHg       Physical Exam:  Physical Exam   Constitutional: She appears well-developed.   Thin woman  Older than stated age   HENT:   Head: Normocephalic.   Eyes: EOM are normal. Pupils are equal, round, and reactive to light.   Neck: Neck supple.   Cardiovascular:   Murmur heard.  Tachycardic   Pulmonary/Chest: Effort normal and breath sounds normal.   R upper chest permacath - nontender, no surrounding erythema   Abdominal: Soft. There is no tenderness.   Musculoskeletal: She exhibits no edema.   Neurological: She is alert.   Skin: No erythema.   Scattered circular scars on bilateral lower extremities       Meds:    Current facility-administered medications:   •  Ampicillin 1000mg IVPB  •  lisinopril  •  dolutegravir  •  Rilpivirine HCl  •  acyclovir  •  alprazolam  •   artificial tears  •  diphenoxylate-atropine  •  fluconazole  •  fluoxetine  •  hydrOXYzine  •  moxifloxacin  •  nystatin  •  sodium bicarbonate  •  heparin  •  ondansetron  •  ondansetron  •  promethazine  •  promethazine  •  prochlorperazine  •  enalaprilat  •  acetaminophen  •  NS  •  NS    Labs:  Recent Labs      08/03/17   0503   WBC  2.4*   RBC  2.90*   HEMOGLOBIN  8.7*   HEMATOCRIT  28.4*   MCV  97.9*   MCH  30.0   RDW  66.7*   PLATELETCT  120*   MPV  9.8   NEUTSPOLYS  66.00   LYMPHOCYTES  11.00*   MONOCYTES  4.00   EOSINOPHILS  7.00*   BASOPHILS  0.00   RBCMORPHOLO  Present     Recent Labs      08/03/17   0503   SODIUM  133*   POTASSIUM  3.8   CHLORIDE  101   CO2  24   GLUCOSE  78   BUN  23*     Recent Labs      08/03/17   0503   CREATININE  2.40*       Imaging:  Dx-chest-portable (1 View)    7/31/2017 7/31/2017 4:35 PM HISTORY/REASON FOR EXAM:  Shortness of Breath. TECHNIQUE/EXAM DESCRIPTION AND NUMBER OF VIEWS: Single portable view of the chest. COMPARISON: 7/30/2017 FINDINGS: Dialysis catheter projects over the right atrium. The cardiomediastinal silhouette is stable. Left basilar opacity may represent atelectasis or pneumonitis. No pleural effusion or pneumothorax is seen. There is a rounded left basilar opacity measuring 1.8 cm. Calcific density projects adjacent to the left humeral head which can be seen in calcific tendinopathy.     7/31/2017  Left basilar opacity may represent atelectasis or pneumonitis. 1.8 cm nodular opacity at the left lung base.    Dx-chest-portable (1 View)    7/30/2017 7/30/2017 6:27 PM HISTORY/REASON FOR EXAM:  Possible sepsis. TECHNIQUE/EXAM DESCRIPTION AND NUMBER OF VIEWS: Single portable view of the chest. COMPARISON: 6/20/2017 FINDINGS: Dialysis catheter projects over the right atrium. The cardiomediastinal silhouette is stable. Possible patchy left basilar opacity represent atelectasis or pneumonitis. No pleural effusion or pneumothorax is seen. There is a skinfold on  the left. Calcific density adjacent to the humeral head on the left can be seen in calcific tendinopathy.     2017  Possible patchy left basilar opacity may represent atelectasis or pneumonitis. Lateral view would be beneficial for further evaluation.     Echocardiogram-comp W/ Cont    2017  Transthoracic Echo Report Echocardiography Laboratory CONCLUSIONS Prior echo on 16; compared to the images of the study done - there has been reduction of LVSF. No obvious intracardiac mass noted. Moderately reduced left ventricular systolic function with global hypokinesis. Left ventricular ejection fraction is visually estimated to be 30%. Diastolic function is difficult to assess. Mild mitral regurgitation. Mild tricuspid regurgitation. Estimated right ventricular systolic pressure is 30 mmHg. KIM GALLARDO Exam Date:         2017                    15:51 Exam Location:     Inpatient Priority:          Routine Ordering Physician:        CODY ZAMORA Referring Physician:       [id number] Gutierrez Sonographer:               Tracy Omer RVT Age:    47     Gender:    F MRN:    [id number] :    [id number] BSA:    1.61   Ht (in):    66     Wt (lb):    120 Exam Type:     Complete Indications:     Endocarditis ICD Codes:       421 CPT Codes:       27719 BP:   126    /   74     HR: Technical Quality:       Good MEASUREMENTS  (Male / Female) Normal Values 2D ECHO LV Diastolic Diameter PLAX        4.4 cm                4.2 - 5.9 / 3.9 - 5.3 cm LV Systolic Diameter PLAX         3.5 cm                2.1 - 4.0 cm IVS Diastolic Thickness           1.2 cm                LVPW Diastolic Thickness          1.1 cm                LVOT Diameter                     1.9 cm                Estimated LV Ejection Fraction    30 %                  LV Ejection Fraction MOD BP       25.8 %                >= 55  % LV Ejection Fraction MOD 4C       35.5 %                LV Ejection Fraction MOD 2C        14 %                  IVC Diameter                      2 cm                  M-MODE Aortic Root Diameter MM           2.6 cm                DOPPLER AV Peak Velocity                  1.1 m/s               AV Peak Gradient                  4.6 mmHg              AV Mean Gradient                  3.3 mmHg              LVOT Peak Velocity                0.74 m/s              AV Area Cont Eq vti               2 cm²                 MR ERO PISA                       0.065 cm²             MR Regurgitant Volume PISA        9.6 cm³               TR Peak Velocity                  263 cm/s              PV Peak Velocity                  0.58 m/s              PV Peak Gradient                  1.3 mmHg              RVOT Peak Velocity                0.45 m/s              * Indicates values subject to auto-interpretation LV EF:  30    % FINDINGS Left Ventricle The left ventricle is dilated. Mild concentric left ventricular hypertrophy. Moderately reduced left ventricular systolic function with global hypokinesis. Left ventricular ejection fraction is visually estimated to be 30%. Diastolic function is difficult to assess. Right Ventricle Moderately dilated right ventricle. Reduced right ventricular systolic function. Right Atrium Enlarged right atrium. Normal inferior vena cava size without inspiratory collapse. Left Atrium The left atrium is normal in size.  Left atrial volume index is 25  mL/sq m. Mitral Valve Thickened mitral valve leaflets. Mild mitral regurgitation. No mitral stenosis. Aortic Valve Aortic sclerosis without stenosis. Trace aortic insufficiency. Tricuspid Valve Structurally normal tricuspid valve without significant stenosis. Mild tricuspid regurgitation. Estimated right ventricular systolic pressure is 30 mmHg. Pulmonic Valve Structurally normal pulmonic valve without significant stenosis. Trace pulmonic insufficiency. Pericardium Normal pericardium without effusion. Aorta The aortic root is normal. Federico CAVANAUGH  "Raza MCGINNIS (Electronically Signed) Final Date:     01 August 2017                 17:57      Micro:  Results     Procedure Component Value Units Date/Time    BLOOD CULTURE [[id number]]  (Abnormal) Collected:  07/30/17 1832    Order Status:  Completed Specimen Information:  Blood from Peripheral Updated:  08/02/17 1231     Significant Indicator POS (POS)      Source BLD      Site PERIPHERAL      Blood Culture        Result:        Growth detected by Bactec instrument. 07/31/2017  05:48 (A)     Blood Culture -- (A)      Result:        Enterococcus faecalis  Combination therapy with ampicillin, penicillin, or  vancomycin (for susceptible strains) plus an aminoglycoside  is usually indicated for serious enterococcal infections,  such as endocarditis unless high-level resistance to both  gentamicin and streptomycin is documented; such combinations  are predicted to result in synergistic killing of the  Enterococcus.  See previous culture for sensitivity report.      Narrative:      CALL  [facility]  MED tel. [phone],  CALLED  MED tel. [phone] 07/31/2017, 04:58, RB PERF. RESULTS CALLED  TO:[phone] RN  Per Hospital Policy: Only change Specimen Src: to \"Line\" if  specified by physician order.    BLOOD CULTURE [[id number]]  (Abnormal)  (Susceptibility) Collected:  07/30/17 1839    Order Status:  Completed Specimen Information:  Blood from Peripheral Updated:  08/02/17 1230     Significant Indicator POS (POS)      Source BLD      Site PERIPHERAL      Blood Culture        Result:        Growth detected by Bactec instrument. 07/31/2017  05:50 (A)     Blood Culture -- (A)      Result:        Enterococcus faecalis  Combination therapy with ampicillin, penicillin, or  vancomycin (for susceptible strains) plus an aminoglycoside  is usually indicated for serious enterococcal infections,  such as endocarditis unless high-level resistance to both  gentamicin and streptomycin is documented; such combinations  are predicted to result in " "synergistic killing of the  Enterococcus.  The susceptibility profile for this organism indicates that  Streptomycin would not be an effective component of  combination therapy.      Narrative:      CALL  [facility]  MED tel. [phone],  CALLED  MED tel. [phone] 07/31/2017, 05:50, RB PERF. RESULTS CALLED  TO:[phone] RN  Per Hospital Policy: Only change Specimen Src: to \"Line\" if  specified by physician order.    Culture & Susceptibility     ENTEROCOCCUS FAECALIS     Antibiotic Sensitivity Microscan Unit Status    Ampicillin Sensitive <=2 mcg/mL Final    Daptomycin Sensitive 1 mcg/mL Final    Gent Synergy Sensitive <=500 mcg/mL Final    Penicillin Sensitive 2 mcg/mL Final    Vancomycin Sensitive 2 mcg/mL Final                       Blood Culture [[id number]] Collected:  07/31/17 0509    Order Status:  Completed Specimen Information:  Blood from Peripheral Updated:  08/01/17 0733     Significant Indicator NEG      Source BLD      Site PERIPHERAL      Blood Culture --      Result:        No Growth    Note: Blood cultures are incubated for 5 days and  are monitored continuously.Positive blood cultures  are called to the RN and reported as soon as  they are identified.  Blood culture testing and Gram stain, if indicated, are  performed at [facility], [address] [place] [address].  Positive blood cultures are  sent to [facility], [address] [place] [address], for organism identification and  susceptibility testing.      Narrative:      From different peripheral sites, if not done within the last  24 hours (Per Hospital Policy: Only change specimen source to  \"Line\" if specified by physician order)    Culture Respiratory W/ GRM STN [[id number]]     Order Status:  No result Specimen Information:  Respirate from Sputum     URINALYSIS [[id number]] Collected:  07/30/17 0000    Order Status:  Canceled Specimen Information:  Urine     Narrative:      TEST Urinalysis WAS CANCELLED, " "08/03/17 01:05 Test autocancelled, not  collected or received  Indication for culture:->Emergency Room Patient    URINE CULTURE(NEW) [[id number]] Collected:  07/30/17 0000    Order Status:  Canceled Specimen Information:  Urine     Narrative:      TEST Urine Culture WAS CANCELLED, 08/03/17 01:05 Test autocancelled, not  collected or received  Indication for culture:->Emergency Room Patient    Culture Respiratory without Gram Stain [[id number]] Collected:  07/30/17 0000    Order Status:  Canceled Specimen Information:  Sputum from Sputum     Narrative:      TEST Respiratory Culture WAS CANCELLED, 08/03/17 01:05 Test autocancelled,  not collected or received    Blood Culture [[id number]] Collected:  07/30/17 0000    Order Status:  Canceled Specimen Information:  Other from Peripheral     Narrative:      TEST Blood Culture WAS CANCELLED, 08/03/17 01:05 Test autocancelled, not  collected or received  From different peripheral sites, if not done within the last  24 hours (Per Hospital Policy: Only change specimen source to  \"Line\" if specified by physician order)          Assessment:  Active Hospital Problems    Diagnosis   • 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 Stop date 08/14/17  Can do daptomycin as outpatient  or at HD if abx can be obtained  Cannot use PO linezolid due to thrombocytopenia  Pt refusing to go to a SNF for IV abx    HIV  On home ARV   Follows with Dr. Morgan at the [facility]    ESRD on HD    Discuss with internal medicine/ Dr. Dixon  "

## 2017-08-04 NOTE — CARE PLAN
Problem: Communication  Goal: The ability to communicate needs accurately and effectively will improve  Intervention: [place] patient and significant other/support system to call light to alert staff of needs    08/04/17 1364   OTHER   Oriented to: All of the Following : Location of Bathroom, Visiting Policy, Unit Routine, Call Light and Bedside Controls, Bedside Rail Policy, Smoking Policy, Rights and Responsibilities, Bedside Report, and Patient Education Notebook

## 2017-08-04 NOTE — PROGRESS NOTES
Bedside report given to JACOB Harvey. Plan of care discussed. Patient has no other needs at this time.

## 2017-08-04 NOTE — DIETARY
Nutrition services follow-up in this pt with ESRD/on dialysis. History of HIV/AIDS.   Pt diet order changed to Renal 8/2.  States she is doing better with her appetite.  Honoring food preferences and menu selection process.  She did not request snacks at this time.  PO intake variable.    Labs reviewed.  8/3 potassium 3.8   7/31 albumin 2.0  Per MD in rounds to add chocolate boost plus with lunch and dinner.    RD to continue to monitor PO intake progress.

## 2017-08-04 NOTE — PROGRESS NOTES
Late entry    Report received. Pt sitting up in bed, no complaints or signs of distress. Pt later cleaned of bowel incontinence and CHG bath provided. Assessment completed and medications administered. Pt repositioned. Needs met at this time.

## 2017-08-04 NOTE — DISCHARGE PLANNING
CARO Abel received a call from Tamy at [facility] who stated pt needs to complete application and then call to schedule an assessment appointment.  CARO gave pt the application for RTC access and a pen.  CARO will follow up tomorrow.

## 2017-08-04 NOTE — CARE PLAN
Problem: Safety  Goal: Will remain free from injury  Intervention: Provide assistance with mobility    08/04/17 1502   OTHER   Assistance / Tolerance Assistance of Two or More;General Weakness;Tires quickly;Does Not Tolerate;Tolerates Poorly

## 2017-08-04 NOTE — DISCHARGE PLANNING
CARO Abel spoke with pt to get dialysis schedule T TH Sat at 1445 @ [facility] [address].  CARO asked if pt would like SW to assist with setting up transportation with RTC [facility] and pt stated she would like that help.  CARO called [facility] [phone] and spoke to Susie who transferred SW to passenger services.  CARO left a message with Tamy requesting a call back to set up assessment so pt can get transportation to dialysis.

## 2017-08-04 NOTE — PROGRESS NOTES
Received bedside report. Plan of care discussed. Patient has no needs at this time. Safety precautions in place call light within reach.

## 2017-08-04 NOTE — PROGRESS NOTES
[facility] Hospitalist Progress Note    Date of Service: 2017    Chief Complaint  47 y.o. female admitted 2017 with fever and confusion after missing dialysis.    Interval Problem Update   confusion is resolved after dialysis two days in a row.  She is afebrile but two blood cultures are positive  Repeat blood culture drawn  negative     The patient declines SNF placement for iv antibiotics  daptomycin outpatient is not able to be given due to cost  Continue abx here and dialysis    Consultants/Specialty  Nephrology Dr. Betancourt  ID Dr. Hoang    Disposition  TBD        Review of Systems   Constitutional: Negative for fever.   HENT: Negative for congestion.    Respiratory: Negative for cough and shortness of breath.    Cardiovascular: Negative for chest pain, palpitations and leg swelling.   Gastrointestinal: Negative for heartburn, nausea, abdominal pain and constipation.   Genitourinary: Negative for dysuria, urgency and frequency.   Neurological: Negative for dizziness, tremors and headaches.      Physical Exam  Laboratory/Imaging   Hemodynamics  Temp (24hrs), Av.3 °C (99.2 °F), Min:36.9 °C (98.4 °F), Max:37.9 °C (100.2 °F)   Temperature: 37.2 °C (98.9 °F)  Pulse  Av  Min: 83  Max: 116   Blood Pressure: 142/87 mmHg      Respiratory      Respiration: 16, Pulse Oximetry: 95 %        RUL Breath Sounds: Clear, RML Breath Sounds: Fine Crackles, RLL Breath Sounds: Fine Crackles, ZEN Breath Sounds: Clear, LLL Breath Sounds: Fine Crackles    Fluids    Intake/Output Summary (Last 24 hours) at 17 1512  Last data filed at 17 0600   Gross per 24 hour   Intake    480 ml   Output      0 ml   Net    480 ml       Nutrition  Orders Placed This Encounter   Procedures   • DIET ORDER     Standing Status: Standing      Number of Occurrences: 1      Standing Expiration Date:      Order Specific Question:  Diet:     Answer:  Renal [8]     Physical Exam   Constitutional: She is oriented to person, place, and  time. She appears cachectic. No distress.   Eyes: Conjunctivae are normal.   Neck: Neck supple. No JVD present.   Cardiovascular: Normal rate, regular rhythm and intact distal pulses.    Pulmonary/Chest: Effort normal and breath sounds normal.   Abdominal: Soft. Bowel sounds are normal.   Musculoskeletal: She exhibits no edema.   Diffuse muscle wasting   Neurological: She is alert and oriented to person, place, and time. Coordination normal.   Skin: Skin is warm. No rash noted. She is not diaphoretic.   Psychiatric: Her behavior is normal.   Nursing note and vitals reviewed.      Recent Labs      08/03/17   0503   WBC  2.4*   RBC  2.90*   HEMOGLOBIN  8.7*   HEMATOCRIT  28.4*   MCV  97.9*   MCH  30.0   MCHC  30.6*   RDW  66.7*   PLATELETCT  120*   MPV  9.8     Recent Labs      08/03/17   0503   SODIUM  133*   POTASSIUM  3.8   CHLORIDE  101   CO2  24   GLUCOSE  78   BUN  23*   CREATININE  2.40*   CALCIUM  7.8*                      Assessment/Plan     Hyponatremia (present on admission)  Assessment & Plan    Adjusted with dialysis      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)  Assessment & Plan  Will give HIV meds from home  Diet preferences offered    ESRD (end stage renal disease) (CMS-HCC) (present on admission)  Assessment & Plan  Dialysis per nephrology, Dr. Say silva for anemia    Pneumonia  Assessment & Plan  Infiltrates on imaging partly due to edema  Antibiotics per ID   Enterococcus in blood cultures    Bacteremia due to Enterococcus  Assessment & Plan  ID Dr. Hoang consulted  Will treat with dialysis catheter to remain in place per Dr. Hoang, abx through 8/14  Sensitivities done but will need coverage with ampicillin or daptomycin  Patient declines SNF placement for antibiotics      Labs reviewed and Medications reviewed  Julien catheter: No Julien      DVT Prophylaxis: Heparin    Ulcer prophylaxis: Not indicated  Antibiotics: Treating active infection/contamination beyond 24 hours  perioperative coverage  Assessed for rehab: Patient returned to prior level of function, rehabilitation not indicated at this time

## 2017-08-05 NOTE — DISCHARGE PLANNING
Medical Social Work    Referral: Pt discussed at IDT rounds this AM.    Intervention: CARO Abel gave pt [facility] packet and will follow up to see if completed.  Pt will need to call RTC access to schedule an assessment appointment.    Plan: @1020 - CARO called pt's room to see if she completed packet but no answer.  CARO will follow up later.

## 2017-08-05 NOTE — CARE PLAN
Problem: Discharge Barriers/Planning  Goal: Patient’s continuum of care needs will be met  Outcome: PROGRESSING AS EXPECTED  Discharge plan discussed with pt. Pt's questions answered.    Problem: Skin Integrity  Goal: Risk for impaired skin integrity will decrease  Outcome: PROGRESSING AS EXPECTED  Pt turned Q2H. Pt's skin assessed during shift. Barrier cream used.

## 2017-08-05 NOTE — PROGRESS NOTES
Nephrology Progress Note, Adult, Complex               Author: Manuel Deal   Date & Time created: 2017  5:52 PM     Interval History:  46 y/o female multiple medical problems, ESRD/HD admitted with bacteremia.  Currently on tx, doing better. Planning for discharge.    Review of Systems:  Review of Systems   Constitutional: Positive for malaise/fatigue. Negative for fever and chills.       Physical Exam:  Physical Exam   Constitutional: She is oriented to person, place, and time. She appears well-developed. No distress.   Very thin female   HENT:   Head: Normocephalic and atraumatic.   Nose: Nose normal.   Mouth/Throat: Oropharynx is clear and moist.   Eyes: Pupils are equal, round, and reactive to light.   Neck: Normal range of motion. Neck supple.   Cardiovascular: Normal rate and regular rhythm.    Pulmonary/Chest: Effort normal and breath sounds normal.   Abdominal: Soft. Bowel sounds are normal.   Neurological: She is alert and oriented to person, place, and time.   Skin: Skin is warm.   Nursing note and vitals reviewed.      Labs:        Invalid input(s): [id number]      Recent Labs      17   0503   SODIUM  133*   POTASSIUM  3.8   CHLORIDE  101   CO2  24   BUN  23*   CREATININE  2.40*   CALCIUM  7.8*     Recent Labs      17   0503   GLUCOSE  78     Recent Labs      17   0503   RBC  2.90*   HEMOGLOBIN  8.7*   HEMATOCRIT  28.4*   PLATELETCT  120*     Recent Labs      17   0503   WBC  2.4*   NEUTSPOLYS  66.00   LYMPHOCYTES  11.00*   MONOCYTES  4.00   EOSINOPHILS  7.00*   BASOPHILS  0.00           Hemodynamics:  Temp (24hrs), Av.2 °C (99 °F), Min:36.9 °C (98.4 °F), Max:37.9 °C (100.2 °F)  Temperature: 36.9 °C (98.4 °F)  Pulse  Av.7  Min: 83  Max: 116  Blood Pressure: 142/78 mmHg     Respiratory:    Respiration: 16, Pulse Oximetry: 91 %        RUL Breath Sounds: Clear, RML Breath Sounds: Fine Crackles, RLL Breath Sounds: Fine Crackles, ZEN Breath Sounds: Clear, LLL Breath  Sounds: Fine Crackles  Fluids:    Intake/Output Summary (Last 24 hours) at 08/04/17 1752  Last data filed at 08/04/17 1500   Gross per 24 hour   Intake   1020 ml   Output      0 ml   Net   1020 ml        GI/Nutrition:  Orders Placed This Encounter   Procedures   • DIET ORDER     Standing Status: Standing      Number of Occurrences: 1      Standing Expiration Date:      Order Specific Question:  Diet:     Answer:  Renal [8]     Medical Decision Making, by Problem:  Active Hospital Problems    Diagnosis   • Hyponatremia [E87.1]   • Pneumonia [J18.9]   • ESRD (end stage renal disease) (CMS-HCC) [N18.6]   • Fever [R50.9]       Labs reviewed and Medications reviewed                      Assessment and Plan    1.ESRD/HD  - HD TTS  2.HTN: BP well controlled  3.Electrolytes: stable      -Await d/c to HD unit  -HD TTS

## 2017-08-05 NOTE — PROGRESS NOTES
Bedside report received from Arlette JAMES. Plan of care discussed. Pt resting comfortably in bed with safety precautions in place.

## 2017-08-05 NOTE — PROGRESS NOTES
HD treatment today per routine order.Treatment tolerated well.Net UF removed 2500 ml.Report given to primary Rn.

## 2017-08-05 NOTE — PROGRESS NOTES
Assessment completed. See flowsheet. Meds given per MAR. Pt resting comfortably in bed with safety precautions in place.

## 2017-08-05 NOTE — PROGRESS NOTES
Nephrology Progress Note, Adult, Complex               Author: Manuelpham Coxdeepjamil   Date & Time created: 2017  3:10 PM     Interval History:  46 y/o female multiple medical problems, ESRD/HD admitted with bacteremia.  Overall improved, noted Vanc/Gent acceptable at HDU    Review of Systems:  Review of Systems   Constitutional: Positive for malaise/fatigue. Negative for fever and chills.       Physical Exam:  Physical Exam   Constitutional: She is oriented to person, place, and time. She appears well-developed. No distress.   Very thin female   HENT:   Head: Normocephalic and atraumatic.   Nose: Nose normal.   Mouth/Throat: Oropharynx is clear and moist.   Eyes: Pupils are equal, round, and reactive to light.   Neck: Normal range of motion. Neck supple.   Cardiovascular: Normal rate and regular rhythm.    Pulmonary/Chest: Effort normal and breath sounds normal.   Abdominal: Soft. Bowel sounds are normal.   Neurological: She is alert and oriented to person, place, and time.   Skin: Skin is warm.   Nursing note and vitals reviewed.      Labs:        Invalid input(s): [id number]      Recent Labs      17   0503   SODIUM  133*   POTASSIUM  3.8   CHLORIDE  101   CO2  24   BUN  23*   CREATININE  2.40*   CALCIUM  7.8*     Recent Labs      17   0503   GLUCOSE  78     Recent Labs      17   0503   RBC  2.90*   HEMOGLOBIN  8.7*   HEMATOCRIT  28.4*   PLATELETCT  120*     Recent Labs      17   0503   WBC  2.4*   NEUTSPOLYS  66.00   LYMPHOCYTES  11.00*   MONOCYTES  4.00   EOSINOPHILS  7.00*   BASOPHILS  0.00           Hemodynamics:  Temp (24hrs), Av.7 °C (98 °F), Min:36.6 °C (97.9 °F), Max:36.7 °C (98.1 °F)  Temperature: 36.6 °C (97.9 °F)  Pulse  Av.1  Min: 83  Max: 116  Blood Pressure: 160/93 mmHg     Respiratory:    Respiration: 16, Pulse Oximetry: 93 %        RUL Breath Sounds: Clear, RML Breath Sounds: Fine Crackles, RLL Breath Sounds: Fine Crackles, ZEN Breath Sounds: Clear, LLL Breath  Sounds: Fine Crackles  Fluids:    Intake/Output Summary (Last 24 hours) at 08/05/17 1510  Last data filed at 08/05/17 1000   Gross per 24 hour   Intake    360 ml   Output   2500 ml   Net  -2140 ml        GI/Nutrition:  Orders Placed This Encounter   Procedures   • DIET ORDER     Standing Status: Standing      Number of Occurrences: 1      Standing Expiration Date:      Order Specific Question:  Diet:     Answer:  Renal [8]     Medical Decision Making, by Problem:  Active Hospital Problems    Diagnosis   • Hyponatremia [E87.1]   • Pneumonia [J18.9]   • ESRD (end stage renal disease) (CMS-HCC) [N18.6]   • Fever [R50.9]       Labs reviewed and Medications reviewed                      Assessment and Plan    1.ESRD/HD  - HD TTS  2.HTN: BP well controlled  3.Electrolytes: stable      -Cannot arrange for Vanc and Gent today, will need to do so on Monday AM  -Will follow while inpatient

## 2017-08-05 NOTE — PROGRESS NOTES
Infectious Disease Progress Note    Author: Tracy Baker M.D. Date & Time of service: 2017  12:51 PM    Chief Complaint:  FU enterococcal sepsis    Interval History:   AF, no CBC, pt up in chair eating breakfast, denies any fevers overnight, states she needs to go home because she does not have her HIV meds here, mental status improving  8/3 Tmax 100, WBC 2.4, feels better overall, states she will not go to a SNF if needed   Tmax 100.2, no CBC, denies any fevers or chills o/n, had HD yesterday, feels well overall without any new complaints, tolerating abx without side effects   AF no WBC getting HD denies SE abx  Labs Reviewed, Medications Reviewed and Radiology Reviewed.    Review of Systems:  Review of Systems   Constitutional: Negative for fever and chills.   Respiratory: Negative for cough and shortness of breath.    Cardiovascular: Negative for chest pain.   Gastrointestinal: Negative for nausea, vomiting, abdominal pain and diarrhea.   Neurological: Negative for headaches.       Hemodynamics:  Temp (24hrs), Av.7 °C (98.1 °F), Min:36.6 °C (97.9 °F), Max:36.9 °C (98.4 °F)  Temperature: 36.6 °C (97.9 °F)  Pulse  Av.1  Min: 83  Max: 116  Blood Pressure: 160/93 mmHg       Physical Exam:  Physical Exam   Constitutional: She appears well-developed. No distress.   Thin woman  Older than stated age   HENT:   Head: Normocephalic and atraumatic.   Eyes: EOM are normal. Pupils are equal, round, and reactive to light.   Neck: Neck supple.   Cardiovascular: Normal rate.    Murmur heard.  Tachycardic   Pulmonary/Chest: Effort normal. No respiratory distress.   R upper chest permacath - nontender, no surrounding erythema   Abdominal: Soft. She exhibits no distension. There is no tenderness.   Musculoskeletal: She exhibits no edema.   Neurological: She is alert.   Skin: No erythema.   Scattered circular scars on bilateral lower extremities       Meds:    Current facility-administered medications:    •  Ampicillin 1000mg IVPB  •  lisinopril  •  dolutegravir  •  Rilpivirine HCl  •  acyclovir  •  alprazolam  •  artificial tears  •  diphenoxylate-atropine  •  fluconazole  •  fluoxetine  •  hydrOXYzine  •  moxifloxacin  •  nystatin  •  sodium bicarbonate  •  heparin  •  ondansetron  •  ondansetron  •  promethazine  •  promethazine  •  prochlorperazine  •  enalaprilat  •  acetaminophen  •  NS  •  NS    Labs:  Recent Labs      08/03/17   0503   WBC  2.4*   RBC  2.90*   HEMOGLOBIN  8.7*   HEMATOCRIT  28.4*   MCV  97.9*   MCH  30.0   RDW  66.7*   PLATELETCT  120*   MPV  9.8   NEUTSPOLYS  66.00   LYMPHOCYTES  11.00*   MONOCYTES  4.00   EOSINOPHILS  7.00*   BASOPHILS  0.00   RBCMORPHOLO  Present     Recent Labs      08/03/17   0503   SODIUM  133*   POTASSIUM  3.8   CHLORIDE  101   CO2  24   GLUCOSE  78   BUN  23*     Recent Labs      08/03/17   0503   CREATININE  2.40*       Imaging:  Dx-chest-portable (1 View)    7/31/2017 7/31/2017 4:35 PM HISTORY/REASON FOR EXAM:  Shortness of Breath. TECHNIQUE/EXAM DESCRIPTION AND NUMBER OF VIEWS: Single portable view of the chest. COMPARISON: 7/30/2017 FINDINGS: Dialysis catheter projects over the right atrium. The cardiomediastinal silhouette is stable. Left basilar opacity may represent atelectasis or pneumonitis. No pleural effusion or pneumothorax is seen. There is a rounded left basilar opacity measuring 1.8 cm. Calcific density projects adjacent to the left humeral head which can be seen in calcific tendinopathy.     7/31/2017  Left basilar opacity may represent atelectasis or pneumonitis. 1.8 cm nodular opacity at the left lung base.    Dx-chest-portable (1 View)    7/30/2017 7/30/2017 6:27 PM HISTORY/REASON FOR EXAM:  Possible sepsis. TECHNIQUE/EXAM DESCRIPTION AND NUMBER OF VIEWS: Single portable view of the chest. COMPARISON: 6/20/2017 FINDINGS: Dialysis catheter projects over the right atrium. The cardiomediastinal silhouette is stable. Possible patchy left basilar  opacity represent atelectasis or pneumonitis. No pleural effusion or pneumothorax is seen. There is a skinfold on the left. Calcific density adjacent to the humeral head on the left can be seen in calcific tendinopathy.     2017  Possible patchy left basilar opacity may represent atelectasis or pneumonitis. Lateral view would be beneficial for further evaluation.     Echocardiogram-comp W/ Cont    2017  Transthoracic Echo Report Echocardiography Laboratory CONCLUSIONS Prior echo on 16; compared to the images of the study done - there has been reduction of LVSF. No obvious intracardiac mass noted. Moderately reduced left ventricular systolic function with global hypokinesis. Left ventricular ejection fraction is visually estimated to be 30%. Diastolic function is difficult to assess. Mild mitral regurgitation. Mild tricuspid regurgitation. Estimated right ventricular systolic pressure is 30 mmHg. KIM GALLARDO Exam Date:         2017                    15:51 Exam Location:     Inpatient Priority:          Routine Ordering Physician:        CODY ZAMORA Referring Physician:       [id number] Rodriguez Sonographer:               Tracy Omer RVT Age:    47     Gender:    F MRN:    [id number] :    1970 BSA:    1.61   Ht (in):    66     Wt (lb):    120 Exam Type:     Complete Indications:     Endocarditis ICD Codes:       421 CPT Codes:       79084 BP:   126    /   74     HR: Technical Quality:       Good MEASUREMENTS  (Male / Female) Normal Values 2D ECHO LV Diastolic Diameter PLAX        4.4 cm                4.2 - 5.9 / 3.9 - 5.3 cm LV Systolic Diameter PLAX         3.5 cm                2.1 - 4.0 cm IVS Diastolic Thickness           1.2 cm                LVPW Diastolic Thickness          1.1 cm                LVOT Diameter                     1.9 cm                Estimated LV Ejection Fraction    30 %                  LV Ejection Fraction MOD BP        25.8 %                >= 55  % LV Ejection Fraction MOD 4C       35.5 %                LV Ejection Fraction MOD 2C       14 %                  IVC Diameter                      2 cm                  M-MODE Aortic Root Diameter MM           2.6 cm                DOPPLER AV Peak Velocity                  1.1 m/s               AV Peak Gradient                  4.6 mmHg              AV Mean Gradient                  3.3 mmHg              LVOT Peak Velocity                0.74 m/s              AV Area Cont Eq vti               2 cm²                 MR ERO PISA                       0.065 cm²             MR Regurgitant Volume PISA        9.6 cm³               TR Peak Velocity                  263 cm/s              PV Peak Velocity                  0.58 m/s              PV Peak Gradient                  1.3 mmHg              RVOT Peak Velocity                0.45 m/s              * Indicates values subject to auto-interpretation LV EF:  30    % FINDINGS Left Ventricle The left ventricle is dilated. Mild concentric left ventricular hypertrophy. Moderately reduced left ventricular systolic function with global hypokinesis. Left ventricular ejection fraction is visually estimated to be 30%. Diastolic function is difficult to assess. Right Ventricle Moderately dilated right ventricle. Reduced right ventricular systolic function. Right Atrium Enlarged right atrium. Normal inferior vena cava size without inspiratory collapse. Left Atrium The left atrium is normal in size.  Left atrial volume index is 25  mL/sq m. Mitral Valve Thickened mitral valve leaflets. Mild mitral regurgitation. No mitral stenosis. Aortic Valve Aortic sclerosis without stenosis. Trace aortic insufficiency. Tricuspid Valve Structurally normal tricuspid valve without significant stenosis. Mild tricuspid regurgitation. Estimated right ventricular systolic pressure is 30 mmHg. Pulmonic Valve Structurally normal pulmonic valve without significant stenosis.  "Trace pulmonic insufficiency. Pericardium Normal pericardium without effusion. Aorta The aortic root is normal. Federico Person M.D. (Electronically Signed) Final Date:     01 August 2017                 17:57      Micro:  Results     Procedure Component Value Units Date/Time    Blood Culture [[id number]] Collected:  07/31/17 0509    Order Status:  Completed Specimen Information:  Blood from Peripheral Updated:  08/05/17 0900     Significant Indicator NEG      Source BLD      Site PERIPHERAL      Blood Culture --      Result:        Blood culture testing and Gram stain, if indicated, are  performed at [address]  Positive blood cultures are  sent to [facility], [address] [place] [address], for organism identification and  susceptibility testing.  No growth after 5 days of incubation.      Narrative:      From different peripheral sites, if not done within the last  24 hours (Per Hospital Policy: Only change specimen source to  \"Line\" if specified by physician order)    BLOOD CULTURE [[id number]]  (Abnormal) Collected:  07/30/17 1832    Order Status:  Completed Specimen Information:  Blood from Peripheral Updated:  08/02/17 1231     Significant Indicator POS (POS)      Source BLD      Site PERIPHERAL      Blood Culture        Result:        Growth detected by Bactec instrument. 07/31/2017  05:48 (A)     Blood Culture -- (A)      Result:        Enterococcus faecalis  Combination therapy with ampicillin, penicillin, or  vancomycin (for susceptible strains) plus an aminoglycoside  is usually indicated for serious enterococcal infections,  such as endocarditis unless high-level resistance to both  gentamicin and streptomycin is documented; such combinations  are predicted to result in synergistic killing of the  Enterococcus.  See previous culture for sensitivity report.      Narrative:      CALL  [facility] tel. [phone],  CALLED  MED " "tel. [phone] 07/31/2017, 04:58, RB PERF. RESULTS CALLED  TO:[phone] RN  Per Hospital Policy: Only change Specimen Src: to \"Line\" if  specified by physician order.    BLOOD CULTURE [[id number]]  (Abnormal)  (Susceptibility) Collected:  07/30/17 1839    Order Status:  Completed Specimen Information:  Blood from Peripheral Updated:  08/02/17 1230     Significant Indicator POS (POS)      Source BLD      Site PERIPHERAL      Blood Culture        Result:        Growth detected by Bactec instrument. 07/31/2017  05:50 (A)     Blood Culture -- (A)      Result:        Enterococcus faecalis  Combination therapy with ampicillin, penicillin, or  vancomycin (for susceptible strains) plus an aminoglycoside  is usually indicated for serious enterococcal infections,  such as endocarditis unless high-level resistance to both  gentamicin and streptomycin is documented; such combinations  are predicted to result in synergistic killing of the  Enterococcus.  The susceptibility profile for this organism indicates that  Streptomycin would not be an effective component of  combination therapy.      Narrative:      CALL  [facility] tel. [phone],  CALLED  MED tel. [phone] 07/31/2017, 05:50, RB PERF. RESULTS CALLED  TO:[phone] RN  Per Hospital Policy: Only change Specimen Src: to \"Line\" if  specified by physician order.    Culture & Susceptibility     ENTEROCOCCUS FAECALIS     Antibiotic Sensitivity Microscan Unit Status    Ampicillin Sensitive <=2 mcg/mL Final    Daptomycin Sensitive 1 mcg/mL Final    Gent Synergy Sensitive <=500 mcg/mL Final    Penicillin Sensitive 2 mcg/mL Final    Vancomycin Sensitive 2 mcg/mL Final                       Culture Respiratory W/ GRM STN [[id number]]     Order Status:  No result Specimen Information:  Respirate from Sputum     URINALYSIS [[id number]] Collected:  07/30/17 0000    Order Status:  Canceled Specimen Information:  Urine     Narrative:      TEST Urinalysis WAS CANCELLED, 08/03/17 01:05 Test " "autocancelled, not  collected or received  Indication for culture:->Emergency Room Patient    URINE CULTURE(NEW) [[id number]] Collected:  07/30/17 0000    Order Status:  Canceled Specimen Information:  Urine     Narrative:      TEST Urine Culture WAS CANCELLED, 08/03/17 01:05 Test autocancelled, not  collected or received  Indication for culture:->Emergency Room Patient    Culture Respiratory without Gram Stain [[id number]] Collected:  07/30/17 0000    Order Status:  Canceled Specimen Information:  Sputum from Sputum     Narrative:      TEST Respiratory Culture WAS CANCELLED, 08/03/17 01:05 Test autocancelled,  not collected or received    Blood Culture [[id number]] Collected:  07/30/17 0000    Order Status:  Canceled Specimen Information:  Other from Peripheral     Narrative:      TEST Blood Culture WAS CANCELLED, 08/03/17 01:05 Test autocancelled, not  collected or received  From different peripheral sites, if not done within the last  24 hours (Per Hospital Policy: Only change specimen source to  \"Line\" if specified by physician order)          Assessment:  Active Hospital Problems    Diagnosis   • 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 Stop date 08/14/17  Can do ampicillin as outpatient if has tunneled cath or vanco/gent at HD if abx can be obtained  Pt refusing to go to a SNF for IV abx    HIV  On home ARV   Follows with Dr. Morgan at the [facility]    ESRD on HD  Dose adjust abx    Discuss with internal medicine/ Dr. Dixon  "

## 2017-08-05 NOTE — PROGRESS NOTES
Bedside report given to Jannette JAMES. Plan of care discussed. Pt resting in bed with safety precautions in place.

## 2017-08-05 NOTE — PROGRESS NOTES
[facility] Hospitalist Progress Note    Date of Service: 2017    Chief Complaint  47 y.o. female admitted 2017 with fever and confusion after missing dialysis.    Interval Problem Update   confusion is resolved after dialysis two days in a row.  She is afebrile but two blood cultures are positive  Repeat blood culture drawn  negative     The patient declines SNF placement for iv antibiotics  She is afebrile. Blood pressure elevated to 160 at times    Consultants/Specialty  Nephrology Dr. Betancourt  ID Dr. Hoang    Disposition  TBD        Review of Systems   Constitutional: Negative for fever, chills and diaphoresis.   HENT: Negative for congestion.    Respiratory: Negative for cough and shortness of breath.    Cardiovascular: Negative for chest pain and leg swelling.   Gastrointestinal: Negative for heartburn, nausea, vomiting, abdominal pain and constipation.   Genitourinary: Negative for dysuria, urgency and frequency.   Neurological: Negative for dizziness and tremors.   Endo/Heme/Allergies: Does not bruise/bleed easily.      Physical Exam  Laboratory/Imaging   Hemodynamics  Temp (24hrs), Av.7 °C (98 °F), Min:36.6 °C (97.9 °F), Max:36.7 °C (98.1 °F)   Temperature: 36.6 °C (97.9 °F)  Pulse  Av.1  Min: 83  Max: 116   Blood Pressure: 160/93 mmHg      Respiratory      Respiration: 16, Pulse Oximetry: 93 %        RUL Breath Sounds: Clear, RML Breath Sounds: Fine Crackles, RLL Breath Sounds: Fine Crackles, ZEN Breath Sounds: Clear, LLL Breath Sounds: Fine Crackles    Fluids    Intake/Output Summary (Last 24 hours) at 17 1447  Last data filed at 17 1000   Gross per 24 hour   Intake    660 ml   Output   2500 ml   Net  -1840 ml       Nutrition  Orders Placed This Encounter   Procedures   • DIET ORDER     Standing Status: Standing      Number of Occurrences: 1      Standing Expiration Date:      Order Specific Question:  Diet:     Answer:  Renal [8]     Physical Exam   Constitutional: She is  oriented to person, place, and time. She appears cachectic. No distress.   HENT:   Mouth/Throat: No oropharyngeal exudate.   Eyes: Conjunctivae are normal.   Neck: Neck supple. No JVD present.   Cardiovascular: Normal rate, regular rhythm and intact distal pulses.    Pulmonary/Chest: Effort normal and breath sounds normal. No respiratory distress. She has no wheezes.   Abdominal: Soft. She exhibits no distension.   Musculoskeletal: She exhibits no edema.   Diffuse muscle wasting   Neurological: She is alert and oriented to person, place, and time. Coordination normal.   Skin: Skin is warm and dry. No rash noted. She is not diaphoretic.   Psychiatric: Her behavior is normal.   Nursing note and vitals reviewed.      Recent Labs      08/03/17   0503   WBC  2.4*   RBC  2.90*   HEMOGLOBIN  8.7*   HEMATOCRIT  28.4*   MCV  97.9*   MCH  30.0   MCHC  30.6*   RDW  66.7*   PLATELETCT  120*   MPV  9.8     Recent Labs      08/03/17   0503   SODIUM  133*   POTASSIUM  3.8   CHLORIDE  101   CO2  24   GLUCOSE  78   BUN  23*   CREATININE  2.40*   CALCIUM  7.8*                      Assessment/Plan     Hyponatremia (present on admission)  Assessment & Plan    Adjusted with dialysis      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)  Assessment & Plan  continue HIV meds from home  Diet preferences provided    ESRD (end stage renal disease) (CMS-[facility]) (present on admission)  Assessment & Plan  Dialysis per nephrology, Dr. Deal  Discussed with Dr. Deal setting up outpt antibiotics with dialysis for vancomycin and gentamicin per ID Dr. Olivia silva for anemia    Pneumonia  Assessment & Plan  Infiltrates on imaging due to edema and pneumonia  Antibiotics per ID   Enterococcus in blood cultures, repeat cultures negative    Bacteremia due to Enterococcus  Assessment & Plan  ID following   abx through 8/14  Patient declines SNF placement for antibiotics      Labs reviewed and Medications reviewed  Julien catheter: No  Julien      DVT Prophylaxis: Heparin    Ulcer prophylaxis: Not indicated  Antibiotics: Treating active infection/contamination beyond 24 hours perioperative coverage  Assessed for rehab: Patient returned to prior level of function, rehabilitation not indicated at this time

## 2017-08-05 NOTE — CARE PLAN
Problem: Safety  Goal: Will remain free from falls  Outcome: PROGRESSING AS EXPECTED  Pt remains free of accidental falls.  Pt uses call light approprietly.  Safety precautions in place: anti slip socks on, bed in low position, call light/personal belongings w/in reach, hourly rounding.    Problem: Knowledge Deficit  Goal: Knowledge of disease process/condition, treatment plan, diagnostic tests, and medications will improve  Outcome: PROGRESSING AS EXPECTED  Discussed POC, tx, and meds.  Pt verbalizes understanding of disease process.  Encouraged pt to ask questions.

## 2017-08-06 NOTE — PROGRESS NOTES
Gave bedside report to NOC nurseJanae.  Discussed POC.  Pt resting in bed,semi-fowlers, calm/cooperative, all lines patent, personal belongings w/in reach, safety precautions in place.

## 2017-08-06 NOTE — PROGRESS NOTES
RN Night Shift Note:  A&O.  BP this shift 144/77 and 148/80.  Regular ocationally tachycardic heart rate, 90 to 100s.  Murmur.  Lungs clear upper and clear dim bases R>L.  RA O2 sats in high 90s.  No cough noted.  Afebrile.  No c/o pain.       Patient states she takes Imodium at home when she has bouts of frequent loose stools.  She is incontinent.  Had 6 stools yesterday, and has had increasing frequency since admit. The stool is not liquid but very loose. There is not abd pain or irritation of skin.  Sadaf Engel Hospitalist states CDiff sample must be sent before Imodium can be given.  Order placed and with it Enteric Isolation Precautions.  Patient's skin is fragile, she is cachectic.  She is on an inflatable overlay mattress and turned Q2 hours and PRN.  No Skin breakdown noted tonight.       Patient is refusing Heparin SQ injections.

## 2017-08-06 NOTE — PROGRESS NOTES
Infectious Disease Progress Note    Author: Tracy Baker M.D. Date & Time of service: 2017  11:34 AM    Chief Complaint:  FU enterococcal sepsis    Interval History:   AF, no CBC, pt up in chair eating breakfast, denies any fevers overnight, states she needs to go home because she does not have her HIV meds here, mental status improving  8/3 Tmax 100, WBC 2.4, feels better overall, states she will not go to a SNF if needed   Tmax 100.2, no CBC, denies any fevers or chills o/n, had HD yesterday, feels well overall without any new complaints, tolerating abx without side effects   AF no WBC getting HD denies SE abx   AF c/o diarrhea-no abd pain or fever  Labs Reviewed, Medications Reviewed and Radiology Reviewed.    Review of Systems:  Review of Systems   Constitutional: Negative for fever and chills.   Respiratory: Negative for cough and shortness of breath.    Cardiovascular: Negative for chest pain.   Gastrointestinal: Negative for nausea, vomiting, abdominal pain and diarrhea.   Neurological: Negative for headaches.       Hemodynamics:  Temp (24hrs), Av.7 °C (98.1 °F), Min:36.4 °C (97.5 °F), Max:37.2 °C (98.9 °F)  Temperature: 36.4 °C (97.5 °F)  Pulse  Av  Min: 83  Max: 116  Blood Pressure: 135/68 mmHg       Physical Exam:  Physical Exam   Constitutional: She appears well-developed. No distress.   Thin woman  Older than stated age   HENT:   Head: Normocephalic and atraumatic.   Eyes: EOM are normal. Pupils are equal, round, and reactive to light.   Neck: Neck supple.   Cardiovascular: Normal rate.    Murmur heard.  Tachycardic   Pulmonary/Chest: Effort normal. No respiratory distress.   R upper chest permacath - nontender, no surrounding erythema   Abdominal: Soft. She exhibits no distension. There is no tenderness.   Musculoskeletal: She exhibits no edema.   Neurological: She is alert.   Skin: No erythema.   Scattered circular scars on bilateral lower extremities        Meds:    Current facility-administered medications:   •  Ampicillin 1000mg IVPB  •  lisinopril  •  dolutegravir  •  Rilpivirine HCl  •  acyclovir  •  alprazolam  •  artificial tears  •  diphenoxylate-atropine  •  fluconazole  •  fluoxetine  •  hydrOXYzine  •  moxifloxacin  •  nystatin  •  sodium bicarbonate  •  heparin  •  ondansetron  •  ondansetron  •  promethazine  •  promethazine  •  prochlorperazine  •  enalaprilat  •  acetaminophen  •  NS  •  NS    Labs:  No results for input(s): WBC, RBC, HEMOGLOBIN, HEMATOCRIT, MCV, MCH, RDW, PLATELETCT, MPV, NEUTSPOLYS, LYMPHOCYTES, MONOCYTES, EOSINOPHILS, BASOPHILS, RBCMORPHOLO in the last 72 hours.  No results for input(s): SODIUM, POTASSIUM, CHLORIDE, CO2, GLUCOSE, BUN, CPKTOTAL in the last 72 hours.  No results for input(s): ALBUMIN, TBILIRUBIN, ALKPHOSPHAT, TOTPROTEIN, ALTSGPT, ASTSGOT, CREATININE in the last 72 hours.    Imaging:  Dx-chest-portable (1 View)    7/31/2017 7/31/2017 4:35 PM HISTORY/REASON FOR EXAM:  Shortness of Breath. TECHNIQUE/EXAM DESCRIPTION AND NUMBER OF VIEWS: Single portable view of the chest. COMPARISON: 7/30/2017 FINDINGS: Dialysis catheter projects over the right atrium. The cardiomediastinal silhouette is stable. Left basilar opacity may represent atelectasis or pneumonitis. No pleural effusion or pneumothorax is seen. There is a rounded left basilar opacity measuring 1.8 cm. Calcific density projects adjacent to the left humeral head which can be seen in calcific tendinopathy.     7/31/2017  Left basilar opacity may represent atelectasis or pneumonitis. 1.8 cm nodular opacity at the left lung base.    Dx-chest-portable (1 View)    7/30/2017 7/30/2017 6:27 PM HISTORY/REASON FOR EXAM:  Possible sepsis. TECHNIQUE/EXAM DESCRIPTION AND NUMBER OF VIEWS: Single portable view of the chest. COMPARISON: 6/20/2017 FINDINGS: Dialysis catheter projects over the right atrium. The cardiomediastinal silhouette is stable. Possible patchy left basilar  opacity represent atelectasis or pneumonitis. No pleural effusion or pneumothorax is seen. There is a skinfold on the left. Calcific density adjacent to the humeral head on the left can be seen in calcific tendinopathy.     2017  Possible patchy left basilar opacity may represent atelectasis or pneumonitis. Lateral view would be beneficial for further evaluation.     Echocardiogram-comp W/ Cont    2017  Transthoracic Echo Report Echocardiography Laboratory CONCLUSIONS Prior echo on 16; compared to the images of the study done - there has been reduction of LVSF. No obvious intracardiac mass noted. Moderately reduced left ventricular systolic function with global hypokinesis. Left ventricular ejection fraction is visually estimated to be 30%. Diastolic function is difficult to assess. Mild mitral regurgitation. Mild tricuspid regurgitation. Estimated right ventricular systolic pressure is 30 mmHg. KIM GALLARDO Exam Date:         2017                    15:51 Exam Location:     Inpatient Priority:          Routine Ordering Physician:        CODY ZAMORA Referring Physician:       [id number] Rodriguez Sonographer:               Tracy Omer RVT Age:    47     Gender:    F MRN:    [id number] :    1970 BSA:    1.61   Ht (in):    66     Wt (lb):    120 Exam Type:     Complete Indications:     Endocarditis ICD Codes:       421 CPT Codes:       34546 BP:   126    /   74     HR: Technical Quality:       Good MEASUREMENTS  (Male / Female) Normal Values 2D ECHO LV Diastolic Diameter PLAX        4.4 cm                4.2 - 5.9 / 3.9 - 5.3 cm LV Systolic Diameter PLAX         3.5 cm                2.1 - 4.0 cm IVS Diastolic Thickness           1.2 cm                LVPW Diastolic Thickness          1.1 cm                LVOT Diameter                     1.9 cm                Estimated LV Ejection Fraction    30 %                  LV Ejection Fraction MOD BP        25.8 %                >= 55  % LV Ejection Fraction MOD 4C       35.5 %                LV Ejection Fraction MOD 2C       14 %                  IVC Diameter                      2 cm                  M-MODE Aortic Root Diameter MM           2.6 cm                DOPPLER AV Peak Velocity                  1.1 m/s               AV Peak Gradient                  4.6 mmHg              AV Mean Gradient                  3.3 mmHg              LVOT Peak Velocity                0.74 m/s              AV Area Cont Eq vti               2 cm²                 MR ERO PISA                       0.065 cm²             MR Regurgitant Volume PISA        9.6 cm³               TR Peak Velocity                  263 cm/s              PV Peak Velocity                  0.58 m/s              PV Peak Gradient                  1.3 mmHg              RVOT Peak Velocity                0.45 m/s              * Indicates values subject to auto-interpretation LV EF:  30    % FINDINGS Left Ventricle The left ventricle is dilated. Mild concentric left ventricular hypertrophy. Moderately reduced left ventricular systolic function with global hypokinesis. Left ventricular ejection fraction is visually estimated to be 30%. Diastolic function is difficult to assess. Right Ventricle Moderately dilated right ventricle. Reduced right ventricular systolic function. Right Atrium Enlarged right atrium. Normal inferior vena cava size without inspiratory collapse. Left Atrium The left atrium is normal in size.  Left atrial volume index is 25  mL/sq m. Mitral Valve Thickened mitral valve leaflets. Mild mitral regurgitation. No mitral stenosis. Aortic Valve Aortic sclerosis without stenosis. Trace aortic insufficiency. Tricuspid Valve Structurally normal tricuspid valve without significant stenosis. Mild tricuspid regurgitation. Estimated right ventricular systolic pressure is 30 mmHg. Pulmonic Valve Structurally normal pulmonic valve without significant stenosis.  "Trace pulmonic insufficiency. Pericardium Normal pericardium without effusion. Aorta The aortic root is normal. Federico Person M.D. (Electronically Signed) Final Date:     01 August 2017                 17:57      Micro:  Results     Procedure Component Value Units Date/Time    CDIFF BY PCR WITH TOXIN [[id number]]     Order Status:  No result Specimen Information:  Stool from Stool     Blood Culture [[id number]] Collected:  07/31/17 0509    Order Status:  Completed Specimen Information:  Blood from Peripheral Updated:  08/05/17 0900     Significant Indicator NEG      Source BLD      Site PERIPHERAL      Blood Culture --      Result:        Blood culture testing and Gram stain, if indicated, are  performed at [address]  Positive blood cultures are  sent to [facility], [address] [place] [address], for organism identification and  susceptibility testing.  No growth after 5 days of incubation.      Narrative:      From different peripheral sites, if not done within the last  24 hours (Per Hospital Policy: Only change specimen source to  \"Line\" if specified by physician order)    BLOOD CULTURE [[id number]]  (Abnormal) Collected:  07/30/17 1832    Order Status:  Completed Specimen Information:  Blood from Peripheral Updated:  08/02/17 1231     Significant Indicator POS (POS)      Source BLD      Site PERIPHERAL      Blood Culture        Result:        Growth detected by Bactec instrument. 07/31/2017  05:48 (A)     Blood Culture -- (A)      Result:        Enterococcus faecalis  Combination therapy with ampicillin, penicillin, or  vancomycin (for susceptible strains) plus an aminoglycoside  is usually indicated for serious enterococcal infections,  such as endocarditis unless high-level resistance to both  gentamicin and streptomycin is documented; such combinations  are predicted to result in synergistic killing of the  Enterococcus.  See " "previous culture for sensitivity report.      Narrative:      CALL  [facility]  MED tel. [phone],  CALLED  MED tel. [phone] 07/31/2017, 04:58, RB PERF. RESULTS CALLED  TO:[phone] RN  Per Hospital Policy: Only change Specimen Src: to \"Line\" if  specified by physician order.    BLOOD CULTURE [[id number]]  (Abnormal)  (Susceptibility) Collected:  07/30/17 1839    Order Status:  Completed Specimen Information:  Blood from Peripheral Updated:  08/02/17 1230     Significant Indicator POS (POS)      Source BLD      Site PERIPHERAL      Blood Culture        Result:        Growth detected by Bactec instrument. 07/31/2017  05:50 (A)     Blood Culture -- (A)      Result:        Enterococcus faecalis  Combination therapy with ampicillin, penicillin, or  vancomycin (for susceptible strains) plus an aminoglycoside  is usually indicated for serious enterococcal infections,  such as endocarditis unless high-level resistance to both  gentamicin and streptomycin is documented; such combinations  are predicted to result in synergistic killing of the  Enterococcus.  The susceptibility profile for this organism indicates that  Streptomycin would not be an effective component of  combination therapy.      Narrative:      CALL  [facility]  MED tel. [phone],  CALLED  MED tel. [phone] 07/31/2017, 05:50, RB PERF. RESULTS CALLED  TO:[phone] RN  Per Hospital Policy: Only change Specimen Src: to \"Line\" if  specified by physician order.    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 Stop date 08/14/17  Can do ampicillin as outpatient if has tunneled cath or vanco/gent at HD if abx can be obtained  Pt refusing to go to a SNF for IV abx    HIV  On home ARV   Follows with Dr. Morgan at [facility]    Diarrhea, new  Cdiff sent  Stop bowel protocol  Check CBC    ESRD on HD  Dose adjust abx    Discuss with internal medicine/ Dr. Dixon

## 2017-08-06 NOTE — PROGRESS NOTES
[facility] Hospitalist Progress Note    Date of Service: 2017    Chief Complaint  47 y.o. female admitted 2017 with fever and confusion after missing dialysis.    Interval Problem Update   confusion is resolved after dialysis two days in a row.  She is afebrile but two blood cultures are positive  Repeat blood culture drawn  negative     The patient declines SNF placement for iv antibiotics  She is afebrile.   Today she is having loose stools    Consultants/Specialty  Nephrology Dr. Betancourt  ID Dr. Hoang    Disposition  TBD        Review of Systems   Constitutional: Negative for fever and diaphoresis.   HENT: Negative for congestion.    Respiratory: Negative for cough and wheezing.    Cardiovascular: Negative for chest pain and palpitations.   Gastrointestinal: Positive for diarrhea. Negative for heartburn, nausea, vomiting, abdominal pain and constipation.   Genitourinary: Negative for dysuria.   Neurological: Negative for dizziness and tremors.   Endo/Heme/Allergies: Does not bruise/bleed easily.      Physical Exam  Laboratory/Imaging   Hemodynamics  Temp (24hrs), Av.8 °C (98.2 °F), Min:36.4 °C (97.5 °F), Max:37.2 °C (98.9 °F)   Temperature: 36.4 °C (97.5 °F)  Pulse  Av  Min: 83  Max: 116   Blood Pressure: 135/68 mmHg      Respiratory      Respiration: 18, Pulse Oximetry: 99 %        RUL Breath Sounds: Clear, RLL Breath Sounds: Clear;Diminished, ZEN Breath Sounds: Clear, LLL Breath Sounds: Clear    Fluids    Intake/Output Summary (Last 24 hours) at 17 1337  Last data filed at 17 2121   Gross per 24 hour   Intake    180 ml   Output      0 ml   Net    180 ml       Nutrition  Orders Placed This Encounter   Procedures   • DIET ORDER     Standing Status: Standing      Number of Occurrences: 1      Standing Expiration Date:      Order Specific Question:  Diet:     Answer:  Renal [8]     Physical Exam   Constitutional: She is oriented to person, place, and time. She appears cachectic. No  distress.   HENT:   Mouth/Throat: No oropharyngeal exudate.   Eyes: Conjunctivae are normal. No scleral icterus.   Neck: No JVD present.   Cardiovascular: Normal rate, regular rhythm and intact distal pulses.    Pulmonary/Chest: Effort normal and breath sounds normal. No stridor. No respiratory distress.   Abdominal: Soft. She exhibits no distension. There is no tenderness.   Musculoskeletal: She exhibits no edema.   Diffuse muscle wasting   Neurological: She is alert and oriented to person, place, and time. Coordination normal.   Skin: Skin is warm. No rash noted. She is not diaphoretic. No erythema.   Psychiatric: Her behavior is normal.   Nursing note and vitals reviewed.      Recent Labs      08/06/17   1248   WBC  2.4*   RBC  2.70*   HEMOGLOBIN  8.1*   HEMATOCRIT  26.2*   MCV  97.0   MCH  30.0   MCHC  30.9*   RDW  63.0*   PLATELETCT  124*   MPV  10.2                          Assessment/Plan     Hyponatremia (present on admission)  Assessment & Plan    Adjusted with dialysis      HIV wasting syndrome (CMS-[facility]) (present on admission)  Assessment & Plan  continue HIV meds from home  Diet preferences provided    ESRD (end stage renal disease) (CMS-[facility]) (present on admission)  Assessment & Plan  Dialysis per nephrology, Dr. Deal  Discussed with Dr. Deal setting up outpt antibiotics with dialysis for vancomycin and gentamicin   Should be set up for discharge home tomorrow  epogen for anemia    Pneumonia  Assessment & Plan  Infiltrates on imaging due to edema and pneumonia  Patient with no hypoxia or shortness of breath  Bacteremia is clearing on antibiotics    Bacteremia due to Enterococcus  Assessment & Plan  ID following   abx through 8/14  Patient declines SNF placement for antibiotics    c, dif tests pending  Labs reviewed and Medications reviewed  Julien catheter: No Julien      DVT Prophylaxis: Heparin    Ulcer prophylaxis: Not indicated  Antibiotics: Treating active infection/contamination beyond 24  hours perioperative coverage  Assessed for rehab: Patient returned to prior level of function, rehabilitation not indicated at this time

## 2017-08-07 NOTE — PROGRESS NOTES
RN Night Shift Note:  A&O.  /115 early in shift.  IV found to be infiltrated with a small amount of normal saline.  Call placed to [facility] Hospitalist.  PO Hydralazine ordered and given.  BP post administration 137/85.       Multiple attempts at restarting a PIV on patient's right arm by ICU RNs were not successful.  As of change of shift this am patient still does not have a PIV.  Report given to Day Shift RN.

## 2017-08-07 NOTE — PROGRESS NOTES
Received an order from Dr. Betancourt to administer IV antibiotic through HD catheter. Infused IV abt without issues. Tolerated medication without s/s of adverse reaction. Locked CVC with heparin post IV abt. Infusion.

## 2017-08-07 NOTE — DIETARY
Nutrition services follow-up.  Pt on Renal diet.  Checked in with patient who states she does not have any dietary needs at this time.  Intake is variable.  She continues to try chocolate boost at lunch and dinner.  Continue to honor food preferences.  RD to continue to monitor progress.

## 2017-08-07 NOTE — PROGRESS NOTES
Margret from Lab called with critical result of 2.4 WBC at 1315. Critical lab result read back to Margret.   Dr. Dixon notified of critical lab result at 1317.  Critical lab result read back by Dr. Dixon.      No new orders received from Dr. Dixon.

## 2017-08-07 NOTE — PROGRESS NOTES
Gave bedside report to NOC nurseJanae.  Discussed POC.  Pt resting in bed, semi-fowlers, calm/cooperative, no s/s of acute distress, all lines patent, IVF tko, personal belongings w/in reach, safety precautions in place.

## 2017-08-07 NOTE — DISCHARGE SUMMARY
CHIEF COMPLAINT ON ADMISSION  Chief Complaint   Patient presents with   • N/V       CODE STATUS  Full Code    HPI & HOSPITAL COURSE  This is a 47 y.o. female here with confusion and weakness. She missed dialysis due to difficulty getting transportation and was found to have increased weakness and confusion. She was treated with dialysis and her mental status improved to her baseline. Blood cultures did grow enterococcus and infectious disease was consulted on the case. Due to sensitivities of the enterococcus it was determined that the dialysis catheter could remain in place through treatment of her bacteremia. She is set up with outpatient antibiotics with dialysis and was provided with information for access to health care transportation to assist her when her brother cannot drive her to dialysis.    Therefore, she is discharged in good and stable condition with close outpatient follow-up.    SPECIFIC OUTPATIENT FOLLOW-UP  Nephrology for dialysis Tuesday, Thursday and Saturday      DISCHARGE PROBLEM LIST  Active Problems:    Hyponatremia POA: Yes    HIV wasting syndrome (CMS-HCC) (Chronic) POA: Yes    ESRD (end stage renal disease) (CMS-HCC) (Chronic) POA: Yes    Pneumonia POA: Unknown    Bacteremia due to Enterococcus POA: Unknown  Resolved Problems:    Fever POA: Unknown      FOLLOW UP  Future Appointments  Date Time Provider Department Center   8/10/2017 11:00 AM CARE MANAGER ZOILA DELGADO   8/11/2017 8:40 AM CHAVA Mcgarry [facility] DANNY Willoughby M.D.  [address]  [address]  [place] [address]  [phone]    Schedule an appointment as soon as possible for a visit in 2 weeks  Hospital follow-up appointment with PCP    Manuel Deal M.D.  [address]  [address]  [place] [address] [phone]    In 1 day  for dialysis      MEDICATIONS ON DISCHARGE   Roger Mckeon   Home Medication Instructions SONI:[id number]    Printed on:08/07/17 5858   Medication Information                      acyclovir  (ZOVIRAX) 400 MG tablet  Take 800 mg by mouth every day.             alprazolam (XANAX) 0.25 MG Tab  Take 0.25 mg by mouth 3 times a day as needed for Anxiety. Indications: Feeling Anxious             artificial tears 1.4 % Solution  Place 1 Drop in both eyes as needed (dry eyes). Indications: dry eyes             diphenoxylate-atropine (LOMOTIL) 2.5-0.025 MG Tab  Take 1 Tab by mouth 4 times a day as needed for Diarrhea.             dolutegravir (TIVICAY) 50 MG Tab tablet  Take 50 mg by mouth every day. Indications: HIV Disease             ethambutol (MYAMBUTOL) 400 MG Tab  Take 800 mg by mouth every day. Pt started a 14 day course on 6/2             fluconazole (DIFLUCAN) 100 MG Tab  Take 1 Tab by mouth every day.             fluoxetine (PROZAC) 10 MG Cap  Take 10 mg by mouth every day. Indications: Depression             hydrocodone-acetaminophen (NORCO) 5-325 MG Tab per tablet  Take 1-2 Tabs by mouth every four hours as needed. Indications: Moderate to Moderately Severe Pain             hydrOXYzine (ATARAX) 25 MG Tab  Take 25 mg by mouth 4 times a day.             lisinopril (PRINIVIL) 20 MG Tab  Take 1 Tab by mouth every day.             moxifloxacin (VIGAMOX) 0.5 % Solution  Place 1 Drop in left eye 4 times a day. Indications: EYE             nystatin (MYCOSTATIN) 985974 UNIT/ML Suspension  Take 500,000 Units by mouth 4 times a day. Indications: Candidiasis Fungal Infection of the Oropharynx             ondansetron (ZOFRAN ODT) 4 MG TABLET DISPERSIBLE  Take 1 Tab by mouth every four hours as needed for Nausea/Vomiting (give PO if no IV route available).             prednisoLONE acetate (PRED FORTE) 1 % Suspension  Place 1 Drop in left eye 4 times a day. Indications: post eye surgery             Rilpivirine HCl (EDURANT) 25 MG Tab  Take 1 Tab by mouth every day at 6 PM. Indications: HIV Disease             sodium bicarbonate (SODIUM BICARBONATE) 650 MG Tab  Take 1 Tab by mouth 3 times a day.              valacyclovir (VALTREX) 500 MG Tab  Take 500 mg by mouth 2 times a day. Indications: Herpes Simplex Infection, Genital Herpes             GENTAMICIN 1MG/KG IV AFTER EACH DIALYSIS THROUGH AUGUST 14, 2017  VANCOMYCIN 1GRAM IV AFTER EACH DIALYSIS THROUGH AUGUST 14, 2017    DIET  Orders Placed This Encounter   Procedures   • DIET ORDER     Standing Status: Standing      Number of Occurrences: 1      Standing Expiration Date:      Order Specific Question:  Diet:     Answer:  Renal [8]       ACTIVITY  As tolerated.  Weight bearing as tolerated      CONSULTATIONS  ID Dr. Baker  Nephrology Dr. Betancourt    PROCEDURES  None    LABORATORY  Lab Results   Component Value Date/Time    SODIUM 133* 08/03/2017 05:03 AM    POTASSIUM 3.8 08/03/2017 05:03 AM    CHLORIDE 101 08/03/2017 05:03 AM    CO2 24 08/03/2017 05:03 AM    GLUCOSE 78 08/03/2017 05:03 AM    BUN 23* 08/03/2017 05:03 AM    CREATININE 2.40* 08/03/2017 05:03 AM        Lab Results   Component Value Date/Time    WBC 2.4* 08/06/2017 12:48 PM    HEMOGLOBIN 8.1* 08/06/2017 12:48 PM    HEMATOCRIT 26.2* 08/06/2017 12:48 PM    PLATELET COUNT 124* 08/06/2017 12:48 PM        Total time of the discharge process exceeds 45 minutes

## 2017-08-07 NOTE — DISCHARGE PLANNING
CARO called [facility] Mueller and was an informed that this patient is set up to see this patient tomorrow.  CARO informed Floor RN.

## 2017-08-07 NOTE — DISCHARGE INSTRUCTIONS
Discharge Instructions    Discharged to home by car with relative. Discharged via wheelchair, hospital escort: Yes.  Special equipment needed: Wheelchair from home    Be sure to schedule a follow-up appointment with your primary care doctor or any specialists as instructed.     Discharge Plan:   Diet Plan: Discussed  Activity Level: Discussed  Confirmed Follow up Appointment: Appointment Scheduled  Confirmed Symptoms Management: Discussed  Medication Reconciliation Updated: Yes  Influenza Vaccine Indication: Patient Refuses    I understand that a diet low in cholesterol, fat, and sodium is recommended for good health. Unless I have been given specific instructions below for another diet, I accept this instruction as my diet prescription.   Other diet: Renal    Special Instructions: None    · Is patient discharged on Warfarin / Coumadin?   No     · Is patient Post Blood Transfusion?  No    Depression / Suicide Risk    As you are discharged from this [facility] facility, it is important to learn how to keep safe from harming yourself.    Recognize the warning signs:  · Abrupt changes in personality, positive or negative- including increase in energy   · Giving away possessions  · Change in eating patterns- significant weight changes-  positive or negative  · Change in sleeping patterns- unable to sleep or sleeping all the time   · Unwillingness or inability to communicate  · Depression  · Unusual sadness, discouragement and loneliness  · Talk of wanting to die  · Neglect of personal appearance   · Rebelliousness- reckless behavior  · Withdrawal from people/activities they love  · Confusion- inability to concentrate     If you or a loved one observes any of these behaviors or has concerns about self-harm, here's what you can do:  · Talk about it- your feelings and reasons for harming yourself  · Remove any means that you might use to hurt yourself (examples: pills, rope, extension cords, firearm)  · Get professional  help from the community (Mental Health, Substance Abuse, psychological counseling)  · Do not be alone:Call your Safe Contact- someone whom you trust who will be there for you.  · Call your local CRISIS HOTLINE 513-3848 or 216-864-1812  · Call your local Children's Mobile Crisis Response Team [place] (359) 631-7925 or www.[employer]  · Call the toll free National Suicide Prevention Hotlines   · National Suicide Prevention Lifeline 461-172-YCII (0248)  · [employer] Hope Line Network 800-SUICIDE (563-0419)      Pneumonia, Adult  Pneumonia is an infection of the lungs.   CAUSES  Pneumonia may be caused by bacteria or a virus. Usually, the infection is caused by breathing in droplets from an infected person's cough or sneeze.   SYMPTOMS   Symptoms of pneumonia include:  · Cough.  · Fever.  · Chest pain.  · Rapid breathing.  · Shortness of breath.  · Shaking chills.  · Mucus production.  DIAGNOSIS   If you have the common symptoms of pneumonia, often your health care provider will confirm the diagnosis with a chest X-ray. The X-ray will show an abnormality in the lung if you have pneumonia. Other tests may be done on your blood, urine, or mucus (sputum) to find the specific cause of your pneumonia. A blood gas test or pulse oximetry test may be needed to check how well your lungs are working.  TREATMENT   Your treatment will depend on whether your pneumonia is caused by bacteria or a virus.   · Bacterial pneumonia is treated with antibiotic medicine.  · Pneumonia that is caused by the influenza virus may be treated with an antiviral medicine.  · Pneumonia that is caused by a virus other than influenza will not respond to antibiotic medicine. This type of pneumonia will have to run its course.   HOME CARE INSTRUCTIONS   · Cough suppressants may be used if you are losing too much rest from coughing at night. However, you should try to avoid taking cough suppresants. This is because coughing helps to remove mucus  from your lungs.  · Sleep in a semi-upright position at night. Try sleeping in a reclining chair, or place a few pillows under your head.  · Try using a cold steam vaporizer or humidifier in your home or bedroom. This may help loosen your mucus.  · If you were prescribed an antibiotic medicine, finish all of it even if you start to feel better.  · If you were prescribed an expectorant, take it as directed by your health care provider. This medicine loosens the mucus so you can cough it up.  · Take medicines only as directed by your health care provider.  · Do not smoke. If you are a smoker and continue to smoke, your cough may last several weeks after your pneumonia has cleared.  · Get rest when you feel tired, or as needed.  PREVENTION  A pneumococcal shot (vaccine) is available to prevent a common bacterial cause of pneumonia. This is usually suggested for:  · People over 65 years old.  · People on chemotherapy.  · People with chronic lung problems, such as bronchitis or emphysema.  · People with immune system problems.  If you are over 65 years old or have a high risk condition, you may receive the pneumococcal vaccine if you have not received it before. In some countries, a routine influenza vaccine is also recommended. This vaccine can help prevent some cases of pneumonia. You may be offered the influenza vaccine as part of your care.  If you are a smoker, it is time to quit in order to prevent pneumonia in the future. You may receive instructions on how to stop smoking. Your health care provider can provide medicines and counseling to help you quit.  SEEK MEDICAL CARE IF:  · You have a fever.  · You cannot control your cough with suppressants at night, and you keep losing sleep.  SEEK IMMEDIATE MEDICAL CARE IF:   · You have worsening shortness of breath.  · You have increased chest pain.  · Your sickness becomes worse, especially if you are an older adult or have a weakened immune system.  · You cough up  "blood.  · You have pain that is getting worse or is not controlled with medicines.  · Your symptoms are getting worse rather than better.     This information is not intended to replace advice given to you by your health care provider. Make sure you discuss any questions you have with your health care provider.     Document Released: 12/18/2006 Document Revised: 01/08/2016 Document Reviewed: 04/13/2016  [employer] Interactive Patient Education ©2016 [employer]      Fever   Fever is a higher-than-normal body temperature. A normal temperature varies with:  · Age.   · How it is measured (mouth, underarm, rectal, or ear).   · Time of day.   In an adult, an oral temperature around 98.6° Fahrenheit (F) or 37° Celsius (C) is considered normal. A rise in temperature of about 1.8° F or 1° C is generally considered a fever (100.4° F or 38° C). In an infant age 28 days or less, a rectal temperature of 100.4° F (38° C) generally is regarded as fever. Fever is not a disease but can be a symptom of illness.  CAUSES   · Fever is most commonly caused by infection.   · Some non-infectious problems can cause fever. For example:   · Some arthritis problems.   · Problems with the thyroid or adrenal glands.   · Immune system problems.   · Some kinds of cancer.   · A reaction to certain medicines.   · Occasionally, the source of a fever cannot be determined. This is sometimes called a \"Fever of Unknown Origin\" (FUO).   · Some situations may lead to a temporary rise in body temperature that may go away on its own. Examples are:   · Childbirth.   · Surgery.   · Some situations may cause a rise in body temperature but these are not considered \"true fever\". Examples are:   · Intense exercise.   · Dehydration.   · Exposure to high outside or room temperatures.   SYMPTOMS   · Feeling warm or hot.   · Fatigue or feeling exhausted.   · Aching all over.   · Chills.   · Shivering.   · Sweats.   DIAGNOSIS   A fever can be suspected by your " caregiver feeling that your skin is unusually warm. The fever is confirmed by taking a temperature with a thermometer. Temperatures can be taken different ways. Some methods are accurate and some are not:  With adults, adolescents, and children:   · An oral temperature is used most commonly.   · An ear thermometer will only be accurate if it is positioned as recommended by the .   · Under the arm temperatures are not accurate and not recommended.   · Most electronic thermometers are fast and accurate.   Infants and Toddlers:  · Rectal temperatures are recommended and most accurate.   · Ear temperatures are not accurate in this age group and are not recommended.   · Skin thermometers are not accurate.   RISKS AND COMPLICATIONS   · During a fever, the body uses more oxygen, so a person with a fever may develop rapid breathing or shortness of breath. This can be dangerous especially in people with heart or lung disease.   · The sweats that occur following a fever can cause dehydration.   · High fever can cause seizures in infants and children.   · Older persons can develop confusion during a fever.   TREATMENT   · Medications may be used to control temperature.   · Do not give aspirin to children with fevers. There is an association with Reye's syndrome. Reye's syndrome is a rare but potentially deadly disease.   · If an infection is present and medications have been prescribed, take them as directed. Finish the full course of medications until they are gone.   · Sponging or bathing with room-temperature water may help reduce body temperature. Do not use ice water or alcohol sponge baths.   · Do not over-bundle children in blankets or heavy clothes.   · Drinking adequate fluids during an illness with fever is important to prevent dehydration.   HOME CARE INSTRUCTIONS   · For adults, rest and adequate fluid intake are important. Dress according to how you feel, but do not over-bundle.   · Drink enough water  and/or fluids to keep your urine clear or pale yellow.   · For infants over 3 months and children, giving medication as directed by your caregiver to control fever can help with comfort. The amount to be given is based on the child's weight. Do NOT give more than is recommended.   SEEK MEDICAL CARE IF:   · You or your child are unable to keep fluids down.   · Vomiting or diarrhea develops.   · You develop a skin rash.   · An oral temperature above 102° F (38.9° C) develops, or a fever which persists for over 3 days.   · You develop excessive weakness, dizziness, fainting or extreme thirst.   · Fevers keep coming back after 3 days.   SEEK IMMEDIATE MEDICAL CARE IF:   · Shortness of breath or trouble breathing develops   · You pass out.   · You feel you are making little or no urine.   · New pain develops that was not there before (such as in the head, neck, chest, back, or abdomen).   · You cannot hold down fluids.   · Vomiting and diarrhea persist for more than a day or two.   · You develop a stiff neck and/or your eyes become sensitive to light.   · An unexplained temperature above 102° F (38.9° C) develops.   Document Released: 12/18/2006 Document Revised: 03/11/2013 Document Reviewed: 12/03/2009  [employer]® Patient Information ©2013 [employer].

## 2017-08-07 NOTE — PROGRESS NOTES
Infectious Disease Progress Note    Author: Tracy Baker M.D. Date & Time of service: 2017  2:22 PM    Chief Complaint:  FU enterococcal sepsis    Interval History:   AF, no CBC, pt up in chair eating breakfast, denies any fevers overnight, states she needs to go home because she does not have her HIV meds here, mental status improving  8/3 Tmax 100, WBC 2.4, feels better overall, states she will not go to a SNF if needed   Tmax 100.2, no CBC, denies any fevers or chills o/n, had HD yesterday, feels well overall without any new complaints, tolerating abx without side effects   AF no WBC getting HD denies SE abx   AF c/o diarrhea-no abd pain or fever   AF up to chair no complaints today  Labs Reviewed, Medications Reviewed and Radiology Reviewed.    Review of Systems:  Review of Systems   Constitutional: Negative for fever and chills.   Respiratory: Negative for cough and shortness of breath.    Cardiovascular: Negative for chest pain.   Gastrointestinal: Negative for nausea, vomiting, abdominal pain and diarrhea.   Neurological: Negative for headaches.       Hemodynamics:  Temp (24hrs), Av.8 °C (98.3 °F), Min:36.4 °C (97.6 °F), Max:37.2 °C (99 °F)  Temperature: 37.2 °C (99 °F)  Pulse  Av.1  Min: 82  Max: 116  Blood Pressure: 144/72 mmHg (rn aware)       Physical Exam:  Physical Exam   Constitutional: She appears well-developed. No distress.   Thin woman  Older than stated age   HENT:   Head: Normocephalic and atraumatic.   Eyes: EOM are normal. Pupils are equal, round, and reactive to light.   Neck: Neck supple.   Cardiovascular: Normal rate.    Murmur heard.  Tachycardic   Pulmonary/Chest: Effort normal. No respiratory distress.   R upper chest permacath - nontender, no surrounding erythema   Abdominal: Soft. She exhibits no distension. There is no tenderness.   Musculoskeletal: She exhibits no edema.   Neurological: She is alert.   Skin: No erythema.   Scattered circular scars on  bilateral lower extremities       Meds:    Current facility-administered medications:   •  hydrALAZINE  •  Ampicillin 1000mg IVPB  •  lisinopril  •  dolutegravir  •  Rilpivirine HCl  •  acyclovir  •  alprazolam  •  artificial tears  •  diphenoxylate-atropine  •  fluconazole  •  fluoxetine  •  hydrOXYzine  •  moxifloxacin  •  nystatin  •  sodium bicarbonate  •  heparin  •  ondansetron  •  ondansetron  •  promethazine  •  promethazine  •  prochlorperazine  •  enalaprilat  •  acetaminophen  •  NS  •  NS    Labs:  Recent Labs      08/06/17   1248   WBC  2.4*   RBC  2.70*   HEMOGLOBIN  8.1*   HEMATOCRIT  26.2*   MCV  97.0   MCH  30.0   RDW  63.0*   PLATELETCT  124*   MPV  10.2   NEUTSPOLYS  68.00   LYMPHOCYTES  11.00*   MONOCYTES  11.00   EOSINOPHILS  5.00   BASOPHILS  0.00   RBCMORPHOLO  Present     Recent Labs      08/07/17   1103   SODIUM  131*   POTASSIUM  3.5*   CHLORIDE  98   CO2  24   GLUCOSE  65   BUN  28*     Recent Labs      08/07/17   1103   CREATININE  2.77*       Imaging:  Dx-chest-portable (1 View)    7/31/2017 7/31/2017 4:35 PM HISTORY/REASON FOR EXAM:  Shortness of Breath. TECHNIQUE/EXAM DESCRIPTION AND NUMBER OF VIEWS: Single portable view of the chest. COMPARISON: 7/30/2017 FINDINGS: Dialysis catheter projects over the right atrium. The cardiomediastinal silhouette is stable. Left basilar opacity may represent atelectasis or pneumonitis. No pleural effusion or pneumothorax is seen. There is a rounded left basilar opacity measuring 1.8 cm. Calcific density projects adjacent to the left humeral head which can be seen in calcific tendinopathy.     7/31/2017  Left basilar opacity may represent atelectasis or pneumonitis. 1.8 cm nodular opacity at the left lung base.    Dx-chest-portable (1 View)    7/30/2017 7/30/2017 6:27 PM HISTORY/REASON FOR EXAM:  Possible sepsis. TECHNIQUE/EXAM DESCRIPTION AND NUMBER OF VIEWS: Single portable view of the chest. COMPARISON: 6/20/2017 FINDINGS: Dialysis catheter  projects over the right atrium. The cardiomediastinal silhouette is stable. Possible patchy left basilar opacity represent atelectasis or pneumonitis. No pleural effusion or pneumothorax is seen. There is a skinfold on the left. Calcific density adjacent to the humeral head on the left can be seen in calcific tendinopathy.     2017  Possible patchy left basilar opacity may represent atelectasis or pneumonitis. Lateral view would be beneficial for further evaluation.     Echocardiogram-comp W/ Cont    2017  Transthoracic Echo Report Echocardiography Laboratory CONCLUSIONS Prior echo on 16; compared to the images of the study done - there has been reduction of LVSF. No obvious intracardiac mass noted. Moderately reduced left ventricular systolic function with global hypokinesis. Left ventricular ejection fraction is visually estimated to be 30%. Diastolic function is difficult to assess. Mild mitral regurgitation. Mild tricuspid regurgitation. Estimated right ventricular systolic pressure is 30 mmHg. KIM GALLARDO Exam Date:         2017                    15:51 Exam Location:     Inpatient Priority:          Routine Ordering Physician:        CODY ZAMORA Referring Physician:       [id number] Rodriguez Sonographer:               Tracy Omer RVT Age:    47     Gender:    F MRN:    [id number] :    [id number] BSA:    1.61   Ht (in):    66     Wt (lb):    120 Exam Type:     Complete Indications:     Endocarditis ICD Codes:       421 CPT Codes:       72390 BP:   126    /   74     HR: Technical Quality:       Good MEASUREMENTS  (Male / Female) Normal Values 2D ECHO LV Diastolic Diameter PLAX        4.4 cm                4.2 - 5.9 / 3.9 - 5.3 cm LV Systolic Diameter PLAX         3.5 cm                2.1 - 4.0 cm IVS Diastolic Thickness           1.2 cm                LVPW Diastolic Thickness          1.1 cm                LVOT Diameter                     1.9 cm                 Estimated LV Ejection Fraction    30 %                  LV Ejection Fraction MOD BP       25.8 %                >= 55  % LV Ejection Fraction MOD 4C       35.5 %                LV Ejection Fraction MOD 2C       14 %                  IVC Diameter                      2 cm                  M-MODE Aortic Root Diameter MM           2.6 cm                DOPPLER AV Peak Velocity                  1.1 m/s               AV Peak Gradient                  4.6 mmHg              AV Mean Gradient                  3.3 mmHg              LVOT Peak Velocity                0.74 m/s              AV Area Cont Eq vti               2 cm²                 MR ERO PISA                       0.065 cm²             MR Regurgitant Volume PISA        9.6 cm³               TR Peak Velocity                  263 cm/s              PV Peak Velocity                  0.58 m/s              PV Peak Gradient                  1.3 mmHg              RVOT Peak Velocity                0.45 m/s              * Indicates values subject to auto-interpretation LV EF:  30    % FINDINGS Left Ventricle The left ventricle is dilated. Mild concentric left ventricular hypertrophy. Moderately reduced left ventricular systolic function with global hypokinesis. Left ventricular ejection fraction is visually estimated to be 30%. Diastolic function is difficult to assess. Right Ventricle Moderately dilated right ventricle. Reduced right ventricular systolic function. Right Atrium Enlarged right atrium. Normal inferior vena cava size without inspiratory collapse. Left Atrium The left atrium is normal in size.  Left atrial volume index is 25  mL/sq m. Mitral Valve Thickened mitral valve leaflets. Mild mitral regurgitation. No mitral stenosis. Aortic Valve Aortic sclerosis without stenosis. Trace aortic insufficiency. Tricuspid Valve Structurally normal tricuspid valve without significant stenosis. Mild tricuspid regurgitation. Estimated right ventricular systolic  "pressure is 30 mmHg. Pulmonic Valve Structurally normal pulmonic valve without significant stenosis. Trace pulmonic insufficiency. Pericardium Normal pericardium without effusion. Aorta The aortic root is normal. Federico Person M.D. (Electronically Signed) Final Date:     01 August 2017                 17:57      Micro:  Results     Procedure Component Value Units Date/Time    CDIFF BY PCR WITH TOXIN [[id number]] Collected:  08/06/17 1125    Order Status:  Completed Specimen Information:  Stool from Stool Updated:  08/07/17 0627     C Diff by PCR Negative      Comment: C. difficile NOT detected by PCR.  Treatment not indicated per guidelines.  Repeat testing not indicated within 7 days.          027-NAP1-BI Presumptive Negative      Comment: Presumptive 027/NAP1/BI target DNA sequences are NOT DETECTED.       Narrative:      Special Contact [id number] RICK ADAMS X  Does this patient have risk factors for C-diff?->Yes  C-Diff Risk Factors->antibiotic exposure  Has patient taken stool softeners or laxatives in the last 5  days?->No  Indication for CDiff by PCR?->Greater than/equal to 3 stools  in 24 hr & \"takes shape of container\"    Blood Culture [[id number]] Collected:  07/31/17 0509    Order Status:  Completed Specimen Information:  Blood from Peripheral Updated:  08/05/17 0900     Significant Indicator NEG      Source BLD      Site PERIPHERAL      Blood Culture --      Result:        Blood culture testing and Gram stain, if indicated, are  performed at [facility], [address] [place] [address].  Positive blood cultures are  sent to [facility], [address] [place] [address], for organism identification and  susceptibility testing.  No growth after 5 days of incubation.      Narrative:      From different peripheral sites, if not done within the last  24 hours (Per Hospital Policy: Only change specimen source to  \"Line\" if specified by physician order)    " "BLOOD CULTURE [[id number]]  (Abnormal) Collected:  07/30/17 1832    Order Status:  Completed Specimen Information:  Blood from Peripheral Updated:  08/02/17 1231     Significant Indicator POS (POS)      Source BLD      Site PERIPHERAL      Blood Culture        Result:        Growth detected by Bactec instrument. 07/31/2017  05:48 (A)     Blood Culture -- (A)      Result:        Enterococcus faecalis  Combination therapy with ampicillin, penicillin, or  vancomycin (for susceptible strains) plus an aminoglycoside  is usually indicated for serious enterococcal infections,  such as endocarditis unless high-level resistance to both  gentamicin and streptomycin is documented; such combinations  are predicted to result in synergistic killing of the  Enterococcus.  See previous culture for sensitivity report.      Narrative:      CALL  [facility]  MED tel. [phone],  CALLED  MED tel. [phone] 07/31/2017, 04:58, RB PERF. RESULTS CALLED  TO:[phone] RN  Per Hospital Policy: Only change Specimen Src: to \"Line\" if  specified by physician order.    BLOOD CULTURE [[id number]]  (Abnormal)  (Susceptibility) Collected:  07/30/17 1839    Order Status:  Completed Specimen Information:  Blood from Peripheral Updated:  08/02/17 1230     Significant Indicator POS (POS)      Source BLD      Site PERIPHERAL      Blood Culture        Result:        Growth detected by Bactec instrument. 07/31/2017  05:50 (A)     Blood Culture -- (A)      Result:        Enterococcus faecalis  Combination therapy with ampicillin, penicillin, or  vancomycin (for susceptible strains) plus an aminoglycoside  is usually indicated for serious enterococcal infections,  such as endocarditis unless high-level resistance to both  gentamicin and streptomycin is documented; such combinations  are predicted to result in synergistic killing of the  Enterococcus.  The susceptibility profile for this organism indicates that  Streptomycin would not be an effective component " "of  combination therapy.      Narrative:      CALL  [facility]  MED tel. [phone],  CALLED  MED tel. [phone] 07/31/2017, 05:50, RB PERF. RESULTS CALLED  TO:[phone] RN  Per Hospital Policy: Only change Specimen Src: to \"Line\" if  specified by physician order.    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 Stop date 08/14/17  Plan is for vanco/gent after HD     HIV  On home ARV   Follows with Dr. Morgan at the [facility]    Diarrhea, new  Cdiff sent  Stop bowel protocol  Check CBC    ESRD on HD  Dose adjust abx    Discuss with internal medicine/ Dr. Dixon  "

## 2017-08-07 NOTE — DISCHARGE PLANNING
Patient discussed in morning rounds.  CARO met with this patient bedside and found that she has set up an appointment with RTC access for her evaluation on 8-31.  This patient stated that she feels confident that she will be able to make it to dialysis prior to the RTC access actually being set up.  CARO also called and left a message for Buck the MSW at [facility] ( [phone]) and requested that she follow up as needed with this patient for transportation concerns.

## 2017-08-08 NOTE — PROGRESS NOTES
Pt discharged home w/all personal belongings.  Pt being taken home by her adult brother via personal wheelchair, pt discharged w/only her r permacath and l lower dialysis fistula, all other peripheral iv's discharged, pt informed that she is set up for dialysis tomorrow at her usual time (confirmed w/Amarilis ), f/up appt w/PCP set up for pt.

## 2017-08-08 NOTE — PROGRESS NOTES
· Chart reviewed.  Patient was discharged from [facility] 8/7/17.  Pt is scheduled for a care manager telephone visit on 8/10/17 at 11:00 AM.  Will place phone call to pt on scheduled date and time.

## 2017-08-10 NOTE — Clinical Note
August 10, 2017    Roger Mckeon  [address]  [place] [address]    Dear Roger:    An attempt was made to contact you after your recent hospital discharge from [facility], but we were unable to reach you.    Below is a list of resources and contact information should you have any questions regarding your hospital stay, follow-up instructions or active medical symptoms.     Questions/concerns regarding:    Medical questions related to your discharge ..............Contact a Nurse Care Coordinator   (7 days a week, 8am-5pm)                                          At [phone]                                                                                                                                                                           Medical questions not related to your recent discharge......Contact the [employer]       (24 hours a day/7 days a week)                                        Line at  [phone]          Medications or Discharge Instructions......Refer to your discharge packet or contact your                                                                    [facility] Primary Care Provider                                                                               [phone]    Follow-up appointment..............................Schedule your appt via [facility] or contact                                                                           scheduling at [phone]    Billing.........................................................Review your statement via [employer] or contact                                                                     Billing at [phone]    Medical Records........................................Review your records via [employer] or contact                                                                        Medical Records at [phone]    If you have any questions or concerns, please do not hesitate to contact us. Thank you for choosing [facility] for  your healthcare needs.     Sincerely,    Your [facility] Team

## 2017-08-10 NOTE — PROGRESS NOTES
"· 8/10/17 at 11:00 AM--Placed discharge outreach phone call to patient s/p hospital discharge 8/7/17.  Received recording stating \"the number you are trying to call is not reachable.\"  No answer, no option to leave voicemail.  Dialed number with and without area code.  Discharge outreach letter mailed to patient.  "

## 2017-08-15 NOTE — DOCUMENTATION QUERY
DOCUMENTATION QUERY    PROVIDERS: Please select “Cosign w/ note”to reply to query.    To better represent the severity of illness of your patient, please review the following information and exercise your independent professional judgment in responding to this query.     Your query response of 8/3/17 notes bacteremia as Present on Admission. In the Progress Notes, this infection is noted as likely secondary to the permacatheter. Based upon the clinical findings, risk factors, and treatment, can the relationship between these two conditions be further specified?    • There is no relationship between bacteremia and the presence of the permacatheter  • The bacteremia is secondary to the permacatheter  • Other explanation of clinical findings (please document)  • Unable to determine    The medical record reflects the following:   Clinical Findings Enterococcal bacteremia likely secondary to bacteremia   Treatment Likely secondary to permacath  Will attempt to salvage catheter  Fever resolved  No leukocytosis - leukopenia  Bcx 7/30 - grp D enterococcus  Bcx 7/31 - NGTD  DC ceftriaxone and azithro as no clinical evidence of PNA  Continue vancomycin pending susceptibilities  If vanco sensitive, can do vancomycin after HD  Anticipate 2 weeks from 7/31. Stop date 08/14/17   Risk Factors    Location within medical record  PRNs of 8/3, 8/4, 8/5, 8/6 and 8/7     Thank you,   Marcia Mcmillan, CRC, CCA  Coding Reimbursement Trainee

## 2017-09-25 NOTE — PROGRESS NOTES
As of this date and time, pt has not contacted pre-admit to make appt; schedulers called to remind her, with no success.

## 2017-09-26 NOTE — PROGRESS NOTES
OPIR RN note:    Non-sedation tunneled HD catheter removal by MD Joyce assisted by RT godfrey,Right chest access site;  Line removed    Patient tolerated procedure, hemodynamically stable  Patient on monitor

## 2017-09-26 NOTE — PROGRESS NOTES
Discharge instructions reviewed with patient , all questions answered. belongings returned.  Patient wheel chaired out in a stable condition.

## 2017-10-05 NOTE — ADDENDUM NOTE
Encounter addended by: Thaira Tapia R.N. on: 10/5/2017 12:50 PM<BR>    Actions taken: Flowsheet accepted

## 2017-11-26 PROBLEM — R53.1 GENERALIZED WEAKNESS: Status: ACTIVE | Noted: 2017-01-01

## 2017-11-26 NOTE — ED NOTES
tech from Lab called with critical result of bands 15.2% at 1415. Critical lab result read back to lab.   Dr. Medellin notified of critical lab result at 1417.  Critical lab result read back by Dr. Medellin.

## 2017-11-26 NOTE — ED PROVIDER NOTES
ED Provider Note    Scribed for Daylin Meedllin M.D. by Tamy Madera. 11/26/2017, 1:15 PM.    Primary care provider: Shonna Willoughby M.D.  Means of arrival: Walk in  History obtained from: Patient  History limited by: None    CHIEF COMPLAINT  Chief Complaint   Patient presents with   • Myalgias       HPI  Roger Mckeon is a 47 y.o. female who presents to the Emergency Department for myalgias with an onset of 7 days. Symptoms developed one week ago with constant myalgias from her neck down to her legs. Myalgias are described as muscle soreness with gradually increasing pain since onset.  No alleviating factors.  She is wheel chair bound secondary to increased weakness from HIV.  History of end stage renal failure and completes dialysis every Tuesday, Thursday and Saturday. Associated symptoms include nausea and vomiting.  Negative for fever, cough, shortness of breath, focal weakness or numbness.          REVIEW OF SYSTEMS  Pertinent positives include myalgias from neck to legs, generalized weakness, nausea and vomiting. Pertinent negatives include no fever, cough, shortness of breath, focal weakness or numbness. All other systems reviewed and negative. See HPI for further details. C.      PAST MEDICAL HISTORY  Patient has a past medical history of Dental disorder; Diabetes; Dialysis patient; H/O: hysterectomy; HIV (human immunodeficiency virus infection) (CMS-HCC) (06/2017); Hypertension; Renal failure; and Seizure disorder (CMS-HCC).      SURGICAL HISTORY  Patient has a past surgical history that includes hysterectomy, vaginal; other; incision and drainage general (4/21/2013); gastroscopy with biopsy (N/A, 9/14/2016); av fistula creation (Left, 10/4/2016); gastroscopy (N/A, 5/10/2017); gyn surgery (2003); and hysterectomy laparoscopy (2003).      SOCIAL HISTORY  Social History   Substance Use Topics   • Smoking status: Never Smoker   • Smokeless tobacco: Never Used   • Alcohol use No      History   Drug Use  No       FAMILY HISTORY  Family History   Problem Relation Age of Onset   • Diabetes Mother    • Diabetes Father    • Hypertension Father    • Diabetes Sister        CURRENT MEDICATIONS  Home Medications     Reviewed by Kellie Richter, Pharmacy Intern (Pharmacy Intern) on 11/26/17 at 1505  Med List Status: Unable to Obtain   Medication Last Dose Status   acyclovir (ZOVIRAX) 400 MG tablet 9/25/2017 Active   diphenoxylate-atropine (LOMOTIL) 2.5-0.025 MG Tab 9/25/2017 Active   dolutegravir (TIVICAY) 50 MG Tab tablet 9/25/2017 Active   ethambutol (MYAMBUTOL) 400 MG Tab 9/25/2017 Active   fluconazole (DIFLUCAN) 100 MG Tab 9/25/2017 Active   fluoxetine (PROZAC) 10 MG Cap 9/25/2017 Active   lisinopril (PRINIVIL) 20 MG Tab 9/25/2017 Active   ondansetron (ZOFRAN ODT) 4 MG TABLET DISPERSIBLE 9/25/2017 Active   Rilpivirine HCl (EDURANT) 25 MG Tab 9/25/2017 Active   sodium bicarbonate (SODIUM BICARBONATE) 650 MG Tab 9/25/2017 Active                ALLERGIES  Allergies   Allergen Reactions   • Oxycodone Itching and Nausea       PHYSICAL EXAM  VITAL SIGNS: /68   Pulse 83   Temp 36.1 °C (96.9 °F)   Resp 17   Wt 54.4 kg (120 lb)   LMP 01/01/2010   SpO2 98%   BMI 19.37 kg/m²     Constitutional:  Cachectic, able to answer questions  HENT: Nose is normal in appearance without rhinorrhea, external ears are normal,  moist mucous membranes  Eyes: Anicteric,  pupils are equal round and reactive, there is no conjunctival drainage or pallor   Neck: The trachea is midline, there is no obvious mass or meningeal signs  Cardiovascular: Equal radial pulsation, regular rate and rhythm without murmurs gallops or rubs  Thorax & Lungs: Respiratory rate and effort are normal. There is normal chest excursion with respiration. No wheezes rhonchi or rales noted.  Abdomen: Abdomen is normal in appearance, normal bowel sounds, no pain with cough, nonpulsatile  :  No CVA tenderness to palpation  Musculoskeletal: No deformities noted in all  4 extremities. Atrophy of both thighs.  Multiple healing sores, no active drainage.  Fistula left forearm. No point tenderness along spine or breakdown.  Skin: Visualized skin is warm.  No pressure sores.    Neurologic:  Cranial nerves II through XII are intact there is no focal abnormality noted.  Psychiatric: Normal mood and mentation.      DIAGNOSTIC STUDIES / PROCEDURES    LABS  Results for orders placed or performed during the hospital encounter of 11/26/17   CBC WITH DIFFERENTIAL   Result Value Ref Range    WBC 6.1 4.8 - 10.8 K/uL    RBC 3.66 (L) 4.20 - 5.40 M/uL    Hemoglobin 10.4 (L) 12.0 - 16.0 g/dL    Hematocrit 33.5 (L) 37.0 - 47.0 %    MCV 91.5 81.4 - 97.8 fL    MCH 28.4 27.0 - 33.0 pg    MCHC 31.0 (L) 33.6 - 35.0 g/dL    RDW 56.4 (H) 35.9 - 50.0 fL    Platelet Count 211 164 - 446 K/uL    MPV 9.2 9.0 - 12.9 fL    Nucleated RBC 0.00 /100 WBC    NRBC (Absolute) 0.00 K/uL    Neutrophils-Polys 67.80 44.00 - 72.00 %    Lymphocytes 8.90 (L) 22.00 - 41.00 %    Monocytes 3.60 0.00 - 13.40 %    Eosinophils 1.80 0.00 - 6.90 %    Basophils 0.90 0.00 - 1.80 %    Neutrophils (Absolute) 5.06 2.00 - 7.15 K/uL    Lymphs (Absolute) 0.54 (L) 1.00 - 4.80 K/uL    Monos (Absolute) 0.22 0.00 - 0.85 K/uL    Eos (Absolute) 0.11 0.00 - 0.51 K/uL    Baso (Absolute) 0.05 0.00 - 0.12 K/uL    Anisocytosis 1+     Macrocytosis 1+    CMP   Result Value Ref Range    Sodium 130 (L) 135 - 145 mmol/L    Potassium 3.7 3.6 - 5.5 mmol/L    Chloride 92 (L) 96 - 112 mmol/L    Co2 31 20 - 33 mmol/L    Anion Gap 7.0 0.0 - 11.9    Glucose 120 (H) 65 - 99 mg/dL    Bun 26 (H) 8 - 22 mg/dL    Creatinine 2.02 (H) 0.50 - 1.40 mg/dL    Calcium 8.6 8.5 - 10.5 mg/dL    AST(SGOT) 33 12 - 45 U/L    ALT(SGPT) 14 2 - 50 U/L    Alkaline Phosphatase 140 (H) 30 - 99 U/L    Total Bilirubin 0.6 0.1 - 1.5 mg/dL    Albumin 2.8 (L) 3.2 - 4.9 g/dL    Total Protein 6.8 6.0 - 8.2 g/dL    Globulin 4.0 (H) 1.9 - 3.5 g/dL    A-G Ratio 0.7 g/dL   CREATINE KINASE   Result  Value Ref Range    CPK Total 25 0 - 154 U/L   INFLUENZA A/B BY PCR   Result Value Ref Range    Influenza virus A RNA Negative Negative    Influenza virus B, PCR Negative Negative   ESTIMATED GFR   Result Value Ref Range    GFR If  32 (A) >60 mL/min/1.73 m 2    GFR If Non African American 26 (A) >60 mL/min/1.73 m 2   DIFFERENTIAL MANUAL   Result Value Ref Range    Bands-Stabs 15.20 (H) 0.00 - 10.00 %    Metamyelocytes 1.80 %    Manual Diff Status PERFORMED    PERIPHERAL SMEAR REVIEW   Result Value Ref Range    Peripheral Smear Review see below    PLATELET ESTIMATE   Result Value Ref Range    Plt Estimation Normal    MORPHOLOGY   Result Value Ref Range    RBC Morphology Present     Giant Platelets 1+    LACTIC ACID   Result Value Ref Range    Lactic Acid 1.3 0.5 - 2.0 mmol/L   PROCALCITONIN   Result Value Ref Range    Procalcitonin 0.28 (H) <0.25 ng/mL   CRP HIGH SENSITIVE (CARDIAC)   Result Value Ref Range    C Reactive Protein High Sensitive 33.6 (H) 0.0 - 7.5 mg/L      All labs reviewed by me.      COURSE & MEDICAL DECISION MAKING  Nursing notes and vital signs were reviewed. (See chart for details)  The patient's records were obtained and reviewed indicates a history of end stage renal disorder requiring dialysis Tuesday,   Thursday and Saturday.  History was obtained from the patient.     The patient presents with myalgias, and the differential diagnosis includes but is not limited to infection, viral syndrome, myalgia, rhabdomyolysis.       1:15 PM Initial orders in the Emergency Department included laboratory testing: lactic acid, procalcitonin, blood culture, differential manual, peripheral smear review, platelet estimate, morphology, CRP high sensitive, influenza, CBC with differential, CMP, estimated GFR and creatine kinase.  Initial treatment in the Emergency Department included 2.5 mg of Morphine IV for pain and 4 mg of Zofran IV for nausea/vomiting.    ED testing reveals bandemia. Patient  is at risk for blood stream infection secondary to dialysis.  Admission for further evaluation to confirm no spinal infection.    2:34 PM Spoke with Dr. Lyons, Hospitalist, regarding the patient's presenting symptoms, medical history and lab results.  He will consult and admit the patient for further evaluation and care.    2:40 PM On repeat evaluation, pain improved with the administered medications. Plan for admission was discussed.        DISPOSITION  Patient will be admitted to Dr. Lyons, Hospitalist, in stable condition.      FINAL IMPRESSION  1. Pain, upper back    2. Weakness         ITamy (Scribe), am scribing for, and in the presence of, Daylin Medellin M.D.    Electronically signed by: Tamy Madera (Scribe), 11/26/2017    IDaylin M.D. personally performed the services described in this documentation, as scribed by Tamy Madera in my presence, and it is both accurate and complete.    The note accurately reflects work and decisions made by me.  Daylin Medellin  11/26/2017  6:40 PM

## 2017-11-26 NOTE — ED NOTES
Pt to room per w/c.  Cleansed of loose dark brown stool.  P;t non ambulatory.  reports general muscle pain increasing over last week.

## 2017-11-26 NOTE — ED NOTES
Assume care with RASHID Campos Dr. in to assess pt. PIV started, blood drawn and sent to lab. Pt medicated per orders. Pt updated on POC. Call light in reach. No additional needs at this time

## 2017-11-26 NOTE — ED NOTES
Unable to complete med rec at this time   Pt and family are unaware of medications   Will follow up with [facility] in AM

## 2017-11-26 NOTE — ED NOTES
46 y/o female ambulate to triage by w/c  Chief Complaint   Patient presents with   • Neck Pain     x 1 week    • Back Pain   • Generalized Body Aches   • N/V

## 2017-11-27 NOTE — PROGRESS NOTES
[facility] Hospitalist Progress Note    Date of Service: 2017    Chief Complaint  47 y.o. female with ESRD on HD, HIV/AIDS, admitted 2017 with generalized weakness, muscle aches and pains from neck down. Initial labs did not show leukocytosis, with 15% bands, with stable Hgb, Na 130, crea 2.02, with normal K. CPK normal. PCT was 0.28. CRP 33.6. Influenza A and B negative. Lactate 1.3.     Interval Problem Update  2017 - no overnight events. Remains hemodynamically stable and afebrile. Stable on RA. Na 127. Crea 2.04. No leukocytosis. Hgb 9.5.  MRI of the lumbar spine showed multilevel degenerative changes.  MRI of the thoracic spine showed unchanged minimal posterior disc bulge at T4-5 and decreased disc bulge at T5-6 without significant central canal or neural foraminal stenosis, with trace right pleural effusion.    > Seen and examined. Still with diffuse aches and pains on shoulder, hips, back, legs. Denied any cough, phlegm. (+) 1 episode of diarrhea yesterday, none since. No nausea, vomiting, CP.       Consultants/Specialty  Nephrology    Disposition  Monitor on the CDU. PT/OT marcia.   I anticipate that she will need at least 2 midnights hospital stay to provide medically necessary services.         ROS     Pertinent positives/negatives as mentioned above.     A complete review of systems was done. All other systems were negative.      Physical Exam  Laboratory/Imaging   Hemodynamics  Temp (24hrs), Av.2 °C (97.1 °F), Min:36 °C (96.8 °F), Max:36.4 °C (97.5 °F)   Temperature: 36 °C (96.8 °F)  Pulse  Av.8  Min: 70  Max: 85    Blood Pressure: 155/82, NIBP: (!) 171/86      Respiratory      Respiration: 16, Pulse Oximetry: 93 %, O2 Daily Delivery Respiratory : Room Air with O2 Available             Fluids  No intake or output data in the 24 hours ending 17 0658    Nutrition  Orders Placed This Encounter   Procedures   • Diet Order     Standing Status:   Standing     Number of Occurrences:    1     Order Specific Question:   Diet:     Answer:   Regular [1]     Physical Exam   Constitutional: She is oriented to person, place, and time. She appears well-developed. No distress.   Cachectic   HENT:   Head: Normocephalic and atraumatic.   Mouth/Throat: No oropharyngeal exudate.   Eyes: Conjunctivae are normal. Pupils are equal, round, and reactive to light. No scleral icterus.   Neck: Normal range of motion. Neck supple.   Cardiovascular: Normal rate and regular rhythm.  Exam reveals no gallop and no friction rub.    No murmur heard.  Pulmonary/Chest: Effort normal. No respiratory distress. She has no wheezes. She has no rales. She exhibits no tenderness.   Diminished air entry B/L bases, otherwise clear to auscultation   Abdominal: Soft. Bowel sounds are normal. She exhibits no distension. There is no tenderness. There is no rebound and no guarding.   Musculoskeletal: She exhibits tenderness ( Diffuse muscle tenderness).   Lymphadenopathy:     She has no cervical adenopathy.   Neurological: She is alert and oriented to person, place, and time. No cranial nerve deficit. Coordination normal.   Skin: Skin is warm and dry. No rash noted. She is not diaphoretic. No erythema. No pallor.   Psychiatric: Her behavior is normal. Judgment and thought content normal.   Flat affect   Nursing note and vitals reviewed.        Recent Labs      11/26/17   1330  11/27/17   0415   WBC  6.1  5.2   RBC  3.66*  3.21*   HEMOGLOBIN  10.4*  9.5*   HEMATOCRIT  33.5*  29.7*   MCV  91.5  92.5   MCH  28.4  29.6   MCHC  31.0*  32.0*   RDW  56.4*  55.6*   PLATELETCT  211  200   MPV  9.2  9.5     Recent Labs      11/26/17   1330  11/27/17   0415   SODIUM  130*  127*   POTASSIUM  3.7  3.8   CHLORIDE  92*  93*   CO2  31  27   GLUCOSE  120*  87   BUN  26*  31*   CREATININE  2.02*  2.04*   CALCIUM  8.6  8.2*                      Assessment/Plan     * Generalized weakness- (present on admission)   Assessment & Plan    - suspect due to systemic  viral illness, with initial bandemia. Infectious work-up negative so far. However, given bandemia and HIV/AIDS, I will start her on empiric IV ceftriaxone and azithromycin until bacterial infection ruled-out. Check procalcitonin and trend. Send for respiratory virus PCR. Will obtain Chest CT to evaluate for occult infiltrates not visible on CXR.   - PT/OT eval for discharge planning.         Hyponatremia- (present on admission)   Assessment & Plan    - could be from hypovolemia. Start IVF NS at 100cc/hr. Trend Na levels.        HIV/AIDS (acquired immune deficiency syndrome) (CMS-HCC)- (present on admission)   Assessment & Plan    - will get updated CD4 count.   - current HAART regimen being reconciled, resume once accurate meds/dosing obtained.         HIV wasting syndrome (CMS-HCC)- (present on admission)   Assessment & Plan    - could be contributing to weakness. PT/OT eval.         Severe protein-calorie malnutrition (CMS-HCC)- (present on admission)   Assessment & Plan    - will get RD to consult for supplements.         ESRD (end stage renal disease) (CMS-HCC)- (present on admission)   Assessment & Plan    - I sent a message to Dr. durbin who is on call for [facility] to set her up for dialysis while she is in house.         Type 2 diabetes mellitus with renal manifestations (CMS-HCC)- (present on admission)   Assessment & Plan    - check HbA1c. SSI for now while in house. Accuchecks AC and HS.         HTN (hypertension), benign- (present on admission)   Assessment & Plan    - good control. Continue home dose lisinopril.             Reviewed items::  Labs reviewed, Medications reviewed and Radiology images reviewed  Julien catheter::  No Julien  DVT prophylaxis pharmacological::  Heparin  Ulcer Prophylaxis::  Not indicated  Antibiotics:  Treating active infection/contamination beyond 24 hours perioperative coverage

## 2017-11-27 NOTE — ED NOTES
Report called to floor RN. All pt belongings, medical chart, and family escorted to CDU with transport. Aware of plan to got to [facility] [address] after MRI

## 2017-11-27 NOTE — DISCHARGE PLANNING
Met with pt after speaking to OT/PT/ and [facility] RONNY Crockett -[phone], Pt lives with her ex- and son, her ex- helps to transfer but in the past weeks the pt has increased in weakness and has become a total lift and the  has not been able to transfer the pt.  Pt has in the past been to a SNF and states that she would like to return there for strengthening if at all possible.  Pt states she thinks the the SNF was [facility].  Informed her that closer to dc, a conversation would happen.     Care Transition Team Assessment    Information Source  Orientation : Oriented x 4  Information Given By: Patient  Informant's Name: Roger Rowlanduirre  Who is responsible for making decisions for patient? : Patient         Elopement Risk  Legal Hold: No  Ambulatory or Self Mobile in Wheelchair: No-Not an Elopement Risk  Disoriented: No  Psychiatric Symptoms: None  History of Wandering: No  Elopement this Admit: No  Vocalizing Wanting to Leave: No  Displays Behaviors, Body Language Wanting to Leave: No-Not at Risk for Elopement  Elopement Risk: Not at Risk for Elopement    Interdisciplinary Discharge Planning  Does Admitting Nurse Feel This Could be a Complex Discharge?: No  Primary Care Physician: Dr. Hoang  Lives with - Patient's Self Care Capacity: Other (Comments), Child Less than 18 Years of Age (ex  and 13 yo son)  Patient or legal guardian wants to designate a caregiver (see row info): Yes  Caregiver name: Vazquez Mahmood  Caregiver relationship to patient: ex-  Caregiver contact info: no phone  Support Systems: Friends / Neighbors, Family Member(s)  Housing / Facility: 1 Story Apartment / Condo  Do You Take your Prescribed Medications Regularly: Yes  Able to Return to Previous ADL's: Yes  Mobility Issues: Yes  Prior Services: Continuous (24 Hour) Care Giving Family  Patient Expects to be Discharged to:: unknown  Assistance Needed: Yes  Durable Medical Equipment:  (wheelchair;  shower chair)    Discharge Preparedness  What is your plan after discharge?: Home with help, Skilled nursing facility  What are your discharge supports?: Other (comment) (ex-)  Prior Functional Level: Bladder Incontinence, Bowel Incontinence, Wheelchair Dependent  Difficulity with ADLs: Bathing, Dressing, Toileting    Functional Assesment  Prior Functional Level: Bladder Incontinence, Bowel Incontinence, Wheelchair Dependent    Finances  Financial Barriers to Discharge: Yes    Vision / Hearing Impairment  Vision Impairment : No  Hearing Impairment : No    Values / Beliefs / Concerns  Values / Beliefs Concerns : No         Domestic Abuse  Have you ever been the victim of abuse or violence?: No  Physical Abuse or Sexual Abuse: No  Verbal Abuse or Emotional Abuse: No  Possible Abuse Reported to:: Not Applicable         Discharge Risks or Barriers  Discharge risks or barriers?: Post-acute placement / services, Complex medical needs  Patient risk factors: Cognitive / sensory / physical deficit, Complex medical needs, Vulnerable adult    Anticipated Discharge Information  Anticipated discharge disposition: SNF, Home

## 2017-11-27 NOTE — PROGRESS NOTES
Report received from Debbi JAMES. Pt eating dinner in bed. Bed in low position, call light within reach.

## 2017-11-27 NOTE — PROGRESS NOTES
Patient on the floor with family members and all her belongings . Will do skin check. Un able to move and stand up. Per patient and family members.

## 2017-11-27 NOTE — PROGRESS NOTES
"Assumed patient care.  Bedside report received from nightshift nurse, Amaury.  Pt A&Ox4.  Respirations even, unlabored on room air.  Pt reports 8/10 neck and lower back pain, described as \"aching\".  No prn medication available at this time, will inform physician.  Pt repositioned. Heat pack applied.  Brief checked, clean and dry.  Mepilex on sacrum, moon boots in place.  Bed in locked, lowest position.  Call light and belongings within reach.  Patient updated on POC, communications board updated.  Needs met, will continue to monitor.   "

## 2017-11-27 NOTE — ASSESSMENT & PLAN NOTE
- Repeat CD4, CD3 count noted  - Continue Rilpivirine 25 daily  - Continue Tivicay 50 daily  -Continue acyclovir prophylaxis  - I consulted Dr. Morgan to see the patient

## 2017-11-27 NOTE — DIETARY
Nutrition Services: Consult for supplements    Pt is a 47 y.o. Female with Dx: Generalized weakness    PMH: HIV/AIDS, ESRD/HD  BMI= 19.3    Pt admitted with weakness. Regular diet in place. Will add Boost supplements TID for additional nutrition. Appetite and weight stable per H&P. RD available as further indicated.

## 2017-11-27 NOTE — THERAPY
"Physical Therapy Evaluation completed.   Bed Mobility:  Supine to Sit: Moderate Assist  Transfers: Sit to Stand: Unable to Participate, max assist for seated slide board to w/c.  Gait: Level Of Assist: Unable to Participate      Plan of Care: Will benefit from Physical Therapy 3 times per week  Discharge Recommendations: Equipment: Will Continue to Assess for Equipment Needs. Post-acute therapy Discharge to a transitional care facility for continued skilled therapy services.  Pt presents with B LE weakness that has prevented pt walking in a year. Pt will benefit from inpt PT to address issues noted and work on slide board transfers to increase pt's independence at home.     See \"Rehab Therapy-Acute\" Patient Summary Report for complete documentation.     "

## 2017-11-27 NOTE — H&P
HOSPITAL MEDICINE HISTORY/ PHYSICAL    Date of Service:  11/26/2017   4:14 PM       Patient ID:   Name: Roger Mckeon  . YOB: 1970. Age: 47 y.o. female. MRN: [id number]    Admitting Attending:  Christ Lyons     PCP : Shonna Willoughby M.D.          Chief Complaint:       Generalized weakness from the neck down    History of Present Illness:    Mike is a 47 y.o. female w/h/o end-stage renal disease on dialysis and AIDS who presents with above chief complaint. Patient states that for the last week she's had worsening muscle ache and pains from the neck down. She says he's never quite had symptoms like this before and describes it as body aches. She's not sure what makes the pain worse or better. She denies any associated numbness with these issues. She is compliant and adherent with all her HIV medications and has not missed any dialysis sessions. She's had no current issues are recent issues of her dialysis sessions either. Her weight is remaining the same and she denies any obvious sick contacts. She denies any new skin rashes or lesions. She is bound but she's felt over the last several days that her arms are so weak that she can't even propel herself forward in the wheelchair. How she currently feels is different now she fell earlier this year when she was diagnosed with enterococcal bacteremia. Her outpatient HIV doctor is Dr. Morgan. She denies any oral lesions and says that her appetite is actually remained quite well. She's not started or stopped any new prescription medications or over-the-counter medications either.    Review of Systems:    Has Review of Systems   Constitutional: Positive for malaise/fatigue. Negative for chills and fever.   HENT: Negative for hearing loss.    Eyes: Negative for blurred vision.   Respiratory: Negative for hemoptysis, sputum production and shortness of breath.    Cardiovascular: Negative for chest pain and leg swelling.   Gastrointestinal: Negative for  abdominal pain, nausea and vomiting.   Genitourinary: Negative for dysuria and urgency.   Musculoskeletal: Positive for myalgias. Negative for neck pain.   Skin: Negative for itching.   Neurological: Positive for weakness. Negative for dizziness, tingling, focal weakness and loss of consciousness.   Endo/Heme/Allergies: Does not bruise/bleed easily.   Psychiatric/Behavioral: Negative for depression.   All other systems reviewed and are negative.    Please see HPI, all other systems were reviewed and are negative (AMA/CMS criteria)              Past Medical/ Family / Social history (PFSH):   Past Medical History:   Diagnosis Date   • HIV (human immunodeficiency virus infection) (CMS-HCC) 2017   • Dental disorder     upper dentures   • Diabetes     on tx since , diet controlled   • Dialysis patient     T, , Sat   • H/O: hysterectomy    • Hypertension    • Renal failure    • Seizure disorder (CMS-HCC)      Past Surgical History:   Procedure Laterality Date   • GASTROSCOPY N/A 5/10/2017    Procedure: GASTROSCOPY- Upper Endoscopy ;  Surgeon: Irene Jenkins M.D.;  Location: [facility];  Service:    • AV FISTULA CREATION Left 10/4/2016    Procedure: AV FISTULA GRAFT CREATION;  Surgeon: Travon Franco M.D.;  Location: [facility];  Service:    • GASTROSCOPY WITH BIOPSY N/A 2016    Procedure: GASTROSCOPY WITH BIOPSY;  Surgeon: Rachid Salazar M.D.;  Location: [facility];  Service:    • INCISION AND DRAINAGE GENERAL  2013    Performed by Conrado Prado M.D. at [facility]   • GYN SURGERY         • HYSTERECTOMY LAPAROSCOPY      still has ovaries   • HYSTERECTOMY, VAGINAL     • OTHER      back surgery      Current Outpatient Medications:  No current facility-administered medications on file prior to encounter.      Current Outpatient Prescriptions on File Prior to Encounter   Medication Sig Dispense Refill   • Rilpivirine HCl  (EDURANT) 25 MG Tab Take 1 Tab by mouth every day at 6 PM. Indications: HIV Disease     • fluconazole (DIFLUCAN) 100 MG Tab Take 1 Tab by mouth every day. 10 Tab 0   • acyclovir (ZOVIRAX) 400 MG tablet Take 800 mg by mouth every day.     • diphenoxylate-atropine (LOMOTIL) 2.5-0.025 MG Tab Take 1 Tab by mouth 4 times a day as needed for Diarrhea.     • lisinopril (PRINIVIL) 20 MG Tab Take 1 Tab by mouth every day. 30 Tab 1   • ondansetron (ZOFRAN ODT) 4 MG TABLET DISPERSIBLE Take 1 Tab by mouth every four hours as needed for Nausea/Vomiting (give PO if no IV route available). 20 Tab 0   • ethambutol (MYAMBUTOL) 400 MG Tab Take 800 mg by mouth every day. Pt started a 14 day course on 6/2     • dolutegravir (TIVICAY) 50 MG Tab tablet Take 50 mg by mouth every day. Indications: HIV Disease     • fluoxetine (PROZAC) 10 MG Cap Take 10 mg by mouth every day. Indications: Depression     • sodium bicarbonate (SODIUM BICARBONATE) 650 MG Tab Take 1 Tab by mouth 3 times a day. 120 Tab 3     Medication Allergy/Sensitivities:  Allergies   Allergen Reactions   • Oxycodone Itching and Nausea     Family History:  Family History   Problem Relation Age of Onset   • Diabetes Mother    • Diabetes Father    • Hypertension Father    • Diabetes Sister      Social History:  Social History   Substance Use Topics   • Smoking status: Never Smoker   • Smokeless tobacco: Never Used   • Alcohol use No     #################################################################  Physical Exam:   Vitals/ General Appearance:   Weight/BMI: Body mass index is 19.37 kg/m².  Blood pressure 143/68, pulse 83, temperature 36.1 °C (96.9 °F), resp. rate 17, weight 54.4 kg (120 lb), last menstrual period 01/01/2010, SpO2 93 %.   Vitals:    11/26/17 1400 11/26/17 1430 11/26/17 1500 11/26/17 1530   BP:       Pulse: 78 85 79 83   Resp:       Temp:       SpO2: 96% 97% 98% 93%   Weight:        Oxygen Therapy:  Pulse Oximetry: 93 %, O2 Delivery: None (Room  Air)    Constitutional:  thin, frail  HENMT: Normocephalic, atraumatic, b/l ears normal, nose normal  Eyes:  EOMI, conjunctiva normal, no discharge  Neck: no tracheal deviation, supple  Cardiovascular: normal heart rate, normal rhythm, no murmurs, no rubs or gallops; no cyanosis, clubbing, 2+ pitting edema of the lower extremities from the knees distal, warm peripherally  Lungs: Respiratory effort is normal, normal breath sounds, breath sounds clear to auscultation b/l, no rales, rhonchi or wheezing  Abdomen: soft, non-tender, no guarding or rebound, scaphoid   Skin: warm, dry, no erythema, no rash  Neurologic: Alert and oriented, strength 3 out of 5 muscle strength of the lower extremities and 4 out of 5 muscle strength of the upper extremities, no focal deficits, CN II-XII normal  Psychiatric: No anxiety or depression    #################################################################  Lab Data Review:    Objective   Recent Results (from the past 24 hour(s))   CBC WITH DIFFERENTIAL    Collection Time: 11/26/17  1:30 PM   Result Value Ref Range    WBC 6.1 4.8 - 10.8 K/uL    RBC 3.66 (L) 4.20 - 5.40 M/uL    Hemoglobin 10.4 (L) 12.0 - 16.0 g/dL    Hematocrit 33.5 (L) 37.0 - 47.0 %    MCV 91.5 81.4 - 97.8 fL    MCH 28.4 27.0 - 33.0 pg    MCHC 31.0 (L) 33.6 - 35.0 g/dL    RDW 56.4 (H) 35.9 - 50.0 fL    Platelet Count 211 164 - 446 K/uL    MPV 9.2 9.0 - 12.9 fL    Nucleated RBC 0.00 /100 WBC    NRBC (Absolute) 0.00 K/uL    Neutrophils-Polys 67.80 44.00 - 72.00 %    Lymphocytes 8.90 (L) 22.00 - 41.00 %    Monocytes 3.60 0.00 - 13.40 %    Eosinophils 1.80 0.00 - 6.90 %    Basophils 0.90 0.00 - 1.80 %    Neutrophils (Absolute) 5.06 2.00 - 7.15 K/uL    Lymphs (Absolute) 0.54 (L) 1.00 - 4.80 K/uL    Monos (Absolute) 0.22 0.00 - 0.85 K/uL    Eos (Absolute) 0.11 0.00 - 0.51 K/uL    Baso (Absolute) 0.05 0.00 - 0.12 K/uL    Anisocytosis 1+     Macrocytosis 1+    CMP    Collection Time: 11/26/17  1:30 PM   Result Value Ref Range     Sodium 130 (L) 135 - 145 mmol/L    Potassium 3.7 3.6 - 5.5 mmol/L    Chloride 92 (L) 96 - 112 mmol/L    Co2 31 20 - 33 mmol/L    Anion Gap 7.0 0.0 - 11.9    Glucose 120 (H) 65 - 99 mg/dL    Bun 26 (H) 8 - 22 mg/dL    Creatinine 2.02 (H) 0.50 - 1.40 mg/dL    Calcium 8.6 8.5 - 10.5 mg/dL    AST(SGOT) 33 12 - 45 U/L    ALT(SGPT) 14 2 - 50 U/L    Alkaline Phosphatase 140 (H) 30 - 99 U/L    Total Bilirubin 0.6 0.1 - 1.5 mg/dL    Albumin 2.8 (L) 3.2 - 4.9 g/dL    Total Protein 6.8 6.0 - 8.2 g/dL    Globulin 4.0 (H) 1.9 - 3.5 g/dL    A-G Ratio 0.7 g/dL   CREATINE KINASE    Collection Time: 11/26/17  1:30 PM   Result Value Ref Range    CPK Total 25 0 - 154 U/L   ESTIMATED GFR    Collection Time: 11/26/17  1:30 PM   Result Value Ref Range    GFR If  32 (A) >60 mL/min/1.73 m 2    GFR If Non African American 26 (A) >60 mL/min/1.73 m 2   DIFFERENTIAL MANUAL    Collection Time: 11/26/17  1:30 PM   Result Value Ref Range    Bands-Stabs 15.20 (H) 0.00 - 10.00 %    Metamyelocytes 1.80 %    Manual Diff Status PERFORMED    PERIPHERAL SMEAR REVIEW    Collection Time: 11/26/17  1:30 PM   Result Value Ref Range    Peripheral Smear Review see below    PLATELET ESTIMATE    Collection Time: 11/26/17  1:30 PM   Result Value Ref Range    Plt Estimation Normal    MORPHOLOGY    Collection Time: 11/26/17  1:30 PM   Result Value Ref Range    RBC Morphology Present     Giant Platelets 1+    PROCALCITONIN    Collection Time: 11/26/17  1:30 PM   Result Value Ref Range    Procalcitonin 0.28 (H) <0.25 ng/mL   CRP HIGH SENSITIVE (CARDIAC)    Collection Time: 11/26/17  1:30 PM   Result Value Ref Range    C Reactive Protein High Sensitive 33.6 (H) 0.0 - 7.5 mg/L   INFLUENZA A/B BY PCR    Collection Time: 11/26/17  1:37 PM   Result Value Ref Range    Influenza virus A RNA Negative Negative    Influenza virus B, PCR Negative Negative   LACTIC ACID    Collection Time: 11/26/17  3:45 PM   Result Value Ref Range    Lactic Acid 1.3 0.5 -  2.0 mmol/L         Imaging/Procedures Review:    MR-THORACIC SPINE-W/O    (Results Pending)   MR-LUMBAR SPINE-W/O    (Results Pending)     EKG:   per my independant read:  None performed    Assessment and Plan:      * Generalized weakness- (present on admission)   Assessment & Plan    -No specific etiology clear at this time  -She is wheelchair-bound but now has worsening upper extremity weakness, could certainly be possibly related to worsening age wasting syndrome, possible viral or bacterial infection, less likely autoimmune  -CPK is normal  -Consider checking aldolase, we'll get an MRI of the lumbar and thoracic spine without contrast  -HIV cause neuropathy issues as well  -Physical therapy occupational therapy will be consult to  -Check vitamin B12 and folate        HIV wasting syndrome (CMS-HCC)- (present on admission)   Assessment & Plan    -See above        Diabetes (CMS-HCC)- (present on admission)   Assessment & Plan    -Insulin sliding scale, just as needed        Hyponatremia- (present on admission)   Assessment & Plan    -Near baseline, volume status appears appropriate, asymptomatic and continue to trend closely        AIDS (acquired immune deficiency syndrome) (CMS-HCC)- (present on admission)   Assessment & Plan    -Last CD4 count in our computer was in July 2017 which showed a CD4 count of 24  -Outpatient HIV doctor is Dr. Morgan, she endorses 100% adherence to her HIV medications  -Continue outpatient HIV medications and 8 wasting syndrome could certainly be contributing factor        Severe protein-calorie malnutrition (CMS-HCC)- (present on admission)   Assessment & Plan    -Consider Megace given her cachexia and underlying HIV status        ESRD (end stage renal disease) (CMS-HCC)- (present on admission)   Assessment & Plan    -Is on dialysis Tuesday Thursday Saturday, has not missed any sessions or had any issues  -AV fistula with good thrill and no obvious infection  -Nephrology will need to  be counseled in the morning to maintain dialysis during this admission        HTN (hypertension), benign- (present on admission)   Assessment & Plan    -Appears well controlled continue outpatient blood pressure medications              1. Prophylaxis: sc heparin  2. Code: Full code per patient withHerself present    This dictation was created using voice recognition software. The accuracy of the dictation is limited to the abilities of the software. I expect there may be some errors of grammar and possibly content.

## 2017-11-27 NOTE — PROGRESS NOTES
Assumed care of pt. Family at bedside. Call light and personal belongings within reach. Pt denies any needs at this time. Will continue to monitor.

## 2017-11-27 NOTE — THERAPY
"Occupational Therapy Evaluation completed.   Functional Status: Pt presented to the hospital with worsening weakness and muscle aches. Pt reports that over the past year she has become progressively weak to the point where she requires total assist for mobility and most ADLs. Pt is close to her functional baseline but reports a strong desire to increase her independence and decrease her reliance on family caregivers. Pt reports that she has been successful in regaining strength with therapy in the past but then became sick again. Pt would benefit from a trial of OT services to address UE strength and ROM for increasing independence with seated ADLs.   Plan of Care: Will benefit from Occupational Therapy 2 times per week  Discharge Recommendations:  Equipment: Will Continue to Assess for Equipment Needs. Post-acute therapy: Discharge to a transitional care facility for continued skilled therapy services.    See \"Rehab Therapy-Acute\" Patient Summary Report for complete documentation.    "

## 2017-11-27 NOTE — PROGRESS NOTES
Patient incontinent of stool.  Loose, watery stool noted.  Brief changed, rizwan care provided.    Pt repositioned

## 2017-11-27 NOTE — ASSESSMENT & PLAN NOTE
Hx reviewed with patient:  Progressive generalized weakness for about 1 year, initially wheelchair bound but can transfer, worsened over last 2 months not able to transfer anymore.  Lives with family who helps with care.  Now presented with 1 week of bilat shoulder, low back, bilat leg pain that's constant.  Pain is much better today; musculoskeletal in nature; cont supportive care;   Can go to SNF tomorrow, accepted.

## 2017-11-27 NOTE — PROGRESS NOTES
Pt A&O x4. C/o pain, med per MAR. Bedbound, cannot turn self, Q2H turn in place, mepilex on sacrum and moon boots also in place. POC discussed with pt. Hourly rounding in place.

## 2017-11-27 NOTE — PROGRESS NOTES
Skin check done with RN, No pressure ulcer. Preventive measures done. Floating boot in placed, Sacral padding in placed. Teach family member about plan of care.

## 2017-11-28 PROBLEM — R78.81 BACTEREMIA: Status: RESOLVED | Noted: 2017-01-01 | Resolved: 2017-01-01

## 2017-11-28 PROBLEM — R11.2 NAUSEA AND VOMITING: Status: RESOLVED | Noted: 2017-01-01 | Resolved: 2017-01-01

## 2017-11-28 NOTE — PROGRESS NOTES
Pt is A&Ox4. C/o pain, meds per MAR. Unable to ambulate or turn in bed, Q2H turn in place. POC discussed with pt. Hourly rounding in place.

## 2017-11-28 NOTE — PROGRESS NOTES
2 RN skin check, Scabs bilaterally on lower extremities, right lateral ankle scab, proximately quarter size. Redness on bottom of left heel. Pt has heels in moon boots. Healed wound on shin. Pt has redness on bottom, blanching.

## 2017-11-28 NOTE — DISCHARGE PLANNING
Outpatient Dialysis Note    Confirmed patient is active at:    [facility]  [address] [place] [address]   [place] [address]      Schedule: Tuesday, Thursday, Saturday  Time: 11:15 am    Spoke with Isi at facility who confirmed.    Forwarded records for review.    Dialysis Coordinator, [facility]  Maren Quintanilla [phone]

## 2017-11-28 NOTE — CONSULTS
DATE OF SERVICE:  11/27/2017    REQUESTING PHYSICIAN:  Jamel Khan MD    REASON FOR CONSULTATION:  Management of chronic kidney disease, assist in the   need for dialysis.    The patient seen and examined, medical records reviewed.    HISTORY OF PRESENT ILLNESS:  The patient is an unfortunate 47-year-old lady   with a past medical history significant for end-stage renal disease, undergoes   hemodialysis on Tuesday, Thursday, Saturday; history of AIDS, presented to   the hospital with generalized weakness and decreased p.o. intake.  The patient   has been admitted.  She has been evaluated for possible viral infection.  We   were called to manage her kidney disease and assist in the need for urgent   dialysis.    The patient is doing slightly better today.  No fever, no chills.    PAST MEDICAL HISTORY:  Significant for:  1.  End-stage renal disease.  2.  AIDS.  3.  HIV infection.  4.  Diabetes.    ALLERGIES:  The list was reviewed.    MEDICATIONS:  Reviewed.    SOCIAL HISTORY:  No smoking or alcohol use.    FAMILY HISTORY:  No known renal disease.    REVIEW OF SYSTEMS:  The patient is feeling weak, decreased p.o. intake.  No   nausea, no vomiting.  All other review of systems is negative.    PHYSICAL EXAMINATION:  GENERAL:  The patient is very cachectic, but no apparent distress.  VITAL SIGNS:  Showed blood pressure of 136/76, heart rate was 72.  HEENT:  Normocephalic.  Sclerae anicteric.  NECK:  No lymphadenopathy.  CHEST:  Normal.  LUNGS:  Clear to auscultation.  HEART:  S1, S2.  ABDOMEN:  Soft.  EXTREMITIES:  There is trace lower extremity edema.  SKIN:  No skin rashes.  NEUROLOGIC:  The patient is alert and oriented x3.    LABORATORY DATA:  Her recent labs were reviewed.  Sodium was 127.    ASSESSMENT AND PLAN:  1.  End-stage renal disease.  2.  Hyponatremia.  3.  Anemia.  4.  Generalized weakness.    PLAN:  1.  There is no acute need to do dialysis today.  2.  We will plan on dialysis tomorrow.  3.  Renal  dose all medications.  4.  Avoid nephrotoxins.  5.  Nutrition evaluation.  6.  Prognosis is guarded.    Plan discussed in detail with Dr. Khan.       ____________________________________     FADI NAJJAR, MD FN / JUAN    DD:  11/27/2017 16:32:29  DT:  11/27/2017 16:42:48    D#:  [id number]  Job#:  [id number]

## 2017-11-28 NOTE — PROGRESS NOTES
Attempted to call report to [facility] Abel, however refused report.  S6 charge RN Zoey notified, aware of refusal.  Report given to Keri JAMES.  Pt is on transport board scheduled for transport.  Pt updated of transfer.

## 2017-11-28 NOTE — ASSESSMENT & PLAN NOTE
Mild, suspect due to chronic kidney disease  -She denies any blood loss  -Check iron studies  -Epo with dialysis

## 2017-11-28 NOTE — PROGRESS NOTES
Report received, assumed patient care.  Pt A&OX4.  Assessment completed.  Call light within reach, personal belongings available, bed in lowest position, pt calling for assistance.  Pt reports no pain.  Pt incont however declines needing brief to be changed.  Mepilex in place to scrum/coccyx area, skin blanching.  Generalized healing scabs/abrasions present.  Q 2 turns in place, pt requested moon boots to be removed, heels blanching.  Communication board updated, POC discussed.  VSS.  No additional needs at this time.

## 2017-11-28 NOTE — CARE PLAN
Problem: Pain Management  Goal: Pain level will decrease to patient's comfort goal  Outcome: PROGRESSING AS EXPECTED  Pt stating pain, pain assessed. Pain medication given. Will reassess.     Problem: Skin Integrity  Goal: Risk for impaired skin integrity will decrease  Outcome: PROGRESSING AS EXPECTED  Pt bed bound, pt skin assessed. 2 hour turns in place, moon boots in place.

## 2017-11-28 NOTE — PROGRESS NOTES
Transport at bedside.  Pt to transfer to [facility].  All belongings, meds and chart sent with pt.  Tech bringing pt wheelchair.

## 2017-11-28 NOTE — PROGRESS NOTES
Assumed care of pt at 1000. A/Ox4, discussed plan of care. Pt on room air, pt stating N&V, pt bed bound. Pt has gomes boots in place. All needs met at this time. Bed in lowest position, treaded socks on, personal belongings and call light within reach, instructed to call for any assistance.

## 2017-11-29 NOTE — PROGRESS NOTES
[facility]ist Progress Note    Date of Service: 2017    Chief Complaint  47 y.o. female admitted 2017 with body pain/progressive gen weakness    Interval Problem Update  Hx reviewed; feels about the same; progressive gen weakness and cannot walk/transfer to chair since last 2 months; c/o 1 week of pain in bilat shoulders/low back/bilat legs.  Had mild n/v last 2 days but eating ok o/w.  No abdom pain/diarrhea.  Denies resp sx's.    Consultants/Specialty  ID    Disposition  snf        Review of Systems   Constitutional: Positive for malaise/fatigue. Negative for fever.   HENT: Negative for hearing loss.    Eyes: Negative for blurred vision.   Respiratory: Negative for cough, sputum production and shortness of breath.    Cardiovascular: Negative for chest pain.   Gastrointestinal: Positive for nausea and vomiting. Negative for abdominal pain, diarrhea and heartburn.   Genitourinary: Negative for dysuria.   Musculoskeletal: Positive for back pain and myalgias.   Skin: Negative for rash.   Neurological: Positive for weakness. Negative for dizziness.   Psychiatric/Behavioral: Negative for depression.      Physical Exam  Laboratory/Imaging   Hemodynamics  Temp (24hrs), Av.3 °C (97.3 °F), Min:35.9 °C (96.7 °F), Max:36.6 °C (97.9 °F)   Temperature: 36.1 °C (97 °F)  Pulse  Av.1  Min: 59  Max: 87    Blood Pressure: (!) 164/84 (rn notified)      Respiratory      Respiration: 18, Pulse Oximetry: 98 %             Fluids    Intake/Output Summary (Last 24 hours) at 17 1105  Last data filed at 17 2040   Gross per 24 hour   Intake              820 ml   Output             2500 ml   Net            -1680 ml       Nutrition  Orders Placed This Encounter   Procedures   • Diet Order     Standing Status:   Standing     Number of Occurrences:   1     Order Specific Question:   Diet:     Answer:   Regular [1]     Physical Exam   Constitutional: She is oriented to person, place, and time. No distress.    cachectic   HENT:   Head: Normocephalic.   Eyes: EOM are normal.   Neck: Neck supple.   Cardiovascular: Normal rate and regular rhythm.    Pulmonary/Chest: Effort normal and breath sounds normal.   Abdominal: Soft. Bowel sounds are normal. She exhibits no distension. There is no tenderness. There is no rebound.   Musculoskeletal: She exhibits no edema.   Neurological: She is alert and oriented to person, place, and time.   Skin: Skin is warm.   Psychiatric: Her behavior is normal.       Recent Labs      11/26/17   1330  11/27/17 0415  11/28/17   0400   WBC  6.1  5.2  5.0   RBC  3.66*  3.21*  3.23*   HEMOGLOBIN  10.4*  9.5*  9.4*   HEMATOCRIT  33.5*  29.7*  30.3*   MCV  91.5  92.5  93.8   MCH  28.4  29.6  29.1   MCHC  31.0*  32.0*  31.0*   RDW  56.4*  55.6*  56.8*   PLATELETCT  211  200  240   MPV  9.2  9.5  9.3     Recent Labs      11/26/17   1330  11/27/17 0415  11/28/17   0400   SODIUM  130*  127*  130*   POTASSIUM  3.7  3.8  3.5*   CHLORIDE  92*  93*  99   CO2  31  27  23   GLUCOSE  120*  87  78   BUN  26*  31*  37*   CREATININE  2.02*  2.04*  2.47*   CALCIUM  8.6  8.2*  7.7*                      Assessment/Plan     * Generalized weakness- (present on admission)   Assessment & Plan    Hx reviewed with patient:  Progressive generalized weakness for about 1 year, initially wheelchair bound but can transfer, worsened over last 2 months not able to transfer anymore.  Lives with family who helps with care.  Now presented with 1 week of bilat shoulder, low back, bilat leg pain that's constant.  Denies sob/cough/f/c/diarrhea/dysuria.  Had mild n/v last 2 days but still eating ok.  Denies focal weakness/numbness.  Does not recall seeing a neurologist before.  Await Dr. Morgan's input and consider neurology consult.  ?HIV neuropathy/myopathy vs other pathologies.        Hyponatremia- (present on admission)   Assessment & Plan    - could be from hypovolemia, improved.   Continue IVF NS at 100cc/hr. Trend Na  levels.        HIV/AIDS (acquired immune deficiency syndrome) (CMS-HCC)- (present on admission)   Assessment & Plan    - Repeat CD4, CD3 count noted  - Continue Rilpivirine 25 daily  - Continue Tivicay 50 daily  -Continue acyclovir prophylaxis  - I consulted Dr. Morgan to see the patient        HIV wasting syndrome (CMS-HCC)- (present on admission)   Assessment & Plan    - could be contributing to weakness. PT/OT recommending placement        Severe protein-calorie malnutrition (CMS-HCC)- (present on admission)   Assessment & Plan    - will get RD to consult for supplements.         ESRD (end stage renal disease) (CMS-HCC)- (present on admission)   Assessment & Plan    -today's dialysis Tuesday Thursday Saturday, nephrology is consulting  -Continue dialysis        Type 2 diabetes mellitus with renal manifestations (CMS-HCC)- (present on admission)   Assessment & Plan    -A1C is only 5  -DC SSI        HTN (hypertension), benign- (present on admission)   Assessment & Plan    Adjust meds for good control        Anemia- (present on admission)   Assessment & Plan    Mild, suspect due to chronic kidney disease  -She denies any blood loss  -Check iron studies  -Epo with dialysis          Quality-Core Measures

## 2017-11-29 NOTE — PROGRESS NOTES
[facility] Services Progress Note    Hemodialysis treatment ordered today per Dr. Najjar x 3 hours. Treatment initiated at 1445, ended at 1745.     Patient tolerated tx; see paper flow sheet for details.     Net UF 2000 mL.     Needles removed from access site. Dressings applied and sites held x 15 minutes; verified no bleeding. Positive bruit/thrill post tx. Staff RN instructed to monitor AVF for breakthrough bleeding. Should breakthrough bleeding occur staff RN instructed to apply pressure to access sites until bleeding resolved. Notify Dialysis and Nephrologist for follow-up.    Report given to Primary RN.

## 2017-11-29 NOTE — CARE PLAN
Problem: Safety  Goal: Will remain free from injury  Outcome: PROGRESSING AS EXPECTED  Fall precautions in place, bed alarm activated.  Call light within reach, bed in low and locked position, treaded socks on.  No injuries or falls noted throughout shift.      Problem: Venous Thromboembolism (VTW)/Deep Vein Thrombosis (DVT) Prevention:  Goal: Patient will participate in Venous Thrombosis (VTE)/Deep Vein Thrombosis (DVT)Prevention Measures  Outcome: PROGRESSING AS EXPECTED    Intervention: Assess and monitor for anticoagulation complications  Heparin SQ injections given per MAR, pt tolerating well.  No s/s of anticoagulation complications noted.

## 2017-11-29 NOTE — PROGRESS NOTES
[facility] Hospitalist Progress Note    Date of Service: 2017    Chief Complaint  47 y.o. female with ESRD on HD, HIV/AIDS, admitted 2017 with generalized weakness, muscle aches and pains from neck down. Initial labs did not show leukocytosis, but did show 15% bands and elevated procalcitonin.  CPK normal.  CRP 33.6. Influenza A and B negative. Lactate 1.3.     Interval Problem Update  2017 - no overnight events. Afebrile.  MRI of the lumbar spine showed multilevel degenerative changes.  MRI of the thoracic spine showed unchanged minimal posterior disc bulge at T4-5 and decreased disc bulge at T5-6 without significant central canal or neural foraminal stenosis, with trace right pleural effusion.  Empiric abx started for elevated procalcitonin.    : Unchanged clinically, Dr. Morgan consulted.  Tolerating ctx/azithro.  Transferred to inpatient.    Consultants/Specialty  Nephrology  Dr. Morgan - ID    Disposition  Monitor on the CDU. PT/OT eval.   I anticipate that she will need at least 2 midnights hospital stay to provide medically necessary services.         Review of Systems   Constitutional: Positive for malaise/fatigue.   Respiratory: Negative for cough, hemoptysis, sputum production, shortness of breath and wheezing.    Cardiovascular: Negative for chest pain and palpitations.   Gastrointestinal: Positive for nausea and vomiting.   Musculoskeletal: Positive for myalgias.   Neurological: Positive for weakness. Negative for dizziness, speech change, focal weakness and headaches.        Pertinent positives/negatives as mentioned above.     A complete review of systems was done. All other systems were negative.      Physical Exam  Laboratory/Imaging   Hemodynamics  Temp (24hrs), Av.3 °C (97.4 °F), Min:36.1 °C (96.9 °F), Max:36.5 °C (97.7 °F)   Temperature: 36.4 °C (97.5 °F)  Pulse  Av.5  Min: 59  Max: 85    Blood Pressure: (!) 168/89 (rn notified)      Respiratory      Respiration: 16,  Pulse Oximetry: 98 %             Fluids    Intake/Output Summary (Last 24 hours) at 11/28/17 1603  Last data filed at 11/28/17 1400   Gross per 24 hour   Intake              120 ml   Output                0 ml   Net              120 ml       Nutrition  Orders Placed This Encounter   Procedures   • Diet Order     Standing Status:   Standing     Number of Occurrences:   1     Order Specific Question:   Diet:     Answer:   Regular [1]     Physical Exam   Constitutional: She is oriented to person, place, and time. She appears well-developed. No distress.   Cachectic   HENT:   Head: Normocephalic and atraumatic.   Mouth/Throat: No oropharyngeal exudate.   Eyes: Conjunctivae are normal. Pupils are equal, round, and reactive to light. No scleral icterus.   Neck: Normal range of motion. Neck supple.   Cardiovascular: Normal rate and regular rhythm.  Exam reveals no gallop and no friction rub.    No murmur heard.  Pulmonary/Chest: Effort normal. No respiratory distress. She has no wheezes. She has no rales. She exhibits no tenderness.   Diminished air entry B/L bases, otherwise clear to auscultation   Abdominal: Soft. Bowel sounds are normal. She exhibits no distension. There is no tenderness. There is no rebound and no guarding.   Musculoskeletal: She exhibits tenderness ( Diffuse muscle tenderness).   Lymphadenopathy:     She has no cervical adenopathy.   Neurological: She is alert and oriented to person, place, and time. No cranial nerve deficit. Coordination normal.   Skin: Skin is warm and dry. No rash noted. She is not diaphoretic. No erythema. No pallor.   Psychiatric: Her behavior is normal. Judgment and thought content normal.   Flat affect   Nursing note and vitals reviewed.        Recent Labs      11/26/17   1330  11/27/17   0415  11/28/17   0400   WBC  6.1  5.2  5.0   RBC  3.66*  3.21*  3.23*   HEMOGLOBIN  10.4*  9.5*  9.4*   HEMATOCRIT  33.5*  29.7*  30.3*   MCV  91.5  92.5  93.8   MCH  28.4  29.6  29.1   MCHC   31.0*  32.0*  31.0*   RDW  56.4*  55.6*  56.8*   PLATELETCT  211  200  240   MPV  9.2  9.5  9.3     Recent Labs      11/26/17   1330  11/27/17   0415  11/28/17   0400   SODIUM  130*  127*  130*   POTASSIUM  3.7  3.8  3.5*   CHLORIDE  92*  93*  99   CO2  31  27  23   GLUCOSE  120*  87  78   BUN  26*  31*  37*   CREATININE  2.02*  2.04*  2.47*   CALCIUM  8.6  8.2*  7.7*                      Assessment/Plan     Generalized weakness- (present on admission)   Assessment & Plan    - Concerning for infection given that she is immunosuppressed. Suspect due to systemic viral illness, with initial bandemia, however procalcitonin is positive and she had bandemia so empiric CTX/Azithro were started  - Infectious work-up negative so far.   - Follow up respiratory virus PCR.  - Chest CT showed improvement of previously seen Groundglass opacities. There was a small right pleural effusion and bibasilar atelectasis.  - PT/OT eval for discharge planning, likely to SNF  - I discussed with Dr. Morgan regarding any further testing that is indicated.        Hyponatremia- (present on admission)   Assessment & Plan    - could be from hypovolemia, improved.   Continue IVF NS at 100cc/hr. Trend Na levels.        Hypokalemia- (present on admission)   Assessment & Plan    Mild, suspect due to vomiting  -Replace PO  -Repeat in AM        HIV/AIDS (acquired immune deficiency syndrome) (CMS-HCC)- (present on admission)   Assessment & Plan    - Repeat CD4, CD3 count pending  - Continue Rilpivirine 25 daily  - Continue Tivicay 50 daily  -Continue acyclovir prophylaxis  - I consulted Dr. Morgan to see the patient        HIV wasting syndrome (CMS-HCC)- (present on admission)   Assessment & Plan    - could be contributing to weakness. PT/OT recommending placement        Severe protein-calorie malnutrition (CMS-HCC)- (present on admission)   Assessment & Plan    - will get RD to consult for supplements.         ESRD (end stage renal disease)  (CMS-HCC)- (present on admission)   Assessment & Plan    -today's dialysis Tuesday Thursday Saturday, nephrology is consulting  -Continue dialysis        Type 2 diabetes mellitus with renal manifestations (CMS-HCC)- (present on admission)   Assessment & Plan    -A1C is only 5  -DC SSI        HTN (hypertension), benign- (present on admission)   Assessment & Plan    - good control. Continue home dose [id number] mg daily        Anemia- (present on admission)   Assessment & Plan    Mild, suspect due to chronic kidney disease  -She denies any blood loss  -Check iron studies  -Epo with dialysis            Reviewed items::  Labs reviewed, Medications reviewed and Radiology images reviewed  Julien catheter::  No Julien  DVT prophylaxis pharmacological::  Heparin  Ulcer Prophylaxis::  Not indicated  Antibiotics:  Treating active infection/contamination beyond 24 hours perioperative coverage

## 2017-11-29 NOTE — PROGRESS NOTES
47 year old female seen in f/u for HIV infection. She has been a patient at [facility] since June 2017 when she was referred for treatment of severe AIDS infection. She had been admitted to [facility] with wasting, CD4 of 4 and viral load of 375,000. She had Mycobacterium avium bacteremia, diarrhea, severe HSV perineal ulcerations, and thrush. She was very weak even then, has never been seen to be able to stand up. She has done remarkably well, she received 3 months of triple therapy for MAC, fluconazole, acyclovir and placed on dolutegravir and rilpivirine for the HIV. Her weight has increased, her bacteremia resolved and she stopped MAC therapy after 3 months, her HSV is in remission., and her latest CD4 count is 120 with an HIV RNA of 120 copies. She has remained wheelchair bound , etiology of the weakness has not been clear. She does have diabetes for over 20 years, and is on dialysis  She has been cared for by her ex-, meds placed in pill boxes by [facility] RN.  She had some muscle aches on admit now, but this has resolved. No respiratory or  complaints, no diarrhea, no back pain    Allergies- sulfa  PMH- HIV/ESRD since 9/2016/Htn/anemia       Meds-  Dolutegravir, rilpivirine, acyclovir, prozac . Off sulfa due to allergy.     PE- alert , comfortable appearing, afebrile     Oral-no thrush     Lungs- clear      Card- no m  '   Ext- scars on legs- diabetic related      Neuro- decreased reflexes and weak in legs    Asses:   AIDS- doing much better on therapy. Wasting resolving   MAC- resolved   thrush- gone   Weakness- chronic, present for months, not clear cause, doubt CMV ? Diabetic or nutritional deficiency induced  neuropathy   She appears stable at present, no evidence for active infection,     REC: SNF placement for PT            Stop azithro and ceftriaxone

## 2017-11-29 NOTE — PROGRESS NOTES
Care assumed at 1915, pt resting comfortably in bed eating evening meal.  A&O x4, on Ra, no respiratory distress noted.  Pt bed bound, Q2 turns in place, heel boots in place.  Pt reports discomfort in back requesting pain medication when available.  Education given on all medications and POC.  Call light within reach, bed in low and locked position, side rails up x2.  Fall precautions in place with bed alarm activated.  Encouraged to ring for any needs or assistance.

## 2017-11-29 NOTE — PROGRESS NOTES
[facility] Medicine Progress Note, Adult, Complex               Author: Fadi Najjar Date & Time created: 2017  4:27 PM     Interval History:  Pt with ESRD, presented with weakness.  Seen and examined while getting HD.      Review of Systems:  Review of Systems   Cardiovascular: Negative for chest pain and leg swelling.   Gastrointestinal: Negative for nausea and vomiting.   Genitourinary: Negative for dysuria and urgency.       Physical Exam:  Physical Exam   HENT:   Nose: Nose normal.   Pulmonary/Chest: Effort normal. She has no wheezes.   Musculoskeletal: She exhibits no edema.   Nursing note and vitals reviewed.      Labs:        Invalid input(s): [id number]  Recent Labs      17   1330   CPKTOTAL  25     Recent Labs      17   1330  17   0415  17   0400   SODIUM  130*  127*  130*   POTASSIUM  3.7  3.8  3.5*   CHLORIDE  92*  93*  99   CO2  31  27  23   BUN  26*  31*  37*   CREATININE  2.02*  2.04*  2.47*   MAGNESIUM   --   1.9   --    CALCIUM  8.6  8.2*  7.7*     Recent Labs      17   1330  17   0415  17   0400   ALTSGPT  14   --    --    ASTSGOT  33   --    --    ALKPHOSPHAT  140*   --    --    TBILIRUBIN  0.6   --    --    GLUCOSE  120*  87  78     Recent Labs      17   1330  17   0415  17   0400   RBC  3.66*  3.21*  3.23*   HEMOGLOBIN  10.4*  9.5*  9.4*   HEMATOCRIT  33.5*  29.7*  30.3*   PLATELETCT  211  200  240     Recent Labs      17   1330  17   0415  17   0400   WBC  6.1  5.2  5.0   NEUTSPOLYS  67.80  65.40  68.40   LYMPHOCYTES  8.90*  9.40*  6.10*   MONOCYTES  3.60  10.60  4.40   EOSINOPHILS  1.80  5.00  0.90   BASOPHILS  0.90  1.30  3.50*   ASTSGOT  33   --    --    ALTSGPT  14   --    --    ALKPHOSPHAT  140*   --    --    TBILIRUBIN  0.6   --    --            Hemodynamics:  Temp (24hrs), Av.3 °C (97.4 °F), Min:36.1 °C (96.9 °F), Max:36.5 °C (97.7 °F)  Temperature: 36.4 °C (97.5 °F)  Pulse  Av.5  Min: 59  Max:  85   Blood Pressure: (!) 168/89 (rn notified)     Respiratory:    Respiration: 16, Pulse Oximetry: 98 %           Fluids:    Intake/Output Summary (Last 24 hours) at 11/28/17 1627  Last data filed at 11/28/17 1400   Gross per 24 hour   Intake              120 ml   Output                0 ml   Net              120 ml        GI/Nutrition:  Orders Placed This Encounter   Procedures   • Diet Order     Standing Status:   Standing     Number of Occurrences:   1     Order Specific Question:   Diet:     Answer:   Regular [1]     Medical Decision Making, by Problem:  Active Hospital Problems    Diagnosis   • Generalized weakness [R53.1]   • Hyponatremia [E87.1]   • Hypokalemia [E87.6]   • HIV/AIDS (acquired immune deficiency syndrome) (CMS-HCC) [B20]   • HIV wasting syndrome (CMS-HCC) [B20, R64]   • Severe protein-calorie malnutrition (CMS-HCC) [E43]   • ESRD (end stage renal disease) (CMS-HCC) [N18.6]   • Type 2 diabetes mellitus with renal manifestations (CMS-[facility]) [E11.29]   • HTN (hypertension), benign [I10]   • Anemia [D64.9]         Reviewed items::  Labs reviewed  1 ESRD: HD  Renal dose all meds  Avoid nephrotoxins  Prognosis poor.

## 2017-11-29 NOTE — PROGRESS NOTES
Bedside report received and assumed pt care. Pt aox 3, reoriented pt to time. Pt repositioned for comfort. Boots in place. Pt complaints of nausea with small emesis. Medicate pt. POC discussed. Call light use encouraged. Bed alarm on.

## 2017-11-29 NOTE — PROGRESS NOTES
Notified pts blood pressure 192/94, no PRN blood pressure medication available. Paged MD Benz. Waiting on call back.

## 2017-11-30 NOTE — CARE PLAN
Problem: Pain Management  Goal: Pain level will decrease to patient's comfort goal    Intervention: Educate and implement non-pharmacologic comfort measures. Examples: relaxation, distration, play therapy, activity therapy, massage, etc.  Repositioning and food used for redirection and distraction used for non pharmacological intervention. Pt requested x1 for pharmacological intervention.       Problem: Urinary Elimination:  Goal: Ability to reestablish a normal urinary elimination pattern will improve    Intervention: Encourage scheduled voiding  Pt anuric due to dialysis.       Problem: Skin Integrity  Goal: Risk for impaired skin integrity will decrease    Intervention: Assess and monitor skin integrity, appearance and/or temperature  Repositioned Q2 hrs and as needed. Pt tolerating repositioning. Pt unable to sit up at this time.

## 2017-11-30 NOTE — PROGRESS NOTES
Care assumed at 1915, pt resting comfortably in bed eating evening meal, family at bedside.   A&O x4, on Ra, no respiratory distress noted.  Pt bed bound, Q2 turns in place, heel boots in place. Pt given pain medication as per MAR.  Education given on all medications and POC.  Call light within reach, bed in low and locked position, side rails up x2.  Fall precautions in place with bed alarm activated.  Encouraged to ring for any needs or assistance

## 2017-11-30 NOTE — DISCHARGE PLANNING
Medical Social Work  Attempted to complete a PASRR under patient's name, unable to.    PC to PASRR Help line: patient is under [facility]  Pasrr # [id number]  LOC# [id number]

## 2017-11-30 NOTE — PROGRESS NOTES
HD treatment ordered by Dr Najjar started at 0946 and ended at 1248 with net uf of 2000 ml as bp tolerated. Hypertensive entire treatment with no significant change in condition post treatment. See flow sheet for details.

## 2017-11-30 NOTE — PROGRESS NOTES
"[facility] Hospitalist Progress Note    Date of Service: 2017    Chief Complaint  47 y.o. female admitted 2017 with body pain/progressive gen weakness    Interval Problem Update  Doing better; pain much less; no new c/o; admits to having problem at home, currently staying with ex- 's sister and it's \"crowded\"; s/p HD today.    Consultants/Specialty  ID    Disposition  snf        Review of Systems   Constitutional: Negative for fever and malaise/fatigue.   HENT: Negative for hearing loss.    Eyes: Negative for blurred vision.   Respiratory: Negative for cough, sputum production and shortness of breath.    Cardiovascular: Negative for chest pain.   Gastrointestinal: Negative for abdominal pain, diarrhea, heartburn, nausea and vomiting.   Genitourinary: Negative for dysuria.   Musculoskeletal: Positive for back pain and myalgias.   Skin: Negative for rash.   Neurological: Positive for weakness. Negative for dizziness.   Psychiatric/Behavioral: Negative for depression.      Physical Exam  Laboratory/Imaging   Hemodynamics  Temp (24hrs), Av.4 °C (97.5 °F), Min:36.1 °C (96.9 °F), Max:36.6 °C (97.9 °F)   Temperature: 36.4 °C (97.5 °F)  Pulse  Av.2  Min: 59  Max: 90    Blood Pressure: (!) 180/74      Respiratory      Respiration: 15, Pulse Oximetry: 97 %             Fluids    Intake/Output Summary (Last 24 hours) at 17 1510  Last data filed at 17 1250   Gross per 24 hour   Intake              600 ml   Output             2500 ml   Net            -1900 ml       Nutrition  Orders Placed This Encounter   Procedures   • Diet Order     Standing Status:   Standing     Number of Occurrences:   1     Order Specific Question:   Diet:     Answer:   Regular [1]     Physical Exam   Constitutional: She is oriented to person, place, and time. No distress.   cachectic   HENT:   Head: Normocephalic.   Eyes: EOM are normal.   Neck: Neck supple.   Cardiovascular: Normal rate and regular rhythm.  "   Pulmonary/Chest: Effort normal and breath sounds normal.   Abdominal: Soft. Bowel sounds are normal. She exhibits no distension. There is no tenderness. There is no rebound.   Musculoskeletal: She exhibits no edema.   Neurological: She is alert and oriented to person, place, and time.   Skin: Skin is warm.   Psychiatric: Her behavior is normal.       Recent Labs      11/28/17   0400   WBC  5.0   RBC  3.23*   HEMOGLOBIN  9.4*   HEMATOCRIT  30.3*   MCV  93.8   MCH  29.1   MCHC  31.0*   RDW  56.8*   PLATELETCT  240   MPV  9.3     Recent Labs      11/28/17   0400   SODIUM  130*   POTASSIUM  3.5*   CHLORIDE  99   CO2  23   GLUCOSE  78   BUN  37*   CREATININE  2.47*   CALCIUM  7.7*                      Assessment/Plan     * Generalized weakness- (present on admission)   Assessment & Plan    Hx reviewed with patient:  Progressive generalized weakness for about 1 year, initially wheelchair bound but can transfer, worsened over last 2 months not able to transfer anymore.  Lives with family who helps with care.  Now presented with 1 week of bilat shoulder, low back, bilat leg pain that's constant.  Pain is much better today; musculoskeletal in nature; cont supportive care;   Can go to SNF tomorrow, accepted.        Hyponatremia- (present on admission)   Assessment & Plan    Stable; f/u labs        HIV/AIDS (acquired immune deficiency syndrome) (CMS-HCC)- (present on admission)   Assessment & Plan    - Repeat CD4, CD3 count noted  - Continue Rilpivirine 25 daily  - Continue Tivicay 50 daily  -Continue acyclovir prophylaxis  - I consulted Dr. Morgan to see the patient        HIV wasting syndrome (CMS-HCC)- (present on admission)   Assessment & Plan    - could be contributing to weakness. PT/OT recommending placement        Severe protein-calorie malnutrition (CMS-formerly [facility])- (present on admission)   Assessment & Plan    - will get RD to consult for supplements.         ESRD (end stage renal disease) (CMS-formerly [facility])- (present on  admission)   Assessment & Plan     dialysis Tuesday Thursday Saturday, nephrology is consulting  -Continue dialysis  stable        Type 2 diabetes mellitus with renal manifestations (CMS-HCC)- (present on admission)   Assessment & Plan    -A1C is only 5  -DC SSI        HTN (hypertension), benign- (present on admission)   Assessment & Plan    Adjust meds for good control        Anemia- (present on admission)   Assessment & Plan    Mild, suspect due to chronic kidney disease  -She denies any blood loss  -Check iron studies  -Epo with dialysis          Quality-Core Measures

## 2017-11-30 NOTE — DISCHARGE PLANNING
Medical Social Work  Patient Liasion from [facility] stated they can take patient tomorrow.    Informed patient's Dr Ramos, can d/c her tomorrow afternoon.    Informed patient that [facility] has accepted her and we are looking at D/C her tomorrow at noon.  Patient agreed.    Faxed Transport communication form to [facility] for a tentative transport time of noon to [facility].

## 2017-11-30 NOTE — CARE PLAN
Problem: Communication  Goal: The ability to communicate needs accurately and effectively will improve  Outcome: PROGRESSING AS EXPECTED  Pt A&O x4, uses call light appropriately.  Pt able to effectively communicate needs to staff.  Call light remains in reach.      Problem: Discharge Barriers/Planning  Goal: Patient's continuum of care needs will be met  Outcome: PROGRESSING AS EXPECTED  Pt meeting with SW and family for discharge concerns and needs.  Pt working with PT/OT therapy for strengthening, anticipating SNF placement for rehab.

## 2017-11-30 NOTE — PROGRESS NOTES
Spoke with ex . Family states that they are unable to cake for pt at this time. Pt and family to requesting to transfer to a facility instead.

## 2017-11-30 NOTE — PROGRESS NOTES
Hospital Medicine Progress Note, Adult, Complex               Author: Fadi Najjar Date & Time created: 2017  1:22 PM     Interval History:  Pt with ESRD, presented with weakness.  Feels better today  Seen and examined while getting HD.    Review of Systems:  Review of Systems   Cardiovascular: Negative for chest pain and leg swelling.   Gastrointestinal: Negative for nausea and vomiting.   Genitourinary: Negative for dysuria and urgency.       Physical Exam:  Physical Exam   HENT:   Nose: Nose normal.   Pulmonary/Chest: Effort normal. She has no wheezes.   Musculoskeletal: She exhibits no edema.   Nursing note and vitals reviewed.      Labs:        Invalid input(s): [id number]      Recent Labs      17   0400   SODIUM  130*   POTASSIUM  3.5*   CHLORIDE  99   CO2  23   BUN  37*   CREATININE  2.47*   CALCIUM  7.7*     Recent Labs      17   0400   GLUCOSE  78     Recent Labs      17   0400   RBC  3.23*   HEMOGLOBIN  9.4*   HEMATOCRIT  30.3*   PLATELETCT  240     Recent Labs      17   0400   WBC  5.0   NEUTSPOLYS  68.40   LYMPHOCYTES  6.10*   MONOCYTES  4.40   EOSINOPHILS  0.90   BASOPHILS  3.50*           Hemodynamics:  Temp (24hrs), Av.4 °C (97.5 °F), Min:36.1 °C (96.9 °F), Max:36.6 °C (97.9 °F)  Temperature: 36.4 °C (97.5 °F)  Pulse  Av.9  Min: 59  Max: 90   Blood Pressure: 135/67     Respiratory:    Respiration: 15, Pulse Oximetry: 97 %           Fluids:    Intake/Output Summary (Last 24 hours) at 17 1322  Last data filed at 17 2048   Gross per 24 hour   Intake              100 ml   Output                0 ml   Net              100 ml        GI/Nutrition:  Orders Placed This Encounter   Procedures   • Diet Order     Standing Status:   Standing     Number of Occurrences:   1     Order Specific Question:   Diet:     Answer:   Regular [1]     Medical Decision Making, by Problem:  Active Hospital Problems    Diagnosis   • Generalized weakness [R53.1]   • Hyponatremia  [E87.1]   • Hypokalemia [E87.6]   • HIV/AIDS (acquired immune deficiency syndrome) (CMS-HCC) [B20]   • HIV wasting syndrome (CMS-HCC) [B20, R64]   • Severe protein-calorie malnutrition (CMS-HCC) [E43]   • ESRD (end stage renal disease) (CMS-HCC) [N18.6]   • Type 2 diabetes mellitus with renal manifestations (CMS-HCC) [E11.29]   • HTN (hypertension), benign [I10]   • Anemia [D64.9]         Reviewed items::  Labs reviewed  1 ESRD: HD  2 Hyponatremia: limit free water intake  HD today  Renal dose all meds  Avoid nephrotoxins  Prognosis poor.

## 2017-11-30 NOTE — DISCHARGE PLANNING
Medical Social Work  Talked to patient about d/c planning and recommendations of PT/OT.  Patient agreed with the recommendations, allowed me to submit a blanket referral.    Faxed Choice to [facility].

## 2017-12-01 NOTE — DISCHARGE PLANNING
Medical Social Work  Informed patient she will be d/c today at noon, going to [facility].

## 2017-12-01 NOTE — CARE PLAN
Problem: Urinary Elimination:  Goal: Ability to reestablish a normal urinary elimination pattern will improve    Intervention: Implement bladder training program  Educated pt with time voiding. Pt states that she does not have the sensation when she needs to void.       Problem: Bowel/Gastric:  Goal: Will not experience complications related to bowel motility    Intervention: Assess baseline bowel pattern  Pt incontinent of bowel elimination. Pt states that she cant tell if she is passing gas or defacating.  Pt states that she will let us know next time if she feels like passing gas to avoid incontinent episode.

## 2017-12-01 NOTE — DISCHARGE INSTRUCTIONS
Discharge Instructions    Discharged to group home by medical transportation with escort. Discharged via wheelchair, hospital escort: Refused.  Special equipment needed: Not Applicable    Be sure to schedule a follow-up appointment with your primary care doctor or any specialists as instructed.     Discharge Plan:   Influenza Vaccine Indication: Not indicated: Previously immunized this influenza season and > 8 years of age    I understand that a diet low in cholesterol, fat, and sodium is recommended for good health. Unless I have been given specific instructions below for another diet, I accept this instruction as my diet prescription.   Other diet: Regular    Special Instructions: None    · Is patient discharged on Warfarin / Coumadin?   No     · Is patient Post Blood Transfusion?  No    Depression / Suicide Risk    As you are discharged from this [facility], it is important to learn how to keep safe from harming yourself.    Recognize the warning signs:  · Abrupt changes in personality, positive or negative- including increase in energy   · Giving away possessions  · Change in eating patterns- significant weight changes-  positive or negative  · Change in sleeping patterns- unable to sleep or sleeping all the time   · Unwillingness or inability to communicate  · Depression  · Unusual sadness, discouragement and loneliness  · Talk of wanting to die  · Neglect of personal appearance   · Rebelliousness- reckless behavior  · Withdrawal from people/activities they love  · Confusion- inability to concentrate     If you or a loved one observes any of these behaviors or has concerns about self-harm, here's what you can do:  · Talk about it- your feelings and reasons for harming yourself  · Remove any means that you might use to hurt yourself (examples: pills, rope, extension cords, firearm)  · Get professional help from the community (Mental Health, Substance Abuse, psychological counseling)  · Do not be  alone:Call your Safe Contact- someone whom you trust who will be there for you.  · Call your local CRISIS HOTLINE 640-3709 or 140-232-7302  · Call your local Children's Mobile Crisis Response Team [place] (748) 079-3820 or www.[employer]  · Call the toll free National Suicide Prevention Hotlines   · National Suicide Prevention Lifeline 094-047-MZQB (6385)  · National [employer] Line Network 800-SUICIDE (783-8479)

## 2017-12-01 NOTE — PROGRESS NOTES
Hospital Medicine Progress Note, Adult, Complex               Author: Fadi Najjar Date & Time created: 2017  12:52 PM     Interval History:  Pt with ESRD, presented with weakness.  Feels better today  No new complaints    Review of Systems:  Review of Systems   Cardiovascular: Negative for chest pain and leg swelling.   Gastrointestinal: Negative for nausea and vomiting.   Genitourinary: Negative for dysuria and urgency.       Physical Exam:  Physical Exam   HENT:   Nose: Nose normal.   Pulmonary/Chest: Effort normal. She has no wheezes.   Musculoskeletal: She exhibits no edema.   Nursing note and vitals reviewed.      Labs:        Invalid input(s): [id number]      No results for input(s): SODIUM, POTASSIUM, CHLORIDE, CO2, BUN, CREATININE, MAGNESIUM, PHOSPHORUS, CALCIUM in the last 72 hours.  No results for input(s): ALTSGPT, ASTSGOT, ALKPHOSPHAT, TBILIRUBIN, DBILIRUBIN, GAMMAGT, AMYLASE, LIPASE, ALB, PREALBUMIN, GLUCOSE in the last 72 hours.  No results for input(s): RBC, HEMOGLOBIN, HEMATOCRIT, PLATELETCT, PROTHROMBTM, APTT, INR, IRON, FERRITIN, TOTIRONBC in the last 72 hours.            Hemodynamics:  Temp (24hrs), Av.7 °C (98.1 °F), Min:36.6 °C (97.9 °F), Max:36.9 °C (98.4 °F)  Temperature: 36.7 °C (98 °F)  Pulse  Av.6  Min: 59  Max: 100   Blood Pressure: (!) 161/86     Respiratory:    Respiration: 16, Pulse Oximetry: 99 %           Fluids:    Intake/Output Summary (Last 24 hours) at 17 1252  Last data filed at 17 0600   Gross per 24 hour   Intake              240 ml   Output                0 ml   Net              240 ml        GI/Nutrition:  Orders Placed This Encounter   Procedures   • Diet Order     Standing Status:   Standing     Number of Occurrences:   1     Order Specific Question:   Diet:     Answer:   Regular [1]     Medical Decision Making, by Problem:  Active Hospital Problems    Diagnosis   • Generalized weakness [R53.1]   • Hyponatremia [E87.1]   • Hypokalemia [E87.6]    • HIV/AIDS (acquired immune deficiency syndrome) (CMS-HCC) [B20]   • HIV wasting syndrome (CMS-HCC) [B20, R64]   • Severe protein-calorie malnutrition (CMS-HCC) [E43]   • ESRD (end stage renal disease) (CMS-HCC) [N18.6]   • Type 2 diabetes mellitus with renal manifestations (CMS-HCC) [E11.29]   • HTN (hypertension), benign [I10]   • Anemia [D64.9]         Reviewed items::  Labs reviewed  1 ESRD: HD  2 Hyponatremia: limit free water intake  No need for HD today  Renal dose all meds  Avoid nephrotoxins  Prognosis poor.

## 2017-12-01 NOTE — PROGRESS NOTES
Pt refusing bed and strip alarms. Pt educated on fall and injury prevention. Pt verbalize understanding and continue to refuse alarms.

## 2017-12-01 NOTE — PROGRESS NOTES
Report received from day shift nurse. Assumed care at 1900.     Patient is alert and oriented x 4. Able to make needs known. Assessment completed. On room air, tolerating well. Left AV fistula noted, dressing CDI, bruit and thrill present. Complained of pain, PRN pain medication given. Patient educated regarding plan of care. Bed locked, in lowest position, treaded socks on. Heel float boots on. Call light and personal belongings within reach.

## 2017-12-01 NOTE — DISCHARGE SUMMARY
CHIEF COMPLAINT ON ADMISSION  Chief Complaint   Patient presents with   • Neck Pain     x 1 week    • Back Pain   • Generalized Body Aches   • N/V       CODE STATUS  Full Code    HPI & HOSPITAL COURSE  This is a 47 y.o. female w/h/o end-stage renal disease on dialysis and AIDS who presents with above chief complaint. Patient states that for the last week she's had worsening muscle ache and pains from the neck down. She says he's never quite had symptoms like this before and describes it as body aches. She's not sure what makes the pain worse or better. She denies any associated numbness with these issues. She is compliant and adherent with all her HIV medications and has not missed any dialysis sessions. She's had no current issues are recent issues of her dialysis sessions either. Her weight is remaining the same and she denies any obvious sick contacts. She denies any new skin rashes or lesions. She is bound but she's felt over the last several days that her arms are so weak that she can't even propel herself forward in the wheelchair. How she currently feels is different now she fell earlier this year when she was diagnosed with enterococcal bacteremia. Her outpatient HIV doctor is Dr. Morgan. She denies any oral lesions and says that her appetite is actually remained quite well. She's not started or stopped any new prescription medications or over-the-counter medications either.  Further history obtained from patient revealed that she has been chair/bed bound for the past year and not able to transfer for last 2 months.  She has been dependent on her ex  and her son for care at home.  No focal weakness/numbness to suggest neurologic condition.  Likely cause is progressive debility due to disuse and malnutrition, with underlying AIDS/ESRD.  Patient was continue with HD with nephro consultant.  She was seen by Dr. Morgan who ordered viral panel which came back negative.  He suggested to stop her  Azithromycin at this time.  Patient's musculoskeletal pain is better with supportive care and she has no new c/o at this time.  Her home situation is not good as patient is living with her ex 's sister and is looking for another place to stay.  She will be transferred to SNF for short term rehab/pain control before going home.    Therefore, she is discharged in fair and stable condition with close outpatient follow-up.    SPECIFIC OUTPATIENT FOLLOW-UP  PCP/ID/nephro; cont current outpatient HD    DISCHARGE PROBLEM LIST  Principal Problem:    Generalized weakness POA: Yes  Active Problems:    Hyponatremia POA: Yes    Anemia POA: Yes    HTN (hypertension), benign POA: Yes    Type 2 diabetes mellitus with renal manifestations (CMS-HCC) POA: Yes    ESRD (end stage renal disease) (CMS-HCC) (Chronic) POA: Yes    Severe protein-calorie malnutrition (CMS-HCC) POA: Yes    HIV wasting syndrome (CMS-HCC) (Chronic) POA: Yes    HIV/AIDS (acquired immune deficiency syndrome) (CMS-HCC) POA: Yes  Resolved Problems:    Hypokalemia POA: Yes      FOLLOW UP  No future appointments.  Shonna Willoughby M.D.  [address] [facility] [address]  [facility] [address]  [phone]    Schedule an appointment as soon as possible for a visit in 1 week  Hospital follow-up appointment with PCP    [facility] ([facility])  [address] [facility] [address]  [phone]          MEDICATIONS ON DISCHARGE   [facility]   Home Medication Instructions SONI:[id number]    Printed on:12/01/17 7008   Medication Information                      acetaminophen (TYLENOL) 325 MG Tab  Take 325 mg by mouth every 8 hours as needed.             acyclovir (ZOVIRAX) 400 MG tablet  Take 800 mg by mouth every day.             amlodipine (NORVASC) 5 MG Tab  Take 2 Tabs by mouth every day.             diphenoxylate-atropine (LOMOTIL) 2.5-0.025 MG Tab  Take 1 Tab by mouth 4 times a day as needed for Diarrhea.             dolutegravir (TIVICAY) 50 MG Tab  tablet  Take 50 mg by mouth every day. Indications: HIV Disease             fluoxetine (PROZAC) 10 MG Cap  Take 10 mg by mouth every day. Indications: Depression             heparin 5000 UNIT/ML Solution  Inject 1 mL as instructed every 12 hours.             hydrocodone-acetaminophen (NORCO) 5-325 MG Tab per tablet  Take 1-2 Tabs by mouth every four hours as needed.             lisinopril (PRINIVIL) 20 MG Tab  Take 1 Tab by mouth every day.             ondansetron (ZOFRAN ODT) 4 MG TABLET DISPERSIBLE  Take 1 Tab by mouth every four hours as needed for Nausea/Vomiting (give PO if no IV route available).             Rilpivirine HCl (EDURANT) 25 MG Tab  Take 1 Tab by mouth every day at 6 PM. Indications: HIV Disease                 DIET  Orders Placed This Encounter   Procedures   • Diet Order     Standing Status:   Standing     Number of Occurrences:   1     Order Specific Question:   Diet:     Answer:   Regular [1]       ACTIVITY  As tolerated.  Weight bearing as tolerated      CONSULTATIONS  ID/nephro    PROCEDURES  Chest CT showed:  1.  Small right pleural effusion.    2.  Bibasilar atelectasis.    3.  Interval improvement in ill-defined bilateral groundglass opacities.    4.  Gallstones.    5.  Anasarca.    6.  Extensive atherosclerotic vascular calcification.    7.  Splenomegaly.    LABORATORY  Lab Results   Component Value Date/Time    SODIUM 130 (L) 11/28/2017 04:00 AM    POTASSIUM 3.5 (L) 11/28/2017 04:00 AM    CHLORIDE 99 11/28/2017 04:00 AM    CO2 23 11/28/2017 04:00 AM    GLUCOSE 78 11/28/2017 04:00 AM    BUN 37 (H) 11/28/2017 04:00 AM    CREATININE 2.47 (H) 11/28/2017 04:00 AM    CREATININE 0.6 09/30/2006 04:42 AM        Lab Results   Component Value Date/Time    WBC 5.0 11/28/2017 04:00 AM    HEMOGLOBIN 9.4 (L) 11/28/2017 04:00 AM    HEMATOCRIT 30.3 (L) 11/28/2017 04:00 AM    PLATELETCT 240 11/28/2017 04:00 AM        Total time of the discharge process exceeds 40 minutes

## 2017-12-01 NOTE — PROGRESS NOTES
"Patient refusing bed alarm. A&OX4. Education provided. Verbalized \"I don't get up.\" Calls appropriately. Charge nurse notified.   "

## 2017-12-01 NOTE — CARE PLAN
Problem: Pain Management  Goal: Pain level will decrease to patient's comfort goal  Outcome: PROGRESSING AS EXPECTED  Patient complained of pain. PRN pain medication given. Patient on bed resting comfortably. Will continue to monitor pain q 2 hrs.    Problem: Safety  Goal: Will remain free from injury  Outcome: PROGRESSING AS EXPECTED  Safety precautions in place. Non skid socks on. Patient calls for assistance appropriately. Call light within reach. Bi hourly rounding in place.

## 2017-12-01 NOTE — DISCHARGE PLANNING
Transport arranged with [facility]. Patient will be leaving today @1200 via [facility] going to [facility]. CARO Guerra notified via [employer].

## 2017-12-11 PROBLEM — A41.9 SEPSIS (HCC): Status: ACTIVE | Noted: 2017-01-01

## 2017-12-11 PROBLEM — J18.9 HCAP (HEALTHCARE-ASSOCIATED PNEUMONIA): Status: ACTIVE | Noted: 2017-01-01

## 2017-12-11 PROBLEM — D61.818 PANCYTOPENIA (HCC): Status: ACTIVE | Noted: 2017-01-01

## 2017-12-11 NOTE — ED NOTES
".Roger Mckeon  .  Chief Complaint   Patient presents with   • Fever     onset of fever this evening at 1900     Patient ATILIO CASTILLO from [facility] with above complaint. Patient received 2 norco and 650mg tylenol at 1900. Sepsis score of 3, protocol started. ./68   Pulse (!) 107   Temp (!) 38.8 °C (101.9 °F)   Resp 18   Ht 1.676 m (5' 6\")   Wt 40.3 kg (88 lb 13.2 oz)   LMP 01/01/2010   SpO2 99%   BMI 14.34 kg/m²     ERP to see.  "

## 2017-12-11 NOTE — PROGRESS NOTES
47 year old HIV infected  female seen in f/u for HIV infection and fever.     HPI: She has been a patient at [facility] since June 2017 when she was referred to me  for treatment of severe AIDS infection. She had been admitted to [facility] with wasting, CD4 of 4 and viral load of 375,000. She had Mycobacterium avium bacteremia, diarrhea, severe HSV perineal ulcerations, and thrush. She was very weak, has never been seen to be able to stand up. She has done remarkably well, she received 3 months of triple therapy for MAC, fluconazole, acyclovir and placed on dolutegravir and rilpivirine for the HIV. Her weight has increased, her MAC bacteremia resolved and she stopped MAC therapy.Her HSV ulcerations are in remission., and her latest CD4 count at [facility] was 120 with an HIV RNA of 120 copies. She has remained wheelchair bound , etiology of the weakness has not been clear. She does have diabetes for over 20 years, and is on dialysis.  She is now residing in [facility] in [place]. Yesterday went out to lunch with her family, came back to [facility] and felt cold, RN checked her temp and was elevated , no associated cough. SOB, rash, diarrhea , vomiting, headache etc.She has a fistula for dialysis- working well.  Came to ER and was febrile to 38 degrees, placed on empiric vanco/cefepime and seen now .  Allergies- sulfa  PMH- HIV/ESRD since 9/2016/Htn/anemia       Meds-  Dolutegravir, rilpivirine, acyclovir, prozac . Off sulfa due to allergy.     PE- alert , comfortable appearing, afebrile     Oral-no thrush     Lungs- clear- normal bilaterally       Card- no m  '   Ext- scars on legs- diabetic related  Skin- well healed ulcers on sacrum      Neuro- decreased reflexes and weak in legs    LABS- CXR looks clear to me   Asses:   Fever- no evidence for any localized bacterial or viral infection. Exam is completely unrevealing today. Could be a MAC recurrence but that would be unusual   AIDS- doing much better on therapy. Wasting  resolving   REC: stop all antibiotics now,. If remains afebrile and cultures all negative  return to SNF tomorrow

## 2017-12-11 NOTE — PROGRESS NOTES
[place] hospitalist Isidro for pt's elevated BP and HR. New orders received for labetalol IV PRN. Will administer.

## 2017-12-11 NOTE — ED NOTES
PIV placed.   Blood and cultures x 2 drawn and sent to lab.   IVF infusing now.   Patient update don POC.

## 2017-12-11 NOTE — PROGRESS NOTES
"Pharmacy Kinetics 47 y.o. female on vancomycin day # 1 12/10/2017    Currently on Vancomycin New Start - Pulse Dosing   Dosing history:              12/10/17 (2148):  1000 mg (25 mg/kg)  Other antibiotics: cefepime 1 g q24h, atovaquone 750 mg PO BID    Indication for Treatment: HCAP    Pertinent history per medical record: Admitted on 12/10/2017 from [facility] for fever and dry cough.  Pertinent PMH include: HIV, ESRD on HD (T/Th/Sat) and recent hospitalization (2017) for Enterococcus faecalis bacteremia, ampicillin sensitive.  Broad spectrum empiric antibiotics initiated for HCAP and atovaquone for PCP pneumonia.  ID has been consulted and will see patient in the morning.       Allergies: Oxycodone     List concerns for renal function: ESRD on HD (T/Th/Sat)    Pertinent cultures to date:   12/10/17 - Peripheral BC x 2: in process  12/10/17 - Urine cx: in process     Recent Labs      12/10/17   2058   WBC  2.5*   NEUTSPOLYS  50.00   BANDSSTABS  4.40     Recent Labs      12/10/17   2058   BUN  32*   CREATININE  2.85*   ALBUMIN  2.6*     No results for input(s): VANCOTROUGH, VANCOPEAK, VANCORANDOM in the last 72 hours.No intake or output data in the 24 hours ending 17 0256   Blood pressure 155/68, pulse 80, temperature 37.3 °C (99.1 °F), resp. rate 19, height 1.676 m (5' 6\"), weight 40.3 kg (88 lb 13.2 oz), last menstrual period 2010, SpO2 100 %. Temp (24hrs), Av.1 °C (100.5 °F), Min:37.3 °C (99.1 °F), Max:38.8 °C (101.9 °F)      A/P   1. Vancomycin dose change: 25 mg/kg load   2. Next vancomycin level:  with AM labs (ordered)   3. Goal trough: 16-20 mcg/mL   4. Comments: Patient started on empiric antibiotics for HCAP.  Will initiate pulse dosing given h/o ESRD with HD every .  Plan for random level with AM labs before next dialysis session on Tuesday.  ID has been consulted.       Flakita Ding, PharmD, BCPS        "

## 2017-12-11 NOTE — DISCHARGE PLANNING
Transitional Care Navigator:    Per MD, patient will return to the skilled care center where she was prior to admission.  Choice form completed and faxed; SW aware.

## 2017-12-11 NOTE — DISCHARGE PLANNING
Received choice form from NOMI Acevedo for SNF. Referral sent to [facility] at [id number].

## 2017-12-11 NOTE — PROGRESS NOTES
Pt seen and examined, admitted ankita this AM, by Dr. Felix  For possible sepsis  and ?? HCAP, she has h/o HIV and also ESRD.  She was started on cefepime atovaquone and vanco. ID was consulted and rec to stop all abx at this time and monitor, and if remains afebrile and cultures neg maybe can be transferred back to skilled tomorrow   Regarding her ESRD, Dr. Najjar has been consulted, pt is on dialysis on T/Th/ Sat, appreciate rec.

## 2017-12-11 NOTE — ED NOTES
Med rec updated and complete.  Allergies reviewed per MARS from [facility].  [place] indicated that family supplies the pts current tivicay   And edurant prescriptions to the facility.

## 2017-12-11 NOTE — ED NOTES
Tech from Lab called with critical result of WBC 2.5 at 2121. Critical lab result read back to tech.

## 2017-12-11 NOTE — ASSESSMENT & PLAN NOTE
She presented with fever  CXR showed hazy bibasilar opacities. Was to be started on Abx for pneumonia but after careful review with Dr. Morgan, ID physician, no Abx started and she improved on her own.   Discussed with Dr. Morgan. OK to discharge to SNF  At discharge date she is afebrile, hemodynamically stable and wants to go back to SNF  I spoke with Dr. Morgan and he agrees to be dischargesd.  Follow up with Shonna Willoughby M.D. Or attending physician at [facility] upon receipt  Follow up with Dr. Garcia in 2-4 weeks

## 2017-12-11 NOTE — H&P
Hospital Medicine History and Physical    Date of Service  12/11/2017    Chief Complaint  Chief Complaint   Patient presents with   • Fever     onset of fever this evening at 1900       History of Presenting Illness  47 y.o. femaleWith a past medical history of HIV/AIDS, end-stage renal disease on hemodialysis TTa who presented 12/10/2017 with fever since yesterday. Patient reports fever and chills that started earlier in the day. She has also developed a dry cough. She denies any chest pain or shortness of breath. She denies any headache, neck stiffness, abdominal pain, diarrhea or dysuria. Patient has been compliant with her medications. She follows  with infectious disease. She received her last session of hemodialysis on Saturday.      Primary Care Physician  Shonna Willoughby M.D.    Consultants  None    Code Status  Full Code    Review of Systems  Review of Systems   Constitutional: Positive for chills, fever and malaise/fatigue.   Respiratory: Positive for cough and sputum production. Negative for shortness of breath.    Cardiovascular: Negative for chest pain.   Gastrointestinal: Negative for abdominal pain, diarrhea, nausea and vomiting.   Genitourinary: Negative for dysuria.   Musculoskeletal: Negative for neck pain.   Neurological: Positive for weakness.   All other systems reviewed and are negative.       Past Medical History  Past Medical History:   Diagnosis Date   • HIV (human immunodeficiency virus infection) (CMS-HCC) 06/2017   • Dental disorder     upper dentures   • Diabetes     on tx since 1999, diet controlled   • Dialysis patient     T, Th, Sat   • H/O: hysterectomy    • Hypertension    • Renal failure    • Seizure disorder (CMS-HCC)        Surgical History  Past Surgical History:   Procedure Laterality Date   • GASTROSCOPY N/A 5/10/2017    Procedure: GASTROSCOPY- Upper Endoscopy ;  Surgeon: Irene Jenkins M.D.;  Location: SURGERY [facility];  Service:    • AV FISTULA  CREATION Left 10/4/2016    Procedure: AV FISTULA GRAFT CREATION;  Surgeon: Travon Franco M.D.;  Location: SURGERY [facility];  Service:    • GASTROSCOPY WITH BIOPSY N/A 2016    Procedure: GASTROSCOPY WITH BIOPSY;  Surgeon: Rachid Salazar M.D.;  Location: SURGERY [facility];  Service:    • INCISION AND DRAINAGE GENERAL  2013    Performed by Conrado Prado M.D. at SURGERY [facility]   • GYN SURGERY         • HYSTERECTOMY LAPAROSCOPY      still has ovaries   • HYSTERECTOMY, VAGINAL     • OTHER      back surgery        Medications  No current facility-administered medications on file prior to encounter.      No current outpatient prescriptions on file prior to encounter.       Family History  Family History   Problem Relation Age of Onset   • Diabetes Mother    • Diabetes Father    • Hypertension Father    • Diabetes Sister        Social History  Social History   Substance Use Topics   • Smoking status: Never Smoker   • Smokeless tobacco: Never Used   • Alcohol use No       Allergies  Allergies   Allergen Reactions   • Oxycodone Itching and Nausea        Physical Exam  Laboratory   Hemodynamics  Temp (24hrs), Av.1 °C (100.5 °F), Min:37.3 °C (99.1 °F), Max:38.8 °C (101.9 °F)   Temperature: 37.3 °C (99.1 °F)  Pulse  Av  Min: 85  Max: 107 Heart Rate (Monitored): 85  Blood Pressure: 155/68, NIBP: 145/72      Respiratory      Respiration: 17, Pulse Oximetry: 99 %             Physical Exam   Constitutional: She is oriented to person, place, and time. No distress.   Cachectic and ill-appearing   HENT:   Head: Normocephalic and atraumatic.   Oral mucous membranes are dry   Eyes: Conjunctivae are normal. Pupils are equal, round, and reactive to light.   Neck: Neck supple.   Cardiovascular: Regular rhythm and normal heart sounds.    Tachycardic   Pulmonary/Chest: Effort normal. No respiratory distress. She has rales (bibasilar).   Bilateral rhonchi   Abdominal: Soft.  She exhibits no distension. There is no tenderness.   Musculoskeletal: Normal range of motion. She exhibits no edema or tenderness.   Neurological: She is alert and oriented to person, place, and time. No cranial nerve deficit. Coordination normal.   Skin: Skin is dry.   Psychiatric: She has a normal mood and affect. Her behavior is normal.   Nursing note and vitals reviewed.      Recent Labs      12/10/17   2058   WBC  2.5*   RBC  2.99*   HEMOGLOBIN  8.9*   HEMATOCRIT  28.3*   MCV  94.6   MCH  29.8   MCHC  31.4*   RDW  57.5*   PLATELETCT  144*   MPV  10.2     Recent Labs      12/10/17   2058   SODIUM  126*   POTASSIUM  4.5   CHLORIDE  90*   CO2  26   GLUCOSE  126*   BUN  32*   CREATININE  2.85*   CALCIUM  9.5     Recent Labs      12/10/17   2058   ALTSGPT  17   ASTSGOT  41   ALKPHOSPHAT  285*   TBILIRUBIN  0.5   GLUCOSE  126*         Recent Labs      12/10/17   2058   BNPBTYPENAT  899*         Lab Results   Component Value Date    TROPONINI <0.02 05/08/2017     Urinalysis:    Lab Results  Component Value Date/Time   SPECGRAVITY 1.015 12/10/2017 2218   GLUCOSEUR 100 (A) 12/10/2017 2218   KETONES Negative 12/10/2017 2218   NITRITE Negative 12/10/2017 2218   WBCURINE 0-2 12/10/2017 2218   RBCURINE 20-50 (A) 12/10/2017 2218   BACTERIA Negative 12/10/2017 2218   EPITHELCELL Few 12/10/2017 2218        Imaging  DX-CHEST-PORTABLE (1 VIEW)   Final Result      1.  Hazy bibasilar opacities may represent atelectasis or developing pneumonia.      2.  Blunted right costophrenic angle, possibly representing scarring or a small pleural effusion.           Assessment/Plan     I anticipate this patient will require at least two midnights for appropriate medical management, necessitating inpatient admission.    HCAP (healthcare-associated pneumonia)- (present on admission)   Assessment & Plan    Patient has been started empirically on IV vancomycin and IV cefepime for HCAP  Given her HIV with low CD4 and CD8 cell counts there is  concern for PCP, I will start the patient on atovaquone, which is a 2nd line agent as recommended by pharmacy, based on her history of end-stage renal disease.  Infectious disease will be consulted in the morning, I will defer further antibiotic recommendations to them  Blood and sputum cultures have been ordered  Patient is currently breathing well on room air, we will continue to monitor her vitals and provide supplemental oxygen as needed  Continue RT protocol        Sepsis (CMS-HCC)- (present on admission)   Assessment & Plan    This is sepsis (without associated acute organ dysfunction).   Patient will be admitted to the telemetry unit with close cardiac monitoring  Likely source is pneumonia  Patient has been started on IV fluids per septic protocol  Patient has been started on IV vancomycin, cefepime and atovaquone  Blood and sputum cultures have been ordered          HIV/AIDS (acquired immune deficiency syndrome) (CMS-HCC)- (present on admission)   Assessment & Plan    Patient is currently on TIVICAY and Rilpivirine   Her CD4 count is low at 63 and CD8 count is low at 149  We will consult infectious disease in the morning for further management        Pancytopenia (CMS-HCC)- (present on admission)   Assessment & Plan    The setting of HIV/AIDS, ESRD and sepsis  Patient has been started on empiric IV antibiotics  We will follow blood cultures  Continue to monitor CBC  Check TSH, iron, vitamin B12 and folate levels  If patient has persistent or worsening of cell counts, we will consider consulting hematology        Severe protein-calorie malnutrition (CMS-HCC)- (present on admission)   Assessment & Plan    Nutrition consult        Hypertension- (present on admission)   Assessment & Plan    Well-controlled  Continue amlodipine and lisinopril  IV hydralazine for systolic blood pressure greater than 160 when necessary        ESRD (end stage renal disease) (CMS-HCC)- (present on admission)   Assessment & Plan     We will consult nephrology in the morning and continue her on hemodialysis              VTE prophylaxis: heparin.

## 2017-12-11 NOTE — PROGRESS NOTES
Pt arrived to floor via betty by myself and CCT Adele. Pt care assumed. Monitor room notified of pt arrival to floor and name verified on monitor- pt HR ST 120s. Patient assessed. A&O x 4. No distress present; no pain. Call light, phone, and bedside table within reach. White board updated and plan of care discussed with patient. Bed alarm set and pt calls for assistance appropriately. Will continue to monitor.

## 2017-12-11 NOTE — PROGRESS NOTES
Bedside eport received from night shift RN. Assumed care. Pt is A&Ox4. Pt is in bed. Pt denies pain at this time. Pt was updated on the plan of care for the day. All questions answered. Pt has call light within reach, bed is in lowest position and bed alarm is on. All fall precautions in place. Pt has non-slip socks on, and appropriate signs are on the door. Pt has no other needs at this time.

## 2017-12-11 NOTE — PROGRESS NOTES
2 RN skin check completed with Marcia RN: Elbows dry bilaterally. Large scab to R ankle laterally. Scabbed, dried area to L plantar heel. Extensive scabbing, dryness, flakiness, and callouses to bilateral lower extremities. Pink discoloration from prior wound to sacrum, per pt. Two scabs to upper back midline. Otherwise, no skin abnormalities noted. Pictures taken and uploaded into EPIC.

## 2017-12-11 NOTE — ASSESSMENT & PLAN NOTE
"\"Follow cbc, ANC- 129  Still trending down  Off antibiotics\"  ANC is not 129 but actually 1290 when I reviewed her chart  At discharge date, ANC is even up at 1990  I reviewed CD4, last month it was at 63, and she is on HAART  I spoke with Dr. Morgan, he did not feel any antibiotic is indicated for now despite low CD4.  "

## 2017-12-11 NOTE — ASSESSMENT & PLAN NOTE
"\"This is sepsis (without associated acute organ dysfunction).   Source?  Off anitbiotics\"  Resolved when I inherited her care. Reviewing her chart she was not started on any antibiotics; she improved without it. Dr. Morgan, ID felt there was no infection.  Sepsis likely ruled out.  "

## 2017-12-11 NOTE — DIETARY
Nutrition Services:  Brief Update    Pt with BMI <19 (= 17.54).  Spoke with pt at bedside.  Pt reports that her appetite is fine.  Offered snacks/supplements and added high protein milkshakes to daily menu per pt request. No meals recorded yet in ADL flowsheet to assess PO adequacy.  RD will follow for PO intake.      Plan/Recommend:  Record percentage of meals in ADL flowsheet to ensure adequate PO intake.  Nutrition Representative to see pt for meals daily.  RD to monitor wt and lab trends.    RD following.

## 2017-12-11 NOTE — ED PROVIDER NOTES
ED Provider Note    Scribed for Chalo Goodwin MD by Aftab Garcia. 12/10/2017, 8:39 PM.    Primary care provider: Shonna Willoughby M.D.  Means of arrival: EMS  History obtained from: Patient  History limited by: None     CHIEF COMPLAINT  Chief Complaint   Patient presents with   • Fever     onset of fever this evening at 1900     HPI  Roger Mckeon is a 47 y.o. Female with a past medical history significant for HIV/AIDS, Diabetes, and renal failure was brought to the Emergency Department by EMS [facility] for a fever.   The patient had an onset of fever tonight, when a [facility] nurse measured patient to have a febrile temperature. The patient denies any other complaints other than fever. She was treated with Norco and Tylenol at 7 PM by EMS en route to the ED. The patient denies any cough, nausea, vomiting, diarrhea, dysuria, sore throat, ear pain, or neck pain associated with her onset of fever. No skin rashes. She denies any ill contacts. She denies any recent long distance travel.   The patient's Diabetes is diet controlled. She does not have any history of myocardial infarction or COPD.   Patient's HIV has been diagnosed as AIDS, and is currently taking antiviral medication.     REVIEW OF SYSTEMS  Pertinent positives include fever. Pertinent negatives include no cough, nausea, vomiting, diarrhea, dysuria, sore throat, ear pain, or neck pain.  All other systems reviewed and negative.    PAST MEDICAL HISTORY   has a past medical history of Dental disorder; Diabetes; Dialysis patient; H/O: hysterectomy; HIV (human immunodeficiency virus infection) (CMS-[facility]) (06/2017); Hypertension; Renal failure; and Seizure disorder (CMS-HCC).    SURGICAL HISTORY   has a past surgical history that includes hysterectomy, vaginal; other; incision and drainage general (4/21/2013); gastroscopy with biopsy (N/A, 9/14/2016); av fistula creation (Left, 10/4/2016); gastroscopy (N/A, 5/10/2017); gyn surgery (2003); and  "hysterectomy laparoscopy (2003).    SOCIAL HISTORY  Social History   Substance Use Topics   • Smoking status: Never Smoker   • Smokeless tobacco: Never Used   • Alcohol use No      History   Drug Use No     FAMILY HISTORY  Family History   Problem Relation Age of Onset   • Diabetes Mother    • Diabetes Father    • Hypertension Father    • Diabetes Sister      CURRENT MEDICATIONS  Home Medications     Reviewed by Amrita Laguna (Pharmacy Tech) on 12/10/17 at 2341  Med List Status: Complete   Medication Last Dose Status   acetaminophen (TYLENOL) 325 MG Tab 12/10/2017 Active   acyclovir (ZOVIRAX) 400 MG tablet 12/10/2017 Active   amlodipine (NORVASC) 5 MG Tab 12/10/2017 Active   dolutegravir (TIVICAY) 50 MG Tab tablet 12/10/2017 Active   fluoxetine (PROZAC) 10 MG Cap 12/10/2017 Active   heparin 5000 UNIT/ML Solution 12/10/2017 Active   hydrocodone-acetaminophen (NORCO) 5-325 MG Tab per tablet 12/9/2017 Active   lisinopril (PRINIVIL) 20 MG Tab 12/10/2017 Active   Rilpivirine HCl (EDURANT) 25 MG Tab 12/10/2017 Active   Sevelamer Carbonate (RENVELA) 800 MG Tab 12/10/2017 Active              ALLERGIES  Allergies   Allergen Reactions   • Oxycodone Itching and Nausea     PHYSICAL EXAM  VITAL SIGNS: /68   Pulse (!) 107   Temp (!) 38.8 °C (101.9 °F)   Resp 18   Ht 1.676 m (5' 6\")   Wt 40.3 kg (88 lb 13.2 oz)   LMP 01/01/2010   SpO2 99%   BMI 14.34 kg/m²      General: Alert, mild acute distress  Skin: Warm, dry, normal for ethnicity, delayed capillary refill lower extremities  Head: Normocephalic, atraumatic  Neck: Trachea midline, no tenderness. No meningismus.   Eye: PERRL, normal conjunctiva  ENMT: Dry mucous membranes, no pharyngeal erythema or exudate  Cardiovascular: mildly tachycardic rate, No murmur, Normal peripheral perfusion  Respiratory: Lungs CTA, respirations are non-labored, breath sounds are equal  Gastrointestinal: Soft, nontender, non distended  Musculoskeletal: No swelling, no " deformity  Neurological: Alert and oriented to person, place, time, and situation  Lymphatics: No lymphadenopathy  Psychiatric: Cooperative, appropriate mood & affect    DIAGNOSTIC STUDIES/PROCEDURES    LABS  Results for orders placed or performed during the hospital encounter of 12/10/17   Lactic acid (lactate)   Result Value Ref Range    Lactic Acid 2.0 0.5 - 2.0 mmol/L   CBC WITH DIFFERENTIAL   Result Value Ref Range    WBC 2.5 (LL) 4.8 - 10.8 K/uL    RBC 2.99 (L) 4.20 - 5.40 M/uL    Hemoglobin 8.9 (L) 12.0 - 16.0 g/dL    Hematocrit 28.3 (L) 37.0 - 47.0 %    MCV 94.6 81.4 - 97.8 fL    MCH 29.8 27.0 - 33.0 pg    MCHC 31.4 (L) 33.6 - 35.0 g/dL    RDW 57.5 (H) 35.9 - 50.0 fL    Platelet Count 144 (L) 164 - 446 K/uL    MPV 10.2 9.0 - 12.9 fL    Neutrophils-Polys 50.00 44.00 - 72.00 %    Lymphocytes 27.20 22.00 - 41.00 %    Monocytes 10.50 0.00 - 13.40 %    Eosinophils 0.00 0.00 - 6.90 %    Basophils 4.40 (H) 0.00 - 1.80 %    Nucleated RBC 0.00 /100 WBC    Neutrophils (Absolute) 1.36 (L) 2.00 - 7.15 K/uL    Lymphs (Absolute) 0.68 (L) 1.00 - 4.80 K/uL    Monos (Absolute) 0.26 0.00 - 0.85 K/uL    Eos (Absolute) 0.00 0.00 - 0.51 K/uL    Baso (Absolute) 0.11 0.00 - 0.12 K/uL    NRBC (Absolute) 0.00 K/uL    Anisocytosis 1+     Macrocytosis 1+    COMP METABOLIC PANEL   Result Value Ref Range    Sodium 126 (L) 135 - 145 mmol/L    Potassium 4.5 3.6 - 5.5 mmol/L    Chloride 90 (L) 96 - 112 mmol/L    Co2 26 20 - 33 mmol/L    Anion Gap 10.0 0.0 - 11.9    Glucose 126 (H) 65 - 99 mg/dL    Bun 32 (H) 8 - 22 mg/dL    Creatinine 2.85 (H) 0.50 - 1.40 mg/dL    Calcium 9.5 8.5 - 10.5 mg/dL    AST(SGOT) 41 12 - 45 U/L    ALT(SGPT) 17 2 - 50 U/L    Alkaline Phosphatase 285 (H) 30 - 99 U/L    Total Bilirubin 0.5 0.1 - 1.5 mg/dL    Albumin 2.6 (L) 3.2 - 4.9 g/dL    Total Protein 6.2 6.0 - 8.2 g/dL    Globulin 3.6 (H) 1.9 - 3.5 g/dL    A-G Ratio 0.7 g/dL   URINALYSIS   Result Value Ref Range    Color Yellow     Character Clear     Specific  Gravity 1.015 <1.035    Ph >=9.0 (A) 5.0 - 8.0    Glucose 100 (A) Negative mg/dL    Ketones Negative Negative mg/dL    Protein 300 (A) Negative mg/dL    Bilirubin Negative Negative    Urobilinogen, Urine 0.2 Negative    Nitrite Negative Negative    Leukocyte Esterase Negative Negative    Occult Blood Small (A) Negative    Micro Urine Req Microscopic    BTYPE NATRIURETIC PEPTIDE   Result Value Ref Range    B Natriuretic Peptide 899 (H) 0 - 100 pg/mL   Influenza By PCR, A/B   Result Value Ref Range    Influenza virus A RNA Negative Negative    Influenza virus B, PCR Negative Negative   ESTIMATED GFR   Result Value Ref Range    GFR If  21 (A) >60 mL/min/1.73 m 2    GFR If Non  18 (A) >60 mL/min/1.73 m 2   DIFFERENTIAL MANUAL   Result Value Ref Range    Bands-Stabs 4.40 0.00 - 10.00 %    Metamyelocytes 0.90 %    Myelocytes 2.60 %    Manual Diff Status PERFORMED    PERIPHERAL SMEAR REVIEW   Result Value Ref Range    Peripheral Smear Review see below    PLATELET ESTIMATE   Result Value Ref Range    Plt Estimation Decreased    MORPHOLOGY   Result Value Ref Range    RBC Morphology Present     Poikilocytosis 1+     Tear Drop Cells 1+    URINE MICROSCOPIC (W/UA)   Result Value Ref Range    WBC 0-2 /hpf    RBC 20-50 (A) /hpf    Bacteria Negative None /hpf    Epithelial Cells Few /hpf    Hyaline Cast 3-5 (A) /lpf      All labs reviewed by me, Leukopenia/neutropenia, establish renal insufficiency, otherwise unremarkable.        RADIOLOGY  DX-CHEST-PORTABLE (1 VIEW)   Final Result      1.  Hazy bibasilar opacities may represent atelectasis or developing pneumonia.      2.  Blunted right costophrenic angle, possibly representing scarring or a small pleural effusion.        The radiologist's interpretation of all radiological studies have been reviewed by me.    COURSE & MEDICAL DECISION MAKING  Pertinent Labs & Imaging studies reviewed. (See chart for details)    8:39 PM - Patient seen and examined  "at bedside. Patient will be treated with Cefepime IV, Vancomycin IV. Ordered Chest X ray, lactic acid, differential, platelet estimate, BNP, influenza, CBC, CMP, urine culture, blood culture to evaluate her symptoms. The patient was resuscitated with 500 ml Bolus IV for dehydration.   The differential diagnoses include but are not limited to: sepsis, pneumonia, urinary tract infection.      10:24 PM Recheck: Patient re-evaluated at beside. The patient was updated of her ED workup, showing pneumonia, as well as concern for sepsis. She was informed of likely admission to the hospital.     10:25 PM Paged Hospitalist.     11:13 PM Spoke with Dr. Felix, Hospitalist, who is agreeable to admit the patient. Heart rate is improved to 90 with IV fluids.    Patient Vitals for the past 24 hrs:   BP Temp Pulse Resp SpO2 Height Weight   12/10/17 2300 - - 90 20 100 % - -   12/10/17 2230 - - 90 17 98 % - -   12/10/17 2200 - - 97 19 98 % - -   12/10/17 2144 - 37.3 °C (99.1 °F) - - - - -   12/10/17 2130 - - 100 (!) 22 - - -   12/10/17 2115 - - (!) 103 18 - - -   12/10/17 2033 155/68 (!) 38.8 °C (101.9 °F) (!) 107 18 99 % - -   12/10/17 2032 - - - - - 1.676 m (5' 6\") 40.3 kg (88 lb 13.2 oz)       Decision Making:  This is a 47 y.o. year old female who presents with fever and generalized malaise. Patient denies dyspnea and nonproductive cough, however x-ray obtained per sepsis protocol is consistent with bibasilar pneumonia. X-ray imaging is not consistent with PCP pneumonia though certainly there is concern for the patient's neutropenia and leukopenia secondary to her HIV AIDS. Given fever, tachycardia, infectious source, neutropenia, and is consistent with sepsis, given patient immunocompromise have ordered cefepime and vancomycin. Patient placed on respiratory precautions, spoke with hospice who concurs with need for admission and accepts the patient to his service..    Disposition:  Patient will be admitted to the hospital under " the care of Dr. Felix (Hospitalist) in guarded condition.     FINAL IMPRESSION  1. Sepsis, due to unspecified organism (CMS-HCC)    2. AIDS (acquired immune deficiency syndrome) (CMS-HCC)    3. Neutropenic fever (CMS-HCC)    4. Community acquired pneumonia, unspecified laterality     5. Chronic kidney disease     Aftab URENA (Scribe), am scribing for, and in the presence of, Chalo Goodwin MD.    Electronically signed by: Aftab Garcia (Scribe), 12/10/2017    IChalo MD personally performed the services described in this documentation, as scribed by Aftab Garcia in my presence, and it is both accurate and complete    The note accurately reflects work and decisions made by me.  Chalo Goodwin  12/10/2017  11:50 PM

## 2017-12-11 NOTE — ED NOTES
Assisted rn with mini cath, cleaning patient up from small amount of stool. Repositioned her in [facility]

## 2017-12-12 NOTE — CONSULTS
DATE OF SERVICE:  12/11/2017    REQUESTING PHYSICIAN:  Dr. Almonte, hospitalist service.    REASON FOR CONSULTATION:  Management of chronic kidney disease, assessing the   need for urgent dialysis.    The patient seen and examined, medical records were reviewed.    HISTORY OF PRESENT ILLNESS:  The patient is an unfortunate 47-year-old lady   who is very well known to my service.  She has end-stage renal disease,   undergoes hemodialysis on Tuesday, Thursday, Saturday at [facility].    She also has a history of HIV/AIDS, presented to the hospital earlier this   morning with fever.  Patient was found to be leukopenic and anemic.  She was   admitted for possible sepsis/pneumonia, infectious disease service has been   consulted to evaluate the patient for possible sepsis, we were called to   manage her kidney disease and assessing the need for urgent dialysis.    The patient's last dialysis was on Saturday, December 9th, 2017.    Patient feels better.  No fever, right now, no shortness of breath, no nausea,   no vomiting.    PAST MEDICAL HISTORY:  1.  End-stage renal disease.  2.  Diabetes mellitus.  3.  HIV.  4.  Malnutrition.    ALLERGIES:  The list was reviewed.    MEDICATIONS:  Reviewed.    SOCIAL HISTORY:  Patient is .    FAMILY HISTORY:  No known renal disease.    REVIEW OF SYSTEMS:  Patient is very weak.  She did have fever yesterday.  All   other review of systems for total of 10 were negative except as outlined in   history of present illness.    PHYSICAL EXAMINATION:  GENERAL:  Patient is very cachectic lady, but in no apparent distress.  VITAL SIGNS:  Show blood pressure of 134/74, heart rate was 82.  HEENT:  Normocephalic, atraumatic.  Sclerae is anicteric.  NECK:  No lymphadenopathy.  CHEST:  Normal.  LUNGS:  Clear to auscultation.  HEART:  S1, S2.  ABDOMEN:  Soft, nontender.  EXTREMITIES:  There is trace lower extremity edema.  SKIN:  No skin rashes.  NEUROLOGIC:  Patient is alert and oriented  x3.    LABORATORY DATA:  Recent labs were reviewed.    ASSESSMENT AND PLAN:  1.  End-stage renal disease.  2.  Hyponatremia.  3.  Anemia.  4.  Leukopenia.    PLAN:  1.  There is no acute need for urgent dialysis at this moment.  We will plan   on dialysis tomorrow.  2.  Renal dose all medications.  3.  Avoid nephrotoxins.  4.  Await ID consult.  5.  Renal diet.  6.  Prognosis is poor.    Plan discussed in detail with Dr. Almonte, who I would like to thank for   consulting this very interesting case.       ____________________________________     FADI NAJJAR, MD FN / JUAN    DD:  12/11/2017 16:31:18  DT:  12/11/2017 19:35:14    D#:  [id number]  Job#:  [id number]

## 2017-12-12 NOTE — DISCHARGE PLANNING
Pt will return to [facility] when medically cleared. SW will continue to follow for medical clearance.

## 2017-12-12 NOTE — CARE PLAN
Problem: Communication  Goal: The ability to communicate needs accurately and effectively will improve  Outcome: PROGRESSING AS EXPECTED  POC discussed with pt. at bedside. All questions and concerns have been addressed at this time. Verbal understanding recieved    Problem: Safety  Goal: Will remain free from falls  Outcome: PROGRESSING AS EXPECTED  Nicci Thornton Fall Risk Assessment:     Last Known Fall: No falls  Mobility: Immobilized/requires assist of one person, Dizziness/generalized weakness  Medications: Cardiovascular or central nervous system meds  Mental Status/LOC/Awareness: Awake, alert, and oriented to date, place, and person  Toileting Needs: Incontinence  Volume/Electrolyte Status: No problems  Communication/Sensory: No deficits  Behavior: Appropriate behavior  Nicci Thornton Fall Risk Total: 9  Fall Risk Level: LOW RISK    Universal Fall Precautions:  call light/belongings in reach, bed in low position and locked, siderails up x 2, adequate lighting, educate on level of risk, educate to call for assistance    Fall Risk Level Interventions:   TRIAL (TELE 8, NEURO, MED TIFFANIE 5) Low Fall Risk Interventions  Place yellow fall risk ID band on patient: verified  Provide patient/family education based on risk assessment: completed  Educate patient/family to call staff for assistance when getting out of bed: completed  Place fall precaution signage outside patient door: verifiedTRIAL (TELE 8, NEURO, MED TIFFANIE 5) Moderate Fall Risk Interventions  Place yellow fall risk ID band on patient: completed  Provide patient/family education based on risk assessment : completed  Educate patient/family to call staff for assistance when getting out of bed: completed  Place fall precaution signage outside patient door: completed  Utilize bed/chair fall alarm: completed     Patient Specific Interventions:     Medication: review medications with patient and family  Mental Status/LOC/Awareness: reinforce falls education,  check on patient hourly, utilize bed/chair fall alarm and reinforce the use of call light  Toileting: provide frquent toileting  Volume/Electrolyte Status: monitor abnormal lab values and ensure IV fluids are appropriate  Communication/Sensory: update plan of care on whiteboard  Behavioral: engage patient in daily activities and administer medication as ordered  Mobility: utilize bed/chair fall alarm, ensure bed is locked and in lowest position, provide appropriate assistive device and instruct patient to exit bed on their strongest side

## 2017-12-12 NOTE — PROGRESS NOTES
Assumed care of pt. at 1900    Bedside report received from DAY shift RN   POC discussed with pt. At bedside and Pt. verbalizes understanding.  Pt. Is Awake and AOx 4 , on protective precautions, running SR 81 on the monitor  Call light within reach  with appropriate instructions to call for assistance  Bed locked and in lowest position.

## 2017-12-12 NOTE — PROGRESS NOTES
Nicci Thornton Fall Risk Assessment:     Last Known Fall: No falls  Mobility: Immobilized/requires assist of one person, Dizziness/generalized weakness  Medications: Cardiovascular or central nervous system meds  Mental Status/LOC/Awareness: Awake, alert, and oriented to date, place, and person  Toileting Needs: Incontinence  Volume/Electrolyte Status: No problems  Communication/Sensory: No deficits  Behavior: Appropriate behavior  Nicci Thornton Fall Risk Total: 9  Fall Risk Level: LOW RISK    Universal Fall Precautions:  call light/belongings in reach, bed in low position and locked, wheelchairs and assistive devices out of sight, siderails up x 2, use non-slip footwear, adequate lighting, clutter free and spill free environment, educate on level of risk, educate to call for assistance    Fall Risk Level Interventions:   TRIAL (magnetU, NEURO, MED TIFFANIE 5) Low Fall Risk Interventions  Place yellow fall risk ID band on patient: verified  Provide patient/family education based on risk assessment: completed  Educate patient/family to call staff for assistance when getting out of bed: completed  Place fall precaution signage outside patient door: verifiedTRIAL (magnetU, NEURO, MED TIFFANIE 5) Moderate Fall Risk Interventions  Place yellow fall risk ID band on patient: completed  Provide patient/family education based on risk assessment : completed  Educate patient/family to call staff for assistance when getting out of bed: completed  Place fall precaution signage outside patient door: completed  Utilize bed/chair fall alarm: completed     Patient Specific Interventions:     Medication: review medications with patient and family  Mental Status/LOC/Awareness: reinforce falls education, utilize bed/chair fall alarm and reinforce the use of call light  Toileting: instruct patient/family on the need to call for assistance when toileting  Volume/Electrolyte Status: monitor abnormal lab values  Communication/Sensory: update plan  of care on whiteboard and ensure proper positioning when transferrng/ambulating  Behavioral: engage patient in daily activities, administer medication as ordered and instruct/reinforce fall program rationale  Mobility: ensure bed is locked and in lowest position and provide appropriate assistive device

## 2017-12-12 NOTE — DISCHARGE PLANNING
Outpatient Dialysis Note    Confirmed patient is active at:    [facility]  [address]   [place] [address]      Schedule: Tuesday, Thursday, Saturday  Time: 11:15 am    Spoke with Arabella at facility who confirmed.    Forwarded records for review.    Dialysis Coordinator, [facility]  Maren Quintanilla [phone]

## 2017-12-12 NOTE — CARE PLAN
Problem: Safety  Goal: Will remain free from injury  Outcome: PROGRESSING AS EXPECTED  Fall risk assessed every shift and fall precautions in place.  Pt educated to not get up without assistance.  Verbalized understanding.         Problem: Knowledge Deficit  Goal: Knowledge of disease process/condition, treatment plan, diagnostic tests, and medications will improve  Outcome: PROGRESSING AS EXPECTED  Pt educated regarding activity, diet, meds and plan of care. Verbalized understanding.

## 2017-12-12 NOTE — CARE PLAN
Problem: Venous Thromboembolism (VTW)/Deep Vein Thrombosis (DVT) Prevention:  Goal: Patient will participate in Venous Thrombosis (VTE)/Deep Vein Thrombosis (DVT)Prevention Measures  Outcome: PROGRESSING AS EXPECTED  Pt on subq heparin for VTE    Problem: Pain Management  Goal: Pain level will decrease to patient's comfort goal  Outcome: PROGRESSING AS EXPECTED  Educated on 0 - 10 pain rating scale, medicated per MAR for pain

## 2017-12-12 NOTE — PROGRESS NOTES
[facility] Medicine Progress Note, Adult, Complex               Author: Fadi Najjar Date & Time created: 2017  1:34 PM     Interval History:  Pt with ESRD, HIV/AIDS, presented with fever.  Doing better  Seen and examined while getting HD.    Review of Systems:  Review of Systems   Constitutional: Negative for chills and fever.   Gastrointestinal: Negative for nausea and vomiting.   Neurological: Positive for weakness.       Physical Exam:  Physical Exam   Constitutional: She appears cachectic.   Pulmonary/Chest: She has no wheezes. She has no rales.   Musculoskeletal: She exhibits no edema.       Labs:        Invalid input(s): [id number]  Recent Labs      12/10/17   2058   BNPBTYPENAT  899*     Recent Labs      12/10/17   2058  12/12/17   0257   SODIUM  126*  128*   POTASSIUM  4.5  4.3   CHLORIDE  90*  95*   CO2  26  24   BUN  32*  40*   CREATININE  2.85*  2.59*   CALCIUM  9.5  9.3     Recent Labs      12/10/17   2058  12/12/17   0257   ALTSGPT  17   --    ASTSGOT  41   --    ALKPHOSPHAT  285*   --    TBILIRUBIN  0.5   --    GLUCOSE  126*  77     Recent Labs      12/10/17   2058  12/11/17   0406  17   RBC  2.99*   --   2.97*   HEMOGLOBIN  8.9*   --   8.9*   HEMATOCRIT  28.3*   --   27.7*   PLATELETCT  144*   --   112*   PROTHROMBTM   --   12.6   --    APTT   --   43.4*   --    INR   --   0.97   --    IRON   --   56   --    TOTIRONBC   --   224*   --      Recent Labs      12/10/17   2058  12/12/17   0257   WBC  2.5*  2.5*   NEUTSPOLYS  50.00  53.50   LYMPHOCYTES  27.20  7.00*   MONOCYTES  10.50  8.80   EOSINOPHILS  0.00  6.10   BASOPHILS  4.40*  3.50*   ASTSGOT  41   --    ALTSGPT  17   --    ALKPHOSPHAT  285*   --    TBILIRUBIN  0.5   --            Hemodynamics:  Temp (24hrs), Av.6 °C (97.8 °F), Min:36.2 °C (97.2 °F), Max:37.1 °C (98.7 °F)  Temperature: 36.4 °C (97.6 °F)  Pulse  Av.2  Min: 71  Max: 114  Blood Pressure: 136/69     Respiratory:    Respiration: 16, Pulse Oximetry: 98 %, O2  Daily Delivery Respiratory : Room Air with O2 Available     PEP/CPT Method: Positive Airway Pressure Device, Work Of Breathing / Effort: Mild  RUL Breath Sounds: Clear, RML Breath Sounds: Clear, RLL Breath Sounds: Diminished, ZEN Breath Sounds: Clear, LLL Breath Sounds: Diminished  Fluids:  No intake or output data in the 24 hours ending 12/12/17 1334     GI/Nutrition:  Orders Placed This Encounter   Procedures   • Diet Order     Standing Status:   Standing     Number of Occurrences:   1     Order Specific Question:   Diet:     Answer:   Renal [8]     Medical Decision Making, by Problem:  Active Hospital Problems    Diagnosis   • Sepsis (CMS-HCC) [A41.9]   • HCAP (healthcare-associated pneumonia) [J18.9]   • HIV/AIDS (acquired immune deficiency syndrome) (CMS-HCC) [B20]   • Severe protein-calorie malnutrition (CMS-HCC) [E43]   • Hypertension [I10]   • ESRD (end stage renal disease) (CMS-HCC) [N18.6]   • Pancytopenia (CMS-HCC) [D61.818]         Reviewed items::  Labs reviewed  1 ESRD: HD TTS  2 Anemia: Epo with HD

## 2017-12-13 NOTE — PROGRESS NOTES
[facility] Hospitalist Progress Note    Date of Service: 2017    Chief Complaint  47 y.o. female admitted 12/10/2017 with Fever, presumed to be Pneumonia, HIV/AIDS.    Interval Problem Update  Fever - no recurrence, off antibiotics X 2 days  PNA - ruled out?  HIV/AIDS - stable  HTN - not controlled  Neutropenia, wbc and ANC trending down    Consultants/Specialty  ID - Krasner    Disposition  SNF        Review of Systems   Constitutional: Positive for malaise/fatigue. Negative for chills and fever.   HENT: Negative for hearing loss and sore throat.    Eyes: Negative for blurred vision.   Respiratory: Negative for cough, shortness of breath and wheezing.    Cardiovascular: Negative for chest pain and leg swelling.   Gastrointestinal: Negative for abdominal pain, diarrhea, heartburn, nausea and vomiting.   Genitourinary: Negative for dysuria.   Neurological: Positive for weakness. Negative for dizziness.      Physical Exam  Laboratory/Imaging   Hemodynamics  Temp (24hrs), Av.7 °C (98.1 °F), Min:36.3 °C (97.3 °F), Max:37 °C (98.6 °F)   Temperature: 36.8 °C (98.2 °F)  Pulse  Av.1  Min: 71  Max: 114   Blood Pressure: 151/81      Respiratory      Respiration: 18, Pulse Oximetry: 100 %, O2 Daily Delivery Respiratory : Room Air with O2 Available     PEP/CPT Method: Positive Airway Pressure Device, Work Of Breathing / Effort: Mild  RUL Breath Sounds: Clear, RML Breath Sounds: Diminished, RLL Breath Sounds: Diminished, ZEN Breath Sounds: Clear, LLL Breath Sounds: Diminished    Fluids    Intake/Output Summary (Last 24 hours) at 17 1500  Last data filed at 17 1300   Gross per 24 hour   Intake              400 ml   Output                0 ml   Net              400 ml       Nutrition  Orders Placed This Encounter   Procedures   • Diet Order     Standing Status:   Standing     Number of Occurrences:   1     Order Specific Question:   Diet:     Answer:   Renal [8]     Physical Exam   Constitutional: She is  oriented to person, place, and time. She appears well-developed.   HENT:   Head: Normocephalic and atraumatic.   Eyes: Conjunctivae are normal. Pupils are equal, round, and reactive to light.   Neck: No tracheal deviation present. No thyromegaly present.   Cardiovascular: Normal rate and regular rhythm.    Pulmonary/Chest: Effort normal and breath sounds normal.   Abdominal: Soft. Bowel sounds are normal. She exhibits no distension. There is no tenderness.   Lymphadenopathy:     She has no cervical adenopathy.   Neurological: She is alert and oriented to person, place, and time.   Skin: Skin is warm and dry.   Nursing note and vitals reviewed.      Recent Labs      12/10/17   2058  12/12/17   0257  12/13/17   0411   WBC  2.5*  2.5*  2.2*   RBC  2.99*  2.97*  3.20*   HEMOGLOBIN  8.9*  8.9*  9.4*   HEMATOCRIT  28.3*  27.7*  29.7*   MCV  94.6  93.3  92.8   MCH  29.8  30.0  29.4   MCHC  31.4*  32.1*  31.6*   RDW  57.5*  55.8*  56.6*   PLATELETCT  144*  112*  137*   MPV  10.2  9.4  8.7*     Recent Labs      12/10/17   2058  12/12/17   0257  12/13/17   0411   SODIUM  126*  128*  129*   POTASSIUM  4.5  4.3  4.1   CHLORIDE  90*  95*  95*   CO2  26  24  27   GLUCOSE  126*  77  65   BUN  32*  40*  24*   CREATININE  2.85*  2.59*  1.83*   CALCIUM  9.5  9.3  9.4     Recent Labs      12/11/17   0406   APTT  43.4*   INR  0.97     Recent Labs      12/10/17   2058   BNPBTYPENAT  899*              Assessment/Plan     * Sepsis (CMS-HCC)- (present on admission)   Assessment & Plan    This is sepsis (without associated acute organ dysfunction).   Source?  Off anitbiotics        HCAP (healthcare-associated pneumonia)- (present on admission)   Assessment & Plan    Ruled out?        Pancytopenia (CMS-[facility])- (present on admission)   Assessment & Plan    Follow cbc, ANC  Off antibiotics        HIV/AIDS (acquired immune deficiency syndrome) (CMS-HCC)- (present on admission)   Assessment & Plan    TIVICAY and Rilpivirine           Hypertension-  (present on admission)   Assessment & Plan    Amlodipine, Lisinopril, add Metoprolol          ESRD (end stage renal disease) (CMS-HCC)- (present on admission)   Assessment & Plan    HD T-Th-Sa          Hyponatremia- (present on admission)   Assessment & Plan    Follow bmp        Severe protein-calorie malnutrition (CMS-HCC)- (present on admission)   Assessment & Plan    Nutrition following            Reviewed items::  Radiology images reviewed, EKG reviewed, Labs reviewed and Medications reviewed  Julien catheter::  No Julien  DVT prophylaxis pharmacological::  Heparin  Ulcer Prophylaxis::  No

## 2017-12-13 NOTE — PROGRESS NOTES
Dialysis for 3 hours today ordered bt Dr. Fadi Najjar. AVF accessed with no complications. NET UF 2000ml. Tx initiated at 0842 and terminated at 1143. Patient tolerated treatment well. Report given to VICTOR MANUEL Duncan, primary RN.

## 2017-12-13 NOTE — CARE PLAN
Problem: Hyperinflation:  Goal: Prevent or improve atelectasis    Intervention: Instruct incentive spirometry usage  Best value 1000ml  60% of predicted = 1680  Intervention: Perform hyperinflation therapy as indicated by assessment  PEP/IS QID  Pressure 10

## 2017-12-13 NOTE — PROGRESS NOTES
[facility] Hospitalist Progress Note    Date of Service: 2017    Chief Complaint  47 y.o. female admitted 12/10/2017 with Fever, presumed to be Pneumonia, HIV/AIDS.    Interval Problem Update  Fever - no recurrence, off antibiotics since yesterday  PNA - ruled out?  HIV/AIDS - stable  HTN - controlled    Consultants/Specialty  ID - Krasner    Disposition  SNF        Review of Systems   Constitutional: Positive for malaise/fatigue. Negative for chills and fever.   HENT: Negative for hearing loss and sore throat.    Eyes: Negative for blurred vision.   Respiratory: Negative for cough, shortness of breath and wheezing.    Cardiovascular: Negative for chest pain and leg swelling.   Gastrointestinal: Negative for abdominal pain, diarrhea, heartburn, nausea and vomiting.   Genitourinary: Negative for dysuria.   Neurological: Positive for weakness. Negative for dizziness.      Physical Exam  Laboratory/Imaging   Hemodynamics  Temp (24hrs), Av.6 °C (97.8 °F), Min:36.2 °C (97.2 °F), Max:37.1 °C (98.7 °F)   Temperature: 36.7 °C (98.1 °F)  Pulse  Av.2  Min: 71  Max: 114   Blood Pressure: 155/86      Respiratory      Respiration: 16, Pulse Oximetry: 98 %, O2 Daily Delivery Respiratory : Room Air with O2 Available     PEP/CPT Method: Positive Airway Pressure Device, Work Of Breathing / Effort: Mild  RUL Breath Sounds: Clear, RML Breath Sounds: Clear, RLL Breath Sounds: Diminished, ZEN Breath Sounds: Clear, LLL Breath Sounds: Diminished    Fluids    Intake/Output Summary (Last 24 hours) at 17 1708  Last data filed at 17 1145   Gross per 24 hour   Intake              500 ml   Output             2500 ml   Net            -2000 ml       Nutrition  Orders Placed This Encounter   Procedures   • Diet Order     Standing Status:   Standing     Number of Occurrences:   1     Order Specific Question:   Diet:     Answer:   Renal [8]     Physical Exam   Constitutional: She is oriented to person, place, and time. She  appears well-developed.   HENT:   Head: Normocephalic and atraumatic.   Eyes: Conjunctivae are normal. Pupils are equal, round, and reactive to light.   Neck: No tracheal deviation present. No thyromegaly present.   Cardiovascular: Normal rate and regular rhythm.    Pulmonary/Chest: Effort normal and breath sounds normal.   Abdominal: Soft. Bowel sounds are normal. She exhibits no distension. There is no tenderness.   Lymphadenopathy:     She has no cervical adenopathy.   Neurological: She is alert and oriented to person, place, and time.   Skin: Skin is warm and dry.   Nursing note and vitals reviewed.      Recent Labs      12/10/17   2058  12/12/17   0257   WBC  2.5*  2.5*   RBC  2.99*  2.97*   HEMOGLOBIN  8.9*  8.9*   HEMATOCRIT  28.3*  27.7*   MCV  94.6  93.3   MCH  29.8  30.0   MCHC  31.4*  32.1*   RDW  57.5*  55.8*   PLATELETCT  144*  112*   MPV  10.2  9.4     Recent Labs      12/10/17   2058  12/12/17   0257   SODIUM  126*  128*   POTASSIUM  4.5  4.3   CHLORIDE  90*  95*   CO2  26  24   GLUCOSE  126*  77   BUN  32*  40*   CREATININE  2.85*  2.59*   CALCIUM  9.5  9.3     Recent Labs      12/11/17   0406   APTT  43.4*   INR  0.97     Recent Labs      12/10/17   2058   BNPBTYPENAT  899*              Assessment/Plan     * Sepsis (CMS-HCC)- (present on admission)   Assessment & Plan    This is sepsis (without associated acute organ dysfunction).   Source?  Off anitbiotics        HCAP (healthcare-associated pneumonia)- (present on admission)   Assessment & Plan    Ruled out?        Pancytopenia (CMS-HCC)- (present on admission)   Assessment & Plan    Follow cbc  Off antibiotics        HIV/AIDS (acquired immune deficiency syndrome) (CMS-HCC)- (present on admission)   Assessment & Plan    TIVICAY and Rilpivirine           Hypertension- (present on admission)   Assessment & Plan    Amlodipine and Lisinopril          ESRD (end stage renal disease) (CMS-HCC)- (present on admission)   Assessment & Plan    HD T-Th-Sa           Hyponatremia- (present on admission)   Assessment & Plan    Follow bmp        Severe protein-calorie malnutrition (CMS-HCC)- (present on admission)   Assessment & Plan    Nutrition following            Reviewed items::  Radiology images reviewed, EKG reviewed, Labs reviewed and Medications reviewed  Julien catheter::  No Julien  DVT prophylaxis pharmacological::  Heparin  Ulcer Prophylaxis::  No

## 2017-12-13 NOTE — PROGRESS NOTES
Assumed care of pt. at 1900    Bedside report received from Day Shift RN   POC discussed with pt. At bedside and Pt. verbalizes understanding.  Pt. Is Awake and AOx 4 , running SR 88 on the monitor  Call light within reach  with appropriate instructions to call for assistance  Bed locked and in lowest position.

## 2017-12-13 NOTE — PROGRESS NOTES
Hospital Medicine Progress Note, Adult, Complex               Author: Fadi Najjar Date & Time created: 12/13/2017  12:50 PM     Interval History:  Pt with ESRD, HIV/AIDS, presented with fever.  Doing better    Review of Systems:  Review of Systems   Constitutional: Negative for chills and fever.   Respiratory: Negative for shortness of breath.    Cardiovascular: Negative for leg swelling.   Gastrointestinal: Negative for nausea and vomiting.   Genitourinary: Negative for dysuria and hematuria.   Neurological: Positive for weakness.       Physical Exam:  Physical Exam   Constitutional: She appears cachectic. She appears ill.   HENT:   Nose: Nose normal.   Pulmonary/Chest: She has no wheezes. She has no rales.   Musculoskeletal: She exhibits no edema.       Labs:        Invalid input(s): [id number]  Recent Labs      12/10/17   2058   BNPBTYPENAT  899*     Recent Labs      12/10/17   2058  12/12/17   0257  12/13/17   0411   SODIUM  126*  128*  129*   POTASSIUM  4.5  4.3  4.1   CHLORIDE  90*  95*  95*   CO2  26  24  27   BUN  32*  40*  24*   CREATININE  2.85*  2.59*  1.83*   CALCIUM  9.5  9.3  9.4     Recent Labs      12/10/17   2058  12/12/17   0257  12/13/17   0411   ALTSGPT  17   --    --    ASTSGOT  41   --    --    ALKPHOSPHAT  285*   --    --    TBILIRUBIN  0.5   --    --    GLUCOSE  126*  77  65     Recent Labs      12/10/17   2058  12/11/17   0406  12/12/17   0257  12/13/17   0411   RBC  2.99*   --   2.97*  3.20*   HEMOGLOBIN  8.9*   --   8.9*  9.4*   HEMATOCRIT  28.3*   --   27.7*  29.7*   PLATELETCT  144*   --   112*  137*   PROTHROMBTM   --   12.6   --    --    APTT   --   43.4*   --    --    INR   --   0.97   --    --    IRON   --   56   --    --    TOTIRONBC   --   224*   --    --      Recent Labs      12/10/17   2058  12/12/17   0257  12/13/17   0411   WBC  2.5*  2.5*  2.2*   NEUTSPOLYS  50.00  53.50  55.00   LYMPHOCYTES  27.20  7.00*  13.50*   MONOCYTES  10.50  8.80  6.30   EOSINOPHILS  0.00  6.10   5.40   BASOPHILS  4.40*  3.50*  5.40*   ASTSGOT  41   --    --    ALTSGPT  17   --    --    ALKPHOSPHAT  285*   --    --    TBILIRUBIN  0.5   --    --            Hemodynamics:  Temp (24hrs), Av.7 °C (98.1 °F), Min:36.3 °C (97.3 °F), Max:37 °C (98.6 °F)  Temperature: 36.8 °C (98.3 °F)  Pulse  Av.9  Min: 71  Max: 114  Blood Pressure: 156/86     Respiratory:    Respiration: 16, Pulse Oximetry: 98 %, O2 Daily Delivery Respiratory : Room Air with O2 Available     PEP/CPT Method: Positive Airway Pressure Device, Work Of Breathing / Effort: Mild  RUL Breath Sounds: Clear, RML Breath Sounds: Diminished, RLL Breath Sounds: Diminished, ZEN Breath Sounds: Clear, LLL Breath Sounds: Diminished  Fluids:  No intake or output data in the 24 hours ending 17 1250  Weight: 54.3 kg (119 lb 11.4 oz)  GI/Nutrition:  Orders Placed This Encounter   Procedures   • Diet Order     Standing Status:   Standing     Number of Occurrences:   1     Order Specific Question:   Diet:     Answer:   Renal [8]     Medical Decision Making, by Problem:  Active Hospital Problems    Diagnosis   • Sepsis (CMS-HCC) [A41.9]   • HCAP (healthcare-associated pneumonia) [J18.9]   • HIV/AIDS (acquired immune deficiency syndrome) (CMS-HCC) [B20]   • Severe protein-calorie malnutrition (CMS-HCC) [E43]   • Hypertension [I10]   • ESRD (end stage renal disease) (CMS-HCC) [N18.6]   • Pancytopenia (CMS-HCC) [D61.818]         Reviewed items::  Labs reviewed  1 ESRD: HD TTS  2 Anemia: Epo with HD  3 pancytopenia: no clear etiology  4 hyponatremia  5 malnutrition  Plan  no acute need for HD today  Daily labs  Renal dose all meds  Avoid nephrotoxins  Prognosis poor.

## 2017-12-14 NOTE — PROGRESS NOTES
[facility] Hospital Medicine Progress Note, Adult, Complex               Author: Fadi Najjar Date & Time created: 2017  3:02 PM     Interval History:  Pt with ESRD, HIV/AIDS, presented with fever.  Doing better  Seen and examined while getting HD.    Review of Systems:  Review of Systems   Constitutional: Negative for chills and fever.   Respiratory: Negative for shortness of breath.    Cardiovascular: Negative for leg swelling.   Gastrointestinal: Negative for nausea and vomiting.   Genitourinary: Negative for dysuria and hematuria.   Neurological: Positive for weakness.       Physical Exam:  Physical Exam   Constitutional: She appears cachectic. She appears ill.   HENT:   Nose: Nose normal.   Pulmonary/Chest: She has no wheezes. She has no rales.   Musculoskeletal: She exhibits no edema.       Labs:        Invalid input(s): [id number]      Recent Labs      17   SODIUM  128*  129*  129*   POTASSIUM  4.3  4.1  4.3   CHLORIDE  95*  95*  100   CO2  24  27  22   BUN  40*  24*  40*   CREATININE  2.59*  1.83*  2.65*   CALCIUM  9.3  9.4  10.0     Recent Labs      17   GLUCOSE  77  65  80     Recent Labs      17   RBC  2.97*  3.20*  3.04*   HEMOGLOBIN  8.9*  9.4*  9.2*   HEMATOCRIT  27.7*  29.7*  28.6*   PLATELETCT  112*  137*  107*     Recent Labs      17   WBC  2.5*  2.2*  2.3*   NEUTSPOLYS  53.50  55.00  51.70   LYMPHOCYTES  7.00*  13.50*  17.30*   MONOCYTES  8.80  6.30  11.20   EOSINOPHILS  6.10  5.40  6.00   BASOPHILS  3.50*  5.40*  3.50*           Hemodynamics:  Temp (24hrs), Av.8 °C (98.2 °F), Min:36.3 °C (97.4 °F), Max:37.2 °C (98.9 °F)  Temperature: 36.5 °C (97.7 °F)  Pulse  Av  Min: 71  Max: 114  Blood Pressure: 151/73     Respiratory:    Respiration: 16, Pulse Oximetry: 96 %     Work Of Breathing / Effort: Mild  RUL  Breath Sounds: Clear, RML Breath Sounds: Clear, RLL Breath Sounds: Diminished, ZEN Breath Sounds: Clear, LLL Breath Sounds: Diminished  Fluids:    Intake/Output Summary (Last 24 hours) at 12/14/17 1502  Last data filed at 12/14/17 1423   Gross per 24 hour   Intake              600 ml   Output             2000 ml   Net            -1400 ml     Weight: 50.9 kg (112 lb 3.4 oz)  GI/Nutrition:  Orders Placed This Encounter   Procedures   • Diet Order     Standing Status:   Standing     Number of Occurrences:   1     Order Specific Question:   Diet:     Answer:   Renal [8]     Medical Decision Making, by Problem:  Active Hospital Problems    Diagnosis   • Sepsis (CMS-HCC) [A41.9]   • HCAP (healthcare-associated pneumonia) [J18.9]   • HIV/AIDS (acquired immune deficiency syndrome) (CMS-HCC) [B20]   • Severe protein-calorie malnutrition (CMS-HCC) [E43]   • Hypertension [I10]   • ESRD (end stage renal disease) (CMS-HCC) [N18.6]   • Pancytopenia (CMS-HCC) [D61.818]         Reviewed items::  Labs reviewed  1 ESRD: HD TTS  2 Anemia: Epo with HD  3 pancytopenia: no clear etiology  4 hyponatremia:revaluate post UF/HD  5 malnutrition  Plan  HD today  Daily labs  Renal dose all meds  Avoid nephrotoxins  Prognosis poor.

## 2017-12-14 NOTE — PROGRESS NOTES
[facility] Hospitalist Progress Note    Date of Service: 2017    Chief Complaint  47 y.o. female admitted 12/10/2017 with Fever, presumed to be Pneumonia, HIV/AIDS.    Interval Problem Update  Fever - no recurrence, off antibiotics X 3 days  PNA - ruled out?  HIV/AIDS - stable  HTN - better controlled  Neutropenia - wbc and ANC trending down    Consultants/Specialty  ID - Krasner    Disposition  SNF        Review of Systems   Constitutional: Positive for malaise/fatigue. Negative for chills and fever.   HENT: Negative for hearing loss and sore throat.    Eyes: Negative for blurred vision.   Respiratory: Negative for cough, shortness of breath and wheezing.    Cardiovascular: Negative for chest pain and leg swelling.   Gastrointestinal: Negative for abdominal pain, diarrhea, heartburn, nausea and vomiting.   Genitourinary: Negative for dysuria.   Neurological: Positive for weakness. Negative for dizziness.      Physical Exam  Laboratory/Imaging   Hemodynamics  Temp (24hrs), Av.8 °C (98.2 °F), Min:36.3 °C (97.4 °F), Max:37.2 °C (98.9 °F)   Temperature: 36.5 °C (97.7 °F)  Pulse  Av  Min: 71  Max: 114   Blood Pressure: 151/73      Respiratory      Respiration: 16, Pulse Oximetry: 96 %     Work Of Breathing / Effort: Mild  RUL Breath Sounds: Clear, RML Breath Sounds: Clear, RLL Breath Sounds: Diminished, ZEN Breath Sounds: Clear, LLL Breath Sounds: Diminished    Fluids    Intake/Output Summary (Last 24 hours) at 17 1457  Last data filed at 17 1423   Gross per 24 hour   Intake              600 ml   Output             2000 ml   Net            -1400 ml       Nutrition  Orders Placed This Encounter   Procedures   • Diet Order     Standing Status:   Standing     Number of Occurrences:   1     Order Specific Question:   Diet:     Answer:   Renal [8]     Physical Exam   Constitutional: She is oriented to person, place, and time. She appears well-developed.   HENT:   Head: Normocephalic and atraumatic.    Eyes: Conjunctivae are normal. Pupils are equal, round, and reactive to light.   Neck: No tracheal deviation present. No thyromegaly present.   Cardiovascular: Normal rate and regular rhythm.    Pulmonary/Chest: Effort normal and breath sounds normal.   Abdominal: Soft. Bowel sounds are normal. She exhibits no distension. There is no tenderness.   Lymphadenopathy:     She has no cervical adenopathy.   Neurological: She is alert and oriented to person, place, and time.   Skin: Skin is warm and dry.   Nursing note and vitals reviewed.      Recent Labs      12/12/17   0257  12/13/17 0411 12/14/17 0319   WBC  2.5*  2.2*  2.3*   RBC  2.97*  3.20*  3.04*   HEMOGLOBIN  8.9*  9.4*  9.2*   HEMATOCRIT  27.7*  29.7*  28.6*   MCV  93.3  92.8  94.1   MCH  30.0  29.4  30.3   MCHC  32.1*  31.6*  32.2*   RDW  55.8*  56.6*  56.8*   PLATELETCT  112*  137*  107*   MPV  9.4  8.7*  9.9     Recent Labs      12/12/17   0257  12/13/17 0411 12/14/17 0319   SODIUM  128*  129*  129*   POTASSIUM  4.3  4.1  4.3   CHLORIDE  95*  95*  100   CO2  24  27  22   GLUCOSE  77  65  80   BUN  40*  24*  40*   CREATININE  2.59*  1.83*  2.65*   CALCIUM  9.3  9.4  10.0                      Assessment/Plan     * Sepsis (CMS-HCC)- (present on admission)   Assessment & Plan    This is sepsis (without associated acute organ dysfunction).   Source?  Off anitbiotics        HCAP (healthcare-associated pneumonia)- (present on admission)   Assessment & Plan    Ruled out?        Pancytopenia (CMS-HCC)- (present on admission)   Assessment & Plan    Follow cbc, ANC  Off antibiotics        HIV/AIDS (acquired immune deficiency syndrome) (CMS-HCC)- (present on admission)   Assessment & Plan    TIVICAY and Rilpivirine           Hypertension- (present on admission)   Assessment & Plan    Amlodipine, Lisinopril, added Metoprolol          ESRD (end stage renal disease) (CMS-HCC)- (present on admission)   Assessment & Plan    HD T-Th-Sa          Hyponatremia-  (present on admission)   Assessment & Plan    Follow bmp        Severe protein-calorie malnutrition (CMS-HCC)- (present on admission)   Assessment & Plan    Nutrition following            Reviewed items::  Radiology images reviewed, EKG reviewed, Labs reviewed and Medications reviewed  Julien catheter::  No Julien  DVT prophylaxis pharmacological::  Heparin  Ulcer Prophylaxis::  No

## 2017-12-14 NOTE — CARE PLAN
Problem: Communication  Goal: The ability to communicate needs accurately and effectively will improve  Outcome: PROGRESSING AS EXPECTED  POC discussed with pt. at bedside. All questions and concerns have been addressed at this time. Verbal understanding recieved    Problem: Safety  Goal: Will remain free from falls  Outcome: PROGRESSING AS EXPECTED  Nicci Thornton Fall Risk Assessment:     Last Known Fall: Within the last year  Mobility: Use of assistive device/requires assist of two people  Medications: Cardiovascular or central nervous system meds  Mental Status/LOC/Awareness: Awake, alert, and oriented to date, place, and person  Toileting Needs: Incontinence  Volume/Electrolyte Status: No problems  Communication/Sensory: No deficits  Behavior: Appropriate behavior  Nicci Thornton Fall Risk Total: 10  Fall Risk Level: LOW RISK    Universal Fall Precautions:  call light/belongings in reach, bed in low position and locked, siderails up x 2, adequate lighting, educate on level of risk, educate to call for assistance    Fall Risk Level Interventions:   TRIAL (TELE 8, NEURO, MED TIFFANIE 5) Low Fall Risk Interventions  Place yellow fall risk ID band on patient: verified  Provide patient/family education based on risk assessment: completed  Educate patient/family to call staff for assistance when getting out of bed: completed  Place fall precaution signage outside patient door: verifiedTRIAL (TELE 8, NEURO, MED TIFFANIE 5) Moderate Fall Risk Interventions  Place yellow fall risk ID band on patient: completed  Provide patient/family education based on risk assessment : completed  Educate patient/family to call staff for assistance when getting out of bed: completed  Place fall precaution signage outside patient door: completed  Utilize bed/chair fall alarm: completed     Patient Specific Interventions:     Medication: review medications with patient and family  Mental Status/LOC/Awareness: reinforce falls education, check on  patient hourly, utilize bed/chair fall alarm and reinforce the use of call light  Toileting: provide frquent toileting  Volume/Electrolyte Status: monitor abnormal lab values and ensure IV fluids are appropriate  Communication/Sensory: update plan of care on whiteboard  Behavioral: engage patient in daily activities and administer medication as ordered  Mobility: utilize bed/chair fall alarm, ensure bed is locked and in lowest position, provide appropriate assistive device and instruct patient to exit bed on their strongest side

## 2017-12-14 NOTE — DOCUMENTATION QUERY
DOCUMENTATION QUERY    PROVIDERS: Please select “Cosign w/ note” to reply to query.    To better represent the severity of illness of your patient, please review the following information and exercise your independent professional judgment in responding to this query.     BMI of 17.54 is noted in the Registered Dietitian note. Based upon the clinical findings, risk factors, and treatment, can a diagnosis be provided to support this BMI finding?    • Underweight  • Cachexia  • Anorexia  • Unable to determine  • Findings of no clinical significance  • Other explanation of clinical findings    The medical record reflects the following:   Clinical Findings H&P:   · cachectic and ill-appearing  · oral mucous membranes are dry  · positive for weakness    RD evaluation:  · BMI 17.54  · patient reports her appetite is fine    Nephrology consult: very cachectic lady   Treatment Nutrition/Dietitian consult  RD recommendations:  record percentage of meals   nutrition representative to see patient daily for meals  RD to monitor weight and lab trends   Risk Factors HIV/AIDS  severe protein-calorie malnutrition   Location within medical record History and Physical, Nephrology consult note and Dietary note     Thank you,   Randa Rosales, RN, BSN  Clinical   [phone]

## 2017-12-14 NOTE — CARE PLAN
Problem: Nutritional:  Goal: Achieve adequate nutritional intake  Patient will consume 50% of meals   Outcome: PROGRESSING SLOWER THAN EXPECTED  PO intake 25-50%

## 2017-12-14 NOTE — PROGRESS NOTES
Assumed care of pt. at 1900    Bedside report received from Day Shift RN   POC discussed with pt. At bedside and Pt. verbalizes understanding.  Pt. Is Awake and AOx 4 , protective precautions in place, and medical status-not monitored  Call light within reach  with appropriate instructions to call for assistance  Bed locked and in lowest position.

## 2017-12-14 NOTE — PROGRESS NOTES
HD treatment today per routine order using LUAAVF.Treatment tolerated well.Net UF removed 2000 ml.Report given to primary Rn.

## 2017-12-14 NOTE — CARE PLAN
Problem: Communication  Goal: The ability to communicate needs accurately and effectively will improve  Outcome: PROGRESSING AS EXPECTED  Patient able to communicate needs appropriately with nursing staff.    Problem: Knowledge Deficit  Goal: Knowledge of disease process/condition, treatment plan, diagnostic tests, and medications will improve  Outcome: PROGRESSING AS EXPECTED  Patient updated on today's plan of care.

## 2017-12-15 NOTE — CARE PLAN
Problem: Safety  Goal: Will remain free from injury    Intervention: Collaborate with Interdisciplinary Team for safe transfer and mobilization techniques    Safety precautions reviewed with pt, pt verbalized understanding and denies questions.  Pt demonstrated ability to use call light for assistance.      Problem: Knowledge Deficit  Goal: Knowledge of disease process/condition, treatment plan, diagnostic tests, and medications will improve    Intervention: Explain information regarding disease process/condition, treatment plan, diagnostic tests, and medications and document in education  Medications and indications reviewed with pt, pt verbalized understanding and all questions answered.

## 2017-12-15 NOTE — CARE PLAN
Problem: Knowledge Deficit  Goal: Knowledge of disease process/condition, treatment plan, diagnostic tests, and medications will improve    Intervention: Assess knowledge level of disease process/condition, treatment plan, diagnostic tests, and medications  Discussed POC, answered pt's questions and reoriented to unit.      Problem: Pain Management  Goal: Pain level will decrease to patient's comfort goal    Intervention: Follow pain managment plan developed in collaboration with patient and Interdisciplinary Team  Discussed alternatives to pain management with heat packs, position changes, and distraction.

## 2017-12-15 NOTE — PROGRESS NOTES
[facility] Medicine Progress Note, Adult, Complex               Author: Fadi Najjar Date & Time created: 12/15/2017  1:26 PM     Interval History:  Pt with ESRD, HIV/AIDS, presented with fever.  Doing better    Review of Systems:  Review of Systems   Constitutional: Positive for malaise/fatigue. Negative for chills and fever.   Respiratory: Negative for shortness of breath.    Cardiovascular: Negative for leg swelling.   Gastrointestinal: Negative for nausea and vomiting.       Physical Exam:  Physical Exam   Constitutional: She is oriented to person, place, and time. She appears cachectic. She appears ill.   HENT:   Nose: Nose normal.   Eyes: No scleral icterus.   Pulmonary/Chest: Effort normal. She has no wheezes. She has no rales.   Musculoskeletal: She exhibits no edema.   Neurological: She is alert and oriented to person, place, and time.       Labs:        Invalid input(s): [id number]      Recent Labs      12/13/17   0411  12/14/17   0319  12/15/17   0330   SODIUM  129*  129*  130*   POTASSIUM  4.1  4.3  3.5*   CHLORIDE  95*  100  98   CO2  27  22  24   BUN  24*  40*  31*   CREATININE  1.83*  2.65*  1.74*   CALCIUM  9.4  10.0  9.8     Recent Labs      12/13/17   0411  12/14/17   0319  12/15/17   0330   GLUCOSE  65  80  74     Recent Labs      12/13/17   0411  12/14/17   0319  12/15/17   0330   RBC  3.20*  3.04*  3.12*   HEMOGLOBIN  9.4*  9.2*  9.2*   HEMATOCRIT  29.7*  28.6*  30.3*   PLATELETCT  137*  107*  145*     Recent Labs      12/13/17   0411  12/14/17   0319  12/15/17   0330   WBC  2.2*  2.3*  2.4*   NEUTSPOLYS  55.00  51.70  56.10   LYMPHOCYTES  13.50*  17.30*  24.60   MONOCYTES  6.30  11.20  9.60   EOSINOPHILS  5.40  6.00  5.30   BASOPHILS  5.40*  3.50*  1.80           Hemodynamics:  Temp (24hrs), Av.8 °C (98.2 °F), Min:36.7 °C (98 °F), Max:36.9 °C (98.5 °F)  Temperature: 36.7 °C (98 °F)  Pulse  Av  Min: 71  Max: 114  Blood Pressure: 156/83     Respiratory:    Respiration: 14, Pulse  Oximetry: 97 %        RUL Breath Sounds: Diminished, RML Breath Sounds: Diminished, RLL Breath Sounds: Diminished, ZEN Breath Sounds: Diminished, LLL Breath Sounds: Diminished  Fluids:    Intake/Output Summary (Last 24 hours) at 12/15/17 1326  Last data filed at 12/15/17 1200   Gross per 24 hour   Intake              880 ml   Output                0 ml   Net              880 ml     Weight: 50.3 kg (110 lb 14.3 oz)  GI/Nutrition:  Orders Placed This Encounter   Procedures   • Diet Order     Standing Status:   Standing     Number of Occurrences:   1     Order Specific Question:   Diet:     Answer:   Renal [8]     Medical Decision Making, by Problem:  Active Hospital Problems    Diagnosis   • Sepsis (CMS-HCC) [A41.9]   • HCAP (healthcare-associated pneumonia) [J18.9]   • HIV/AIDS (acquired immune deficiency syndrome) (CMS-HCC) [B20]   • Severe protein-calorie malnutrition (CMS-HCC) [E43]   • Hypertension [I10]   • ESRD (end stage renal disease) (CMS-HCC) [N18.6]   • Pancytopenia (CMS-HCC) [D61.818]         Reviewed items::  Labs reviewed1 ESRD: HD TTS  2 Anemia: Epo with HD  3 pancytopenia: no clear etiology  4 hyponatremia:revaluate post UF/HD  5 malnutrition  Plan  No acute need for HD today  Daily labs  Renal dose all meds  Avoid nephrotoxins  Prognosis poor.

## 2017-12-15 NOTE — PROGRESS NOTES
Received report from nightshift RN, assumed care of patient. Patient is A&O x4, bed alarm on. Patient educated on importance of calling if in need of assistance. Verbalizes understanding. Patient 8/10 pain at this time in her back. Patient updated on plan of care, voices no concerns at this time. Will continue to monitor for safety and comfort

## 2017-12-15 NOTE — PROGRESS NOTES
Nicci Thornton Fall Risk Assessment:     Last Known Fall: Within the last year  Mobility: Use of assistive device/requires assist of two people  Medications: Cardiovascular and central nervous system meds  Mental Status/LOC/Awareness: Awake, alert, and oriented to date, place, and person  Toileting Needs: Incontinence  Volume/Electrolyte Status: No problems  Communication/Sensory: No deficits  Behavior: Appropriate behavior  Nicci Thornton Fall Risk Total: 11  Fall Risk Level: MODERATE RISK    Universal Fall Precautions:  call light/belongings in reach, bed in low position and locked, siderails up x 2, use non-slip footwear, adequate lighting, clutter free and spill free environment, educate on level of risk, educate to call for assistance    Fall Risk Level Interventions:   TRIAL ([employer], NEURO, MED TIFFANIE 5) Low Fall Risk Interventions  Place yellow fall risk ID band on patient: verified  Provide patient/family education based on risk assessment: completed  Educate patient/family to call staff for assistance when getting out of bed: completed  Place fall precaution signage outside patient door: verifiedTRIAL ([employer], NEURO, MED TIFFANIE 5) Moderate Fall Risk Interventions  Place yellow fall risk ID band on patient: verified  Provide patient/family education based on risk assessment : completed  Educate patient/family to call staff for assistance when getting out of bed: completed  Place fall precaution signage outside patient door: verified  Utilize bed/chair fall alarm: verified     Patient Specific Interventions:     Medication: review medications with patient and family  Mental Status/LOC/Awareness: reorient patient, reinforce falls education, check on patient hourly, utilize bed/chair fall alarm and reinforce the use of call light  Toileting: instruct patient/family on the need to call for assistance when toileting  Volume/Electrolyte Status: ensure patient remains hydrated and monitor abnormal lab  values  Communication/Sensory: update plan of care on whiteboard  Behavioral: not applicable  Mobility: utilize bed/chair fall alarm and ensure bed is locked and in lowest position

## 2017-12-16 PROBLEM — R53.81 DEBILITY: Status: ACTIVE | Noted: 2017-01-01

## 2017-12-16 NOTE — ASSESSMENT & PLAN NOTE
From skilled nursing facility, to return to skilled nursing facility when showing stabilization and improvement of ANC  12/18- 137-129 today  Follow-up and CBC  Pt/ot  At discharge date she is afebrile, hemodynamically stable and wants to be discharged back to SNF.

## 2017-12-16 NOTE — PROGRESS NOTES
Hemodialysis treatment ordered today per Dr Robertson x 3 hours. Treatment initiated at 0810, ended at 1100.     Patient tolerated tx, hypertensive towards the end of HD; see paper flow sheet for details.     Net UF 2,050 mL.     Needles removed from access site. Dressings applied and arterial site held x 10 minutes, Venous site held x 30 min excessive bleeding noted; verified no bleeding. Positive bruit/thrill post tx. Staff RN to monitor AVF for breakthrough bleeding. Should breakthrough bleeding occur, staff RN to apply pressure to access sites until bleeding resolved. Notify Dialysis and Nephrologist for follow-up.    Report given to Primary RN.

## 2017-12-16 NOTE — PROGRESS NOTES
[facility] Medicine Progress Note, Adult, Complex               Author: Fadi Najjar Date & Time created: 2017  1:25 PM     Interval History:  Pt with ESRD, HIV/AIDS, presented with fever.  Doing better  Seen and examined while getting HD.    Review of Systems:  Review of Systems   Constitutional: Positive for malaise/fatigue. Negative for chills and fever.   Respiratory: Negative for shortness of breath.    Cardiovascular: Negative for leg swelling.   Gastrointestinal: Negative for nausea.       Physical Exam:  Physical Exam   Constitutional: She is oriented to person, place, and time. She appears cachectic. She appears ill.   HENT:   Nose: Nose normal.   Eyes: No scleral icterus.   Pulmonary/Chest: Effort normal. She has no wheezes. She has no rales.   Musculoskeletal: She exhibits no edema.   Neurological: She is alert and oriented to person, place, and time.       Labs:        Invalid input(s): [id number]      Recent Labs      12/14/17   0319  12/15/17   0330  12/16/17   0213   SODIUM  129*  130*  130*   POTASSIUM  4.3  3.5*  3.8   CHLORIDE  100  98  99   CO2  22  24  23   BUN  40*  31*  39*   CREATININE  2.65*  1.74*  2.15*   CALCIUM  10.0  9.8  10.1     Recent Labs      12/14/17   0319  12/15/17   03317   0213   GLUCOSE  80  74  87     Recent Labs      12/14/17   0319  12/15/17   0330  17   0213   RBC  3.04*  3.12*  3.21*   HEMOGLOBIN  9.2*  9.2*  9.4*   HEMATOCRIT  28.6*  30.3*  29.8*   PLATELETCT  107*  145*  156*     Recent Labs      12/14/17   0319  12/15/17   0330  17   0213   WBC  2.3*  2.4*  2.6*   NEUTSPOLYS  51.70  56.10  47.00   LYMPHOCYTES  17.30*  24.60  15.60*   MONOCYTES  11.20  9.60  13.90*   EOSINOPHILS  6.00  5.30  5.20   BASOPHILS  3.50*  1.80  6.10*           Hemodynamics:  Temp (24hrs), Av.5 °C (97.7 °F), Min:36.1 °C (97 °F), Max:36.8 °C (98.3 °F)  Temperature: 36.1 °C (97 °F)  Pulse  Av.8  Min: 71  Max: 114  Blood Pressure: (!) 162/75     Respiratory:     Respiration: 18, Pulse Oximetry: 100 %     Work Of Breathing / Effort: Mild  RUL Breath Sounds: Clear, RML Breath Sounds: Clear, RLL Breath Sounds: Clear, ZEN Breath Sounds: Clear, LLL Breath Sounds: Clear  Fluids:    Intake/Output Summary (Last 24 hours) at 12/16/17 1325  Last data filed at 12/16/17 1110   Gross per 24 hour   Intake              680 ml   Output             2550 ml   Net            -1870 ml     Weight: 49.4 kg (108 lb 14.5 oz)  GI/Nutrition:  Orders Placed This Encounter   Procedures   • Diet Order     Standing Status:   Standing     Number of Occurrences:   1     Order Specific Question:   Diet:     Answer:   Renal [8]     Medical Decision Making, by Problem:  Active Hospital Problems    Diagnosis   • Sepsis (CMS-HCC) [A41.9]   • HCAP (healthcare-associated pneumonia) [J18.9]   • HIV/AIDS (acquired immune deficiency syndrome) (CMS-HCC) [B20]   • Severe protein-calorie malnutrition (CMS-HCC) [E43]   • Hypertension [I10]   • ESRD (end stage renal disease) (CMS-HCC) [N18.6]   • Pancytopenia (CMS-HCC) [D61.818]         Reviewed items::  Labs reviewed  1 ESRD: HD TTS  2 Anemia: Epo with HD  3 pancytopenia: no clear etiology  4 hyponatremia:revaluate post UF/HD  5 malnutrition  Plan  HD today  Renal dose all meds  Avoid nephrotoxins  Prognosis poor.

## 2017-12-16 NOTE — PROGRESS NOTES
Bedside report received. Assumed care of pt.  Pt able to make needs known.  Pt shows no s/s of distress. No complaints of pain.  Dialysis in room to begin treatment. Held BP medications, and will administer renegel with meal. Dialysis RN and I are unsure if antivirals will dialyse out and so holding until after treatment complete. Pt stating she is hungry, provided pt snacks.  Pt Resting comfortably in bed.   Fall precautions in place, call light and belongings within reach.  Hourly rounding in place.

## 2017-12-16 NOTE — CARE PLAN
Problem: Infection  Goal: Will remain free from infection    Intervention: Implement standard precautions and perform hand washing before and after patient contact  Protective isolation precautions in place.      Problem: Discharge Barriers/Planning  Goal: Patient's continuum of care needs will be met    Intervention: Assess potential discharge barriers on admission and throughout hospital stay  Pt expected to DC to [facility] tomorrow after dialysis

## 2017-12-16 NOTE — PROGRESS NOTES
Nicci Thornton Fall Risk Assessment:     Last Known Fall: Within the last year  Mobility: Hemiplegic, paraplegia, or quadriplegia  Medications: Cardiovascular and central nervous system meds  Mental Status/LOC/Awareness: Awake, alert, and oriented to date, place, and person  Toileting Needs: Incontinence  Volume/Electrolyte Status: No problems  Communication/Sensory: No deficits  Behavior: Appropriate behavior  Nicci Thornton Fall Risk Total: 12  Fall Risk Level: MODERATE RISK    Universal Fall Precautions:  call light/belongings in reach, bed in low position and locked, wheelchairs and assistive devices out of sight, siderails up x 2, use non-slip footwear, adequate lighting, clutter free and spill free environment, educate on level of risk, educate to call for assistance    Fall Risk Level Interventions:   TRIAL ([email or web address], NEURO, MED TIFFANIE 5) Low Fall Risk Interventions  Place yellow fall risk ID band on patient: verified  Provide patient/family education based on risk assessment: completed  Educate patient/family to call staff for assistance when getting out of bed: completed  Place fall precaution signage outside patient door: verifiedTRIAL ([email or web address], NEURO, MED TIFFANIE 5) Moderate Fall Risk Interventions  Place yellow fall risk ID band on patient: verified  Provide patient/family education based on risk assessment : completed  Educate patient/family to call staff for assistance when getting out of bed: completed  Place fall precaution signage outside patient door: verified  Utilize bed/chair fall alarm: verified     Patient Specific Interventions:     Medication: review medications with patient and family, assess for medications that can be discontinued or dosage decreased and limit combination of prn medications  Mental Status/LOC/Awareness: reinforce falls education, check on patient hourly, utilize bed/chair fall alarm, reinforce the use of call light and provide activity  Toileting: provide frquent toileting and  monitor intake and output/use of appropriate interventions  Volume/Electrolyte Status: monitor abnormal lab values  Communication/Sensory: update plan of care on whiteboard and ensure proper positioning when transferrng/ambulating  Behavioral: engage patient in daily activities, administer medication as ordered and instruct/reinforce fall program rationale  Mobility: provide comfort measures during transport, utilize bed/chair fall alarm, ensure bed is locked and in lowest position, provide appropriate assistive device, instruct patient to exit bed on their strongest side and collaborate with doctor for possible PT/OT consult

## 2017-12-16 NOTE — PROGRESS NOTES
Nicci Thornton Fall Risk Assessment:     Last Known Fall: Within the last year  Mobility: Hemiplegic, paraplegia, or quadriplegia  Medications: Cardiovascular and central nervous system meds  Mental Status/LOC/Awareness: Awake, alert, and oriented to date, place, and person  Toileting Needs: Incontinence  Volume/Electrolyte Status: No problems  Communication/Sensory: No deficits  Behavior: Appropriate behavior  Nicci Thornton Fall Risk Total: 12  Fall Risk Level: MODERATE RISK    Universal Fall Precautions:  call light/belongings in reach, bed in low position and locked, siderails up x 2, wheelchairs and assistive devices out of sight, use non-slip footwear, adequate lighting, clutter free and spill free environment, educate on level of risk, educate to call for assistance    Fall Risk Level Interventions:   TRIAL ([employer], NEURO, MED TIFFANIE 5) Low Fall Risk Interventions  Place yellow fall risk ID band on patient: verified  Provide patient/family education based on risk assessment: completed  Educate patient/family to call staff for assistance when getting out of bed: completed  Place fall precaution signage outside patient door: verifiedTRIAL ([employer], NEURO, MED TIFFANIE 5) Moderate Fall Risk Interventions  Place yellow fall risk ID band on patient: verified  Provide patient/family education based on risk assessment : completed  Educate patient/family to call staff for assistance when getting out of bed: completed  Place fall precaution signage outside patient door: verified  Utilize bed/chair fall alarm: verified     Patient Specific Interventions:     Medication: review medications with patient and family  Mental Status/LOC/Awareness: reinforce falls education and check on patient hourly  Toileting: instruct patient/family on the need to call for assistance when toileting  Volume/Electrolyte Status: ensure patient remains hydrated and monitor abnormal lab values  Communication/Sensory: update plan of care on whiteboard  and ensure proper positioning when transferrng/ambulating  Behavioral: encourage patient to voice feelings  Mobility: utilize bed/chair fall alarm and ensure bed is locked and in lowest position

## 2017-12-16 NOTE — PROGRESS NOTES
[facility] Hospitalist Progress Note    Date of Service: 2017    Chief Complaint  47 y.o. female admitted 12/10/2017 with Fever, presumed to be Pneumonia, HIV/AIDS.    Interval Problem Update  Fever - no recurrence, off antibiotics X 4 days  PNA - ruled out  HIV/AIDS - stable  HTN - better controlled  Neutropenia - monitor, on protective precautions    Consultants/Specialty  ID - Krasner    Disposition  SNF  Pt/ot        Review of Systems   Constitutional: Negative for chills, diaphoresis, fever and malaise/fatigue.   HENT: Negative for sore throat.    Respiratory: Negative for shortness of breath and wheezing.    Cardiovascular: Negative for chest pain and leg swelling.   Gastrointestinal: Negative for diarrhea, heartburn, nausea and vomiting.   Genitourinary: Negative for dysuria.   Musculoskeletal: Negative for myalgias.   Neurological: Positive for weakness. Negative for dizziness and sensory change.   Psychiatric/Behavioral: Negative for depression. The patient is not nervous/anxious.       Physical Exam  Laboratory/Imaging   Hemodynamics  Temp (24hrs), Av.6 °C (97.9 °F), Min:36.2 °C (97.1 °F), Max:36.8 °C (98.3 °F)   Temperature: 36.4 °C (97.6 °F)  Pulse  Av  Min: 71  Max: 114   Blood Pressure: (!) 162/79 (RN notified)      Respiratory      Respiration: 17, Pulse Oximetry: 100 %     Work Of Breathing / Effort: Mild  RUL Breath Sounds: Clear, RML Breath Sounds: Clear, RLL Breath Sounds: Clear, ZEN Breath Sounds: Clear, LLL Breath Sounds: Clear    Fluids    Intake/Output Summary (Last 24 hours) at 17 1156  Last data filed at 12/15/17 1600   Gross per 24 hour   Intake              360 ml   Output                0 ml   Net              360 ml       Nutrition  Orders Placed This Encounter   Procedures   • Diet Order     Standing Status:   Standing     Number of Occurrences:   1     Order Specific Question:   Diet:     Answer:   Renal [8]     Physical Exam   Constitutional: She is oriented to  person, place, and time. She appears well-developed. No distress.   cachetic   HENT:   Head: Normocephalic and atraumatic.   Eyes: EOM are normal. Pupils are equal, round, and reactive to light.   Neck: No tracheal deviation present. No thyromegaly present.   Cardiovascular: Normal rate, regular rhythm and intact distal pulses.    Pulmonary/Chest: Effort normal and breath sounds normal. No respiratory distress. She has no wheezes.   Abdominal: Soft. Bowel sounds are normal. She exhibits no distension. There is no tenderness.   Musculoskeletal: She exhibits no edema.   Neurological: She is alert and oriented to person, place, and time. No cranial nerve deficit.   Skin: Skin is warm and dry. No erythema.   Psychiatric: She has a normal mood and affect. Her behavior is normal.   Nursing note and vitals reviewed.      Recent Labs      12/14/17   0319  12/15/17   0330  12/16/17   0213   WBC  2.3*  2.4*  2.6*   RBC  3.04*  3.12*  3.21*   HEMOGLOBIN  9.2*  9.2*  9.4*   HEMATOCRIT  28.6*  30.3*  29.8*   MCV  94.1  97.1  92.8   MCH  30.3  29.5  29.3   MCHC  32.2*  30.4*  31.5*   RDW  56.8*  59.9*  55.0*   PLATELETCT  107*  145*  156*   MPV  9.9  9.1  9.2     Recent Labs      12/14/17   0319  12/15/17   0330  12/16/17   0213   SODIUM  129*  130*  130*   POTASSIUM  4.3  3.5*  3.8   CHLORIDE  100  98  99   CO2  22  24  23   GLUCOSE  80  74  87   BUN  40*  31*  39*   CREATININE  2.65*  1.74*  2.15*   CALCIUM  10.0  9.8  10.1                      Assessment/Plan     * Sepsis (CMS-HCC)- (present on admission)   Assessment & Plan    This is sepsis (without associated acute organ dysfunction).   Source?  Off anitbiotics        HCAP (healthcare-associated pneumonia)- (present on admission)   Assessment & Plan    Ruled out?        Pancytopenia (CMS-[facility])- (present on admission)   Assessment & Plan    Follow cbc, ANC  , improving  Off antibiotics        HIV/AIDS (acquired immune deficiency syndrome) (CMS-[facility])- (present on admission)    Assessment & Plan    TIVICAY and Rilpivirine           Hypertension- (present on admission)   Assessment & Plan    Amlodipine, Lisinopril, added Metoprolol          ESRD (end stage renal disease) (CMS-[facility])- (present on admission)   Assessment & Plan    HD T-Th-Sa          Hyponatremia- (present on admission)   Assessment & Plan    Follow bmp        Debility   Assessment & Plan    WC bound with 1 person assist at baseline, lives with her ex   Pt/ot  snf referral        HIV wasting syndrome (CMS-HCC)- (present on admission)   Assessment & Plan    Encourage oral intake  With cachexia        Severe protein-calorie malnutrition (CMS-HCC)- (present on admission)   Assessment & Plan    Nutrition following            Reviewed items::  Radiology images reviewed, EKG reviewed, Labs reviewed and Medications reviewed  Julien catheter::  No Julien  DVT prophylaxis pharmacological::  Heparin  Ulcer Prophylaxis::  No

## 2017-12-16 NOTE — PROGRESS NOTES
Pt arrived to unit via hospital bed. Pt oriented to unit, declines pain at this time, protective isolation precautions in place, pt w/c bound at baseline, hourly rounding in place.

## 2017-12-16 NOTE — CARE PLAN
Problem: Discharge Barriers/Planning  Goal: Patient's continuum of care needs will be met  Outcome: PROGRESSING AS EXPECTED  PT/OT eval needed to assess for discharge planning/post discharge needs.   Order placed.     Problem: Skin Integrity  Goal: Risk for impaired skin integrity will decrease  Outcome: PROGRESSING AS EXPECTED  Pt incontinent.  Mepilex was removed.  Will consider placing patient on waffle cushion to reduce pressure, Q2 turns implemented.  Toileting/linen changes addressed with hourly rounding.

## 2017-12-16 NOTE — CARE PLAN
Problem: Bowel/Gastric:  Goal: Normal bowel function is maintained or improved  Outcome: PROGRESSING AS EXPECTED  Pt incontinent of urine and stool.     Problem: Pain Management  Goal: Pain level will decrease to patient's comfort goal  Outcome: PROGRESSING AS EXPECTED  Pt c/o lower back pain. PRN pain medications given. Patient states she is comfortable after given medications.

## 2017-12-16 NOTE — PROGRESS NOTES
"Assumed care at 1900. Received report from RN. Patient is AOx4. Patient is sitting up in bed. Patient appears calm and in no distress. Assessment complete. Labs reviewed. Patient and RN discussed plan of care. Patient questions answered. Patient needs are met at this time. Bed in lowest and locked position. Call light is within reach. Hourly rounding in place. /76   Pulse 86   Temp 36.7 °C (98.1 °F)   Resp 16   Ht 1.676 m (5' 6\")   Wt 50 kg (110 lb 3.7 oz)   LMP 01/01/2010   SpO2 99%   Breastfeeding? No   BMI 17.79 kg/m²       "

## 2017-12-16 NOTE — PROGRESS NOTES
Pt arterial access in AVF was noted to have prolonged bleeding after dialysis.  Myself and dialysis nurse placed new gauze and applied pressure.  Wrapped loosely in coban and sandbag applied for 5-10 min.   Dressing has minimal blood and seems to be tapering off.   Sandbag at bedside. Will continue to monitor site.      Consulted MD Rodríguez, BP meds given (held earlier for dialysis), due to bp being 162/75 after dialysis.  Pt also given pain medications and held 1400 heparin due to risk for even more prolonged bleeding.

## 2017-12-16 NOTE — PROGRESS NOTES
Nicci Thornton Fall Risk Assessment:     Last Known Fall: Within the last year  Mobility: Hemiplegic, paraplegia, or quadriplegia  Medications: Cardiovascular and central nervous system meds  Mental Status/LOC/Awareness: Awake, alert, and oriented to date, place, and person  Toileting Needs: Incontinence  Volume/Electrolyte Status: No problems  Communication/Sensory: No deficits  Behavior: Appropriate behavior  Nicci Thornton Fall Risk Total: 12  Fall Risk Level: MODERATE RISK    Universal Fall Precautions:  bed in low position and locked, call light/belongings in reach, wheelchairs and assistive devices out of sight, siderails up x 2, use non-slip footwear, adequate lighting, clutter free and spill free environment, educate on level of risk, educate to call for assistance    Fall Risk Level Interventions:   TRIAL ([place], NEURO, MED TIFFANIE 5) Low Fall Risk Interventions  Place yellow fall risk ID band on patient: verified  Provide patient/family education based on risk assessment: completed  Educate patient/family to call staff for assistance when getting out of bed: completed  Place fall precaution signage outside patient door: verifiedTRIAL ([employer], NEURO, MED TIFFANIE 5) Moderate Fall Risk Interventions  Place yellow fall risk ID band on patient: verified  Provide patient/family education based on risk assessment : completed  Educate patient/family to call staff for assistance when getting out of bed: completed  Place fall precaution signage outside patient door: verified  Utilize bed/chair fall alarm: verified     Patient Specific Interventions:     Medication: review medications with patient and family  Mental Status/LOC/Awareness: check on patient hourly, utilize bed/chair fall alarm and reinforce the use of call light  Toileting: provide frquent toileting  Volume/Electrolyte Status: ensure patient remains hydrated  Communication/Sensory: update plan of care on whiteboard  Behavioral: not applicable  Mobility:  utilize bed/chair fall alarm, ensure bed is locked and in lowest position and provide appropriate assistive device

## 2017-12-17 NOTE — PROGRESS NOTES
[facility] Hospitalist Progress Note    Date of Service: 2017    Chief Complaint  47 y.o. female admitted 12/10/2017 with Fever, presumed to be Pneumonia, HIV/AIDS.    Interval Problem Update  Fever - no recurrence, off antibiotics X 4 days  PNA - ruled out  HIV/AIDS - stable  HTN - better controlled  Neutropenia - monitor, on protective precautions    :  No new complaints, improved energy    Consultants/Specialty  ID - Krasner    Disposition  SNF, accepted, needs clearance  Possible dc in 2-3 days with improved neutropneia  Pt/ot        Review of Systems   Constitutional: Negative for chills and fever.   HENT: Negative for sore throat.    Respiratory: Negative for cough and shortness of breath.    Cardiovascular: Negative for chest pain and leg swelling.   Gastrointestinal: Negative for diarrhea, heartburn and vomiting.   Genitourinary: Negative for dysuria.   Musculoskeletal: Negative for joint pain and myalgias.   Neurological: Positive for weakness. Negative for sensory change.   Psychiatric/Behavioral: The patient is not nervous/anxious.       Physical Exam  Laboratory/Imaging   Hemodynamics  Temp (24hrs), Av.7 °C (98 °F), Min:36.2 °C (97.2 °F), Max:36.8 °C (98.3 °F)   Temperature: 36.2 °C (97.2 °F)  Pulse  Av.8  Min: 71  Max: 114   Blood Pressure: 153/67      Respiratory      Respiration: 16, Pulse Oximetry: 100 %     Work Of Breathing / Effort: Mild  RUL Breath Sounds: Clear, RML Breath Sounds: Clear, RLL Breath Sounds: Clear, ZEN Breath Sounds: Clear, LLL Breath Sounds: Clear    Fluids    Intake/Output Summary (Last 24 hours) at 17 1329  Last data filed at 17 0900   Gross per 24 hour   Intake              240 ml   Output                0 ml   Net              240 ml       Nutrition  Orders Placed This Encounter   Procedures   • Diet Order     Standing Status:   Standing     Number of Occurrences:   1     Order Specific Question:   Diet:     Answer:   Renal [8]     Physical Exam    Constitutional: She is oriented to person, place, and time. No distress.   cachetic   HENT:   Head: Normocephalic and atraumatic.   Eyes: Pupils are equal, round, and reactive to light. No scleral icterus.   Neck: No thyromegaly present.   Cardiovascular: Normal rate, regular rhythm and intact distal pulses.    Pulmonary/Chest: Effort normal and breath sounds normal. No respiratory distress.   Abdominal: Soft. Bowel sounds are normal. She exhibits no distension. There is no tenderness.   Musculoskeletal: She exhibits no edema or tenderness.   Neurological: She is alert and oriented to person, place, and time. No cranial nerve deficit. Coordination normal.   Skin: Skin is warm and dry. She is not diaphoretic.   Psychiatric: She has a normal mood and affect. Her behavior is normal. Judgment normal.   Nursing note and vitals reviewed.      Recent Labs      12/15/17   0330  12/16/17   0213  12/17/17   0134   WBC  2.4*  2.6*  2.4*   RBC  3.12*  3.21*  2.85*   HEMOGLOBIN  9.2*  9.4*  8.4*   HEMATOCRIT  30.3*  29.8*  26.5*   MCV  97.1  92.8  93.0   MCH  29.5  29.3  29.5   MCHC  30.4*  31.5*  31.7*   RDW  59.9*  55.0*  56.6*   PLATELETCT  145*  156*  163*   MPV  9.1  9.2  8.9*     Recent Labs      12/15/17   0330  12/16/17   0213  12/17/17   0134   SODIUM  130*  130*  132*   POTASSIUM  3.5*  3.8  3.9   CHLORIDE  98  99  99   CO2  24  23  26   GLUCOSE  74  87  99   BUN  31*  39*  26*   CREATININE  1.74*  2.15*  1.50*   CALCIUM  9.8  10.1  9.7                      Assessment/Plan     * Sepsis (CMS-HCC)- (present on admission)   Assessment & Plan    This is sepsis (without associated acute organ dysfunction).   Source?  Off anitbiotics        HCAP (healthcare-associated pneumonia)- (present on admission)   Assessment & Plan    Ruled out?        Pancytopenia (CMS-HCC)- (present on admission)   Assessment & Plan    Follow cbc, ANC- 144 today, slight improvement  , improving  Off antibiotics        HIV/AIDS (acquired immune  deficiency syndrome) (CMS-[facility])- (present on admission)   Assessment & Plan    TIVICAY and Rilpivirine           Hypertension- (present on admission)   Assessment & Plan    Amlodipine, Lisinopril, added Metoprolol          ESRD (end stage renal disease) (CMS-HCC)- (present on admission)   Assessment & Plan    HD T-Th-Sa          Hyponatremia- (present on admission)   Assessment & Plan    Follow bmp        Debility   Assessment & Plan    WC bound with 1 person assist at baseline, lives with her ex   Pt/ot  snf referral        HIV wasting syndrome (CMS-HCC)- (present on admission)   Assessment & Plan    Encourage oral intake  With cachexia        Severe protein-calorie malnutrition (CMS-HCC)- (present on admission)   Assessment & Plan    Nutrition following            Reviewed items::  Radiology images reviewed, EKG reviewed, Labs reviewed and Medications reviewed  Julien catheter::  No Julien  DVT prophylaxis pharmacological::  Heparin  Ulcer Prophylaxis::  No

## 2017-12-17 NOTE — CARE PLAN
Problem: Skin Integrity  Goal: Risk for impaired skin integrity will decrease  Outcome: PROGRESSING AS EXPECTED  Patient is on Q2 turns and waffle mattress was placed. Skin is intact with no new signs of pressure wounds/open skin.    Problem: Pain Management  Goal: Pain level will decrease to patient's comfort goal  Outcome: PROGRESSING AS EXPECTED  Patient c/o pain in lower back. PRN pain medications given and pt states she is comfortable.

## 2017-12-17 NOTE — PROGRESS NOTES
"Assumed care at 1900. Received report from RN. Patient is AOx4. Patient is sitting up in bed and appears calm and in no distress. Assessment complete. Labs reviewed. Patient and RN discussed plan of care. Patient questions answered. Patient needs are met at this time. Bed in lowest and locked position. Call light is within reach. Hourly rounding in place. /74   Pulse 95   Temp 36.8 °C (98.2 °F)   Resp 16   Ht 1.676 m (5' 6\")   Wt 49.4 kg (108 lb 14.5 oz)   LMP 01/01/2010   SpO2 98%   Breastfeeding? No   BMI 17.58 kg/m²       "

## 2017-12-17 NOTE — CARE PLAN
Problem: Safety  Goal: Will remain free from injury  Outcome: PROGRESSING AS EXPECTED  Bed locked and in low position, bed alarm on, non skid socks on, hourly rounds done, call bell in reach.

## 2017-12-17 NOTE — PROGRESS NOTES
Leigh from Lab called with critical result of WBC 2.4 at 0244. Critical lab result read back to Leigh.   This critical lab result is within parameters established by Hospitalist for this patient

## 2017-12-17 NOTE — PROGRESS NOTES
[facility] Medicine Progress Note, Adult, Complex               Author: Fadi Najjar Date & Time created: 2017  3:39 PM     Interval History:  Pt with ESRD, HIV/AIDS, presented with fever.  Doing better    Review of Systems:  Review of Systems   Constitutional: Positive for malaise/fatigue. Negative for chills and fever.   Respiratory: Negative for shortness of breath.    Cardiovascular: Negative for leg swelling.   Gastrointestinal: Negative for nausea.       Physical Exam:  Physical Exam   Constitutional: She is oriented to person, place, and time. She appears cachectic. She appears ill.   HENT:   Nose: Nose normal.   Eyes: No scleral icterus.   Pulmonary/Chest: Effort normal. She has no wheezes. She has no rales.   Musculoskeletal: She exhibits no edema.   Neurological: She is alert and oriented to person, place, and time.       Labs:        Invalid input(s): [id number]      Recent Labs      12/15/17   0330  12/16/17   0213  12/17/17   0134   SODIUM  130*  130*  132*   POTASSIUM  3.5*  3.8  3.9   CHLORIDE  98  99  99   CO2  24  23  26   BUN  31*  39*  26*   CREATININE  1.74*  2.15*  1.50*   CALCIUM  9.8  10.1  9.7     Recent Labs      12/15/17   0330  12/16/17   0213  12/17/17   013   GLUCOSE  74  87  99     Recent Labs      12/15/17   0330  12/16/17   0213  12/17/17   0134   RBC  3.12*  3.21*  2.85*   HEMOGLOBIN  9.2*  9.4*  8.4*   HEMATOCRIT  30.3*  29.8*  26.5*   PLATELETCT  145*  156*  163*     Recent Labs      12/15/17   0330  12/16/17   0213  12/17/17   0134   WBC  2.4*  2.6*  2.4*   NEUTSPOLYS  56.10  47.00  47.80   LYMPHOCYTES  24.60  15.60*  16.50*   MONOCYTES  9.60  13.90*  13.10   EOSINOPHILS  5.30  5.20  4.40   BASOPHILS  1.80  6.10*  1.70           Hemodynamics:  Temp (24hrs), Av.7 °C (98 °F), Min:36.2 °C (97.2 °F), Max:36.8 °C (98.3 °F)  Temperature: 36.2 °C (97.2 °F)  Pulse  Av.8  Min: 71  Max: 114  Blood Pressure: 153/67     Respiratory:    Respiration: 16, Pulse Oximetry: 100 %      Work Of Breathing / Effort: Mild  RUL Breath Sounds: Clear, RML Breath Sounds: Clear, RLL Breath Sounds: Clear, ZEN Breath Sounds: Clear, LLL Breath Sounds: Clear  Fluids:    Intake/Output Summary (Last 24 hours) at 12/17/17 1539  Last data filed at 12/17/17 1400   Gross per 24 hour   Intake              480 ml   Output                0 ml   Net              480 ml        GI/Nutrition:  Orders Placed This Encounter   Procedures   • Diet Order     Standing Status:   Standing     Number of Occurrences:   1     Order Specific Question:   Diet:     Answer:   Renal [8]     Medical Decision Making, by Problem:  Active Hospital Problems    Diagnosis   • Sepsis (CMS-HCC) [A41.9]   • HCAP (healthcare-associated pneumonia) [J18.9]   • HIV/AIDS (acquired immune deficiency syndrome) (CMS-HCC) [B20]   • Severe protein-calorie malnutrition (CMS-HCC) [E43]   • Hypertension [I10]   • ESRD (end stage renal disease) (CMS-HCC) [N18.6]   • Pancytopenia (CMS-HCC) [D61.818]         Reviewed items::  Labs reviewed  1 ESRD: HD TTS  2 Anemia: Epo with HD  3 pancytopenia: no clear etiology  4 hyponatremia:revaluate post UF/HD  5 malnutrition  Plan  No acute need for HD today  Renal dose all meds  Avoid nephrotoxins  Prognosis poor.  D/W Dr Rodríguez

## 2017-12-17 NOTE — PROGRESS NOTES
Nicci Thornton Fall Risk Assessment:     Last Known Fall: Within the last year  Mobility: Hemiplegic, paraplegia, or quadriplegia  Medications: Cardiovascular and central nervous system meds  Mental Status/LOC/Awareness: Awake, alert, and oriented to date, place, and person  Toileting Needs: Incontinence  Volume/Electrolyte Status: No problems  Communication/Sensory: No deficits  Behavior: Appropriate behavior  Nicci Thornton Fall Risk Total: 12  Fall Risk Level: MODERATE RISK    Universal Fall Precautions:  call light/belongings in reach, bed in low position and locked, siderails up x 2, use non-slip footwear, adequate lighting, clutter free and spill free environment, educate on level of risk, educate to call for assistance    Fall Risk Level Interventions:   TRIAL ([place], NEURO, MED TIFFANIE 5) Low Fall Risk Interventions  Place yellow fall risk ID band on patient: verified  Provide patient/family education based on risk assessment: completed  Educate patient/family to call staff for assistance when getting out of bed: completed  Place fall precaution signage outside patient door: verifiedTRIAL ([employer], NEURO, MED TIFFANIE 5) Moderate Fall Risk Interventions  Place yellow fall risk ID band on patient: verified  Provide patient/family education based on risk assessment : completed  Educate patient/family to call staff for assistance when getting out of bed: completed  Place fall precaution signage outside patient door: completed  Utilize bed/chair fall alarm: verified     Patient Specific Interventions:     Medication: review medications with patient and family and limit combination of prn medications  Mental Status/LOC/Awareness: reinforce falls education, check on patient hourly, utilize bed/chair fall alarm and reinforce the use of call light  Toileting: monitor intake and output/use of appropriate interventions and instruct patient/family on the need to call for assistance when toileting  Volume/Electrolyte Status:  ensure patient remains hydrated and monitor abnormal lab values  Communication/Sensory: ensure proper positioning when transferrng/ambulating  Behavioral: administer medication as ordered  Mobility: utilize bed/chair fall alarm and ensure bed is locked and in lowest position

## 2017-12-17 NOTE — PROGRESS NOTES
Nicci Thornton Fall Risk Assessment:     Last Known Fall: Within the last year  Mobility: Hemiplegic, paraplegia, or quadriplegia  Medications: Cardiovascular and central nervous system meds  Mental Status/LOC/Awareness: Awake, alert, and oriented to date, place, and person  Toileting Needs: Incontinence  Volume/Electrolyte Status: No problems  Communication/Sensory: No deficits  Behavior: Appropriate behavior  Nicci Thornton Fall Risk Total: 12  Fall Risk Level: MODERATE RISK    Universal Fall Precautions:  call light/belongings in reach, bed in low position and locked, wheelchairs and assistive devices out of sight, siderails up x 2, use non-slip footwear, adequate lighting, clutter free and spill free environment, educate on level of risk, educate to call for assistance    Fall Risk Level Interventions:   TRIAL ([place], NEURO, MED TIFFANIE 5) Low Fall Risk Interventions  Place yellow fall risk ID band on patient: verified  Provide patient/family education based on risk assessment: completed  Educate patient/family to call staff for assistance when getting out of bed: completed  Place fall precaution signage outside patient door: verifiedTRIAL ([place], NEURO, MED TIFFANIE 5) Moderate Fall Risk Interventions  Place yellow fall risk ID band on patient: verified  Provide patient/family education based on risk assessment : completed  Educate patient/family to call staff for assistance when getting out of bed: completed  Place fall precaution signage outside patient door: verified  Utilize bed/chair fall alarm: verified     Patient Specific Interventions:     Medication: review medications with patient and family  Mental Status/LOC/Awareness: check on patient hourly and utilize bed/chair fall alarm  Toileting: instruct patient/family on the need to call for assistance when toileting  Volume/Electrolyte Status: ensure patient remains hydrated and monitor abnormal lab values  Communication/Sensory: update plan of care on  whiteboard and ensure proper positioning when transferrng/ambulating  Behavioral: encourage patient to voice feelings  Mobility: utilize bed/chair fall alarm and ensure bed is locked and in lowest position

## 2017-12-18 NOTE — PROGRESS NOTES
[facility] Hospitalist Progress Note    Date of Service: 2017    Chief Complaint  47 y.o. female admitted 12/10/2017 with Fever, presumed to be Pneumonia, HIV/AIDS.    Interval Problem Update  Fever - no recurrence, off antibiotics X 4 days  PNA - ruled out  HIV/AIDS - stable  HTN - better controlled  Neutropenia - monitor, on protective precautions    :  No new complaints, improved energy  : Remains afebrile, needs stabilization of ANC prior to discharge to skilled nursing facility, pleasant, cooperative    Consultants/Specialty  ID - Krasner    Disposition  SNF, accepted, needs clearance  Possible dc in 2-3 days with improved neutropneia  Pt/ot        Review of Systems   HENT: Negative for hearing loss and sore throat.    Respiratory: Negative for cough and shortness of breath.    Cardiovascular: Negative for leg swelling.   Gastrointestinal: Negative for diarrhea and vomiting.   Genitourinary: Negative for dysuria and flank pain.   Musculoskeletal: Negative for joint pain and myalgias.   Neurological: Positive for weakness. Negative for dizziness, sensory change and loss of consciousness.   Psychiatric/Behavioral: The patient is not nervous/anxious.       Physical Exam  Laboratory/Imaging   Hemodynamics  Temp (24hrs), Av.8 °C (98.2 °F), Min:36.4 °C (97.6 °F), Max:37.1 °C (98.8 °F)   Temperature: 36.4 °C (97.6 °F)  Pulse  Av.9  Min: 71  Max: 114   Blood Pressure: (!) 170/70 (RN notified)      Respiratory      Respiration: 16, Pulse Oximetry: 98 %     Work Of Breathing / Effort: Mild  RUL Breath Sounds: Clear, RML Breath Sounds: Clear, RLL Breath Sounds: Diminished, ZEN Breath Sounds: Clear, LLL Breath Sounds: Diminished    Fluids    Intake/Output Summary (Last 24 hours) at 17 0851  Last data filed at 17 1800   Gross per 24 hour   Intake              720 ml   Output                0 ml   Net              720 ml       Nutrition  Orders Placed This Encounter   Procedures   • Diet  Order     Standing Status:   Standing     Number of Occurrences:   1     Order Specific Question:   Diet:     Answer:   Renal [8]     Physical Exam   Constitutional: She is oriented to person, place, and time. No distress.   cachetic   HENT:   Head: Normocephalic and atraumatic.   Eyes: EOM are normal. Pupils are equal, round, and reactive to light.   Cardiovascular: Normal rate, regular rhythm and intact distal pulses.    Pulmonary/Chest: Effort normal. No respiratory distress.   Abdominal: Soft. Bowel sounds are normal. She exhibits no distension. There is no tenderness.   Musculoskeletal: She exhibits no edema.   Neurological: She is alert and oriented to person, place, and time. No cranial nerve deficit. She exhibits abnormal muscle tone.   Skin: Skin is warm and dry.   Psychiatric: She has a normal mood and affect. Her behavior is normal. Judgment and thought content normal.   Nursing note and vitals reviewed.      Recent Labs      12/16/17 0213 12/17/17   0134  12/18/17   0340   WBC  2.6*  2.4*  3.0*   RBC  3.21*  2.85*  2.91*   HEMOGLOBIN  9.4*  8.4*  8.6*   HEMATOCRIT  29.8*  26.5*  27.7*   MCV  92.8  93.0  95.2   MCH  29.3  29.5  29.6   MCHC  31.5*  31.7*  31.0*   RDW  55.0*  56.6*  58.1*   PLATELETCT  156*  163*  179   MPV  9.2  8.9*  8.8*     Recent Labs      12/16/17   0213  12/17/17   0134  12/18/17   0340   SODIUM  130*  132*  130*   POTASSIUM  3.8  3.9  4.0   CHLORIDE  99  99  100   CO2  23  26  23   GLUCOSE  87  99  103*   BUN  39*  26*  38*   CREATININE  2.15*  1.50*  1.95*   CALCIUM  10.1  9.7  10.3                      Assessment/Plan     * Sepsis (CMS-HCC)- (present on admission)   Assessment & Plan    This is sepsis (without associated acute organ dysfunction).   Source?  Off anitbiotics        HCAP (healthcare-associated pneumonia)- (present on admission)   Assessment & Plan    Ruled out?        Pancytopenia (CMS-HCC)- (present on admission)   Assessment & Plan    Follow cbc, ANC- 129  Still  trending down  Off antibiotics        HIV/AIDS (acquired immune deficiency syndrome) (CMS-HCC)- (present on admission)   Assessment & Plan    TIVICAY and Rilpivirine           Hypertension- (present on admission)   Assessment & Plan    Amlodipine, Lisinopril, added Metoprolol          ESRD (end stage renal disease) (CMS-HCC)- (present on admission)   Assessment & Plan    HD T-Th-Sa          Hyponatremia- (present on admission)   Assessment & Plan    Follow bmp        Debility   Assessment & Plan    From skilled nursing facility, to return to skilled nursing facility when showing stabilization and improvement of ANC  12/18- 137-129 today  Follow-up and CBC  Pt/ot  snf referral        HIV wasting syndrome (CMS-HCC)- (present on admission)   Assessment & Plan    Encourage oral intake  With cachexia        Severe protein-calorie malnutrition (CMS-HCC)- (present on admission)   Assessment & Plan    Nutrition following            Reviewed items::  Radiology images reviewed, EKG reviewed, Labs reviewed and Medications reviewed  Julien catheter::  No Julien  DVT prophylaxis pharmacological::  Heparin  Ulcer Prophylaxis::  No

## 2017-12-18 NOTE — THERAPY
"Physical Therapy Evaluation completed.   Bed Mobility:  Supine to Sit: Maximal Assist  Transfers: Sit to Stand: Refused  Gait: Level Of Assist: Unable to Participate       Plan of Care: Will benefit from Physical Therapy 3 times per week and Plan to complete next treatment by Wednesday 12/20  Discharge Recommendations: Equipment: Will Continue to Assess for Equipment Needs. Post-acute therapy Discharge to a transitional care facility for continued skilled therapy services.    Pt is a 47 year old female admitted to the hospital from [facility] with fever, found to have sepsis. Pt with an extensive PMH including HIV/AIDS, diabetes and renal failure. Pt reports that she has been non-ambulatory for the past 8-9 months. Pt was WC dependent at home and relied on \"son and ex to lift me into my wheelchair.\" At time of initial evaluation, pt required maximal assist for bed mobility and transition from supine to sit. Pt with significant generalized weakness. Pt required physical assist to maintain seated balance at EOB and has significant R lateral lean. Pt demonstrates inconsistent LE strength, trace strength supine in bed or seated EOB, however, was able to isometrically maintain LE's positioned in hook lying in preparation for rolling. Pt declined sit to stand transition or transfer to bedside chair. Pt would benefit from ongoing PT intervention while in the acute care setting to address the listed deficits and improve mobility prior to DC. Pt will require ongoing post acute transitional care upon DC given weakness and CLOF.     See \"Rehab Therapy-Acute\" Patient Summary Report for complete documentation.     "

## 2017-12-18 NOTE — DISCHARGE PLANNING
Received choice form from CAMI Molina for patients SNF services, referral has been sent to [facility] per patient request.

## 2017-12-18 NOTE — CARE PLAN
Problem: Infection  Goal: Will remain free from infection  Outcome: PROGRESSING AS EXPECTED  No signs or symptoms of infection noted.     Problem: Skin Integrity  Goal: Risk for impaired skin integrity will decrease  Outcome: PROGRESSING AS EXPECTED  Patient is turned and repositioned [id number].

## 2017-12-18 NOTE — PROGRESS NOTES
"Assumed care at 1900. Received report from RN. Patient is AOx4. Patient is sitting up in bed and appears calm and in no distress. Q2 turns are in place. Assessment complete. Labs reviewed. Patient and RN discussed plan of care. Patient questions answered. Patient needs are met at this time. Bed in lowest and locked position. Call light is within reach. Hourly rounding in place. /70   Pulse 96   Temp 37.1 °C (98.8 °F)   Resp 16   Ht 1.676 m (5' 6\")   Wt 49.4 kg (108 lb 14.5 oz)   LMP 01/01/2010   SpO2 96%   Breastfeeding? No   BMI 17.58 kg/m²       "

## 2017-12-18 NOTE — DISCHARGE PLANNING
Updated Clinical note: via chart review  Per Dr. Anne MD concerned about ANC.   Results for KIM GALLARDO (MRN [id number]) as of 12/18/2017 12:28   Ref. Range 12/14/2017 03:19 12/15/2017 03:30 12/16/2017 02:13 12/17/2017 01:34   Neutrophils (Absolute) Latest Ref Range: 2.00 - 7.15 K/uL 1.39 (L) 1.35 (L) 1.45 (L) 1.48 (L)   If ANC is stable , pt could be dc in 1-2 days.     Update Discharge Planning:  MD recommends back to SNF.  Rounding done.   [facility].  Talked to pt about SNF choices who said that she prefers [facility] as she came from there. Faxed choice list to [facility].

## 2017-12-18 NOTE — CARE PLAN
Problem: Communication  Goal: The ability to communicate needs accurately and effectively will improve  Outcome: PROGRESSING AS EXPECTED  Pt able to verbalize needs appropriately. Utilizes call light to communicate with staff.

## 2017-12-18 NOTE — PROGRESS NOTES
Nicci Thornton Fall Risk Assessment:     Last Known Fall: Within the last year  Mobility: Use of assistive device/requires assist of two people  Medications: Cardiovascular and central nervous system meds  Mental Status/LOC/Awareness: Awake, alert, and oriented to date, place, and person  Toileting Needs: Incontinence  Volume/Electrolyte Status: No problems  Communication/Sensory: No deficits  Behavior: Appropriate behavior  Nicci Thornton Fall Risk Total: 11  Fall Risk Level: MODERATE RISK    Universal Fall Precautions:  call light/belongings in reach, bed in low position and locked, wheelchairs and assistive devices out of sight, siderails up x 2, use non-slip footwear, adequate lighting, clutter free and spill free environment, educate on level of risk, educate to call for assistance    Fall Risk Level Interventions:   TRIAL (Multiply, NEURO, MED TIFFANIE 5) Low Fall Risk Interventions  Place yellow fall risk ID band on patient: verified  Provide patient/family education based on risk assessment: completed  Educate patient/family to call staff for assistance when getting out of bed: completed  Place fall precaution signage outside patient door: verifiedTRIAL (Multiply, NEURO, MED TIFFANIE 5) Moderate Fall Risk Interventions  Place yellow fall risk ID band on patient: verified  Provide patient/family education based on risk assessment : completed  Educate patient/family to call staff for assistance when getting out of bed: completed  Place fall precaution signage outside patient door: completed  Utilize bed/chair fall alarm: verified     Patient Specific Interventions:     Medication: review medications with patient and family  Mental Status/LOC/Awareness: check on patient hourly and utilize bed/chair fall alarm  Toileting: instruct patient/family on the need to call for assistance when toileting  Volume/Electrolyte Status: ensure patient remains hydrated and monitor abnormal lab values  Communication/Sensory: update plan of  care on whiteboard, ensure proper positioning when transferrng/ambulating and ensure patient has glasses/contacts and hearing aids/dentures  Behavioral: encourage patient to voice feelings  Mobility: provide comfort measures during transport, utilize bed/chair fall alarm and ensure bed is locked and in lowest position

## 2017-12-18 NOTE — WOUND TEAM
In to see pt earlier today. She had dry skin/crust to lateral R malleolus. Peeled it off using forceps revealing pink intact skin. Applied moisturizer to BLE. Pt turned to R side to view discolored skin to L buttock/ischium. It is slightly darker 5 x 5 cm intact. Temperature and texture no difference than surrounding skin. May be POA DTI, will need to monitor for changes. Pt has no advanced wound care needs @ this time.

## 2017-12-18 NOTE — THERAPY
OT orders received.  Pt reports she is WC bound at baseline and is dependent for ADL transfers.  Pt is dependent with bathing and dressing at home.  Pt reports she is able to groom and self-feed at this time.  Pt appears at baseline, pt with no acute OT needs at this time.

## 2017-12-18 NOTE — PROGRESS NOTES
Nicci Thornton Fall Risk Assessment:     Last Known Fall: Within the last year  Mobility: Use of assistive device/requires assist of two people  Medications: Cardiovascular and central nervous system meds  Mental Status/LOC/Awareness: Awake, alert, and oriented to date, place, and person  Toileting Needs: Incontinence  Volume/Electrolyte Status: No problems  Communication/Sensory: No deficits  Behavior: Appropriate behavior  Nicci Thornton Fall Risk Total: 11  Fall Risk Level: MODERATE RISK    Universal Fall Precautions:  call light/belongings in reach, bed in low position and locked, siderails up x 2, use non-slip footwear, adequate lighting, clutter free and spill free environment, educate on level of risk, educate to call for assistance    Fall Risk Level Interventions:   TRIAL ([employer] 8, NEURO, MED TIFFANIE 5) Low Fall Risk Interventions  Place yellow fall risk ID band on patient: verified  Provide patient/family education based on risk assessment: completed  Educate patient/family to call staff for assistance when getting out of bed: completed  Place fall precaution signage outside patient door: verifiedTRIAL ([employer] 8, NEURO, MED TIFFANIE 5) Moderate Fall Risk Interventions  Place yellow fall risk ID band on patient: verified  Provide patient/family education based on risk assessment : completed  Educate patient/family to call staff for assistance when getting out of bed: completed  Place fall precaution signage outside patient door: completed  Utilize bed/chair fall alarm: verified     Patient Specific Interventions:     Medication: review medications with patient and family and limit combination of prn medications  Mental Status/LOC/Awareness: check on patient hourly and reinforce the use of call light  Toileting: monitor intake and output/use of appropriate interventions  Volume/Electrolyte Status: ensure patient remains hydrated  Communication/Sensory: update plan of care on whiteboard and ensure proper positioning  when transferrng/ambulating  Behavioral: administer medication as ordered  Mobility: ensure bed is locked and in lowest position

## 2017-12-19 PROBLEM — R50.9 FEVER: Status: ACTIVE | Noted: 2017-01-01

## 2017-12-19 NOTE — DISCHARGE PLANNING
Update Discharge Planning:  Discussed in ADT rounds. MD agreeable to discharge pt to [facility] today. [facility] said  is at 4pm.

## 2017-12-19 NOTE — DISCHARGE SUMMARY
"CHIEF COMPLAINT ON ADMISSION  Chief Complaint   Patient presents with   • Fever     onset of fever this evening at 1900       CODE STATUS  Full Code    HPI & HOSPITAL COURSE  Please review Dr. Isaias Felix M.D. notes for further details of history of present illness, past medical/social/family histories, allergies and medications. Please review Dr. Morgan and MARLON Duvall and Nephrology consultation notes.                 Fever- (present on admission)   Assessment & Plan    She presented with fever  CXR showed hazy bibasilar opacities. Was to be started on Abx for pneumonia but after careful review with Dr. Morgan, ID physician, no Abx started and she improved on her own.   Discussed with Dr. Morgan. OK to discharge to SNF  At discharge date she is afebrile, hemodynamically stable and wants to go back to SNF  I spoke with Dr. Morgan and he clears her to be discharged  At doischarge date she is afebrile, hemodynamically stable and wants to be discharged.  Follow up with Shonna Willoughby M.D. Or attending physician at [facility] upon receipt  Follow up with Dr. Garcia in 2-4 weeks       * Sepsis (CMS-HCC)- (present on admission)   Assessment & Plan    \"This is sepsis (without associated acute organ dysfunction).   Source?  Off anitbiotics\"  Resolved when I inherited her care. Reviewing her chart she was not started on any antibiotics; she improved without it. MARLON Beasley felt there was no infection.  Sepsis likely ruled out.            Pancytopenia (CMS-HCC)- (present on admission)   Assessment & Plan    \"Follow cbc, ANC- 129  Still trending down  Off antibiotics\"  ANC is not 129 but actually 1290 when I reviewed her chart  At discharge date, ANC is even up at 1990  I reviewed CD4, last month it was at 63, and she is on HAART  I spoke with Dr. Morgan, he did not feel any antibiotic is indicated for now despite low CD4.        HIV/AIDS (acquired immune deficiency syndrome) (CMS-HCC)- (present on admission) "   Assessment & Plan    Continue TIVICAY and Rilpivirine   Follow up with Dr. Morgan in 2-4 weeks        Hypertension- (present on admission)   Assessment & Plan    Amlodipine, Lisinopril, added Metoprolol          ESRD (end stage renal disease) (CMS-HCC)- (present on admission)   Assessment & Plan    HD T-Th-Sa  Follow up with Dr. Betancourt in 1-2 weeks          Hyponatremia- (present on admission)   Assessment & Plan    Mild category  Follow up with Shonna Willoughby M.D. Or attending physician at [facility] upon receipt  Follow up with Dr. Betancourt in 1-2 weeks        Debility   Assessment & Plan    From skilled nursing facility, to return to skilled nursing facility when showing stabilization and improvement of ANC  12/18- 137-129 today  Follow-up and CBC  Pt/ot  At discharge date she is afebrile, hemodynamically stable and wants to be discharged back to [facility]        HIV wasting syndrome (CMS-HCC)- (present on admission)   Assessment & Plan    Encourage oral intake  With cachexia        Severe protein-calorie malnutrition (CMS-HCC)- (present on admission)   Assessment & Plan    Nutrition following  Encourage PO            Discharge Physical Exam  Constitutional: She is oriented to person, place, and time. No distress.   cachetic   HENT:   Head: Normocephalic and atraumatic.   Eyes: EOM are normal. Pupils are equal, round, and reactive to light.   Cardiovascular: Normal rate, regular rhythm and intact distal pulses.    Pulmonary/Chest: Effort normal. No respiratory distress.   Abdominal: Soft. Bowel sounds are normal. She exhibits no distension. There is no tenderness.   Musculoskeletal: She exhibits no edema.   Neurological: She is alert and oriented to person, place, and time. No cranial nerve deficit. Generalized weakness  Skin: Skin is warm and dry.   Psychiatric: She has a normal mood and affect. Her behavior is normal. Judgment and thought content normal.   Nursing note and vitals reviewed.      Therefore, she  is discharged in fair and stable condition for further post-acute management.     SPECIFIC OUTPATIENT FOLLOW-UP  Follow up with Shonna Willoughby M.D. Or attending physician at [facility] upon receipt  Follow up with Dr. Garcia in 2-4 weeks  Follow up with Dr. Betancourt in 1-2 weeks    DISCHARGE PROBLEM LIST  Principal Problem:    Sepsis (CMS-[facility]) POA: Yes  Active Problems:    Pancytopenia (CMS-[facility]) POA: Yes    Fever POA: Yes    Hyponatremia POA: Yes    ESRD (end stage renal disease) (CMS-[facility]) (Chronic) POA: Yes    Hypertension POA: Yes    HIV/AIDS (acquired immune deficiency syndrome) (CMS-[facility]) POA: Yes    Severe protein-calorie malnutrition (CMS-[facility]) POA: Yes    HIV wasting syndrome (CMS-[facility]) (Chronic) POA: Yes    Debility POA: Unknown  Resolved Problems:    * No resolved hospital problems. *      FOLLOW UP  No future appointments.  No follow-up provider specified.   Follow up with Shonna Willoughby M.D. Or attending physician at [facility] upon receipt  Follow up with Dr. Garcia in 2-4 weeks  Follow up with Dr. Betancourt in 1-2 weeks    MEDICATIONS ON DISCHARGE   Roger Mckeon   Home Medication Instructions SONI:[id number]    Printed on:12/19/17 1223   Medication Information                      acetaminophen (TYLENOL) 325 MG Tab  Take 650 mg by mouth every four hours as needed for Fever.             acyclovir (ZOVIRAX) 400 MG tablet  Take 400 mg by mouth every day.             amlodipine (NORVASC) 10 MG Tab  Take 1 Tab by mouth every day.             bisacodyl (DULCOLAX) 10 MG Suppos  Insert 1 Suppository in rectum 1 time daily as needed (if magnesium hydroxide ineffective after 24 hours).             dolutegravir (TIVICAY) 50 MG Tab tablet  Take 50 mg by mouth every day.             fluoxetine (PROZAC) 10 MG Cap  Take 10 mg by mouth every day.             hydrocodone-acetaminophen (NORCO) 5-325 MG Tab per tablet  Take 2 Tabs by mouth every four hours as needed.             polyethylene glycol/lytes (MIRALAX) Pack  Take 1  Packet by mouth 1 time daily as needed (if sennosides and docusate ineffective after 24 hours).             Rilpivirine HCl (EDURANT) 25 MG Tab  Take 25 mg by mouth every evening.             senna-docusate (PERICOLACE OR SENOKOT S) 8.6-50 MG Tab  Take 2 Tabs by mouth 2 Times a Day.             Sevelamer Carbonate (RENVELA) 800 MG Tab  Take 1,600 mg by mouth 3 times a day.                 DIET  Orders Placed This Encounter   Procedures   • Diet Order     Standing Status:   Standing     Number of Occurrences:   1     Order Specific Question:   Diet:     Answer:   Renal [8]       ACTIVITY  as per PT/OT at [facility]      LINES, DRAINS, AND WOUNDS  This is an automated list. Peripheral IVs will be removed prior to discharge.  PIV Group Right Forearm 18g Flexible Catheter;Saline Lock (Active)   Line Secured Taped;Transparent 12/19/2017  9:00 AM   Site Condition / Description Assessed;Patent;Clean;Dry;Intact 12/19/2017  9:00 AM   Dressing Type / Description Transparent;Clean;Dry;Intact 12/19/2017  9:00 AM   Dressing Status Observed 12/19/2017  9:00 AM   Saline Locked Yes 12/19/2017  9:00 AM   Infiltration Grading Used by [facility] 0 12/19/2017  9:00 AM   Phlebitis Scale (Used by [facility]) 0 12/19/2017  9:00 AM   Date Primary Tubing Changed 12/11/17 12/11/2017  5:15 AM   NEXT Primary Tubing Change  12/12/17 12/11/2017  5:15 AM       Dialysis Access Group Left;Upper Arm AV Fistula (Active)   Access Site Clean;Dry;Intact 12/19/2017  9:59 AM   Dressing Type / Description Gauze;Clean;Dry;Intact 12/19/2017  9:59 AM   Dressing Status Changed 12/19/2017  9:59 AM   AV Fistula Bruit Present;Thrill Present 12/19/2017  9:59 AM   Dialysis Performed Yes 12/19/2017  9:59 AM   Next Dressing Change  12/16/17 12/16/2017  8:00 AM       Wound Other (comment) Buttock Left (Active)   Wound Bed Brown 12/19/2017  9:17 AM   Drainage  None 12/19/2017  9:17 AM   Periwound Skin Discolored;Normal;Intact 12/19/2017  9:17 AM   Cleansing Not  Applicable 12/19/2017  9:17 AM   Dressing Options Open to Air 12/19/2017  9:17 AM   Dressing Status / Change Not Applicable 12/18/2017  8:00 AM   Dressing Cleansing/Solutions Not Applicable 12/18/2017  8:00 AM   Time Spent with Patient (mins) 40 12/18/2017  4:00 PM                  MENTAL STATUS ON TRANSFER  Level of Consciousness: Alert  Orientation : Oriented x 4  Speech: Speech Clear    CONSULTATIONS  Nephrology  Dr. Mrogan, ID    PROCEDURES  *Ct-chest (thorax) W/o    Result Date: 11/27/2017 11/27/2017 9:36 AM HISTORY/REASON FOR EXAM: Pleural effusion. TECHNIQUE/EXAM DESCRIPTION: CT scan of the chest without contrast. Thin-section helical images were obtained from the lung apices through the adrenal glands. Low dose optimization technique was utilized for this CT exam including automated exposure control and adjustment of the mA and/or kV according to patient size. COMPARISON: 5/9/2017 FINDINGS: The study is limited due to non-use of intravenous contrast. The mediastinum and hilum is not well evaluated. There is atelectasis within the lung bases bilaterally. Ill-defined groundglass opacities have improved. There is a small right pleural effusion. There are small bilateral thyroid nodules. There is atherosclerotic vascular calcification. No enlarged mediastinal lymph nodes. The pulmonary breonna are not well evaluated due to absence of intravenous contrast. There is no evidence of pericardial effusion. There are gallstones within the gallbladder. There is diffuse anasarca. There is minimal ascites within the upper abdomen. There is extensive vascular calcification within the upper abdomen.     1.  Small right pleural effusion. 2.  Bibasilar atelectasis. 3.  Interval improvement in ill-defined bilateral groundglass opacities. 4.  Gallstones. 5.  Anasarca. 6.  Extensive atherosclerotic vascular calcification. 7.  Splenomegaly.     Dx-chest-portable (1 View)    Result Date: 12/10/2017  12/10/2017 9:17 PM  HISTORY/REASON FOR EXAM:  Fever. TECHNIQUE/EXAM DESCRIPTION AND NUMBER OF VIEWS: Single portable view of the chest. COMPARISON: 7/31/2017 FINDINGS: Single portable view of the chest demonstrates a stable cardiac silhouette and mediastinal contours. There is pulmonary vascular prominence. There are hazy bibasilar opacification. The right costophrenic angle is blunted. No suspicious bony lesions.     1.  Hazy bibasilar opacities may represent atelectasis or developing pneumonia. 2.  Blunted right costophrenic angle, possibly representing scarring or a small pleural effusion.    Mr-lumbar Spine-w/o    Result Date: 11/27/2017 11/26/2017 4:02 PM HISTORY/REASON FOR EXAM: Worsening lower extremity weakness, history of AIDS. TECHNIQUE/EXAM DESCRIPTION: MRI of the lumbar spine without contrast. The study was performed on a [employer] Signa 1.5 Estrella MRI scanner.  T1 sagittal, T2 sagittal, and T2 axial images were obtained of the lumbar spine. COMPARISON: Prior imaging dated 9/15/2006 was unavailable for comparison due to PACS archive technical issues. FINDINGS: The lumbar vertebral bodies have a normal height and alignment. The conus medullaris has a normal caliber, course and signal intensity. There is moderate to severe intervertebral disc space narrowing at L5-S1 with associated chronic discogenic degenerative endplate signal changes. Level-specific findings as follows: L5-S1: There is moderate posterior and left posterolateral disc protrusion. There is moderate facet arthropathy. There are apparent postsurgical changes suggestive of left medial facetectomy and/or hemilaminectomy. There is mild to moderate right and moderate left neural foraminal stenosis. L4-5: There is moderate facet arthropathy and mild ligamentum flavum hypertrophy. There is mild bilateral neural foraminal stenosis. L3-4: There is mild facet arthropathy and ligamentum flavum hypertrophy. No significant central canal or neural foraminal stenosis. L2-3:  There is mild facet arthropathy and ligamentum flavum hypertrophy. No significant central canal or neural foraminal stenosis. L1-2: There is mild facet arthropathy. No significant central canal or neural foraminal stenosis. The visualized prevertebral and posterior paraspinous soft tissues are grossly unremarkable.     1.  Multilevel degenerative changes of the lumbar spine as described above. 2.  Post surgical changes at L5-S1 suggestive of left medial facetectomy and hemilaminectomy.    Mr-thoracic Spine-w/o    Result Date: 11/27/2017 11/26/2017 4:02 PM HISTORY/REASON FOR EXAM:  Increasing generalized weakness, history of AIDS. TECHNIQUE/EXAM DESCRIPTION: MRI of the thoracic spine without contrast. The study was performed on a [employer] Signa 1.5 Estrella MRI scanner. T1 sagittal, T2 sagittal, and T2 axial images were obtained of the thoracic spine. COMPARISON: 5/9/2015. FINDINGS: The thoracic vertebral bodies have a normal height and alignment. The thoracic disks have a normal appearance and signal intensity. The thoracic cord has a normal caliber, course and appearance. There is unchanged minimal posterior disc bulge at T4-5 which deforms the ventral surface of the cord but does not result in significant central canal or neural foraminal stenosis. Previously demonstrated disc bulge at C5-6 appears less prominent. There is trace right pleural effusion.     1.  Unchanged minimal posterior disc bulge at T4-5 and decreased disc bulge at T5-6 without significant central canal or neural foraminal stenosis. 2.  Trace right pleural effusion.      LABORATORY  Lab Results   Component Value Date/Time    SODIUM 128 (L) 12/19/2017 03:53 AM    POTASSIUM 4.7 12/19/2017 03:53 AM    CHLORIDE 100 12/19/2017 03:53 AM    CO2 21 12/19/2017 03:53 AM    GLUCOSE 100 (H) 12/19/2017 03:53 AM    BUN 46 (H) 12/19/2017 03:53 AM    CREATININE 2.18 (H) 12/19/2017 03:53 AM    CREATININE 0.6 09/30/2006 04:42 AM        Lab Results   Component Value  Date/Time    WBC 3.1 (L) 12/19/2017 03:53 AM    HEMOGLOBIN 8.7 (L) 12/19/2017 03:53 AM    HEMATOCRIT 27.2 (L) 12/19/2017 03:53 AM    PLATELETCT 205 12/19/2017 03:53 AM        Total time of the discharge process exceeds 45 minutes.

## 2017-12-19 NOTE — DISCHARGE INSTRUCTIONS
Discharge Instructions    Discharged to other by medical transportation with escort. Discharged via wheelchair, hospital escort: Yes.  Special equipment needed: Not Applicable    Be sure to schedule a follow-up appointment with your primary care doctor or any specialists as instructed.     Discharge Plan:   Diet Plan: Discussed  Activity Level: Discussed  Confirmed Follow up Appointment: Appointment Scheduled  Confirmed Symptoms Management: Discussed  Medication Reconciliation Updated: No (Comments)  Pneumococcal Vaccine Given - only chart on this line when given:  (Vaccine cancelled per pharmacy)  Influenza Vaccine Indication: Not indicated: Previously immunized this influenza season and > 8 years of age    I understand that a diet low in cholesterol, fat, and sodium is recommended for good health. Unless I have been given specific instructions below for another diet, I accept this instruction as my diet prescription.   Other diet: renal    Special Instructions: None    · Is patient discharged on Warfarin / Coumadin?   No     · Is patient Post Blood Transfusion?  No    Depression / Suicide Risk    As you are discharged from this [facility] facility, it is important to learn how to keep safe from harming yourself.    Recognize the warning signs:  · Abrupt changes in personality, positive or negative- including increase in energy   · Giving away possessions  · Change in eating patterns- significant weight changes-  positive or negative  · Change in sleeping patterns- unable to sleep or sleeping all the time   · Unwillingness or inability to communicate  · Depression  · Unusual sadness, discouragement and loneliness  · Talk of wanting to die  · Neglect of personal appearance   · Rebelliousness- reckless behavior  · Withdrawal from people/activities they love  · Confusion- inability to concentrate     If you or a loved one observes any of these behaviors or has concerns about self-harm, here's what you can  do:  · Talk about it- your feelings and reasons for harming yourself  · Remove any means that you might use to hurt yourself (examples: pills, rope, extension cords, firearm)  · Get professional help from the community (Mental Health, Substance Abuse, psychological counseling)  · Do not be alone:Call your Safe Contact- someone whom you trust who will be there for you.  · Call your local CRISIS HOTLINE 431-0713 or 894-159-5176  · Call your local Children's Mobile Crisis Response Team [place] (874) 635-2747 or www.Safe N Clear  · Call the toll free National Suicide Prevention Hotlines   · National Suicide Prevention Lifeline 291-086-XLDD (7476)  · National Hope Line Network 800-SUICIDE (321-1220)

## 2017-12-19 NOTE — DISCHARGE PLANNING
SW met with pt at bedside. Pt agreeable to transfer to [facility] today. IMM delivered at this time. JACOB Rubio updated.

## 2017-12-19 NOTE — PROGRESS NOTES
Nephrology Progress Note, Adult, Complex               Author: Yaquelin Danielcristinahipolito Date & Time created: 2017  6:09 PM     Interval History:  Pt with ESRD, HIV/AIDS, presented with fever.  Doing better  HD TTS  Review of Systems:  Review of Systems   Constitutional: Positive for malaise/fatigue. Negative for chills and fever.   Respiratory: Negative for shortness of breath.    Cardiovascular: Negative for leg swelling.   Gastrointestinal: Negative for nausea.       Physical Exam:  Physical Exam   Constitutional: She is oriented to person, place, and time. She appears cachectic. She appears ill.   HENT:   Nose: Nose normal.   Eyes: No scleral icterus.   Pulmonary/Chest: Effort normal. She has no wheezes. She has no rales.   Musculoskeletal: She exhibits no edema.   Neurological: She is alert and oriented to person, place, and time.       Labs:        Invalid input(s): [id number]      Recent Labs      17   0340   SODIUM  130*  132*  130*   POTASSIUM  3.8  3.9  4.0   CHLORIDE  99  99  100   CO2  23  26  23   BUN  39*  26*  38*   CREATININE  2.15*  1.50*  1.95*   CALCIUM  10.1  9.7  10.3     Recent Labs      17   0134  17   0340   GLUCOSE  87  99  103*     Recent Labs      17   0134  17   0340   RBC  3.21*  2.85*  2.91*   HEMOGLOBIN  9.4*  8.4*  8.6*   HEMATOCRIT  29.8*  26.5*  27.7*   PLATELETCT  156*  163*  179     Recent Labs      17   0134  17   0340   WBC  2.6*  2.4*  3.0*   NEUTSPOLYS  47.00  47.80  43.70*   LYMPHOCYTES  15.60*  16.50*  22.70   MONOCYTES  13.90*  13.10  18.80*   EOSINOPHILS  5.20  4.40  7.20*   BASOPHILS  6.10*  1.70  3.00*           Hemodynamics:  Temp (24hrs), Av.7 °C (98 °F), Min:36.4 °C (97.6 °F), Max:37.1 °C (98.8 °F)  Temperature: 36.4 °C (97.6 °F)  Pulse  Av.8  Min: 71  Max: 114  Blood Pressure: 159/77     Respiratory:    Respiration: 16, Pulse Oximetry:  98 %     Work Of Breathing / Effort: Mild  RUL Breath Sounds: Clear, RML Breath Sounds: Clear, RLL Breath Sounds: Diminished, ZEN Breath Sounds: Clear, LLL Breath Sounds: Diminished  Fluids:    Intake/Output Summary (Last 24 hours) at 12/18/17 1809  Last data filed at 12/18/17 1800   Gross per 24 hour   Intake              720 ml   Output                0 ml   Net              720 ml        GI/Nutrition:  Orders Placed This Encounter   Procedures   • Diet Order     Standing Status:   Standing     Number of Occurrences:   1     Order Specific Question:   Diet:     Answer:   Renal [8]     Medical Decision Making, by Problem:  Active Hospital Problems    Diagnosis   • Sepsis (CMS-HCC) [A41.9]   • HCAP (healthcare-associated pneumonia) [J18.9]   • HIV/AIDS (acquired immune deficiency syndrome) (CMS-HCC) [B20]   • Severe protein-calorie malnutrition (CMS-HCC) [E43]   • Hypertension [I10]   • ESRD (end stage renal disease) (CMS-HCC) [N18.6]   • Pancytopenia (CMS-HCC) [D61.818]         Reviewed items::  Labs reviewed  1 ESRD: HD TTS  2 Anemia: Epo with HD  3 pancytopenia: no clear etiology  4 hyponatremia:revaluate post UF/HD  5 malnutrition  Plan  No acute need for HD today  Renal dose all meds  Avoid nephrotoxins

## 2017-12-19 NOTE — PROGRESS NOTES
Nicci Thornton Fall Risk Assessment:     Last Known Fall: Within the last year  Mobility: Use of assistive device/requires assist of two people  Medications: Cardiovascular and central nervous system meds  Mental Status/LOC/Awareness: Awake, alert, and oriented to date, place, and person  Toileting Needs: Incontinence  Volume/Electrolyte Status: No problems  Communication/Sensory: No deficits  Behavior: Appropriate behavior  Nicci Thornton Fall Risk Total: 11  Fall Risk Level: MODERATE RISK    Universal Fall Precautions:  call light/belongings in reach, bed in low position and locked, wheelchairs and assistive devices out of sight, siderails up x 2, use non-slip footwear, adequate lighting, clutter free and spill free environment, educate on level of risk, educate to call for assistance    Fall Risk Level Interventions:   TRIAL ([employer], NEURO, MED TIFFANIE 5) Low Fall Risk Interventions  Place yellow fall risk ID band on patient: verified  Provide patient/family education based on risk assessment: completed  Educate patient/family to call staff for assistance when getting out of bed: completed  Place fall precaution signage outside patient door: verifiedTRIAL ([email or web address], NEURO, MED TIFFANIE 5) Moderate Fall Risk Interventions  Place yellow fall risk ID band on patient: verified  Provide patient/family education based on risk assessment : completed  Educate patient/family to call staff for assistance when getting out of bed: completed  Place fall precaution signage outside patient door: verified  Utilize bed/chair fall alarm: verified     Patient Specific Interventions:     Medication: review medications with patient and family, assess for medications that can be discontinued or dosage decreased and limit combination of prn medications  Mental Status/LOC/Awareness: check on patient hourly, utilize bed/chair fall alarm, reinforce the use of call light and provide activity  Toileting: provide frquent toileting and monitor intake  and output/use of appropriate interventions  Volume/Electrolyte Status: ensure patient remains hydrated and monitor abnormal lab values  Communication/Sensory: update plan of care on whiteboard and ensure proper positioning when transferrng/ambulating  Behavioral: engage patient in daily activities, administer medication as ordered and instruct/reinforce fall program rationale  Mobility: utilize bed/chair fall alarm, ensure bed is locked and in lowest position, provide appropriate assistive device, instruct patient to exit bed on their strongest side and collaborate with doctor for possible PT/OT consult

## 2017-12-19 NOTE — PROGRESS NOTES
Bedside report received. Assumed care of pt.  Pt able to make needs known.  Pt shows no s/s of distress.  Resting comfortably in bed. Dialysis in process. Potential to DC today.  Fall precautions in place, call light and belongings within reach.  Hourly rounding in place.

## 2017-12-19 NOTE — PROGRESS NOTES
HD   Tx per Dr. ROSEANNE Betancourt x 3 hrs., initiated at 0646, ended at 0946, net UF 2000 ml. See paper flowsheet for details.    Report given to KARAL Porter RN.

## 2017-12-19 NOTE — DISCHARGE PLANNING
Received transportation request from CARO Sandoval for patient to transfer to [facility]. Call placed to [facility] with [facility] and transportation has been arranged for 1400.    CARO Sandoval has been notified.

## 2017-12-19 NOTE — PROGRESS NOTES
Report provided to Jade at [facility].Pt has been given discharge paperwork and prescriptions.  Pt verbalized understanding of paperwork and of additions/changes to medication regimen.  Pt PIV d/c'd, tip intact.  Pt leaving via [employer] transport with Kevin to [facility] in [place], pt is A &O x4.

## 2017-12-19 NOTE — CARE PLAN
Problem: Nutritional:  Goal: Achieve adequate nutritional intake  Patient will consume 50% of meals   Outcome: PROGRESSING AS EXPECTED  Patient ate % of breakfast and lunch today.  She has snacks between meals.  Nutrition Representative will continue to see her for ongoing meal and snack preferences.  RD following.

## 2018-01-01 ENCOUNTER — APPOINTMENT (OUTPATIENT)
Dept: RADIOLOGY | Facility: MEDICAL CENTER | Age: 48
DRG: 974 | End: 2018-01-01
Attending: EMERGENCY MEDICINE
Payer: MEDICARE

## 2018-01-01 ENCOUNTER — HOSPITAL ENCOUNTER (INPATIENT)
Facility: MEDICAL CENTER | Age: 48
LOS: 1 days | DRG: 974 | End: 2018-01-11
Attending: EMERGENCY MEDICINE | Admitting: INTERNAL MEDICINE
Payer: MEDICARE

## 2018-01-01 ENCOUNTER — APPOINTMENT (OUTPATIENT)
Dept: RADIOLOGY | Facility: MEDICAL CENTER | Age: 48
DRG: 974 | End: 2018-01-01
Attending: INTERNAL MEDICINE
Payer: MEDICARE

## 2018-01-01 VITALS
OXYGEN SATURATION: 36 % | RESPIRATION RATE: 28 BRPM | HEART RATE: 48 BPM | BODY MASS INDEX: 19.29 KG/M2 | WEIGHT: 120 LBS | HEIGHT: 66 IN

## 2018-01-01 DIAGNOSIS — R09.2 RESPIRATORY ARREST (HCC): ICD-10-CM

## 2018-01-01 DIAGNOSIS — E16.2 HYPOGLYCEMIA: ICD-10-CM

## 2018-01-01 DIAGNOSIS — E87.1 HYPONATREMIA: ICD-10-CM

## 2018-01-01 DIAGNOSIS — D64.9 ANEMIA, UNSPECIFIED TYPE: ICD-10-CM

## 2018-01-01 DIAGNOSIS — R79.89 ELEVATED LACTIC ACID LEVEL: ICD-10-CM

## 2018-01-01 DIAGNOSIS — N18.9 CHRONIC RENAL FAILURE, UNSPECIFIED CKD STAGE: ICD-10-CM

## 2018-01-01 DIAGNOSIS — D70.9 NEUTROPENIA, UNSPECIFIED TYPE (HCC): ICD-10-CM

## 2018-01-01 DIAGNOSIS — J90 PLEURAL EFFUSION ON RIGHT: ICD-10-CM

## 2018-01-01 DIAGNOSIS — I46.9 CARDIAC ARREST (HCC): ICD-10-CM

## 2018-01-01 DIAGNOSIS — D69.6 THROMBOCYTOPENIA (HCC): ICD-10-CM

## 2018-01-01 LAB
ACTION RANGE TRIGGERED IACRT: YES
ALBUMIN SERPL BCP-MCNC: 1.9 G/DL (ref 3.2–4.9)
ALBUMIN/GLOB SERPL: 0.7 G/DL
ALP SERPL-CCNC: 119 U/L (ref 30–99)
ALT SERPL-CCNC: 23 U/L (ref 2–50)
ANION GAP SERPL CALC-SCNC: 14 MMOL/L (ref 0–11.9)
APPEARANCE UR: CLEAR
APTT PPP: 61.4 SEC (ref 24.7–36)
AST SERPL-CCNC: 73 U/L (ref 12–45)
BACTERIA #/AREA URNS HPF: NEGATIVE /HPF
BASE EXCESS BLDA CALC-SCNC: -9 MMOL/L (ref -4–3)
BASOPHILS # BLD AUTO: 0 % (ref 0–1.8)
BASOPHILS # BLD: 0 K/UL (ref 0–0.12)
BILIRUB SERPL-MCNC: 0.6 MG/DL (ref 0.1–1.5)
BILIRUB UR QL STRIP.AUTO: NEGATIVE
BNP SERPL-MCNC: 2105 PG/ML (ref 0–100)
BODY TEMPERATURE: ABNORMAL DEGREES
BUN SERPL-MCNC: 23 MG/DL (ref 8–22)
BURR CELLS BLD QL SMEAR: NORMAL
CA-I SERPL-SCNC: 1.1 MMOL/L (ref 1.1–1.3)
CALCIUM SERPL-MCNC: 7.3 MG/DL (ref 8.5–10.5)
CHLORIDE SERPL-SCNC: 91 MMOL/L (ref 96–112)
CO2 BLDA-SCNC: 22 MMOL/L (ref 20–33)
CO2 SERPL-SCNC: 22 MMOL/L (ref 20–33)
COLOR UR: YELLOW
CREAT SERPL-MCNC: 1.61 MG/DL (ref 0.5–1.4)
CULTURE IF INDICATED INDCX: YES UA CULTURE
EKG IMPRESSION: NORMAL
EOSINOPHIL # BLD AUTO: 0.07 K/UL (ref 0–0.51)
EOSINOPHIL NFR BLD: 8 % (ref 0–6.9)
EPI CELLS #/AREA URNS HPF: ABNORMAL /HPF
ERYTHROCYTE [DISTWIDTH] IN BLOOD BY AUTOMATED COUNT: 59.1 FL (ref 35.9–50)
GIANT PLATELETS BLD QL SMEAR: NORMAL
GLOBULIN SER CALC-MCNC: 2.6 G/DL (ref 1.9–3.5)
GLUCOSE BLD-MCNC: 110 MG/DL (ref 65–99)
GLUCOSE BLD-MCNC: 140 MG/DL (ref 65–99)
GLUCOSE BLD-MCNC: 27 MG/DL (ref 65–99)
GLUCOSE BLD-MCNC: 34 MG/DL (ref 65–99)
GLUCOSE SERPL-MCNC: 162 MG/DL (ref 65–99)
GLUCOSE UR STRIP.AUTO-MCNC: NEGATIVE MG/DL
HCO3 BLDA-SCNC: 19.9 MMOL/L (ref 17–25)
HCT VFR BLD AUTO: 28.1 % (ref 37–47)
HGB BLD-MCNC: 8.7 G/DL (ref 12–16)
HYALINE CASTS #/AREA URNS LPF: ABNORMAL /LPF
INR PPP: 2.27 (ref 0.87–1.13)
INST. QUALIFIED PATIENT IIQPT: YES
KETONES UR STRIP.AUTO-MCNC: NEGATIVE MG/DL
LACTATE BLD-SCNC: 9.7 MMOL/L (ref 0.5–2)
LEUKOCYTE ESTERASE UR QL STRIP.AUTO: ABNORMAL
LIPASE SERPL-CCNC: 6 U/L (ref 11–82)
LYMPHOCYTES # BLD AUTO: 0.67 K/UL (ref 1–4.8)
LYMPHOCYTES NFR BLD: 74.1 % (ref 22–41)
MAGNESIUM SERPL-MCNC: 1.9 MG/DL (ref 1.5–2.5)
MANUAL DIFF BLD: NORMAL
MCH RBC QN AUTO: 30.9 PG (ref 27–33)
MCHC RBC AUTO-ENTMCNC: 31 G/DL (ref 33.6–35)
MCV RBC AUTO: 99.6 FL (ref 81.4–97.8)
METAMYELOCYTES NFR BLD MANUAL: 3.6 %
MICRO URNS: ABNORMAL
MONOCYTES # BLD AUTO: 0.01 K/UL (ref 0–0.85)
MONOCYTES NFR BLD AUTO: 1.3 % (ref 0–13.4)
MORPHOLOGY BLD-IMP: NORMAL
MYELOCYTES NFR BLD MANUAL: 2.2 %
NEUTROPHILS # BLD AUTO: 0.09 K/UL (ref 2–7.15)
NEUTROPHILS NFR BLD: 5.4 % (ref 44–72)
NEUTS BAND NFR BLD MANUAL: 4.9 % (ref 0–10)
NITRITE UR QL STRIP.AUTO: NEGATIVE
NRBC # BLD AUTO: 0.02 K/UL
NRBC BLD-RTO: 2.2 /100 WBC
O2/TOTAL GAS SETTING VFR VENT: 100 %
PCO2 BLDA: 55.9 MMHG (ref 26–37)
PCO2 TEMP ADJ BLDA: 47.1 MMHG (ref 26–37)
PH BLDA: 7.16 [PH] (ref 7.4–7.5)
PH TEMP ADJ BLDA: 7.21 [PH] (ref 7.4–7.5)
PH UR STRIP.AUTO: 8.5 [PH]
PHOSPHATE SERPL-MCNC: 5 MG/DL (ref 2.5–4.5)
PLATELET # BLD AUTO: 34 K/UL (ref 164–446)
PLATELET BLD QL SMEAR: NORMAL
PLATELETS.RETICULATED NFR BLD AUTO: 12.7 K/UL (ref 0.6–13.1)
PMV BLD AUTO: 10.2 FL (ref 9–12.9)
PO2 BLDA: 100 MMHG (ref 64–87)
PO2 TEMP ADJ BLDA: 79 MMHG (ref 64–87)
POIKILOCYTOSIS BLD QL SMEAR: NORMAL
POTASSIUM SERPL-SCNC: 3.6 MMOL/L (ref 3.6–5.5)
PROMYELOCYTES NFR BLD MANUAL: 0.5 %
PROT SERPL-MCNC: 4.5 G/DL (ref 6–8.2)
PROT UR QL STRIP: 300 MG/DL
PROTHROMBIN TIME: 24.7 SEC (ref 12–14.6)
RBC # BLD AUTO: 2.82 M/UL (ref 4.2–5.4)
RBC # URNS HPF: ABNORMAL /HPF
RBC BLD AUTO: PRESENT
RBC UR QL AUTO: NEGATIVE
SAO2 % BLDA: 95 % (ref 93–99)
SMUDGE CELLS BLD QL SMEAR: NORMAL
SODIUM SERPL-SCNC: 127 MMOL/L (ref 135–145)
SP GR UR STRIP.AUTO: 1.02
SPECIMEN DRAWN FROM PATIENT: ABNORMAL
TROPONIN I SERPL-MCNC: 0.02 NG/ML (ref 0–0.04)
UROBILINOGEN UR STRIP.AUTO-MCNC: 0.2 MG/DL
WBC # BLD AUTO: 0.9 K/UL (ref 4.8–10.8)
WBC #/AREA URNS HPF: ABNORMAL /HPF

## 2018-01-01 PROCEDURE — 96376 TX/PRO/DX INJ SAME DRUG ADON: CPT

## 2018-01-01 PROCEDURE — B547ZZA ULTRASONOGRAPHY OF LEFT SUBCLAVIAN VEIN, GUIDANCE: ICD-10-PCS | Performed by: EMERGENCY MEDICINE

## 2018-01-01 PROCEDURE — 303105 HCHG CATHETER EXTRA

## 2018-01-01 PROCEDURE — 86360 T CELL ABSOLUTE COUNT/RATIO: CPT

## 2018-01-01 PROCEDURE — 85730 THROMBOPLASTIN TIME PARTIAL: CPT

## 2018-01-01 PROCEDURE — 71045 X-RAY EXAM CHEST 1 VIEW: CPT

## 2018-01-01 PROCEDURE — 82330 ASSAY OF CALCIUM: CPT

## 2018-01-01 PROCEDURE — 96375 TX/PRO/DX INJ NEW DRUG ADDON: CPT

## 2018-01-01 PROCEDURE — C1751 CATH, INF, PER/CENT/MIDLINE: HCPCS

## 2018-01-01 PROCEDURE — 81001 URINALYSIS AUTO W/SCOPE: CPT

## 2018-01-01 PROCEDURE — 99291 CRITICAL CARE FIRST HOUR: CPT

## 2018-01-01 PROCEDURE — 87086 URINE CULTURE/COLONY COUNT: CPT

## 2018-01-01 PROCEDURE — 700105 HCHG RX REV CODE 258: Performed by: STUDENT IN AN ORGANIZED HEALTH CARE EDUCATION/TRAINING PROGRAM

## 2018-01-01 PROCEDURE — 96368 THER/DIAG CONCURRENT INF: CPT

## 2018-01-01 PROCEDURE — 02HV33Z INSERTION OF INFUSION DEVICE INTO SUPERIOR VENA CAVA, PERCUTANEOUS APPROACH: ICD-10-PCS | Performed by: EMERGENCY MEDICINE

## 2018-01-01 PROCEDURE — 85055 RETICULATED PLATELET ASSAY: CPT

## 2018-01-01 PROCEDURE — 94002 VENT MGMT INPAT INIT DAY: CPT

## 2018-01-01 PROCEDURE — 86359 T CELLS TOTAL COUNT: CPT

## 2018-01-01 PROCEDURE — 5A1935Z RESPIRATORY VENTILATION, LESS THAN 24 CONSECUTIVE HOURS: ICD-10-PCS | Performed by: EMERGENCY MEDICINE

## 2018-01-01 PROCEDURE — 80053 COMPREHEN METABOLIC PANEL: CPT

## 2018-01-01 PROCEDURE — 84100 ASSAY OF PHOSPHORUS: CPT

## 2018-01-01 PROCEDURE — 304538 HCHG NG TUBE

## 2018-01-01 PROCEDURE — 700105 HCHG RX REV CODE 258: Performed by: INTERNAL MEDICINE

## 2018-01-01 PROCEDURE — 51702 INSERT TEMP BLADDER CATH: CPT

## 2018-01-01 PROCEDURE — 84484 ASSAY OF TROPONIN QUANT: CPT

## 2018-01-01 PROCEDURE — 36556 INSERT NON-TUNNEL CV CATH: CPT

## 2018-01-01 PROCEDURE — 87040 BLOOD CULTURE FOR BACTERIA: CPT

## 2018-01-01 PROCEDURE — 83690 ASSAY OF LIPASE: CPT

## 2018-01-01 PROCEDURE — 82962 GLUCOSE BLOOD TEST: CPT

## 2018-01-01 PROCEDURE — 700101 HCHG RX REV CODE 250: Performed by: EMERGENCY MEDICINE

## 2018-01-01 PROCEDURE — 96365 THER/PROPH/DIAG IV INF INIT: CPT

## 2018-01-01 PROCEDURE — 96366 THER/PROPH/DIAG IV INF ADDON: CPT

## 2018-01-01 PROCEDURE — C1751 CATH, INF, PER/CENT/MIDLINE: HCPCS | Performed by: EMERGENCY MEDICINE

## 2018-01-01 PROCEDURE — 0BH17EZ INSERTION OF ENDOTRACHEAL AIRWAY INTO TRACHEA, VIA NATURAL OR ARTIFICIAL OPENING: ICD-10-PCS | Performed by: EMERGENCY MEDICINE

## 2018-01-01 PROCEDURE — 700111 HCHG RX REV CODE 636 W/ 250 OVERRIDE (IP): Performed by: STUDENT IN AN ORGANIZED HEALTH CARE EDUCATION/TRAINING PROGRAM

## 2018-01-01 PROCEDURE — 83605 ASSAY OF LACTIC ACID: CPT

## 2018-01-01 PROCEDURE — 31500 INSERT EMERGENCY AIRWAY: CPT

## 2018-01-01 PROCEDURE — 85007 BL SMEAR W/DIFF WBC COUNT: CPT

## 2018-01-01 PROCEDURE — 306637 HCHG MISC ORTHO ITEM RC 0274

## 2018-01-01 PROCEDURE — 36415 COLL VENOUS BLD VENIPUNCTURE: CPT

## 2018-01-01 PROCEDURE — 85610 PROTHROMBIN TIME: CPT

## 2018-01-01 PROCEDURE — 700105 HCHG RX REV CODE 258: Performed by: EMERGENCY MEDICINE

## 2018-01-01 PROCEDURE — 96374 THER/PROPH/DIAG INJ IV PUSH: CPT

## 2018-01-01 PROCEDURE — 700111 HCHG RX REV CODE 636 W/ 250 OVERRIDE (IP): Performed by: EMERGENCY MEDICINE

## 2018-01-01 PROCEDURE — 85027 COMPLETE CBC AUTOMATED: CPT

## 2018-01-01 PROCEDURE — 83735 ASSAY OF MAGNESIUM: CPT

## 2018-01-01 PROCEDURE — 87186 SC STD MICRODIL/AGAR DIL: CPT

## 2018-01-01 PROCEDURE — 83880 ASSAY OF NATRIURETIC PEPTIDE: CPT

## 2018-01-01 PROCEDURE — 87077 CULTURE AEROBIC IDENTIFY: CPT

## 2018-01-01 PROCEDURE — 94760 N-INVAS EAR/PLS OXIMETRY 1: CPT

## 2018-01-01 PROCEDURE — 82803 BLOOD GASES ANY COMBINATION: CPT

## 2018-01-01 PROCEDURE — 93005 ELECTROCARDIOGRAM TRACING: CPT | Performed by: EMERGENCY MEDICINE

## 2018-01-01 RX ORDER — CHLORHEXIDINE GLUCONATE ORAL RINSE 1.2 MG/ML
15 SOLUTION DENTAL 2 TIMES DAILY
Status: DISCONTINUED | OUTPATIENT
Start: 2018-01-01 | End: 2018-01-01 | Stop reason: HOSPADM

## 2018-01-01 RX ORDER — DEXTROSE MONOHYDRATE 50 MG/ML
INJECTION, SOLUTION INTRAVENOUS
Status: DISCONTINUED
Start: 2018-01-01 | End: 2018-01-01 | Stop reason: HOSPADM

## 2018-01-01 RX ORDER — SODIUM CHLORIDE 9 MG/ML
1000 INJECTION, SOLUTION INTRAVENOUS
Status: DISCONTINUED | OUTPATIENT
Start: 2018-01-01 | End: 2018-01-01 | Stop reason: HOSPADM

## 2018-01-01 RX ORDER — POLYETHYLENE GLYCOL 3350 17 G/17G
1 POWDER, FOR SOLUTION ORAL
Status: DISCONTINUED | OUTPATIENT
Start: 2018-01-01 | End: 2018-01-01 | Stop reason: HOSPADM

## 2018-01-01 RX ORDER — SODIUM CHLORIDE, SODIUM LACTATE, POTASSIUM CHLORIDE, CALCIUM CHLORIDE 600; 310; 30; 20 MG/100ML; MG/100ML; MG/100ML; MG/100ML
1000 INJECTION, SOLUTION INTRAVENOUS ONCE
Status: COMPLETED | OUTPATIENT
Start: 2018-01-01 | End: 2018-01-01

## 2018-01-01 RX ORDER — DEXTROSE MONOHYDRATE 25 G/50ML
50 INJECTION, SOLUTION INTRAVENOUS ONCE
Status: COMPLETED | OUTPATIENT
Start: 2018-01-01 | End: 2018-01-01

## 2018-01-01 RX ORDER — AMOXICILLIN 250 MG
2 CAPSULE ORAL 2 TIMES DAILY
Status: DISCONTINUED | OUTPATIENT
Start: 2018-01-01 | End: 2018-01-01 | Stop reason: HOSPADM

## 2018-01-01 RX ORDER — LINEZOLID 2 MG/ML
600 INJECTION, SOLUTION INTRAVENOUS EVERY 12 HOURS
Status: DISCONTINUED | OUTPATIENT
Start: 2018-01-01 | End: 2018-01-01

## 2018-01-01 RX ORDER — OSELTAMIVIR PHOSPHATE 6 MG/ML
30 FOR SUSPENSION ORAL 2 TIMES DAILY
Status: DISCONTINUED | OUTPATIENT
Start: 2018-01-01 | End: 2018-01-01

## 2018-01-01 RX ORDER — LIDOCAINE HYDROCHLORIDE 10 MG/ML
1-2 INJECTION, SOLUTION INFILTRATION; PERINEURAL
Status: DISCONTINUED | OUTPATIENT
Start: 2018-01-01 | End: 2018-01-01 | Stop reason: HOSPADM

## 2018-01-01 RX ORDER — HYDROCODONE BITARTRATE AND ACETAMINOPHEN 5; 325 MG/1; MG/1
2 TABLET ORAL EVERY 4 HOURS PRN
COMMUNITY

## 2018-01-01 RX ORDER — DEXTROSE AND SODIUM CHLORIDE 5; .45 G/100ML; G/100ML
INJECTION, SOLUTION INTRAVENOUS
Status: DISCONTINUED
Start: 2018-01-01 | End: 2018-01-01 | Stop reason: HOSPADM

## 2018-01-01 RX ORDER — DOXYCYCLINE HYCLATE 100 MG
100 TABLET ORAL 2 TIMES DAILY
COMMUNITY
Start: 2018-01-01

## 2018-01-01 RX ORDER — SODIUM CHLORIDE 9 MG/ML
30 INJECTION, SOLUTION INTRAVENOUS
Status: DISCONTINUED | OUTPATIENT
Start: 2018-01-01 | End: 2018-01-01 | Stop reason: HOSPADM

## 2018-01-01 RX ORDER — BISACODYL 10 MG
10 SUPPOSITORY, RECTAL RECTAL
Status: DISCONTINUED | OUTPATIENT
Start: 2018-01-01 | End: 2018-01-01 | Stop reason: HOSPADM

## 2018-01-01 RX ORDER — SODIUM CHLORIDE 9 MG/ML
1000 INJECTION, SOLUTION INTRAVENOUS ONCE
Status: COMPLETED | OUTPATIENT
Start: 2018-01-01 | End: 2018-01-01

## 2018-01-01 RX ADMIN — SODIUM CHLORIDE, POTASSIUM CHLORIDE, SODIUM LACTATE AND CALCIUM CHLORIDE 1000 ML: 600; 310; 30; 20 INJECTION, SOLUTION INTRAVENOUS at 06:06

## 2018-01-01 RX ADMIN — DOPAMINE HCL 1 MCG/KG/MIN: 80 INJECTION, SOLUTION INTRAVENOUS at 07:33

## 2018-01-01 RX ADMIN — DEXTROSE MONOHYDRATE 50 ML: 25 INJECTION, SOLUTION INTRAVENOUS at 04:43

## 2018-01-01 RX ADMIN — SODIUM CHLORIDE, POTASSIUM CHLORIDE, SODIUM LACTATE AND CALCIUM CHLORIDE 1000 ML: 600; 310; 30; 20 INJECTION, SOLUTION INTRAVENOUS at 07:16

## 2018-01-01 RX ADMIN — DEXTROSE MONOHYDRATE 50 ML: 25 INJECTION, SOLUTION INTRAVENOUS at 06:22

## 2018-01-01 RX ADMIN — GLUCAGON HYDROCHLORIDE 1 MG: 1 INJECTION, POWDER, FOR SOLUTION INTRAMUSCULAR; INTRAVENOUS; SUBCUTANEOUS at 04:43

## 2018-01-01 RX ADMIN — SODIUM CHLORIDE 1000 ML: 9 INJECTION, SOLUTION INTRAVENOUS at 05:13

## 2018-01-01 RX ADMIN — NOREPINEPHRINE BITARTRATE 10 MCG/MIN: 1 INJECTION INTRAVENOUS at 05:01

## 2018-01-01 RX ADMIN — GLUCAGON HYDROCHLORIDE 1 MG: 1 INJECTION, POWDER, FOR SOLUTION INTRAMUSCULAR; INTRAVENOUS; SUBCUTANEOUS at 06:26

## 2018-01-11 PROBLEM — I46.9 CARDIAC ARREST (HCC): Status: ACTIVE | Noted: 2018-01-01

## 2018-01-11 NOTE — ED NOTES
Family at bedside.  Discussed pt condition, family understands pt not doing well and will not get better.  Family leaves to family room to talk.

## 2018-01-11 NOTE — ED NOTES
Americo from Lab called with critical result of LA 9.7 at 519. Critical lab result read back to Americo.   Dr. Siddiqi notified of critical lab result at 0520.  Critical lab result read back by Dr. Siddiqi.

## 2018-01-11 NOTE — ED NOTES
Med rec complete per MAR  Allergies reviewed    Patient started Doxycycline 1-10-18 for lower lobe pneumonia

## 2018-01-11 NOTE — DISCHARGE SUMMARY
Primary diagnosis: Cardiac arrest/Asystole    Secondary Diagnosis:  1)?Sepsis  2)HIV AIDS  3) Pancytopenia  4)Hyponatremia  5)ESRD    Time of death: 8:20    48 yo female who presented to the ED s/p cardiopulmonary arrest at her nursing facility. She was found unresponsive(downtime about 20mins) before she was found unresponsive. She was found to be in asystole with a BS of 20. Patient was given D10, 3 ronds or epinephrine during ACLS protocol was administered and ROSC was obtained after which she was brought to our ED. Discussion with family regarding her overall grim prognosis was done and family agreed for patient to be DNR with likely movement towards comfort care once the family has said their goodbyes.     Patient was intubated a the time of our encounter, no further history could be obtained. We started her on the sepsis protocol for presumed sepsis and initiated her on cefepime and vancomycin. Pressors were added to support her blood pressure to obtain imaging, however patient could not be stabilized-on levophed, vasopressin and dopamine, with blood pressure still not being able to sustain. At around 8:12, was paged about patient still not being able to support her BPwith a weak pulse, 8:18 at patient bedside and patient found to be in asystole. As per family wishes, no further resuscitation efforts were made.     Time of death 8:20.

## 2018-01-11 NOTE — DISCHARGE PLANNING
Medical Social Work     MSW received call from ED Lobby stating pt's family was out front. MSW met with pt's family who stated that they are waiting for the pt to be moved. MSW called Dr. Weathers who stated she met with pt's family and son (DPOA) and decided to move pt to DNR. At this time the family is waiting for more family to come and see pt before comfort care measures are taken.     MSW met with pt's son Vazquez/ FLORENCIO ([phone]) who is making medical decisions for the family. The family is all in agreeance to plan at this time. No needs at this time. MSW will update Unit SW on case when pt is assigned room.

## 2018-01-11 NOTE — ED PROVIDER NOTES
ED Provider Note    CHIEF COMPLAINT  Chief Complaint   Patient presents with   • Cardiac Arrest        [facility]    Primary care provider: Shonna Willoughby M.D.   History obtained from: EMS and family member who subsequently arrived  History limited by: Patient's clinical status    Roger Mckeon is a 47 y.o. female who presents to the ED status post reported cardiopulmonary arrest at her nursing facility. No history was obtained from the patient since she was unresponsive. Patient was seen upon arrival. Per EMS, patient was found unresponsive with downtime anywhere from 5-20 minutes. They arrived to find the patient in asystole with an initial Accu-Chek of 20. Patient was given glucose and remained unresponsive and in asystole and CPR was initiated. There was ROSC after 3 rounds of epinephrine. Patient had LMA placed and transported to the ED. No further history was given except that patient is a dialysis patient and has not missed any appointments. She also was reported to have HIV. Family arrived later and stated that they last saw the patient 2 days ago and she appeared to be fine with no complaints.      REVIEW OF SYSTEMS  Please see HPI for pertinent positives/negatives.    PAST MEDICAL HISTORY  Past Medical History:   Diagnosis Date   • HIV (human immunodeficiency virus infection) (CMS-HCC) 06/2017   • Dental disorder     upper dentures   • Diabetes     on tx since 1999, diet controlled   • Dialysis patient     T, Th, Sat   • H/O: hysterectomy    • Hypertension    • Renal failure    • Seizure disorder (CMS-HCC)         SURGICAL HISTORY  Past Surgical History:   Procedure Laterality Date   • GASTROSCOPY N/A 5/10/2017    Procedure: GASTROSCOPY- Upper Endoscopy ;  Surgeon: Irene Jenkins M.D.;  Location: SURGERY [facility];  Service:    • AV FISTULA CREATION Left 10/4/2016    Procedure: AV FISTULA GRAFT CREATION;  Surgeon: Travon Franco M.D.;  Location: SURGERY [facility];  Service:    •  "GASTROSCOPY WITH BIOPSY N/A 2016    Procedure: GASTROSCOPY WITH BIOPSY;  Surgeon: Rachid Salazar M.D.;  Location: SURGERY [facility];  Service:    • INCISION AND DRAINAGE GENERAL  2013    Performed by Conrado Prado M.D. at SURGERY [facility]   • GYN SURGERY         • HYSTERECTOMY LAPAROSCOPY      still has ovaries   • HYSTERECTOMY, VAGINAL     • OTHER      back surgery         SOCIAL HISTORY  Social History     Social History Main Topics   • Smoking status: Never Smoker   • Smokeless tobacco: Never Used   • Alcohol use No   • Drug use: No   • Sexual activity: Not on file        FAMILY HISTORY  Family History   Problem Relation Age of Onset   • Diabetes Mother    • Diabetes Father    • Hypertension Father    • Diabetes Sister         CURRENT MEDICATIONS  Home Medications     Reviewed by Amrita Weber (Pharmacy Tech) on 18 at 0805  Med List Status: Complete   Medication Last Dose Status   acyclovir (ZOVIRAX) 400 MG tablet 1/10/2018 Active   amlodipine (NORVASC) 10 MG Tab 1/10/2018 Active   dolutegravir (TIVICAY) 50 MG Tab tablet 1/10/2018 Active   doxycycline (VIBRAMYCIN) 100 MG Tab 1/10/2018 Active   fluoxetine (PROZAC) 10 MG Cap 1/10/2018 Active   hydrocodone-acetaminophen (NORCO) 5-325 MG Tab per tablet 1/10/2018 Active   polyethylene glycol/lytes (MIRALAX) Pack 2018 Active   Rilpivirine HCl (EDURANT) 25 MG Tab 1/10/2018 Active   senna-docusate (PERICOLACE OR SENOKOT S) 8.6-50 MG Tab 1/10/2018 Active   Sevelamer Carbonate (RENVELA) 800 MG Tab 1/10/2018 Active                 ALLERGIES  Allergies   Allergen Reactions   • Oxycodone Itching and Nausea        PHYSICAL EXAM  VITAL SIGNS: Pulse (!) 48   Resp (!) 28   Ht 1.676 m (5' 6\")   Wt 54.4 kg (120 lb)   LMP 2010   SpO2 (!) 36%   BMI 19.37 kg/m²  @SEFERINO[[id number]::@     Pulse ox interpretation: 83% I interpret this pulse ox as abnormal     Constitutional: Thin patient, unresponsive  HENT: No " external signs of trauma, normocephalic, bilateral external ears normal, LMA in place  Eyes: Pupils equally round and same size, conjunctiva without erythema, no discharge, no icterus    Neck: Soft and supple, trachea midline, no stridor, no LAD, no JVD  Cardiovascular: Regular rate and rhythm, no murmurs/rubs/gallops, palpable pulses  Thorax & Lungs: Coarse breath sounds bilaterally on ventilator   Abdomen: Soft, nondistended  Extremities: No cyanosis, no edema, no gross deformity, left arm hemodialysis fistula, intact distal pulses with good cap refill    Skin: Cool, dry, no pallor/cyanosis, no rash noted except scattered skin lesions, no petechiae/purpura/blisters/vesicles/streaking/drainage/warmth/crepitus/fluctuance  Lymphatic: No lymphadenopathy noted    Neuro: Unresponsive   Psychiatric: Unable to assess        DIAGNOSTIC STUDIES / PROCEDURES    Intubation Procedure    Indication: airway protection    Consent: Unable to be obtained due to the emergent nature of this procedure.    Medications Used: None    Procedure: The patient was placed in the appropriate position.  Cricoid pressure was not required.  Intubation was performed using Glidescope a 7.5 cuffed endotracheal tube.  The cuff was then inflated and the tube was secured appropriately at a distance of 20 cm to the dental ridge.  Initial confirmation of placement included bilateral breath sounds.  A chest x-ray to verify correct placement of the tube showed appropriate tube position.    The patient tolerated the procedure well.     Complications: None        Central Line Placement Procedure    Indication: vascular access    Consent: Unable to be obtained due to the emergent nature of this procedure.    Procedure: The patient was positioned appropriately and the skin over the left subclavian vein was prepped with Chloraprep.  Sterile protocol was used.  Local anesthesia was not performed due to the emergent nature of this procedure.  US was used for  guidance.  A large bore needle was used inserted.  A guide wire was then inserted into the vein through the needle.  A triple lumen catheter was then inserted into the vessel over the guide wire using the Seldinger technique.  All ports showed good, free flowing blood return and were flushed with saline solution.  The catheter was then securely fastened to the skin with sutures and covered with a sterile dressing.  A post procedure X-ray was ordered and showed no evidence of pneumothorax.    The patient tolerated the procedure well.    Complications: None          EKG  12 Lead EKG obtained at 0439 and interpreted by me:   Rate: 69   Rhythm: Sinus rhythm   Ectopy: None  Intervals: Normal   Axis: Normal   Q Waves: None   QRS: Late precordial R-wave transition  ST segments: Normal  T Waves: T-wave inversion anterior lateral leads    Clinical Impression: Sinus rhythm with nonspecific T-wave changes  Compared to December 11, 2017 with findings of the T-wave inversion anterior and lateral leads that are new today compared to previous      LABS  All labs reviewed by me.     Results for orders placed or performed during the hospital encounter of 01/11/18   CBC WITH DIFFERENTIAL   Result Value Ref Range    WBC 0.9 (LL) 4.8 - 10.8 K/uL    RBC 2.82 (L) 4.20 - 5.40 M/uL    Hemoglobin 8.7 (L) 12.0 - 16.0 g/dL    Hematocrit 28.1 (L) 37.0 - 47.0 %    MCV 99.6 (H) 81.4 - 97.8 fL    MCH 30.9 27.0 - 33.0 pg    MCHC 31.0 (L) 33.6 - 35.0 g/dL    RDW 59.1 (H) 35.9 - 50.0 fL    Platelet Count 34 (LL) 164 - 446 K/uL    MPV 10.2 9.0 - 12.9 fL    Nucleated RBC 2.20 /100 WBC    NRBC (Absolute) 0.02 K/uL    Neutrophils-Polys 5.40 (L) 44.00 - 72.00 %    Lymphocytes 74.10 (H) 22.00 - 41.00 %    Monocytes 1.30 0.00 - 13.40 %    Eosinophils 8.00 (H) 0.00 - 6.90 %    Basophils 0.00 0.00 - 1.80 %    Neutrophils (Absolute) 0.09 (LL) 2.00 - 7.15 K/uL    Lymphs (Absolute) 0.67 (L) 1.00 - 4.80 K/uL    Monos (Absolute) 0.01 0.00 - 0.85 K/uL    Eos  (Absolute) 0.07 0.00 - 0.51 K/uL    Baso (Absolute) 0.00 0.00 - 0.12 K/uL   COMP METABOLIC PANEL   Result Value Ref Range    Sodium 127 (L) 135 - 145 mmol/L    Potassium 3.6 3.6 - 5.5 mmol/L    Chloride 91 (L) 96 - 112 mmol/L    Co2 22 20 - 33 mmol/L    Anion Gap 14.0 (H) 0.0 - 11.9    Glucose 162 (H) 65 - 99 mg/dL    Bun 23 (H) 8 - 22 mg/dL    Creatinine 1.61 (H) 0.50 - 1.40 mg/dL    Calcium 7.3 (L) 8.5 - 10.5 mg/dL    AST(SGOT) 73 (H) 12 - 45 U/L    ALT(SGPT) 23 2 - 50 U/L    Alkaline Phosphatase 119 (H) 30 - 99 U/L    Total Bilirubin 0.6 0.1 - 1.5 mg/dL    Albumin 1.9 (L) 3.2 - 4.9 g/dL    Total Protein 4.5 (L) 6.0 - 8.2 g/dL    Globulin 2.6 1.9 - 3.5 g/dL    A-G Ratio 0.7 g/dL   LIPASE   Result Value Ref Range    Lipase 6 (L) 11 - 82 U/L   TROPONIN   Result Value Ref Range    Troponin I 0.02 0.00 - 0.04 ng/mL   BTYPE NATRIURETIC PEPTIDE   Result Value Ref Range    B Natriuretic Peptide 2105 (H) 0 - 100 pg/mL   LACTIC ACID   Result Value Ref Range    Lactic Acid 9.7 (HH) 0.5 - 2.0 mmol/L   MAGNESIUM   Result Value Ref Range    Magnesium 1.9 1.5 - 2.5 mg/dL   PHOSPHORUS   Result Value Ref Range    Phosphorus 5.0 (H) 2.5 - 4.5 mg/dL   PROTHROMBIN TIME (INR)   Result Value Ref Range    PT 24.7 (H) 12.0 - 14.6 sec    INR 2.27 (H) 0.87 - 1.13   APTT   Result Value Ref Range    APTT 61.4 (H) 24.7 - 36.0 sec   URINALYSIS CULTURE, IF INDICATED   Result Value Ref Range    Color Yellow     Character Clear     Specific Gravity 1.016 <1.035    Ph 8.5 (A) 5.0 - 8.0    Glucose Negative Negative mg/dL    Ketones Negative Negative mg/dL    Protein 300 (A) Negative mg/dL    Bilirubin Negative Negative    Urobilinogen, Urine 0.2 Negative    Nitrite Negative Negative    Leukocyte Esterase Trace (A) Negative    Occult Blood Negative Negative    Micro Urine Req Microscopic     Culture Indicated Yes UA Culture   URINE MICROSCOPIC (W/UA)   Result Value Ref Range    WBC 0-2 /hpf    RBC 10-20 (A) /hpf    Bacteria Negative None /hpf     Epithelial Cells Moderate (A) /hpf    Hyaline Cast 3-5 (A) /lpf   IONIZED CALCIUM   Result Value Ref Range    Ionized Calcium 1.1 1.1 - 1.3 mmol/L   ESTIMATED GFR   Result Value Ref Range    GFR If  41 (A) >60 mL/min/1.73 m 2    GFR If Non  34 (A) >60 mL/min/1.73 m 2   DIFFERENTIAL MANUAL   Result Value Ref Range    Bands-Stabs 4.90 0.00 - 10.00 %    Metamyelocytes 3.60 %    Myelocytes 2.20 %    Progranulocytes 0.50 %    Manual Diff Status PERFORMED    PERIPHERAL SMEAR REVIEW   Result Value Ref Range    Peripheral Smear Review see below    PLATELET ESTIMATE   Result Value Ref Range    Plt Estimation Marked Decrease    MORPHOLOGY   Result Value Ref Range    RBC Morphology Present     Giant Platelets 1+     Poikilocytosis 1+     Echinocytes 1+     Smudge Cells Few    IMMATURE PLT FRACTION   Result Value Ref Range    Imm. Plt Fraction 12.7 0.6 - 13.1 K/uL   ACCU-CHEK GLUCOSE   Result Value Ref Range    Glucose - Accu-Ck 27 (LL) 65 - 99 mg/dL   ACCU-CHEK GLUCOSE   Result Value Ref Range    Glucose - Accu-Ck 110 (H) 65 - 99 mg/dL   ACCU-CHEK GLUCOSE   Result Value Ref Range    Glucose - Accu-Ck 34 (LL) 65 - 99 mg/dL   ACCU-CHEK GLUCOSE   Result Value Ref Range    Glucose - Accu-Ck 140 (H) 65 - 99 mg/dL   EKG (NOW)   Result Value Ref Range    Report       [facility] Emergency Dept.    Test Date:  2018  Pt Name:    KIM GALLARDO              Department: ER  MRN:        [id number]                      Room:        09  Gender:     Female                       Technician: [id number]  :        [id number]                   Requested By:BLOSSOM SIDDIQI  Order #:    [id number]                    Reading MD: Blossom Siddiqi    Measurements  Intervals                                Axis  Rate:       69                           P:          81  MI:         180                          QRS:        89  QRSD:       92                           T:          109  QT:         384  QTc:         412    Interpretive Statements  SINUS RHYTHM  PROBABLE LEFT VENTRICULAR HYPERTROPHY  Compared to ECG 12/11/2017 02:16:40  No significant changes    Electronically Signed On 1- 6:50:50 PST by Randy MOREAU [employer]ERIAL BLOOD GAS   Result Value Ref Range    Ph 7.161 (LL) 7.400 - 7.500    Pco2 55.9 (HH) 26.0 - 37.0 mmHg    Po2 100 (H) 64 - 87 mmHg    Tco2 22 20 - 33 mmol/L    S02 95 93 - 99 %    Hco3 19.9 17.0 - 25.0 mmol/L    BE -9 (L) -4 - 3 mmol/L    Body Temp 33.1 C degrees    O2 Therapy 100 %    Ph Temp Lorena 7.212 (LL) 7.400 - 7.500    Pco2 Temp Co 47.1 (H) 26.0 - 37.0 mmHg    Po2 Temp Cor 79 64 - 87 mmHg    Specimen Arterial     Action Range Triggered YES     Inst. Qualified Patient YES         RADIOLOGY  The radiologist's interpretation of all radiological studies have been reviewed by me.     DX-CHEST-PORTABLE (1 VIEW)   Final Result         1. No significant interval change.      DX-CHEST-PORTABLE (1 VIEW)   Final Result         1. Extensive airspace opacities throughout both lungs, most in the right lung, could relate to infection, contusion, edema or pulmonary hemorrhage, etc.      2. Moderate right pleural effusion.      CT-HEAD W/O    (Results Pending)   CT-CSPINE WITHOUT PLUS RECONS    (Results Pending)   CT-CTA CHEST PULMONARY ARTERY W/ RECONS    (Results Pending)          COURSE & MEDICAL DECISION MAKING  Nursing notes, VS, PMSFHx reviewed in chart.     Review of past medical records shows the patient was admitted in November as well as December 2017.    Differential diagnoses considered include but are not limited to: CVA/TIA/intracranial hemorrhage, syncope, meningitis/encephalitis, sepsis, hepatic encephalopathy, Sz, UTI, OD/intoxication, hypoglycemia, hypoxia, hypercarbia, CO poisoning, electrolyte abnormality, endocrine dysfunction, thyroid disorder, dementia, AMI, dissection, PE, pneumothorax, pneumomediastinum, CHF/pulm edema, pericardial effusion/tamponade, pericarditis, pneumonia,  pleural effusion, mediastinitis, esophageal rupture, pancreatitis, cholecystitis/ascending cholangitis, gallstone/biliary colic         0615: D/W Dr. Craevn, intensivist.    0625: D/W ROSITA Valle      Patient was brought by EMS to the ED status post cardiopulmonary arrest at her nursing facility. Patient was met upon arrival. LMA was placed by EMS but respiratory tech had difficulty ventilating the patient. Patient was subsequently intubated using Glidescope without difficulty. She was also noted to be hypotensive with only access being her IO placed by the EMS. Left subclavian central line was placed without any complications. Patient was started on IV fluids as well as Levophed. Her EKG showed nonspecific T-wave changes. Chest x-ray confirmed ET tube placement and central line placement. She had evidence of extensive airspace opacities bilaterally as well as moderate right pleural effusion. Labs show pancytopenia, hyponatremia, chronic renal failure and elevated lactate level. She continues to have episodes of hypoglycemia requiring glucose and glucagon. Findings discussed with Dr. Craven, intensivist, who agreed to admit patient. He would like CT head and chest performed. Discussed with ROSITA Valle who graciously agreed to admit patient. Family arrived and I met with them and answered their questions. They had thought that patient was DNR but EMS reported no records of DNR and therefore patient was treated as if she was full code.        CRITICAL CARE NOTE:  Total critical care time spent on this patient was 45 minutes exclusive of separately billable procedures.  This may include direct bedside patient care, speaking with family members, review of past medical records, reviewing the results of laboratory/diagnostic studies, consulting with other physicians, as well as evaluating the response to the therapy instituted.              FINAL IMPRESSION  1. Cardiac arrest (CMS-[facility])    2. Respiratory arrest (CMS-[facility])    3.  Hypoglycemia    4. Hyponatremia    5. Chronic renal failure, unspecified CKD stage    6. Pleural effusion on right    7. Neutropenia, unspecified type (CMS-HCC)    8. Anemia, unspecified type    9. Thrombocytopenia (CMS-HCC)    10. Elevated lactic acid level           DISPOSITION  Patient will be admitted to ICU by ROSITA Valle in guarded condition        Electronically signed by: Randy Siddiqi, 1/11/2018 4:36 AM      Portions of this record were made with voice recognition software.  Despite my review, spelling/grammar/context errors may still remain.  Interpretation of this chart should be taken in this context.

## 2018-01-11 NOTE — ED NOTES
Pt BIB EMS from [facility]. Pt was found down and was down for approximately 20 minutes. When EMS arrive pt was in asystole and CPR was started. FSBG 20. 200mLs of D10 given. Epi x3 in field. Pt regained pulses. Chris airway established. HX of dialysis, hep, HIV, recent pna.

## 2018-01-11 NOTE — ED NOTES
Niru from lab called w/ crit absolute neutrophil 0.09. Dr Siddiqi called immed informed, read back.

## 2018-01-11 NOTE — ED NOTES
Dr Leija Arrives at bedside. Called for family to come to bedside.  Pt  HR meenakshi at 30 then asystole,  No pulse palpated or auscultated at 0820.  Family arrives at bedside.

## 2018-01-11 NOTE — H&P
Internal Medicine Admitting History and Physical    Note Author: Shari Leija M.D.       Name Roger Mckeon 1970   Age/Sex 47 y.o. female   MRN [id number]   Code Status DNR     After 5PM or if no immediate response to page, please call for cross-coverage  Attending/Team: Dr. Weathers See Patient List for primary contact information  Call [phone] to page    1st Call -Dr. Leija      Chief Complaint:  Cardiac arrest/Asystole.    HPI:  46 yo female who presented to the ED s/p cardiopulmonary arrest at her nursing facility. She was found unresponsive(downtime about 20mins) before she was found unresponsive. She was found to be in asystole with a BS of 20. Patient was given D10, 3 ronds or epinephrine during ACLS protocol was administered and ROSC was obtained after which she was brought to our ED. Discussion with family regarding her overall grim prognosis was done and family agreed for patient to be DNR with likely movement towards comfort care once the family has said their goodbyes.        Review of Systems   Unable to perform ROS: Intubated          Past Medical History:   Past Medical History:   Diagnosis Date   • Dental disorder     upper dentures   • Diabetes     on tx since , diet controlled   • Dialysis patient     T, , Sat   • H/O: hysterectomy    • HIV (human immunodeficiency virus infection) (CMS-HCC) 2017   • Hypertension    • Renal failure    • Seizure disorder (CMS-HCC)        Past Surgical History:  Past Surgical History:   Procedure Laterality Date   • GASTROSCOPY N/A 5/10/2017    Procedure: GASTROSCOPY- Upper Endoscopy ;  Surgeon: Irene Jenkins M.D.;  Location: SURGERY [facility];  Service:    • AV FISTULA CREATION Left 10/4/2016    Procedure: AV FISTULA GRAFT CREATION;  Surgeon: Travon Franco M.D.;  Location: SURGERY [facility];  Service:    • GASTROSCOPY WITH BIOPSY N/A 2016    Procedure: GASTROSCOPY WITH BIOPSY;  Surgeon: Rachid Salazar,  M.D.;  Location: SURGERY [facility];  Service:    • INCISION AND DRAINAGE GENERAL  2013    Performed by Conrado Prado M.D. at SURGERY [facility]   • GYN SURGERY         • HYSTERECTOMY LAPAROSCOPY      still has ovaries   • HYSTERECTOMY, VAGINAL     • OTHER      back surgery        Current Outpatient Medications:  Home Medications     Reviewed by Amrita Weber (Pharmacy Tech) on 18 at 0805  Med List Status: Complete   Medication Last Dose Status   acyclovir (ZOVIRAX) 400 MG tablet 1/10/2018 Active   amlodipine (NORVASC) 10 MG Tab 1/10/2018 Active   dolutegravir (TIVICAY) 50 MG Tab tablet 1/10/2018 Active   doxycycline (VIBRAMYCIN) 100 MG Tab 1/10/2018 Active   fluoxetine (PROZAC) 10 MG Cap 1/10/2018 Active   hydrocodone-acetaminophen (NORCO) 5-325 MG Tab per tablet 1/10/2018 Active   polyethylene glycol/lytes (MIRALAX) Pack 2018 Active   Rilpivirine HCl (EDURANT) 25 MG Tab 1/10/2018 Active   senna-docusate (PERICOLACE OR SENOKOT S) 8.6-50 MG Tab 1/10/2018 Active   Sevelamer Carbonate (RENVELA) 800 MG Tab 1/10/2018 Active                Medication Allergy/Sensitivities:  Allergies   Allergen Reactions   • Oxycodone Itching and Nausea         Family History:  Family History   Problem Relation Age of Onset   • Diabetes Mother    • Diabetes Father    • Hypertension Father    • Diabetes Sister        Social History:  Social History     Social History   • Marital status:      Spouse name: N/A   • Number of children: N/A   • Years of education: N/A     Occupational History   • Not on file.     Social History Main Topics   • Smoking status: Never Smoker   • Smokeless tobacco: Never Used   • Alcohol use No   • Drug use: No   • Sexual activity: Not on file     Other Topics Concern   • Not on file     Social History Narrative   • No narrative on file     Living situation: Currently at [facility]  PCP : Shonna Willoughby M.D.      Physical Exam     Vitals:  "   18 0717 18 0720 18 0726 18 0820   Pulse: (!) 50 (!) 40 (!) 48    Resp: (!) 21 (!) 25 (!) 28    SpO2: (!) 57% (!) 51% (!) 51% (!) 36%   Weight:       Height:         Body mass index is 19.37 kg/m².  Pulse (!) 48   Resp (!) 28   Ht 1.676 m (5' 6\")   Wt 54.4 kg (120 lb)   LMP 2010   SpO2 (!) 36%   BMI 19.37 kg/m²   O2 therapy: Pulse Oximetry: (!) 36 %, O2 Delivery: Ventilator    Physical Exam   Constitutional:   Cachectic, frail and debilitated.   Intubated, unresponsive despite not being on sedatives   HENT:   Head: Normocephalic and atraumatic.   Eyes: No scleral icterus.   No corneal reflex, mildly reactive to light.    Neck: No JVD present. No tracheal deviation present.   Cardiovascular: Normal rate and regular rhythm.  Exam reveals no friction rub.    No murmur heard.  Pulmonary/Chest: She has no wheezes. She exhibits no tenderness.   Coarse bs, no rales or wheezes   Abdominal: Soft. Bowel sounds are normal. She exhibits no distension. There is no tenderness.   Musculoskeletal: She exhibits no edema.   Neurological:   No gag reflex  Unresponsive including to pain   Skin: Skin is warm and dry.   Multiple lesions in her legs bilaterally          Data Review       Old Records Request:   Completed  Current Records review and summary: Completed    Lab Data Review:  Recent Results (from the past 24 hour(s))   ACCU-CHEK GLUCOSE    Collection Time: 18  4:37 AM   Result Value Ref Range    Glucose - Accu-Ck 27 (LL) 65 - 99 mg/dL   EKG (NOW)    Collection Time: 18  4:39 AM   Result Value Ref Range    Report       [facility] Emergency Dept.    Test Date:  2018  Pt Name:    KIM GALLARDO              Department: ER  MRN:        [id number]                      Room:        09  Gender:     Female                       Technician: [id number]  :        1970                   Requested By:BLOSSOM RESENDEZ  Order #:    [id number]                    Reading " MD: Randy Siddiqi    Measurements  Intervals                                Axis  Rate:       69                           P:          81  DC:         180                          QRS:        89  QRSD:       92                           T:          109  QT:         384  QTc:        412    Interpretive Statements  SINUS RHYTHM  PROBABLE LEFT VENTRICULAR HYPERTROPHY  Compared to ECG 12/11/2017 02:16:40  No significant changes    Electronically Signed On 1- 6:50:50 PST by Randy Siddiqi     URINALYSIS CULTURE, IF INDICATED    Collection Time: 01/11/18  4:45 AM   Result Value Ref Range    Color Yellow     Character Clear     Specific Gravity 1.016 <1.035    Ph 8.5 (A) 5.0 - 8.0    Glucose Negative Negative mg/dL    Ketones Negative Negative mg/dL    Protein 300 (A) Negative mg/dL    Bilirubin Negative Negative    Urobilinogen, Urine 0.2 Negative    Nitrite Negative Negative    Leukocyte Esterase Trace (A) Negative    Occult Blood Negative Negative    Micro Urine Req Microscopic     Culture Indicated Yes UA Culture   URINE MICROSCOPIC (W/UA)    Collection Time: 01/11/18  4:45 AM   Result Value Ref Range    WBC 0-2 /hpf    RBC 10-20 (A) /hpf    Bacteria Negative None /hpf    Epithelial Cells Moderate (A) /hpf    Hyaline Cast 3-5 (A) /lpf   CBC WITH DIFFERENTIAL    Collection Time: 01/11/18  5:00 AM   Result Value Ref Range    WBC 0.9 (LL) 4.8 - 10.8 K/uL    RBC 2.82 (L) 4.20 - 5.40 M/uL    Hemoglobin 8.7 (L) 12.0 - 16.0 g/dL    Hematocrit 28.1 (L) 37.0 - 47.0 %    MCV 99.6 (H) 81.4 - 97.8 fL    MCH 30.9 27.0 - 33.0 pg    MCHC 31.0 (L) 33.6 - 35.0 g/dL    RDW 59.1 (H) 35.9 - 50.0 fL    Platelet Count 34 (LL) 164 - 446 K/uL    MPV 10.2 9.0 - 12.9 fL    Nucleated RBC 2.20 /100 WBC    NRBC (Absolute) 0.02 K/uL    Neutrophils-Polys 5.40 (L) 44.00 - 72.00 %    Lymphocytes 74.10 (H) 22.00 - 41.00 %    Monocytes 1.30 0.00 - 13.40 %    Eosinophils 8.00 (H) 0.00 - 6.90 %    Basophils 0.00 0.00 - 1.80 %    Neutrophils (Absolute) 0.09  (LL) 2.00 - 7.15 K/uL    Lymphs (Absolute) 0.67 (L) 1.00 - 4.80 K/uL    Monos (Absolute) 0.01 0.00 - 0.85 K/uL    Eos (Absolute) 0.07 0.00 - 0.51 K/uL    Baso (Absolute) 0.00 0.00 - 0.12 K/uL   COMP METABOLIC PANEL    Collection Time: 01/11/18  5:00 AM   Result Value Ref Range    Sodium 127 (L) 135 - 145 mmol/L    Potassium 3.6 3.6 - 5.5 mmol/L    Chloride 91 (L) 96 - 112 mmol/L    Co2 22 20 - 33 mmol/L    Anion Gap 14.0 (H) 0.0 - 11.9    Glucose 162 (H) 65 - 99 mg/dL    Bun 23 (H) 8 - 22 mg/dL    Creatinine 1.61 (H) 0.50 - 1.40 mg/dL    Calcium 7.3 (L) 8.5 - 10.5 mg/dL    AST(SGOT) 73 (H) 12 - 45 U/L    ALT(SGPT) 23 2 - 50 U/L    Alkaline Phosphatase 119 (H) 30 - 99 U/L    Total Bilirubin 0.6 0.1 - 1.5 mg/dL    Albumin 1.9 (L) 3.2 - 4.9 g/dL    Total Protein 4.5 (L) 6.0 - 8.2 g/dL    Globulin 2.6 1.9 - 3.5 g/dL    A-G Ratio 0.7 g/dL   LIPASE    Collection Time: 01/11/18  5:00 AM   Result Value Ref Range    Lipase 6 (L) 11 - 82 U/L   TROPONIN    Collection Time: 01/11/18  5:00 AM   Result Value Ref Range    Troponin I 0.02 0.00 - 0.04 ng/mL   BTYPE NATRIURETIC PEPTIDE    Collection Time: 01/11/18  5:00 AM   Result Value Ref Range    B Natriuretic Peptide 2105 (H) 0 - 100 pg/mL   LACTIC ACID    Collection Time: 01/11/18  5:00 AM   Result Value Ref Range    Lactic Acid 9.7 (HH) 0.5 - 2.0 mmol/L   MAGNESIUM    Collection Time: 01/11/18  5:00 AM   Result Value Ref Range    Magnesium 1.9 1.5 - 2.5 mg/dL   PHOSPHORUS    Collection Time: 01/11/18  5:00 AM   Result Value Ref Range    Phosphorus 5.0 (H) 2.5 - 4.5 mg/dL   PROTHROMBIN TIME (INR)    Collection Time: 01/11/18  5:00 AM   Result Value Ref Range    PT 24.7 (H) 12.0 - 14.6 sec    INR 2.27 (H) 0.87 - 1.13   APTT    Collection Time: 01/11/18  5:00 AM   Result Value Ref Range    APTT 61.4 (H) 24.7 - 36.0 sec   IONIZED CALCIUM    Collection Time: 01/11/18  5:00 AM   Result Value Ref Range    Ionized Calcium 1.1 1.1 - 1.3 mmol/L   ESTIMATED GFR    Collection Time:  01/11/18  5:00 AM   Result Value Ref Range    GFR If  41 (A) >60 mL/min/1.73 m 2    GFR If Non  34 (A) >60 mL/min/1.73 m 2   DIFFERENTIAL MANUAL    Collection Time: 01/11/18  5:00 AM   Result Value Ref Range    Bands-Stabs 4.90 0.00 - 10.00 %    Metamyelocytes 3.60 %    Myelocytes 2.20 %    Progranulocytes 0.50 %    Manual Diff Status PERFORMED    PERIPHERAL SMEAR REVIEW    Collection Time: 01/11/18  5:00 AM   Result Value Ref Range    Peripheral Smear Review see below    PLATELET ESTIMATE    Collection Time: 01/11/18  5:00 AM   Result Value Ref Range    Plt Estimation Marked Decrease    MORPHOLOGY    Collection Time: 01/11/18  5:00 AM   Result Value Ref Range    RBC Morphology Present     Giant Platelets 1+     Poikilocytosis 1+     Echinocytes 1+     Smudge Cells Few    IMMATURE PLT FRACTION    Collection Time: 01/11/18  5:00 AM   Result Value Ref Range    Imm. Plt Fraction 12.7 0.6 - 13.1 K/uL   ACCU-CHEK GLUCOSE    Collection Time: 01/11/18  5:11 AM   Result Value Ref Range    Glucose - Accu-Ck 110 (H) 65 - 99 mg/dL   ISTAT ARTERIAL BLOOD GAS    Collection Time: 01/11/18  5:46 AM   Result Value Ref Range    Ph 7.161 (LL) 7.400 - 7.500    Pco2 55.9 (HH) 26.0 - 37.0 mmHg    Po2 100 (H) 64 - 87 mmHg    Tco2 22 20 - 33 mmol/L    S02 95 93 - 99 %    Hco3 19.9 17.0 - 25.0 mmol/L    BE -9 (L) -4 - 3 mmol/L    Body Temp 33.1 C degrees    O2 Therapy 100 %    Ph Temp [place] 7.212 (LL) 7.400 - 7.500    Pco2 Temp Co 47.1 (H) 26.0 - 37.0 mmHg    Po2 Temp Cor 79 64 - 87 mmHg    Specimen Arterial     Action Range Triggered YES     Inst. Qualified Patient YES    ACCU-CHEK GLUCOSE    Collection Time: 01/11/18  6:19 AM   Result Value Ref Range    Glucose - Accu-Ck 34 (LL) 65 - 99 mg/dL   ACCU-CHEK GLUCOSE    Collection Time: 01/11/18  7:00 AM   Result Value Ref Range    Glucose - Accu-Ck 140 (H) 65 - 99 mg/dL       Imaging/Procedures Review:    ndependant Imaging Review: Completed  DX-CHEST-PORTABLE  (1 VIEW)   Final Result         1. No significant interval change.      DX-CHEST-PORTABLE (1 VIEW)   Final Result         1. Extensive airspace opacities throughout both lungs, most in the right lung, could relate to infection, contusion, edema or pulmonary hemorrhage, etc.      2. Moderate right pleural effusion.      (y) Records reviewed and summarized in current documentation             Assessment/Plan   Patient was intubated a the time of our encounter, no further history could be obtained. We started her on the sepsis protocol for presumed sepsis and initiated her on broad spectrum antibiotics with cefepime and vancomycin. Pressors were added to support her blood pressure to obtain imaging, however patient could not be stabilized-on levophed, vasopressin and dopamine, with blood pressure still not being able to sustain. At around 8:12, was paged about patient still not being able to support her BP with a weak pulse, 8:18 at patient bedside and patient found to be in asystole. As per family wishes, no further resuscitation efforts were made.     Anticipated Hospital stay:  >2 midnights    Quality Measures    Julien catheter::  Critically Ill - Requiring Accurate Measurement of Urinary Output

## 2018-01-13 LAB
ANNOTATION COMMENT IMP: ABNORMAL
BACTERIA BLD CULT: ABNORMAL
BACTERIA UR CULT: NORMAL
CD3 CELLS # BLD: 275 CELLS/UL (ref 570–2400)
CD3+CD4+ CELLS # BLD: 78 CELLS/UL (ref 430–1800)
CD3+CD4+ CELLS/CD3+CD8+ CLL BLD: 0.39 RATIO (ref 0.8–3.9)
CD3+CD8+ CELLS # BLD: 199 CELLS/UL (ref 210–1200)
SIGNIFICANT IND 70042: ABNORMAL
SIGNIFICANT IND 70042: ABNORMAL
SIGNIFICANT IND 70042: NORMAL
SITE SITE: ABNORMAL
SITE SITE: ABNORMAL
SITE SITE: NORMAL
SOURCE SOURCE: ABNORMAL
SOURCE SOURCE: ABNORMAL
SOURCE SOURCE: NORMAL

## 2018-11-25 NOTE — DISCHARGE SUMMARY
DATE OF ADMISSION:  06/10/2017    DATE OF DISCHARGE:  06/12/2017    Please see the H and P for full history.    CONSULTATIONS:  Dr. Fadi Najjar and Dr. Maximiliano Morgan of infectious disease.    HISTORY OF PRESENT ILLNESS:  This patient came in with fever, known history of   HIV and end-stage renal disease, on dialysis.  Nephrology was consulted and   infectious disease was consulted.  Patient was given broad spectrum   antibiotics.  He had dialysis during the hospital stay, here hemoglobin did   drop to less than 7, she was transfused 1 unit of PRBCs.  Fever went away and   infectious disease recommended stopping all antibiotics except for what she   was on prior to her coming into the hospital.  She was stable for discharge on   06/12/2017.  She made remarkable improvement during the hospital stay, stable   with the below medications, Tivicay 50 mg daily, Valtrex 500 mg b.i.d.,   lisinopril 20 mg daily, Bactrim single strength 1 tablet daily, ethambutol 800   mg daily, Xanax 0.25 mg t.i.d. p.r.n. for anxiety, Prozac 10 mg daily, and   sodium bicarbonate 650 mg t.i.d.    PERTINENT LABORATORY DATA:  White cell count of 4, hemoglobin 6.7, hematocrit   28, and platelet count is 118.  Sodium 132, BUN 22, and creatinine of 2.79.    Blood culture is negative.  Urine culture was positive for group D   Enterococcus.    IMPRESSION:  1.  Acute GI due to group D Enterococcus.  2.  Febrile illness, resolved.  3.  End-stage renal disease, on dialysis.  4.  Human immunodeficiency virus positive.  5.  Anemia, requiring blood transfusion.  6.  History of hypertension.  7.  Herpes of the genitalia.  8.  History of esophageal candidiasis.    TOTAL TIME SPENT:  On the day of discharge, 38 minutes taken with this   patient.       ____________________________________     MD OLIMPIA RESENDEZ / JUAN    DD:  06/12/2017 09:36:17  DT:  06/12/2017 18:05:32    D#:  [id number]  Job#:  [id number]  
shortness of breath

## 2019-12-20 NOTE — PROGRESS NOTES
- Continuing atorvastatin 20mg per G daily.   Received report from NOC RN, assumed care of patient at 0700. Patient is awake alert and oriented x 4. Patient is bed bound very weak. Turns with assistance of 1. Patient is incontinent of stool, C-diff negative. Patient denies pain this morning, no c/o nausea. Patient updated on plan of care, no dialysis scheduled today. Call light within reach, bed in low and locked position.

## 2022-01-01 NOTE — PROGRESS NOTES
"Pharmacy Kinetics 47 y.o. female on vancomycin day # 2 2017    Currently on Vancomycin Level dependent    Indication for Treatment: Step bacteremia    Pertinent history per medical record: Admitted on 2017 for SIRS, bacteremia, possible pneumonia, anemia, hyponatremia.  PMH: HIV/AIDS, ESRD on HD (no HD x 1 wee.    Other antibiotics: Ceftriaxone, Azithromycin, ethambutol, fluconazole, nystatin    Allergies: Oxycodone     List concerns for renal function: ESRD on HD    Pertinent cultures to date:    BC periph x 2 Possible Strep    Recent Labs      17   1832  17   0503  17   0507   WBC  5.0  4.6*  2.8*   NEUTSPOLYS  88.00*  87.00*   --    BANDSSTABS  1.00  2.00   --      Recent Labs      17   1832  17   0503  17   0507   BUN  44*  53*  28*   CREATININE  3.66*  3.71*  2.65*   ALBUMIN  2.0*  2.0*   --      Recent Labs      17   0856   VANCORANDOM  13.2     Intake/Output Summary (Last 24 hours) at 17 0941  Last data filed at 17 0600   Gross per 24 hour   Intake   2470 ml   Output   2500 ml   Net    -30 ml      Blood pressure 120/75, pulse 91, temperature 36.7 °C (98 °F), resp. rate 18, height 1.676 m (5' 6\"), weight 54.5 kg (120 lb 2.4 oz), last menstrual period 2010, SpO2 100 %. Temp (24hrs), Av.2 °C (98.9 °F), Min:36.7 °C (98 °F), Max:37.4 °C (99.3 °F)      A/P   1. Vancomycin dose change: Give 1 gram today  2. Next vancomycin level:  at 10:30  3. Goal trough: 12-16 mcg/ml  4. Comments: Pulse dosing.    Chetna Gallegos      " CCHD passed: 100% right hand, 100% right foot

## 2024-11-06 NOTE — IP AVS SNAPSHOT
" <p align=\"LEFT\"><IMG SRC=\"[email or web address]\" alt=\"Image\" WIDTH=\"50%\" HEIGHT=\"200\" BORDER=\"\"></p>                   Name:Roger Mckeon  Medical Record Number:[id number]  CSN: [id number]    YOB: 1970   Age: 47 y.o.  Sex: female  HT:1.676 m (5' 6\") WT: 49.5 kg (109 lb 2 oz)          Admit Date: 6/3/2017     Discharge Date:   Today's Date: 6/7/2017  Attending Doctor:  Amy Linares M.D.                  Allergies:  Oxycodone          Follow-up Information     1. Follow up with [facility].    Why:  Dr. Morgan for HIV follow ups    Contact information    [address] [place] [id number] [phone]        2. Go to Nephrology.    Why:  As scheduled for HD         Medication List      Take these Medications        Instructions    acyclovir 800 MG Tabs   Commonly known as:  ZOVIRAX    Take 800 mg by mouth every day. Pt started a 7 day course on 6/2   Dose:  800 mg       azithromycin 250 MG Tabs   Commonly known as:  ZITHROMAX    Take 250 mg by mouth every day. Pt started a 10 day course on 5/14   Dose:  250 mg       diphenoxylate-atropine 2.5-0.025 MG Tabs   Commonly known as:  LOMOTIL    Take 2 Tabs by mouth 4 times a day as needed for Diarrhea. Indications: Diarrhea   Dose:  2 Tab       ethambutol 400 MG Tabs   Commonly known as:  MYAMBUTOL    Take 800 mg by mouth every day. Pt started a 14 day course on 6/2   Dose:  800 mg       fluconazole 200 MG Tabs   Commonly known as:  DIFLUCAN    Take 200 mg by mouth every day. Pt started a 10 day course on 5/14   Dose:  200 mg       fluoxetine 10 MG Caps   Commonly known as:  PROZAC    Take 10 mg by mouth every day. Indications: Depression   Dose:  10 mg       hydrOXYzine 25 MG Tabs   Commonly known as:  ATARAX    Take 25 mg by mouth 4 times a day.   Dose:  25 mg       K-PHOS 500 MG tablet   Generic drug:  potassium phosphate (monobasic)    Take 1 Tab by mouth every day. Indications: Supplement   Dose:  1 Tab       moxifloxacin 0.5 % Soln   " Left message for pt to call me, need to see if she can move surg up to 11-20     Commonly known as:  VIGAMOX    Place 1 Drop in left eye 4 times a day. Indications: EYE   Dose:  1 Drop       Non Formulary Request    Take 25 mg by mouth every day. Rilpivirine- take with food   Dose:  25 mg       nystatin [id number] UNIT/ML Susp   Commonly known as:  MYCOSTATIN    Take 500,000 Units by mouth 4 times a day. Indications: Candidiasis Fungal Infection of the Oropharynx   Dose:  [id number] Units       ondansetron 4 MG Tbdp   Commonly known as:  ZOFRAN ODT    Take 1 Tab by mouth every four hours as needed for Nausea/Vomiting (give PO if no IV route available).   Dose:  4 mg       prednisoLONE acetate 1 % Susp   Commonly known as:  PRED FORTE    Place 1 Drop in left eye 4 times a day. Indications: post eye surgery   Dose:  1 Drop       sodium bicarbonate 650 MG Tabs   Commonly known as:  SODIUM BICARBONATE    Take 1 Tab by mouth 3 times a day.   Dose:  650 mg       sulfamethoxazole-trimethoprim 400-80 MG Tabs   Commonly known as:  BACTRIM    Take 1 Tab by mouth every day.   Dose:  1 Tab       TIVICAY 50 MG Tabs tablet   Generic drug:  dolutegravir    Take 50 mg by mouth every day.   Dose:  50 mg       XANAX 0.25 MG Tabs   Generic drug:  alprazolam    Take 0.25 mg by mouth 3 times a day as needed for Anxiety. Indications: Feeling Anxious   Dose:  0.25 mg

## 2025-01-06 NOTE — DISCHARGE PLANNING
Medical Social Work    Referral: Pt discussed at IDT rounds this AM.    Intervention: Pt stated that she might be out of SNF days.      Plan: SW available for any assistance with d.c planning.   diarrhea

## (undated) DEVICE — FORCEP RADIAL JAW 4 STANDARD CAPACITY W/NEEDLE 240CM (40EA/BX)

## (undated) DEVICE — SET EXTENSION WITH 2 PORTS (48EA/CA) ***PART #2C8610 IS A SUBSTITUTE*****

## (undated) DEVICE — SITE INJ 7/8IN IV M LL ADPR - (100/CA) THIS IS A CUSTOM ITEM AND HAS A 30 DAY LEAD TIME

## (undated) DEVICE — TUBING CLEARLINK DUO-VENT - C-FLO (48EA/CA)

## (undated) DEVICE — GOWN SURGEONS LARGE - (32/CA)

## (undated) DEVICE — BITE BLOCK ADULT 60FR (100EA/CA)

## (undated) DEVICE — GLOVE, LITE (PAIR)

## (undated) DEVICE — ELECTRODE 850 FOAM ADHESIVE - HYDROGEL RADIOTRNSPRNT (50/PK)

## (undated) DEVICE — CANNULA W/ SUPPLY TUBING O2 - (50/CA)

## (undated) DEVICE — BASIN EMESIS DISP. - (250/CA)

## (undated) DEVICE — SYRINGE DISP. 50CC LS - (40/BX)

## (undated) DEVICE — LACTATED RINGERS INJ 1000 ML - (14EA/CA 60CA/PF)

## (undated) DEVICE — KIT  I.V. START (100EA/CA)

## (undated) DEVICE — TUBE CONNECTING SUCTION - CLEAR PLASTIC STERILE 72 IN (50EA/CA)

## (undated) DEVICE — TUBE SUCTION YANKAUER  1/4 X 6FT (20EA/CA)"

## (undated) DEVICE — SENSOR SPO2 ADULT LNCS ADTX (20/BX) ORDER ITEM #19593

## (undated) DEVICE — SPONGE GAUZE NON-STERILE 4X4 - (2000/CA 10PK/CA)